# Patient Record
Sex: FEMALE | Race: WHITE | NOT HISPANIC OR LATINO | Employment: FULL TIME | URBAN - METROPOLITAN AREA
[De-identification: names, ages, dates, MRNs, and addresses within clinical notes are randomized per-mention and may not be internally consistent; named-entity substitution may affect disease eponyms.]

---

## 2018-04-24 ENCOUNTER — APPOINTMENT (OUTPATIENT)
Dept: GENERAL RADIOLOGY | Facility: HOSPITAL | Age: 38
End: 2018-04-24

## 2018-04-24 ENCOUNTER — HOSPITAL ENCOUNTER (EMERGENCY)
Facility: HOSPITAL | Age: 38
Discharge: HOME | End: 2018-04-24
Attending: EMERGENCY MEDICINE | Admitting: EMERGENCY MEDICINE

## 2018-04-24 ENCOUNTER — APPOINTMENT (OUTPATIENT)
Dept: CT IMAGING | Facility: HOSPITAL | Age: 38
End: 2018-04-24

## 2018-04-24 VITALS
HEIGHT: 63 IN | WEIGHT: 185 LBS | HEART RATE: 77 BPM | SYSTOLIC BLOOD PRESSURE: 129 MMHG | DIASTOLIC BLOOD PRESSURE: 73 MMHG | BODY MASS INDEX: 32.78 KG/M2 | OXYGEN SATURATION: 99 % | RESPIRATION RATE: 18 BRPM | TEMPERATURE: 97.9 F

## 2018-04-24 DIAGNOSIS — M25.551 RIGHT HIP PAIN: ICD-10-CM
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
## 2018-04-24 DIAGNOSIS — S36.60XA RECTUM INJURY, INITIAL ENCOUNTER: ICD-10-CM
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
## 2018-04-24 LAB
ALBUMIN SERPL-MCNC: 4.4 G/DL (ref 3.2–4.8)
ALBUMIN/GLOB SERPL: 1.4 G/DL (ref 1.5–2.5)
ALP SERPL-CCNC: 95 U/L (ref 25–100)
ALT SERPL W P-5'-P-CCNC: 39 U/L (ref 7–40)
AMORPH URATE CRY URNS QL MICRO: ABNORMAL /HPF
ANION GAP SERPL CALCULATED.3IONS-SCNC: 8 MMOL/L (ref 3–11)
AST SERPL-CCNC: 41 U/L (ref 0–33)
B-HCG UR QL: NEGATIVE
BACTERIA UR QL AUTO: ABNORMAL /HPF
BASOPHILS # BLD AUTO: 0.01 10*3/MM3 (ref 0–0.2)
BASOPHILS NFR BLD AUTO: 0.1 % (ref 0–1)
BILIRUB SERPL-MCNC: 0.5 MG/DL (ref 0.3–1.2)
BILIRUB UR QL STRIP: NEGATIVE
BUN BLD-MCNC: 9 MG/DL (ref 9–23)
BUN/CREAT SERPL: 11.3 (ref 7–25)
CALCIUM SPEC-SCNC: 9.4 MG/DL (ref 8.7–10.4)
CHLORIDE SERPL-SCNC: 104 MMOL/L (ref 99–109)
CLARITY UR: ABNORMAL
CO2 SERPL-SCNC: 26 MMOL/L (ref 20–31)
COLOR UR: ABNORMAL
CREAT BLD-MCNC: 0.8 MG/DL (ref 0.6–1.3)
DEPRECATED RDW RBC AUTO: 42.5 FL (ref 37–54)
EOSINOPHIL # BLD AUTO: 0.06 10*3/MM3 (ref 0–0.3)
EOSINOPHIL NFR BLD AUTO: 0.7 % (ref 0–3)
ERYTHROCYTE [DISTWIDTH] IN BLOOD BY AUTOMATED COUNT: 12.4 % (ref 11.3–14.5)
ETHANOL BLD-MCNC: 37 MG/DL (ref 0–10)
GFR SERPL CREATININE-BSD FRML MDRD: 81 ML/MIN/1.73
GLOBULIN UR ELPH-MCNC: 3.1 GM/DL
GLUCOSE BLD-MCNC: 96 MG/DL (ref 70–100)
GLUCOSE UR STRIP-MCNC: NEGATIVE MG/DL
HCT VFR BLD AUTO: 38.1 % (ref 34.5–44)
HGB BLD-MCNC: 13.1 G/DL (ref 11.5–15.5)
HGB UR QL STRIP.AUTO: ABNORMAL
HOLD SPECIMEN: NORMAL
HOLD SPECIMEN: NORMAL
HYALINE CASTS UR QL AUTO: ABNORMAL /LPF
IMM GRANULOCYTES # BLD: 0.02 10*3/MM3 (ref 0–0.03)
IMM GRANULOCYTES NFR BLD: 0.2 % (ref 0–0.6)
INTERNAL NEGATIVE CONTROL: NEGATIVE
INTERNAL POSITIVE CONTROL: POSITIVE
KETONES UR QL STRIP: ABNORMAL
LEUKOCYTE ESTERASE UR QL STRIP.AUTO: ABNORMAL
LYMPHOCYTES # BLD AUTO: 1.8 10*3/MM3 (ref 0.6–4.8)
LYMPHOCYTES NFR BLD AUTO: 22.3 % (ref 24–44)
Lab: NORMAL
MCH RBC QN AUTO: 31.9 PG (ref 27–31)
MCHC RBC AUTO-ENTMCNC: 34.4 G/DL (ref 32–36)
MCV RBC AUTO: 92.7 FL (ref 80–99)
MONOCYTES # BLD AUTO: 0.5 10*3/MM3 (ref 0–1)
MONOCYTES NFR BLD AUTO: 6.2 % (ref 0–12)
NEUTROPHILS # BLD AUTO: 5.71 10*3/MM3 (ref 1.5–8.3)
NEUTROPHILS NFR BLD AUTO: 70.7 % (ref 41–71)
NITRITE UR QL STRIP: NEGATIVE
OVAL FAT BODIES #/AREA URNS AUTO: ABNORMAL /HPF
PH UR STRIP.AUTO: <=5 [PH] (ref 5–8)
PLATELET # BLD AUTO: 278 10*3/MM3 (ref 150–450)
PMV BLD AUTO: 9.8 FL (ref 6–12)
POTASSIUM BLD-SCNC: 3.8 MMOL/L (ref 3.5–5.5)
PROT SERPL-MCNC: 7.5 G/DL (ref 5.7–8.2)
PROT UR QL STRIP: ABNORMAL
RBC # BLD AUTO: 4.11 10*6/MM3 (ref 3.89–5.14)
RBC # UR: ABNORMAL /HPF
REF LAB TEST METHOD: ABNORMAL
SODIUM BLD-SCNC: 138 MMOL/L (ref 132–146)
SP GR UR STRIP: 1.03 (ref 1–1.03)
SQUAMOUS #/AREA URNS HPF: ABNORMAL /HPF
UROBILINOGEN UR QL STRIP: ABNORMAL
WBC NRBC COR # BLD: 8.08 10*3/MM3 (ref 3.5–10.8)
WBC UR QL AUTO: ABNORMAL /HPF
WHOLE BLOOD HOLD SPECIMEN: NORMAL
WHOLE BLOOD HOLD SPECIMEN: NORMAL

## 2018-04-24 PROCEDURE — 80307 DRUG TEST PRSMV CHEM ANLYZR: CPT | Performed by: PHYSICIAN ASSISTANT

## 2018-04-24 PROCEDURE — 87086 URINE CULTURE/COLONY COUNT: CPT | Performed by: PHYSICIAN ASSISTANT

## 2018-04-24 PROCEDURE — 85025 COMPLETE CBC W/AUTO DIFF WBC: CPT | Performed by: PHYSICIAN ASSISTANT

## 2018-04-24 PROCEDURE — 72192 CT PELVIS W/O DYE: CPT

## 2018-04-24 PROCEDURE — 99284 EMERGENCY DEPT VISIT MOD MDM: CPT

## 2018-04-24 PROCEDURE — 81001 URINALYSIS AUTO W/SCOPE: CPT | Performed by: PHYSICIAN ASSISTANT

## 2018-04-24 PROCEDURE — 73502 X-RAY EXAM HIP UNI 2-3 VIEWS: CPT

## 2018-04-24 PROCEDURE — 80053 COMPREHEN METABOLIC PANEL: CPT | Performed by: PHYSICIAN ASSISTANT

## 2018-04-24 PROCEDURE — 70450 CT HEAD/BRAIN W/O DYE: CPT

## 2018-04-24 RX ORDER — HYDROCODONE BITARTRATE AND ACETAMINOPHEN 5; 325 MG/1; MG/1
1 TABLET ORAL ONCE
Status: COMPLETED | OUTPATIENT
Start: 2018-04-24 | End: 2018-04-24

## 2018-04-24 RX ORDER — IBUPROFEN 800 MG/1
800 TABLET ORAL EVERY 8 HOURS PRN
Qty: 30 TABLET | Refills: 0 | Status: SHIPPED | OUTPATIENT
Start: 2018-04-24 | End: 2018-08-21

## 2018-04-24 RX ADMIN — HYDROCODONE BITARTRATE AND ACETAMINOPHEN 1 TABLET: 5; 325 TABLET ORAL at 13:54
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
## 2018-04-24 NOTE — DISCHARGE INSTRUCTIONS
Follow up with one of the physician centers below to setup primary care.    [facility], [facility], [phone], [address], [address], [place], [address]    [facility], [phone], [address], [address]    [facility], [phone], [address] [place], [address];     [facility], [phone], [address], [address]        Follow up with one of the [facility] physician groups below to setup primary care. If you have trouble following up, please call Ktaie Huerta, our transitional care nurse, at [phone].    (Dr. Brooks, Dr. Casas, Dr. Rhoades, and Dr. Bear.)  [facility], Primary Care, [phone], [address], [place] [address]    [facility], Primary Care, [phone], [address], [address] [place], [address] [facility], Primary Care, [phone], [address], [address] [place], [address] [facility], Primary Care, [phone], [address], [address] [place], [address]     [place] [facility], Primary Care, [phone], [address], [address] [place], [address]    [place] [facility], Primary Care, [phone], [address]    [place], [address] [facility], Primary Care, [phone], [address], [address] [place], [address] [facility], Primary Care, [phone], [address], [address] [place], [address] [facility]) [facility], Primary Care, [phone], [address], [address] [place], [address] [facility]  [facility], Primary Care, [phone], [address], [address] [place], [address] [place] [address]     ([facility]) [facility], Primary Care, [phone], [address], [place], [address] [place] [address], Primary Care, [phone], [address], [address] [place] [address]

## 2018-04-26 LAB — BACTERIA SPEC AEROBE CULT: NORMAL

## 2018-08-21 ENCOUNTER — HOSPITAL ENCOUNTER (EMERGENCY)
Facility: HOSPITAL | Age: 38
Discharge: HOME | End: 2018-08-21
Attending: EMERGENCY MEDICINE | Admitting: EMERGENCY MEDICINE

## 2018-08-21 VITALS
SYSTOLIC BLOOD PRESSURE: 129 MMHG | HEIGHT: 63 IN | BODY MASS INDEX: 31.54 KG/M2 | RESPIRATION RATE: 16 BRPM | WEIGHT: 178 LBS | HEART RATE: 60 BPM | OXYGEN SATURATION: 100 % | DIASTOLIC BLOOD PRESSURE: 57 MMHG | TEMPERATURE: 98 F

## 2018-08-21 DIAGNOSIS — Z33.1 IUP (INTRAUTERINE PREGNANCY), INCIDENTAL: ICD-10-CM

## 2018-08-21 DIAGNOSIS — A59.01 TRICHOMONAS VAGINITIS: ICD-10-CM

## 2018-08-21 DIAGNOSIS — F11.93 OPIATE WITHDRAWAL (HCC): Primary | ICD-10-CM

## 2018-08-21 LAB
ANION GAP SERPL CALCULATED.3IONS-SCNC: 7 MMOL/L (ref 3–11)
BACTERIA UR QL AUTO: ABNORMAL /HPF
BASOPHILS # BLD AUTO: 0.01 10*3/MM3 (ref 0–0.2)
BASOPHILS NFR BLD AUTO: 0.2 % (ref 0–1)
BILIRUB UR QL STRIP: NEGATIVE
BUN BLD-MCNC: 7 MG/DL (ref 9–23)
BUN/CREAT SERPL: 12.5 (ref 7–25)
CALCIUM SPEC-SCNC: 9.2 MG/DL (ref 8.7–10.4)
CHLORIDE SERPL-SCNC: 107 MMOL/L (ref 99–109)
CLARITY UR: CLEAR
CLUE CELLS SPEC QL WET PREP: ABNORMAL
CO2 SERPL-SCNC: 23 MMOL/L (ref 20–31)
COLOR UR: YELLOW
CREAT BLD-MCNC: 0.56 MG/DL (ref 0.6–1.3)
DEPRECATED RDW RBC AUTO: 43.6 FL (ref 37–54)
EOSINOPHIL # BLD AUTO: 0.05 10*3/MM3 (ref 0–0.3)
EOSINOPHIL NFR BLD AUTO: 0.9 % (ref 0–3)
ERYTHROCYTE [DISTWIDTH] IN BLOOD BY AUTOMATED COUNT: 12.5 % (ref 11.3–14.5)
GFR SERPL CREATININE-BSD FRML MDRD: 122 ML/MIN/1.73
GLUCOSE BLD-MCNC: 94 MG/DL (ref 70–100)
GLUCOSE UR STRIP-MCNC: NEGATIVE MG/DL
HCT VFR BLD AUTO: 38.5 % (ref 34.5–44)
HGB BLD-MCNC: 13.3 G/DL (ref 11.5–15.5)
HGB UR QL STRIP.AUTO: NEGATIVE
HOLD SPECIMEN: NORMAL
HOLD SPECIMEN: NORMAL
HYALINE CASTS UR QL AUTO: ABNORMAL /LPF
HYDATID CYST SPEC WET PREP: ABNORMAL
IMM GRANULOCYTES # BLD: 0.02 10*3/MM3 (ref 0–0.03)
IMM GRANULOCYTES NFR BLD: 0.4 % (ref 0–0.6)
KETONES UR QL STRIP: ABNORMAL
KOH PREP NAIL: NORMAL
LEUKOCYTE ESTERASE UR QL STRIP.AUTO: ABNORMAL
LYMPHOCYTES # BLD AUTO: 1.46 10*3/MM3 (ref 0.6–4.8)
LYMPHOCYTES NFR BLD AUTO: 27.5 % (ref 24–44)
MCH RBC QN AUTO: 32.8 PG (ref 27–31)
MCHC RBC AUTO-ENTMCNC: 34.5 G/DL (ref 32–36)
MCV RBC AUTO: 95.1 FL (ref 80–99)
MONOCYTES # BLD AUTO: 0.31 10*3/MM3 (ref 0–1)
MONOCYTES NFR BLD AUTO: 5.8 % (ref 0–12)
NEUTROPHILS # BLD AUTO: 3.47 10*3/MM3 (ref 1.5–8.3)
NEUTROPHILS NFR BLD AUTO: 65.6 % (ref 41–71)
NITRITE UR QL STRIP: NEGATIVE
PH UR STRIP.AUTO: 5.5 [PH] (ref 5–8)
PLATELET # BLD AUTO: 201 10*3/MM3 (ref 150–450)
PMV BLD AUTO: 10.5 FL (ref 6–12)
POTASSIUM BLD-SCNC: 3.7 MMOL/L (ref 3.5–5.5)
PROT UR QL STRIP: NEGATIVE
RBC # BLD AUTO: 4.05 10*6/MM3 (ref 3.89–5.14)
RBC # UR: ABNORMAL /HPF
REF LAB TEST METHOD: ABNORMAL
SODIUM BLD-SCNC: 137 MMOL/L (ref 132–146)
SP GR UR STRIP: 1.02 (ref 1–1.03)
SQUAMOUS #/AREA URNS HPF: ABNORMAL /HPF
T VAGINALIS SPEC QL WET PREP: ABNORMAL
UROBILINOGEN UR QL STRIP: ABNORMAL
WBC NRBC COR # BLD: 5.3 10*3/MM3 (ref 3.5–10.8)
WBC SPEC QL WET PREP: ABNORMAL
WBC UR QL AUTO: ABNORMAL /HPF
WHOLE BLOOD HOLD SPECIMEN: NORMAL
WHOLE BLOOD HOLD SPECIMEN: NORMAL
YEAST GENITAL QL WET PREP: ABNORMAL

## 2018-08-21 PROCEDURE — 87220 TISSUE EXAM FOR FUNGI: CPT | Performed by: EMERGENCY MEDICINE

## 2018-08-21 PROCEDURE — 87086 URINE CULTURE/COLONY COUNT: CPT | Performed by: EMERGENCY MEDICINE

## 2018-08-21 PROCEDURE — 87210 SMEAR WET MOUNT SALINE/INK: CPT | Performed by: EMERGENCY MEDICINE

## 2018-08-21 PROCEDURE — 25010000002 ONDANSETRON PER 1 MG: Performed by: EMERGENCY MEDICINE

## 2018-08-21 PROCEDURE — 85025 COMPLETE CBC W/AUTO DIFF WBC: CPT | Performed by: EMERGENCY MEDICINE

## 2018-08-21 PROCEDURE — 96374 THER/PROPH/DIAG INJ IV PUSH: CPT

## 2018-08-21 PROCEDURE — 80048 BASIC METABOLIC PNL TOTAL CA: CPT | Performed by: EMERGENCY MEDICINE

## 2018-08-21 PROCEDURE — 87591 N.GONORRHOEAE DNA AMP PROB: CPT | Performed by: EMERGENCY MEDICINE

## 2018-08-21 PROCEDURE — 99284 EMERGENCY DEPT VISIT MOD MDM: CPT

## 2018-08-21 PROCEDURE — 87491 CHLMYD TRACH DNA AMP PROBE: CPT | Performed by: EMERGENCY MEDICINE

## 2018-08-21 PROCEDURE — 81001 URINALYSIS AUTO W/SCOPE: CPT | Performed by: EMERGENCY MEDICINE

## 2018-08-21 RX ORDER — METRONIDAZOLE 500 MG/1
2000 TABLET ORAL ONCE
Status: COMPLETED | OUTPATIENT
Start: 2018-08-21 | End: 2018-08-21

## 2018-08-21 RX ORDER — ONDANSETRON HYDROCHLORIDE 8 MG/1
8 TABLET, FILM COATED ORAL EVERY 8 HOURS PRN
Qty: 10 TABLET | Refills: 0 | Status: SHIPPED | OUTPATIENT
Start: 2018-08-21 | End: 2019-03-14 | Stop reason: HOSPADM

## 2018-08-21 RX ORDER — NICOTINE 21 MG/24HR
1 PATCH, TRANSDERMAL 24 HOURS TRANSDERMAL
Status: DISCONTINUED | OUTPATIENT
Start: 2018-08-21 | End: 2018-08-21 | Stop reason: HOSPADM

## 2018-08-21 RX ORDER — NICOTINE 21 MG/24HR
1 PATCH, TRANSDERMAL 24 HOURS TRANSDERMAL EVERY 24 HOURS
Qty: 7 PATCH | Refills: 0 | Status: SHIPPED | OUTPATIENT
Start: 2018-08-21 | End: 2018-09-17 | Stop reason: SINTOL

## 2018-08-21 RX ORDER — SODIUM CHLORIDE 0.9 % (FLUSH) 0.9 %
10 SYRINGE (ML) INJECTION AS NEEDED
Status: DISCONTINUED | OUTPATIENT
Start: 2018-08-21 | End: 2018-08-21 | Stop reason: HOSPADM

## 2018-08-21 RX ORDER — ONDANSETRON 2 MG/ML
4 INJECTION INTRAMUSCULAR; INTRAVENOUS ONCE
Status: COMPLETED | OUTPATIENT
Start: 2018-08-21 | End: 2018-08-21

## 2018-08-21 RX ADMIN — ONDANSETRON 4 MG: 2 INJECTION INTRAMUSCULAR; INTRAVENOUS at 11:53

## 2018-08-21 RX ADMIN — SODIUM CHLORIDE 500 ML: 9 INJECTION, SOLUTION INTRAVENOUS at 11:52

## 2018-08-21 RX ADMIN — METRONIDAZOLE 2000 MG: 500 TABLET ORAL at 12:37

## 2018-08-21 NOTE — ED PROVIDER NOTES
Subjective   Frieda Flores is a 38 y.o.female who presents to the ED with c/o abdominal pain with onset 4 days ago. She is currently at the [facility] for the past two days for narcotic detox. She has not used an opiates in 4 days and is starting to experience withdrawal symptoms. She took a positive home pregnancy test 3 weeks ago because her boyfriend told her she was pregnant. She is currently 10 weeks gestation that was confirmed by an US at [facility] and has an appointment with Dr. King, OB-GYN in one week. She is experiencing pelvic pain that was present before going into to opiate withdrawal. She has lower abdominal cramping along with a white creamy vaginal discharge and foul smelling odor but denies dysuria or vaginal bleeding. She c/o constant nausea and recurrent vomiting for 2 days. She has been unable to keep anything down PO and feels dehydrated. She has intermittent alternating fevers and chills. She experiences dizziness and light-headedness upon standing and reports a sensitivity to light and sound. She has a h/o IV drug use and gonorrhea.         History provided by:  Patient  Abdominal Pain   Pain location:  Suprapubic  Pain quality: cramping    Pain radiation: pelvis.  Pain severity:  Moderate  Onset quality:  Gradual  Duration:  4 days  Timing:  Constant  Progression:  Worsening  Chronicity:  New  Context: medication withdrawal    Context comment:  Pregnancy  Relieved by:  Nothing  Worsened by:  Nothing  Ineffective treatments:  None tried  Associated symptoms: chills, fever, nausea, vaginal discharge and vomiting    Associated symptoms: no dysuria and no vaginal bleeding    Risk factors: pregnancy        Review of Systems   Constitutional: Positive for appetite change, chills and fever.   Eyes: Positive for photophobia.   Gastrointestinal: Positive for abdominal pain, nausea and vomiting.   Genitourinary: Positive for pelvic pain and vaginal discharge. Negative for dysuria and  vaginal bleeding.   Neurological: Positive for dizziness and light-headedness.   All other systems reviewed and are negative.      Past Medical History:   Diagnosis Date   • Anxiety    • Hepatitis B    • Hepatitis C    • PTSD (post-traumatic stress disorder)        No Known Allergies    History reviewed. No pertinent surgical history.    History reviewed. No pertinent family history.    Social History     Social History   • Marital status:      Social History Main Topics   • Smoking status: Current Every Day Smoker     Packs/day: 1.50     Types: Cigarettes   • Smokeless tobacco: Never Used   • Alcohol use Yes      Comment: prior to four days ago patient states she was drinking 1/5 of vodka a day.   • Drug use: Yes     Types: IV      Comment: patient uses heroin, and crack cocaine   • Sexual activity: Yes     Other Topics Concern   • Not on file         Objective   Physical Exam   Constitutional: She is oriented to person, place, and time. She appears well-developed and well-nourished. No distress.   HENT:   Head: Normocephalic and atraumatic.   Right Ear: External ear normal.   Left Ear: External ear normal.   Nose: Nose normal.   Eyes: Conjunctivae are normal. No scleral icterus.   Neck: Normal range of motion. Neck supple. No thyromegaly present.   Cardiovascular: Normal rate, regular rhythm and normal heart sounds.    No murmur heard.  Pulmonary/Chest: Effort normal and breath sounds normal. No respiratory distress. She has no wheezes. She has no rales.   Abdominal: Soft. Bowel sounds are normal. She exhibits no distension. There is tenderness. There is no guarding.   Lower abdominal and suprapubic tenderness.    Genitourinary:   Genitourinary Comments: White creamy discharge.   Negative CMT.  Uterus approximately 10 weeks in size and non tender.  Right adnexa mildly tenderness.   Musculoskeletal: Normal range of motion. She exhibits no edema or tenderness.   No pretibial edema.    Lymphadenopathy:     She  has no cervical adenopathy.   Neurological: She is alert and oriented to person, place, and time.   Skin: Skin is warm and dry. She is not diaphoretic.   Vitiligo present.   Psychiatric: She has a normal mood and affect. Her behavior is normal.   Nursing note and vitals reviewed.      Procedures         ED Course      Recent Results (from the past 24 hour(s))   Light Blue Top    Collection Time: 08/21/18 11:00 AM   Result Value Ref Range    Extra Tube hold for add-on    Green Top (Gel)    Collection Time: 08/21/18 11:00 AM   Result Value Ref Range    Extra Tube Hold for add-ons.    Lavender Top    Collection Time: 08/21/18 11:00 AM   Result Value Ref Range    Extra Tube hold for add-on    Gold Top - SST    Collection Time: 08/21/18 11:00 AM   Result Value Ref Range    Extra Tube Hold for add-ons.    Basic Metabolic Panel    Collection Time: 08/21/18 11:00 AM   Result Value Ref Range    Glucose 94 70 - 100 mg/dL    BUN 7 (L) 9 - 23 mg/dL    Creatinine 0.56 (L) 0.60 - 1.30 mg/dL    Sodium 137 132 - 146 mmol/L    Potassium 3.7 3.5 - 5.5 mmol/L    Chloride 107 99 - 109 mmol/L    CO2 23.0 20.0 - 31.0 mmol/L    Calcium 9.2 8.7 - 10.4 mg/dL    eGFR Non African Amer 122 >60 mL/min/1.73    BUN/Creatinine Ratio 12.5 7.0 - 25.0    Anion Gap 7.0 3.0 - 11.0 mmol/L   CBC Auto Differential    Collection Time: 08/21/18 11:00 AM   Result Value Ref Range    WBC 5.30 3.50 - 10.80 10*3/mm3    RBC 4.05 3.89 - 5.14 10*6/mm3    Hemoglobin 13.3 11.5 - 15.5 g/dL    Hematocrit 38.5 34.5 - 44.0 %    MCV 95.1 80.0 - 99.0 fL    MCH 32.8 (H) 27.0 - 31.0 pg    MCHC 34.5 32.0 - 36.0 g/dL    RDW 12.5 11.3 - 14.5 %    RDW-SD 43.6 37.0 - 54.0 fl    MPV 10.5 6.0 - 12.0 fL    Platelets 201 150 - 450 10*3/mm3    Neutrophil % 65.6 41.0 - 71.0 %    Lymphocyte % 27.5 24.0 - 44.0 %    Monocyte % 5.8 0.0 - 12.0 %    Eosinophil % 0.9 0.0 - 3.0 %    Basophil % 0.2 0.0 - 1.0 %    Immature Grans % 0.4 0.0 - 0.6 %    Neutrophils, Absolute 3.47 1.50 - 8.30  10*3/mm3    Lymphocytes, Absolute 1.46 0.60 - 4.80 10*3/mm3    Monocytes, Absolute 0.31 0.00 - 1.00 10*3/mm3    Eosinophils, Absolute 0.05 0.00 - 0.30 10*3/mm3    Basophils, Absolute 0.01 0.00 - 0.20 10*3/mm3    Immature Grans, Absolute 0.02 0.00 - 0.03 10*3/mm3   Urinalysis With Culture If Indicated - Urine, Catheter    Collection Time: 08/21/18 11:26 AM   Result Value Ref Range    Color, UA Yellow Yellow, Straw    Appearance, UA Clear Clear    pH, UA 5.5 5.0 - 8.0    Specific Gravity, UA 1.016 1.001 - 1.030    Glucose, UA Negative Negative    Ketones, UA Trace (A) Negative    Bilirubin, UA Negative Negative    Blood, UA Negative Negative    Protein, UA Negative Negative    Leuk Esterase, UA Small (1+) (A) Negative    Nitrite, UA Negative Negative    Urobilinogen, UA 1.0 E.U./dL 0.2 - 1.0 E.U./dL   Urinalysis, Microscopic Only - Urine, Clean Catch    Collection Time: 08/21/18 11:26 AM   Result Value Ref Range    RBC, UA None Seen None Seen, 0-2 /HPF    WBC, UA 0-2 None Seen, 0-2 /HPF    Bacteria, UA None Seen None Seen, Trace /HPF    Squamous Epithelial Cells, UA 3-6 (A) None Seen, 0-2 /HPF    Hyaline Casts, UA 0-6 0 - 6 /LPF    Methodology Manual Light Microscopy    YOUNG Prep - Swab, Vagina    Collection Time: 08/21/18 11:27 AM   Result Value Ref Range    KOH Prep No yeast or hyphal elements seen No yeast or hyphal elements seen   Wet Prep, Genital - Swab, Vagina    Collection Time: 08/21/18 11:27 AM   Result Value Ref Range    YEAST No yeast seen No yeast seen    HYPHAL ELEMENTS No Hyphal elements seen No Hyphal elements seen    WBC'S 3+ WBC's seen (A) No WBC's seen    Clue Cells, Wet Prep 1+ Clue cells seen (A) No Clue cells seen    Trichomonas, Wet Prep 3+ Trichomonas seen (A) No Trichomonas seen     Note: In addition to lab results from this visit, the labs listed above may include labs taken at another facility or during a different encounter within the last 24 hours. Please correlate lab times with ED  "admission and discharge times for further clarification of the services performed during this visit.    No orders to display     Vitals:    08/21/18 1043   BP: 129/57   BP Location: Left arm   Patient Position: Sitting   Pulse: 60   Resp: 16   Temp: 98 °F (36.7 °C)   TempSrc: Oral   SpO2: 100%   Weight: 80.7 kg (178 lb)   Height: 160 cm (63\")     Medications   sodium chloride 0.9 % bolus 500 mL (0 mL Intravenous Stopped 8/21/18 1233)   ondansetron (ZOFRAN) injection 4 mg (4 mg Intravenous Given 8/21/18 1153)   metroNIDAZOLE (FLAGYL) tablet 2,000 mg (2,000 mg Oral Given 8/21/18 1237)     ECG/EMG Results (last 24 hours)     ** No results found for the last 24 hours. **                          [facility]    Final diagnoses:   Trichomonas vaginitis   Opiate withdrawal (CMS/[facility])   IUP (intrauterine pregnancy), incidental       Documentation assistance provided by alberto Newman.  Information recorded by the scribe was done at my direction and has been verified and validated by me.     Martin Newman  08/21/18 4093       Vikash Wallace MD  08/21/18 9529    "

## 2018-08-21 NOTE — DISCHARGE INSTRUCTIONS
Continue to stay off recreational drugs.    Notify sexual partner to be treated for trichomonas.     Follow up with Dr. Kendall King as scheduled.     Return if worse.

## 2018-08-23 LAB
BACTERIA SPEC AEROBE CULT: NORMAL
C TRACH RRNA SPEC DONR QL NAA+PROBE: NEGATIVE
N GONORRHOEA DNA SPEC QL NAA+PROBE: NEGATIVE

## 2018-08-28 ENCOUNTER — LAB (OUTPATIENT)
Dept: LAB | Facility: HOSPITAL | Age: 38
End: 2018-08-28

## 2018-08-28 ENCOUNTER — RESULTS ENCOUNTER (OUTPATIENT)
Dept: OBSTETRICS AND GYNECOLOGY | Facility: CLINIC | Age: 38
End: 2018-08-28

## 2018-08-28 ENCOUNTER — INITIAL PRENATAL (OUTPATIENT)
Dept: OBSTETRICS AND GYNECOLOGY | Facility: CLINIC | Age: 38
End: 2018-08-28

## 2018-08-28 VITALS — WEIGHT: 180 LBS | BODY MASS INDEX: 31.89 KG/M2 | DIASTOLIC BLOOD PRESSURE: 76 MMHG | SYSTOLIC BLOOD PRESSURE: 120 MMHG

## 2018-08-28 DIAGNOSIS — Z34.81 PRENATAL CARE, SUBSEQUENT PREGNANCY, FIRST TRIMESTER: ICD-10-CM

## 2018-08-28 DIAGNOSIS — F39 MOOD DISORDER (HCC): ICD-10-CM

## 2018-08-28 DIAGNOSIS — F11.21 OPIOID DEPENDENCE IN REMISSION (HCC): ICD-10-CM

## 2018-08-28 DIAGNOSIS — Z34.81 PRENATAL CARE, SUBSEQUENT PREGNANCY, FIRST TRIMESTER: Primary | ICD-10-CM

## 2018-08-28 DIAGNOSIS — Z72.0 TOBACCO ABUSE: ICD-10-CM

## 2018-08-28 DIAGNOSIS — O09.529 ANTEPARTUM MULTIGRAVIDA OF ADVANCED MATERNAL AGE: ICD-10-CM

## 2018-08-28 DIAGNOSIS — F10.21 ALCOHOLISM IN REMISSION (HCC): ICD-10-CM

## 2018-08-28 DIAGNOSIS — Z36.89 ENCOUNTER TO ESTABLISH GESTATIONAL AGE USING ULTRASOUND: Primary | ICD-10-CM

## 2018-08-28 PROBLEM — R79.89 ABNORMAL LIVER FUNCTION TESTS: Status: ACTIVE | Noted: 2018-08-28

## 2018-08-28 LAB
ABO GROUP BLD: NORMAL
ALBUMIN SERPL-MCNC: 4.27 G/DL (ref 3.2–4.8)
ALBUMIN/GLOB SERPL: 1.6 G/DL (ref 1.5–2.5)
ALP SERPL-CCNC: 70 U/L (ref 25–100)
ALT SERPL W P-5'-P-CCNC: 268 U/L (ref 7–40)
ANION GAP SERPL CALCULATED.3IONS-SCNC: 9 MMOL/L (ref 3–11)
AST SERPL-CCNC: 98 U/L (ref 0–33)
BASOPHILS # BLD AUTO: 0.02 10*3/MM3 (ref 0–0.2)
BASOPHILS NFR BLD AUTO: 0.3 % (ref 0–1)
BILIRUB SERPL-MCNC: 0.3 MG/DL (ref 0.3–1.2)
BILIRUB UR QL STRIP: NEGATIVE
BLD GP AB SCN SERPL QL: NEGATIVE
BUN BLD-MCNC: 8 MG/DL (ref 9–23)
BUN/CREAT SERPL: 14.8 (ref 7–25)
CALCIUM SPEC-SCNC: 9.3 MG/DL (ref 8.7–10.4)
CHLORIDE SERPL-SCNC: 105 MMOL/L (ref 99–109)
CLARITY UR: CLEAR
CO2 SERPL-SCNC: 22 MMOL/L (ref 20–31)
COLOR UR: YELLOW
CREAT BLD-MCNC: 0.54 MG/DL (ref 0.6–1.3)
DEPRECATED RDW RBC AUTO: 43.2 FL (ref 37–54)
EOSINOPHIL # BLD AUTO: 0.09 10*3/MM3 (ref 0–0.3)
EOSINOPHIL NFR BLD AUTO: 1.2 % (ref 0–3)
ERYTHROCYTE [DISTWIDTH] IN BLOOD BY AUTOMATED COUNT: 12.4 % (ref 11.3–14.5)
GFR SERPL CREATININE-BSD FRML MDRD: 127 ML/MIN/1.73
GLOBULIN UR ELPH-MCNC: 2.6 GM/DL
GLUCOSE BLD-MCNC: 101 MG/DL (ref 70–100)
GLUCOSE UR STRIP-MCNC: NEGATIVE MG/DL
HBA1C MFR BLD: 5.1 % (ref 4.8–5.6)
HBV SURFACE AG SERPL QL IA: NORMAL
HCT VFR BLD AUTO: 36.2 % (ref 34.5–44)
HCV AB SER DONR QL: REACTIVE
HGB BLD-MCNC: 12.3 G/DL (ref 11.5–15.5)
HGB UR QL STRIP.AUTO: NEGATIVE
HIV1+2 AB SER QL: NORMAL
IMM GRANULOCYTES # BLD: 0.06 10*3/MM3 (ref 0–0.03)
IMM GRANULOCYTES NFR BLD: 0.8 % (ref 0–0.6)
KETONES UR QL STRIP: NEGATIVE
LEUKOCYTE ESTERASE UR QL STRIP.AUTO: NEGATIVE
LYMPHOCYTES # BLD AUTO: 2.12 10*3/MM3 (ref 0.6–4.8)
LYMPHOCYTES NFR BLD AUTO: 28 % (ref 24–44)
MCH RBC QN AUTO: 32.6 PG (ref 27–31)
MCHC RBC AUTO-ENTMCNC: 34 G/DL (ref 32–36)
MCV RBC AUTO: 96 FL (ref 80–99)
MONOCYTES # BLD AUTO: 0.46 10*3/MM3 (ref 0–1)
MONOCYTES NFR BLD AUTO: 6.1 % (ref 0–12)
NEUTROPHILS # BLD AUTO: 4.87 10*3/MM3 (ref 1.5–8.3)
NEUTROPHILS NFR BLD AUTO: 64.4 % (ref 41–71)
NITRITE UR QL STRIP: NEGATIVE
PH UR STRIP.AUTO: 7.5 [PH] (ref 5–8)
PLATELET # BLD AUTO: 244 10*3/MM3 (ref 150–450)
PMV BLD AUTO: 10.2 FL (ref 6–12)
POTASSIUM BLD-SCNC: 4.2 MMOL/L (ref 3.5–5.5)
PROT SERPL-MCNC: 6.9 G/DL (ref 5.7–8.2)
PROT UR QL STRIP: NEGATIVE
RBC # BLD AUTO: 3.77 10*6/MM3 (ref 3.89–5.14)
REF LAB TEST METHOD: NORMAL
RH BLD: POSITIVE
RUBV IGG SER QL: NORMAL
RUBV IGG SER-ACNC: 224.1 IU/ML
SODIUM BLD-SCNC: 136 MMOL/L (ref 132–146)
SP GR UR STRIP: 1.01 (ref 1–1.03)
TSH SERPL DL<=0.05 MIU/L-ACNC: 1.42 MIU/ML (ref 0.35–5.35)
UROBILINOGEN UR QL STRIP: NORMAL
WBC NRBC COR # BLD: 7.56 10*3/MM3 (ref 3.5–10.8)

## 2018-08-28 PROCEDURE — 81003 URINALYSIS AUTO W/O SCOPE: CPT | Performed by: OBSTETRICS & GYNECOLOGY

## 2018-08-28 PROCEDURE — 80081 OBSTETRIC PANEL INC HIV TSTG: CPT | Performed by: OBSTETRICS & GYNECOLOGY

## 2018-08-28 PROCEDURE — 86803 HEPATITIS C AB TEST: CPT | Performed by: OBSTETRICS & GYNECOLOGY

## 2018-08-28 PROCEDURE — 99204 OFFICE O/P NEW MOD 45 MIN: CPT | Performed by: OBSTETRICS & GYNECOLOGY

## 2018-08-28 PROCEDURE — 83036 HEMOGLOBIN GLYCOSYLATED A1C: CPT | Performed by: OBSTETRICS & GYNECOLOGY

## 2018-08-28 PROCEDURE — 80053 COMPREHEN METABOLIC PANEL: CPT | Performed by: OBSTETRICS & GYNECOLOGY

## 2018-08-28 PROCEDURE — 87522 HEPATITIS C REVRS TRNSCRPJ: CPT | Performed by: OBSTETRICS & GYNECOLOGY

## 2018-08-28 PROCEDURE — 36415 COLL VENOUS BLD VENIPUNCTURE: CPT | Performed by: OBSTETRICS & GYNECOLOGY

## 2018-08-28 PROCEDURE — 84443 ASSAY THYROID STIM HORMONE: CPT | Performed by: OBSTETRICS & GYNECOLOGY

## 2018-08-28 PROCEDURE — 86480 TB TEST CELL IMMUN MEASURE: CPT | Performed by: OBSTETRICS & GYNECOLOGY

## 2018-08-28 RX ORDER — PRENATAL VIT/IRON FUM/FOLIC AC 27MG-0.8MG
1 TABLET ORAL DAILY
Qty: 30 TABLET | Refills: 5 | Status: SHIPPED | OUTPATIENT
Start: 2018-08-28 | End: 2021-02-02

## 2018-08-28 NOTE — PROGRESS NOTES
Subjective     Chief Complaint   Patient presents with   • Initial Prenatal Visit     NOB appt recovering from substance and alcohol.  Patient last drank 10 days and used heroin a couple of weeks ago       Frieda Flores is a 38 y.o. year old .  No LMP recorded (lmp unknown). Patient is pregnant..  She presents to be seen to initiate prenatal care with our practice.    She is currently having problems with mood  As it relates to the pregnancy, she has concerns regarding management of mood, incapacitating anxiety and irritability in the context of long-standing alcoholism in recent remission and long-standing polysubstance abuse, primarily opioids in remission. Recent detox at [facility] followed by near immediate relapse while at [facility]. Presented herself to [facility] and detoxed there again alone. Also concerned about AMA status. We discussed these risks and diagnostic and screening tools. She is a smoker and trying to cut down.    The following portions of the patient's history were reviewed and updated as appropriate:vital signs, allergies, current medications, past medical history, past social history, past surgical history and problem list.    A 14 point review of systems was negative except for: HPI    Objective     Physical Exam    General:  distracted  axious   Constitutional:  alert   Skin:  No suspicious lesions seen   Thyroid: normal to inspection and palpation   Lungs:  breathing is unlabored  clear to auscultation bilaterally   Heart:  regular rate and rhythm, S1, S2 normal, no murmur, click, rub or gallop   Breasts:  Not performed.   Abdomen: soft, non-tender; no masses  no umbilical or inginual hernias are present  no hepato-splenomegaly   Pelvis: Clinical staff was present for exam  External genitalia:  normal appearance of the external genitalia including Bartholin's and La Vernia's glands.  :  urethral meatus normal; urethral hypermobility is absent.  Vaginal:  normal pink mucosa without  prolapse or lesions.  Cervix:  normal appearance  Uterus:  normal size, shape and consistency symmetrically enlarged, consisent with 12 weeks size;  Adnexa:  normal bimanual exam of the adnexa.   Musculoskeletal: negative   Neuro: normal without focal findings, mental status, speech normal, alert and oriented x3 and JOHANNE   Psych: oriented to time, place and person, mood and affect are within normal limits, pt is a good historian; no memory problems were noted       Lab Review   No data reviewed    Imaging   No data reviewed    Assessment/Plan     ASSESSMENT  1. IUP at 11w1d  Problem List Items Addressed This Visit        Other    Alcoholism in remission (CMS/HCC)    Relevant Orders    TSH    Hemoglobin A1c    Opioid dependence in remission (CMS/HCC)    Antepartum multigravida of advanced maternal age    Relevant Orders    TSH    Hemoglobin A1c    Mood disorder (CMS/HCC)    Tobacco abuse      Other Visit Diagnoses     Prenatal care, subsequent pregnancy, first trimester    -  Primary    Relevant Orders    Gardnerella vaginalis, Trichomonas vaginalis, Candida albicans, PCR - Swab, Vagina    Liquid-based Pap Smear, Screening    HIV-1 / O / 2 Ag / Antibody 4th Generation    Obstetric Panel    Non-invasive Prenatal Testing    Pain Management Profile (13 Drugs) Urine - Urine, Clean Catch    Urinalysis With Culture If Indicated - Urine, Clean Catch (Completed)    Comprehensive Metabolic Panel    QuantiFERON TB Gold    RPR    CBC Auto Differential    Hepatitis B Surface Antigen    Rubella Antibody, IgG    OB Panel Type & Screen    Hepatitis C Antibody      2.     PLAN  1. Tests ordered today:  Orders Placed This Encounter   Procedures   • Gardnerella vaginalis, Trichomonas vaginalis, Candida albicans, PCR - Swab, Vagina   • HIV-1 / O / 2 Ag / Antibody 4th Generation   • Obstetric Panel   • Non-invasive Prenatal Testing   • Pain Management Profile (13 Drugs) Urine - Urine, Clean Catch     Standing Status:   Future      Standing Expiration Date:   8/28/2019   • Urinalysis With Culture If Indicated - Urine, Clean Catch   • Comprehensive Metabolic Panel   • QuantiFERON TB Gold     Standing Status:   Future     Number of Occurrences:   1     Standing Expiration Date:   8/28/2019   • TSH   • Hemoglobin A1c   • RPR   • CBC Auto Differential   • Hepatitis B Surface Antigen   • Rubella Antibody, IgG   • Hepatitis C Antibody   • OB Panel Type & Screen     2. Medications prescribed today:  No orders of the defined types were placed in this encounter.    3. Information reviewed: smoking in pregnancy, exercise in pregnancy, nutrition in pregnancy, weight gain in pregnancy, work and travel restrictions during pregnancy, list of OTC medications acceptable in pregnancy and call coverage groups  4. Genetic testing reviewed: NIPT ([facility])  5. The problem list for pregnancy was initiated today  6. AMA  7. Alcoholism in remission  8. Opioid dependence in remission  9. Mood disorder, needs evaluation and treatment      Follow up: 1 week(s)         This note was electronically signed.    Dani King MD  August 28, 2018

## 2018-08-28 NOTE — PROGRESS NOTES
Patient is new from [facility]. Per ARIADNE Amaya patient was positive for substances, but patient refuses that she has used any substances only alcohol. Patient is supposed to be a new subutex start.

## 2018-08-29 LAB — RPR SER QL: NORMAL

## 2018-08-30 LAB
HCV RNA SERPL NAA+PROBE-ACNC: NORMAL IU/ML
HCV RNA SERPL NAA+PROBE-LOG IU: 4.77 LOG10 IU/ML
TEST INFORMATION: NORMAL

## 2018-09-04 ENCOUNTER — LAB REQUISITION (OUTPATIENT)
Dept: LAB | Facility: HOSPITAL | Age: 38
End: 2018-09-04

## 2018-09-04 ENCOUNTER — ROUTINE PRENATAL (OUTPATIENT)
Dept: OBSTETRICS AND GYNECOLOGY | Facility: CLINIC | Age: 38
End: 2018-09-04

## 2018-09-04 VITALS — SYSTOLIC BLOOD PRESSURE: 98 MMHG | BODY MASS INDEX: 33.16 KG/M2 | DIASTOLIC BLOOD PRESSURE: 60 MMHG | WEIGHT: 187.2 LBS

## 2018-09-04 DIAGNOSIS — Z72.0 TOBACCO ABUSE: ICD-10-CM

## 2018-09-04 DIAGNOSIS — B18.2 HEPATITIS C CARRIER (HCC): ICD-10-CM

## 2018-09-04 DIAGNOSIS — Z00.00 ROUTINE GENERAL MEDICAL EXAMINATION AT A HEALTH CARE FACILITY: ICD-10-CM

## 2018-09-04 DIAGNOSIS — F10.21 ALCOHOLISM IN REMISSION (HCC): ICD-10-CM

## 2018-09-04 DIAGNOSIS — F11.21 OPIOID DEPENDENCE IN REMISSION (HCC): Primary | ICD-10-CM

## 2018-09-04 DIAGNOSIS — O09.529 ANTEPARTUM MULTIGRAVIDA OF ADVANCED MATERNAL AGE: ICD-10-CM

## 2018-09-04 PROCEDURE — 99213 OFFICE O/P EST LOW 20 MIN: CPT | Performed by: OBSTETRICS & GYNECOLOGY

## 2018-09-04 PROCEDURE — 36415 COLL VENOUS BLD VENIPUNCTURE: CPT

## 2018-09-04 RX ORDER — NITROFURANTOIN 25; 75 MG/1; MG/1
100 CAPSULE ORAL 2 TIMES DAILY
Refills: 0 | COMMUNITY
Start: 2018-08-20 | End: 2018-10-01

## 2018-09-04 RX ORDER — QUETIAPINE FUMARATE 200 MG/1
TABLET, FILM COATED ORAL
Refills: 0 | COMMUNITY
Start: 2018-08-29 | End: 2020-03-29

## 2018-09-04 NOTE — PROGRESS NOTES
Chief Complaint   Patient presents with   • Routine Prenatal Visit     pt harmony test came back stating that she needed to be redrawn. pt states that a psychiatrist put her on seroquel but she said that the bottle said use during third trimester.       HPI: Frieda is a  currently at 12w1d who today reports the following:Headache - No ; Visual change - No ; Swelling in legs - No ; Nausea - No ; Constipation - No; Diarrhea - No ; Contractions - No ; Leaking fluid - No ; Vaginal bleeding -  No.      ROS: Pertinent items are noted in HPI.  Vitals: See prenatal flowsheet     EXAM:  Abdomen: See prenatal flowsheet   Urine glucose/protein: See prenatal flowsheet   Pelvic: See prenatal flowsheet     Prenatal Labs  Lab Results   Component Value Date    HGB 12.3 2018    HEPBSAG Non-Reactive 2018    ABO A 2018    RH Positive 2018    ABSCRN Negative 2018    [id number] Non-Reactive 2018    HEPCVIRUSABY Reactive (C) 2018    URINECX No growth at 2 days 2018       MDM:  Impression:AMA, cfDNA pending. Alcoholism opioid dependence in remisssion. Mood disorder, imporved on medication. Hep C Smoker  Tests done today: none and UDS  Specific topics discussed at today's visit: tobacco use  AMA results pending or repeat  Next visit:none and UDS    I spent 15   mins out of 25 mins face to face time counselling the patient regarding   the effects of opioid addiction in pregnancy as well as the management of pregnancy with MAT of opioid addiction. We further discussed the risk and benefits of MAT the need for close monitoring and supervision of the use of Subutex in pregnancy. We discussed the risk for abuse and diversion. The effects of this medication on the  were discussed in detail including FREIDA.       Follow up: 2 week(s)         This note was electronically signed.    Dani King MD  2018

## 2018-09-05 ENCOUNTER — RESULTS ENCOUNTER (OUTPATIENT)
Dept: OBSTETRICS AND GYNECOLOGY | Facility: CLINIC | Age: 38
End: 2018-09-05

## 2018-09-05 DIAGNOSIS — F11.21 OPIOID DEPENDENCE IN REMISSION (HCC): ICD-10-CM

## 2018-09-14 ENCOUNTER — TELEPHONE (OUTPATIENT)
Dept: OBSTETRICS AND GYNECOLOGY | Facility: CLINIC | Age: 38
End: 2018-09-14

## 2018-09-14 NOTE — TELEPHONE ENCOUNTER
Patient called and wants to know [place] results. I tried to call stuart, no answer no machine.

## 2018-09-17 ENCOUNTER — ROUTINE PRENATAL (OUTPATIENT)
Dept: OBSTETRICS AND GYNECOLOGY | Facility: CLINIC | Age: 38
End: 2018-09-17

## 2018-09-17 ENCOUNTER — RESULTS ENCOUNTER (OUTPATIENT)
Dept: OBSTETRICS AND GYNECOLOGY | Facility: CLINIC | Age: 38
End: 2018-09-17

## 2018-09-17 VITALS — BODY MASS INDEX: 33.2 KG/M2 | WEIGHT: 187.4 LBS | SYSTOLIC BLOOD PRESSURE: 100 MMHG | DIASTOLIC BLOOD PRESSURE: 60 MMHG

## 2018-09-17 DIAGNOSIS — F11.21 OPIOID DEPENDENCE IN REMISSION (HCC): ICD-10-CM

## 2018-09-17 DIAGNOSIS — Z72.0 TOBACCO ABUSE: Primary | ICD-10-CM

## 2018-09-17 DIAGNOSIS — Z3A.14 14 WEEKS GESTATION OF PREGNANCY: ICD-10-CM

## 2018-09-17 DIAGNOSIS — O09.529 ANTEPARTUM MULTIGRAVIDA OF ADVANCED MATERNAL AGE: ICD-10-CM

## 2018-09-17 DIAGNOSIS — F10.21 ALCOHOLISM IN REMISSION (HCC): ICD-10-CM

## 2018-09-17 DIAGNOSIS — B18.2 HEPATITIS C CARRIER (HCC): ICD-10-CM

## 2018-09-17 PROCEDURE — 99213 OFFICE O/P EST LOW 20 MIN: CPT | Performed by: OBSTETRICS & GYNECOLOGY

## 2018-09-17 RX ORDER — POLYETHYLENE GLYCOL 3350 17 G
4 POWDER IN PACKET (EA) ORAL AS NEEDED
Qty: 24 EACH | Refills: 5 | Status: SHIPPED | OUTPATIENT
Start: 2018-09-17 | End: 2018-10-01 | Stop reason: SDDI

## 2018-09-17 NOTE — PROGRESS NOTES
Chief Complaint   Patient presents with   • Routine Prenatal Visit     pt states that she is unable to use the nicotine patches as they break her out and sting/ burn. pt wants to see if she can get the gum or lozenge.        HPI: Frieda is a  currently at 14w0d who today reports the following:Headache - No ; Visual change - No ; Swelling in legs - No ; Nausea - No ; Constipation - No; Diarrhea - No ; Contractions - No ; Leaking fluid - No ; Vaginal bleeding -  No.      ROS: Pertinent items are noted in HPI.  Vitals: See prenatal flowsheet     EXAM:  Abdomen: See prenatal flowsheet   Urine glucose/protein: See prenatal flowsheet   Pelvic: See prenatal flowsheet     Prenatal Labs  Lab Results   Component Value Date    HGB 12.3 2018    RUBELLAABIGG 224.1 2018    RUBELLAABIGG Immune 2018    HEPBSAG Non-Reactive 2018    ABO A 2018    RH Positive 2018    ABSCRN Negative 2018    [id number] Non-Reactive 2018    HEPCVIRUSABY Reactive (C) 2018    URINECX No growth at 2 days 2018       MDM:High Risk Pregnancy  Impression:Advnaced maternal age, normal screen. opioid and alcoholism in remission. Tobacco abuse will try lozenges. Needs wrist brace for carpa;l tunnel syndrome like symptoms  Tests done today: tobacco and carpal tunnel syndrome harmony results  Specific topics discussed at today's visit: none - she had no major complaints,questions or concerns  Next visit:none

## 2018-09-26 ENCOUNTER — TELEPHONE (OUTPATIENT)
Dept: OBSTETRICS AND GYNECOLOGY | Facility: CLINIC | Age: 38
End: 2018-09-26

## 2018-09-26 NOTE — TELEPHONE ENCOUNTER
DIARRHEA ALL MORNING NAUSEATED NO FEVER 15 WK 4 DAYS [address] IN CLASS UNTIL 10:30 AM PLEASE CALL AFTER

## 2018-09-30 ENCOUNTER — HOSPITAL ENCOUNTER (EMERGENCY)
Facility: HOSPITAL | Age: 38
Discharge: HOME | End: 2018-10-01
Attending: EMERGENCY MEDICINE | Admitting: EMERGENCY MEDICINE

## 2018-09-30 VITALS
RESPIRATION RATE: 18 BRPM | HEIGHT: 63 IN | BODY MASS INDEX: 33.84 KG/M2 | OXYGEN SATURATION: 100 % | DIASTOLIC BLOOD PRESSURE: 48 MMHG | WEIGHT: 191 LBS | TEMPERATURE: 98.1 F | HEART RATE: 84 BPM | SYSTOLIC BLOOD PRESSURE: 106 MMHG

## 2018-09-30 DIAGNOSIS — T78.40XA ALLERGIC REACTION TO DRUG, INITIAL ENCOUNTER: Primary | ICD-10-CM

## 2018-09-30 PROCEDURE — 96374 THER/PROPH/DIAG INJ IV PUSH: CPT

## 2018-09-30 PROCEDURE — 99283 EMERGENCY DEPT VISIT LOW MDM: CPT

## 2018-09-30 PROCEDURE — 96375 TX/PRO/DX INJ NEW DRUG ADDON: CPT

## 2018-09-30 PROCEDURE — 25010000002 DIPHENHYDRAMINE PER 50 MG

## 2018-09-30 PROCEDURE — 25010000002 LEVOFLOXACIN PER 250 MG

## 2018-09-30 RX ORDER — GUANFACINE 2 MG/1
TABLET, EXTENDED RELEASE ORAL
COMMUNITY
End: 2018-10-29 | Stop reason: DRUGHIGH

## 2018-09-30 RX ORDER — DIPHENHYDRAMINE HYDROCHLORIDE 50 MG/ML
25 INJECTION INTRAMUSCULAR; INTRAVENOUS ONCE
Status: COMPLETED | OUTPATIENT
Start: 2018-09-30 | End: 2018-09-30

## 2018-09-30 RX ORDER — SODIUM CHLORIDE 0.9 % (FLUSH) 0.9 %
10 SYRINGE (ML) INJECTION AS NEEDED
Status: DISCONTINUED | OUTPATIENT
Start: 2018-09-30 | End: 2018-10-01 | Stop reason: HOSPADM

## 2018-09-30 RX ORDER — FAMOTIDINE 10 MG/ML
20 INJECTION, SOLUTION INTRAVENOUS ONCE
Status: COMPLETED | OUTPATIENT
Start: 2018-09-30 | End: 2018-09-30

## 2018-09-30 RX ADMIN — FAMOTIDINE 20 MG: 10 INJECTION, SOLUTION INTRAVENOUS at 16:59

## 2018-09-30 RX ADMIN — DIPHENHYDRAMINE HYDROCHLORIDE 25 MG: 50 INJECTION INTRAMUSCULAR; INTRAVENOUS at 16:59

## 2018-10-01 ENCOUNTER — ROUTINE PRENATAL (OUTPATIENT)
Dept: OBSTETRICS AND GYNECOLOGY | Facility: CLINIC | Age: 38
End: 2018-10-01

## 2018-10-01 ENCOUNTER — RESULTS ENCOUNTER (OUTPATIENT)
Dept: OBSTETRICS AND GYNECOLOGY | Facility: CLINIC | Age: 38
End: 2018-10-01

## 2018-10-01 VITALS — BODY MASS INDEX: 34.44 KG/M2 | SYSTOLIC BLOOD PRESSURE: 108 MMHG | DIASTOLIC BLOOD PRESSURE: 64 MMHG | WEIGHT: 194.4 LBS

## 2018-10-01 DIAGNOSIS — B18.2 HEPATITIS C CARRIER (HCC): ICD-10-CM

## 2018-10-01 DIAGNOSIS — O09.529 ANTEPARTUM MULTIGRAVIDA OF ADVANCED MATERNAL AGE: ICD-10-CM

## 2018-10-01 DIAGNOSIS — F11.21 OPIOID DEPENDENCE IN REMISSION (HCC): ICD-10-CM

## 2018-10-01 DIAGNOSIS — Z72.0 TOBACCO ABUSE: Primary | ICD-10-CM

## 2018-10-01 DIAGNOSIS — F10.21 ALCOHOLISM IN REMISSION (HCC): ICD-10-CM

## 2018-10-01 PROBLEM — Z3A.16 16 WEEKS GESTATION OF PREGNANCY: Status: ACTIVE | Noted: 2018-09-17

## 2018-10-01 PROCEDURE — 99213 OFFICE O/P EST LOW 20 MIN: CPT | Performed by: OBSTETRICS & GYNECOLOGY

## 2018-10-01 RX ORDER — HYDROXYZINE PAMOATE 25 MG/1
CAPSULE ORAL
Refills: 0 | COMMUNITY
Start: 2018-09-18 | End: 2020-03-29

## 2018-10-01 NOTE — PROGRESS NOTES
Chief Complaint   Patient presents with   • Routine Prenatal Visit     pt had an allergic reaction to aleve. States face and neck swelled up and she states that she fell as she was running down the driveway when she was trying to get her boyfriends attention as she had no voice. pt would like to try the nicotine gum instead of the lozenge       HPI: Frieda is a  currently at 16w0d who today reports the following:Headache - No ; Visual change - No ; Swelling in legs - No ; Nausea - No ; Constipation - No; Diarrhea - No ; Contractions - No ; Leaking fluid - No ; Vaginal bleeding -  No.      ROS: Pertinent items are noted in HPI.  Vitals: See prenatal flowsheet     EXAM:  Abdomen: See prenatal flowsheet   Urine glucose/protein: See prenatal flowsheet   Pelvic: See prenatal flowsheet     Prenatal Labs  Lab Results   Component Value Date    HGB 12.3 2018    RUBELLAABIGG 224.1 2018    RUBELLAABIGG Immune 2018    HEPBSAG Non-Reactive 2018    ABO A 2018    RH Positive 2018    ABSCRN Negative 2018    [id number] Non-Reactive 2018    HEPCVIRUSABY Reactive (C) 2018    URINECX No growth at 2 days 2018       MDM: High Risk Pregnancy  Impression:AMA, Neg Lyons Male. Substance and alocohoism in remission. Smoker. Tobacco abuse  Tests done today: none  Specific topics discussed at today's visit: substance abuse. Tobacco abuse  Next visit:U/S for anatomic screening

## 2018-10-29 ENCOUNTER — HOSPITAL ENCOUNTER (OUTPATIENT)
Dept: WOMENS IMAGING | Facility: HOSPITAL | Age: 38
Discharge: HOME | End: 2018-10-29
Attending: OBSTETRICS & GYNECOLOGY | Admitting: OBSTETRICS & GYNECOLOGY

## 2018-10-29 ENCOUNTER — ROUTINE PRENATAL (OUTPATIENT)
Dept: OBSTETRICS AND GYNECOLOGY | Facility: CLINIC | Age: 38
End: 2018-10-29

## 2018-10-29 ENCOUNTER — RESULTS ENCOUNTER (OUTPATIENT)
Dept: OBSTETRICS AND GYNECOLOGY | Facility: CLINIC | Age: 38
End: 2018-10-29

## 2018-10-29 ENCOUNTER — OFFICE VISIT (OUTPATIENT)
Dept: OBSTETRICS AND GYNECOLOGY | Facility: HOSPITAL | Age: 38
End: 2018-10-29

## 2018-10-29 VITALS — DIASTOLIC BLOOD PRESSURE: 70 MMHG | BODY MASS INDEX: 36.31 KG/M2 | WEIGHT: 205 LBS | SYSTOLIC BLOOD PRESSURE: 120 MMHG

## 2018-10-29 VITALS — SYSTOLIC BLOOD PRESSURE: 106 MMHG | BODY MASS INDEX: 36.21 KG/M2 | DIASTOLIC BLOOD PRESSURE: 59 MMHG | WEIGHT: 204.4 LBS

## 2018-10-29 DIAGNOSIS — O09.529 ANTEPARTUM MULTIGRAVIDA OF ADVANCED MATERNAL AGE: ICD-10-CM

## 2018-10-29 DIAGNOSIS — F10.21 ALCOHOLISM IN REMISSION (HCC): Primary | ICD-10-CM

## 2018-10-29 DIAGNOSIS — B18.2 HEPATITIS C CARRIER (HCC): ICD-10-CM

## 2018-10-29 DIAGNOSIS — F11.21 OPIOID DEPENDENCE IN REMISSION (HCC): ICD-10-CM

## 2018-10-29 DIAGNOSIS — Z82.79 FAMILY HISTORY OF CONGENITAL HEART DEFECT: ICD-10-CM

## 2018-10-29 DIAGNOSIS — Z72.0 TOBACCO ABUSE: ICD-10-CM

## 2018-10-29 DIAGNOSIS — Z3A.20 20 WEEKS GESTATION OF PREGNANCY: ICD-10-CM

## 2018-10-29 PROCEDURE — 76811 OB US DETAILED SNGL FETUS: CPT | Performed by: OBSTETRICS & GYNECOLOGY

## 2018-10-29 PROCEDURE — 76811 OB US DETAILED SNGL FETUS: CPT

## 2018-10-29 PROCEDURE — 99213 OFFICE O/P EST LOW 20 MIN: CPT | Performed by: OBSTETRICS & GYNECOLOGY

## 2018-10-29 RX ORDER — BUPROPION HYDROCHLORIDE 150 MG/1
150 TABLET, EXTENDED RELEASE ORAL EVERY MORNING
Refills: 0 | COMMUNITY
Start: 2018-10-16 | End: 2020-03-29

## 2018-10-29 RX ORDER — GUANFACINE 3 MG/1
1 TABLET, EXTENDED RELEASE ORAL DAILY
COMMUNITY
End: 2021-01-12

## 2018-10-29 RX ORDER — RANITIDINE 150 MG/1
150 TABLET ORAL NIGHTLY
Qty: 30 TABLET | Refills: 3 | Status: SHIPPED | OUTPATIENT
Start: 2018-10-29 | End: 2019-10-29

## 2018-10-29 NOTE — PROGRESS NOTES
Documentation of the ultasound findings, images, and interpretations with be available in the patient's Viewpoint report located in the Chart Review Imaging tab in [email or web address].

## 2018-10-29 NOTE — PROGRESS NOTES
Denies problems. Admits to ETOH abuse early in pregnancy but denies any substance abuse except marijuana  since 2012 and denies any hx of IV drug use. Plans to discuss discrepancy in her chart with Dr. King today.  Has been at [facility] for rehab since late July.

## 2018-10-30 ENCOUNTER — TELEPHONE (OUTPATIENT)
Dept: OBSTETRICS AND GYNECOLOGY | Facility: CLINIC | Age: 38
End: 2018-10-30

## 2018-10-30 RX ORDER — DOCUSATE SODIUM 100 MG/1
100 CAPSULE, LIQUID FILLED ORAL 2 TIMES DAILY
Qty: 60 CAPSULE | Refills: 1 | Status: SHIPPED | OUTPATIENT
Start: 2018-10-30 | End: 2018-10-30 | Stop reason: SDUPTHER

## 2018-10-30 RX ORDER — DOCUSATE SODIUM 100 MG/1
100 CAPSULE, LIQUID FILLED ORAL 2 TIMES DAILY
Qty: 60 CAPSULE | Refills: 1 | Status: SHIPPED | OUTPATIENT
Start: 2018-10-30 | End: 2021-01-12

## 2018-11-26 ENCOUNTER — ROUTINE PRENATAL (OUTPATIENT)
Dept: OBSTETRICS AND GYNECOLOGY | Facility: CLINIC | Age: 38
End: 2018-11-26

## 2018-11-26 ENCOUNTER — HOSPITAL ENCOUNTER (OUTPATIENT)
Dept: WOMENS IMAGING | Facility: HOSPITAL | Age: 38
Discharge: HOME | End: 2018-11-26
Admitting: OBSTETRICS & GYNECOLOGY

## 2018-11-26 ENCOUNTER — OFFICE VISIT (OUTPATIENT)
Dept: OBSTETRICS AND GYNECOLOGY | Facility: HOSPITAL | Age: 38
End: 2018-11-26

## 2018-11-26 VITALS — BODY MASS INDEX: 37.87 KG/M2 | DIASTOLIC BLOOD PRESSURE: 71 MMHG | SYSTOLIC BLOOD PRESSURE: 117 MMHG | WEIGHT: 213.8 LBS

## 2018-11-26 VITALS — WEIGHT: 213 LBS | SYSTOLIC BLOOD PRESSURE: 118 MMHG | DIASTOLIC BLOOD PRESSURE: 68 MMHG | BODY MASS INDEX: 37.73 KG/M2

## 2018-11-26 DIAGNOSIS — F10.21 ALCOHOLISM IN REMISSION (HCC): Primary | ICD-10-CM

## 2018-11-26 DIAGNOSIS — F11.21 OPIOID DEPENDENCE IN REMISSION (HCC): ICD-10-CM

## 2018-11-26 DIAGNOSIS — O09.529 ANTEPARTUM MULTIGRAVIDA OF ADVANCED MATERNAL AGE: ICD-10-CM

## 2018-11-26 DIAGNOSIS — Z3A.24 24 WEEKS GESTATION OF PREGNANCY: ICD-10-CM

## 2018-11-26 DIAGNOSIS — Z72.0 TOBACCO ABUSE: ICD-10-CM

## 2018-11-26 DIAGNOSIS — Z82.79 FAMILY HISTORY OF CONGENITAL HEART DEFECT: ICD-10-CM

## 2018-11-26 DIAGNOSIS — B18.2 HEPATITIS C CARRIER (HCC): ICD-10-CM

## 2018-11-26 DIAGNOSIS — F10.21 ALCOHOLISM IN REMISSION (HCC): ICD-10-CM

## 2018-11-26 PROCEDURE — 93325 DOPPLER ECHO COLOR FLOW MAPG: CPT

## 2018-11-26 PROCEDURE — 76825 ECHO EXAM OF FETAL HEART: CPT | Performed by: OBSTETRICS & GYNECOLOGY

## 2018-11-26 PROCEDURE — 99213 OFFICE O/P EST LOW 20 MIN: CPT | Performed by: OBSTETRICS & GYNECOLOGY

## 2018-11-26 PROCEDURE — 76816 OB US FOLLOW-UP PER FETUS: CPT

## 2018-11-26 PROCEDURE — 76816 OB US FOLLOW-UP PER FETUS: CPT | Performed by: OBSTETRICS & GYNECOLOGY

## 2018-11-26 PROCEDURE — 93325 DOPPLER ECHO COLOR FLOW MAPG: CPT | Performed by: OBSTETRICS & GYNECOLOGY

## 2018-11-26 PROCEDURE — 76825 ECHO EXAM OF FETAL HEART: CPT

## 2018-11-26 NOTE — PROGRESS NOTES
Documentation of the ultasound findings, images, and interpretations with be available in the patient's Viewpoint report located in the Chart Review Imaging tab in [employer].

## 2018-11-26 NOTE — PROGRESS NOTES
Chief Complaint   Patient presents with   • Routine Prenatal Visit     ob visit with PDC today needs glucola       HPI: Frieda is a  currently at 24w0d who today reports the following:Headache - No ; Visual change - No ; Swelling in legs - No ; Nausea - No ; Constipation - No; Diarrhea - No ; Contractions - No ; Leaking fluid - No ; Vaginal bleeding -  No.      ROS: Pertinent items are noted in HPI.  Vitals: See prenatal flowsheet     EXAM:  Abdomen: See prenatal flowsheet   Urine glucose/protein: See prenatal flowsheet   Pelvic: See prenatal flowsheet     Prenatal Labs  Lab Results   Component Value Date    HGB 12.3 2018    RUBELLAABIGG 224.1 2018    RUBELLAABIGG Immune 2018    HEPBSAG Non-Reactive 2018    ABO A 2018    RH Positive 2018    ABSCRN Negative 2018    [id number] Non-Reactive 2018    HEPCVIRUSABY Reactive (C) 2018    URINECX No growth at 2 days 2018       MDM:High Risk Pregnancy  Impression:Multiparous patient with medical complications, AMA and Alcohol and asubstance abuse in remission. Tobacco abuse Hep C antibody +. Abnormal LFT  Tests done today: U/S for anatomic screening   Specific topics discussed at today's visit: none - she had no major complaints,questions or concerns  Next visit:GCT

## 2018-12-10 ENCOUNTER — RESULTS ENCOUNTER (OUTPATIENT)
Dept: OBSTETRICS AND GYNECOLOGY | Facility: CLINIC | Age: 38
End: 2018-12-10

## 2018-12-10 DIAGNOSIS — F10.21 ALCOHOLISM IN REMISSION (HCC): ICD-10-CM

## 2018-12-27 ENCOUNTER — LAB (OUTPATIENT)
Dept: LAB | Facility: HOSPITAL | Age: 38
End: 2018-12-27

## 2018-12-27 ENCOUNTER — ROUTINE PRENATAL (OUTPATIENT)
Dept: OBSTETRICS AND GYNECOLOGY | Facility: CLINIC | Age: 38
End: 2018-12-27

## 2018-12-27 VITALS — WEIGHT: 223 LBS | DIASTOLIC BLOOD PRESSURE: 70 MMHG | BODY MASS INDEX: 39.5 KG/M2 | SYSTOLIC BLOOD PRESSURE: 120 MMHG

## 2018-12-27 DIAGNOSIS — F39 MOOD DISORDER (HCC): ICD-10-CM

## 2018-12-27 DIAGNOSIS — Z72.0 TOBACCO ABUSE: ICD-10-CM

## 2018-12-27 DIAGNOSIS — F11.21 OPIOID DEPENDENCE IN REMISSION (HCC): ICD-10-CM

## 2018-12-27 DIAGNOSIS — O09.529 ANTEPARTUM MULTIGRAVIDA OF ADVANCED MATERNAL AGE: ICD-10-CM

## 2018-12-27 DIAGNOSIS — F10.21 ALCOHOLISM IN REMISSION (HCC): Primary | ICD-10-CM

## 2018-12-27 DIAGNOSIS — B18.2 HEPATITIS C CARRIER (HCC): ICD-10-CM

## 2018-12-27 PROBLEM — Z3A.28 28 WEEKS GESTATION OF PREGNANCY: Status: ACTIVE | Noted: 2018-09-17

## 2018-12-27 LAB
ALBUMIN SERPL-MCNC: 3.69 G/DL (ref 3.2–4.8)
ALBUMIN/GLOB SERPL: 1.7 G/DL (ref 1.5–2.5)
ALP SERPL-CCNC: 68 U/L (ref 25–100)
ALT SERPL W P-5'-P-CCNC: 125 U/L (ref 7–40)
ANION GAP SERPL CALCULATED.3IONS-SCNC: 7 MMOL/L (ref 3–11)
AST SERPL-CCNC: 64 U/L (ref 0–33)
BILIRUB SERPL-MCNC: 0.2 MG/DL (ref 0.3–1.2)
BUN BLD-MCNC: 10 MG/DL (ref 9–23)
BUN/CREAT SERPL: 17.9 (ref 7–25)
CALCIUM SPEC-SCNC: 8.3 MG/DL (ref 8.7–10.4)
CHLORIDE SERPL-SCNC: 105 MMOL/L (ref 99–109)
CO2 SERPL-SCNC: 23 MMOL/L (ref 20–31)
CREAT BLD-MCNC: 0.56 MG/DL (ref 0.6–1.3)
GFR SERPL CREATININE-BSD FRML MDRD: 122 ML/MIN/1.73
GLOBULIN UR ELPH-MCNC: 2.1 GM/DL
GLUCOSE 1H P 100 G GLC PO SERPL-MCNC: 109 MG/DL (ref 65–140)
GLUCOSE BLD-MCNC: 109 MG/DL (ref 70–100)
POTASSIUM BLD-SCNC: 3.9 MMOL/L (ref 3.5–5.5)
PROT SERPL-MCNC: 5.8 G/DL (ref 5.7–8.2)
SODIUM BLD-SCNC: 135 MMOL/L (ref 132–146)

## 2018-12-27 PROCEDURE — 82950 GLUCOSE TEST: CPT

## 2018-12-27 PROCEDURE — 36415 COLL VENOUS BLD VENIPUNCTURE: CPT

## 2018-12-27 PROCEDURE — 99213 OFFICE O/P EST LOW 20 MIN: CPT | Performed by: OBSTETRICS & GYNECOLOGY

## 2018-12-27 PROCEDURE — 80053 COMPREHEN METABOLIC PANEL: CPT

## 2018-12-27 RX ORDER — PANTOPRAZOLE SODIUM 20 MG/1
20 TABLET, DELAYED RELEASE ORAL DAILY
Qty: 30 TABLET | Refills: 1 | Status: SHIPPED | OUTPATIENT
Start: 2018-12-27 | End: 2019-12-27

## 2018-12-27 RX ORDER — PSEUDOEPHEDRINE HCL 30 MG
60 TABLET ORAL EVERY 6 HOURS
Qty: 60 TABLET | Refills: 1 | Status: SHIPPED | OUTPATIENT
Start: 2018-12-27 | End: 2020-03-29

## 2018-12-27 NOTE — PROGRESS NOTES
Chief Complaint   Patient presents with   • Routine Prenatal Visit     ob visit needs glucola and having hip pain feels like she can't walk.  Vaginal odor smells salty.  Bad Heartburn meds not working        HPI: Frieda is a  currently at 28w3d who today reports the following:Headache - YES ; Visual change - No ; Swelling in legs - No ; Nausea - No ; Constipation - No; Diarrhea - No ; Contractions - No ; Leaking fluid - No ; Vaginal bleeding -  No.      ROS: Ears, nose, mouth, throat, and face: positive for nasal congestion. GERD symptoms  Vitals: See prenatal flowsheet     EXAM:  Abdomen: See prenatal flowsheet   Urine glucose/protein: See prenatal flowsheet   Pelvic: See prenatal flowsheet     Prenatal Labs  Lab Results   Component Value Date    HGB 12.3 2018    RUBELLAABIGG 224.1 2018    RUBELLAABIGG Immune 2018    HEPBSAG Non-Reactive 2018    ABO A 2018    RH Positive 2018    ABSCRN Negative 2018    [id number] Non-Reactive 2018    HEPCVIRUSABY Reactive (C) 2018    URINECX No growth at 2 days 2018       MDM:High Risk Pregnancy  Impression:Multiparous patient with medical complications, AMA and alcoholism in remission. opioid dependence in remission. URI. Hip pain  Tests done today: GCT  Specific topics discussed at today's visit: URI management  Next visit:none

## 2019-01-14 ENCOUNTER — ROUTINE PRENATAL (OUTPATIENT)
Dept: OBSTETRICS AND GYNECOLOGY | Facility: CLINIC | Age: 39
End: 2019-01-14

## 2019-01-14 VITALS — DIASTOLIC BLOOD PRESSURE: 70 MMHG | BODY MASS INDEX: 40.74 KG/M2 | SYSTOLIC BLOOD PRESSURE: 120 MMHG | WEIGHT: 230 LBS

## 2019-01-14 DIAGNOSIS — G44.89 OTHER HEADACHE SYNDROME: ICD-10-CM

## 2019-01-14 DIAGNOSIS — B18.2 HEPATITIS C CARRIER (HCC): ICD-10-CM

## 2019-01-14 DIAGNOSIS — O09.529 ANTEPARTUM MULTIGRAVIDA OF ADVANCED MATERNAL AGE: ICD-10-CM

## 2019-01-14 DIAGNOSIS — F11.21 OPIOID DEPENDENCE IN REMISSION (HCC): Primary | ICD-10-CM

## 2019-01-14 DIAGNOSIS — Z72.0 TOBACCO ABUSE: ICD-10-CM

## 2019-01-14 DIAGNOSIS — F10.21 ALCOHOLISM IN REMISSION (HCC): ICD-10-CM

## 2019-01-14 PROBLEM — Z3A.31 31 WEEKS GESTATION OF PREGNANCY: Status: ACTIVE | Noted: 2018-09-17

## 2019-01-14 PROCEDURE — 99213 OFFICE O/P EST LOW 20 MIN: CPT | Performed by: OBSTETRICS & GYNECOLOGY

## 2019-01-14 NOTE — PROGRESS NOTES
Chief Complaint   Patient presents with   • Routine Prenatal Visit     ob visit, c/o pelvic pressure   • Insomnia   • Headache       HPI: Frieda is a  currently at 31w0d who today reports the following:Headache - chronic unimproved ; Visual change - No ; Swelling in legs - No ; Nausea - No ; Constipation - No; Diarrhea - No ; Contractions - No ; Leaking fluid - No ; Vaginal bleeding -  No.      ROS: Pertinent items are noted in HPI.  Vitals: See prenatal flowsheet     EXAM:  Abdomen: See prenatal flowsheet   Urine glucose/protein: See prenatal flowsheet   Pelvic: See prenatal flowsheet     Prenatal Labs  Lab Results   Component Value Date    HGB 12.3 2018    RUBELLAABIGG 224.1 2018    RUBELLAABIGG Immune 2018    HEPBSAG Non-Reactive 2018    ABO A 2018    RH Positive 2018    ABSCRN Negative 2018    [id number] Non-Reactive 2018    HEPCVIRUSABY Reactive (C) 2018    URINECX No growth at 2 days 2018       MDM:High Risk Pregnancy  Impression:Multiparous patient with medical complications, Obesity, AMA and alcoholism. opioid ependence in remission. tobacco abuse. Hep C, Chroninc headaceh  Tests done today: none  Specific topics discussed at today's visit:  labor signs and symptoms  Next visit:U/S to complete the anatomic screening

## 2019-01-17 DIAGNOSIS — F11.21 OPIOID DEPENDENCE IN REMISSION (HCC): ICD-10-CM

## 2019-01-21 ENCOUNTER — APPOINTMENT (OUTPATIENT)
Dept: WOMENS IMAGING | Facility: HOSPITAL | Age: 39
End: 2019-01-21
Attending: OBSTETRICS & GYNECOLOGY

## 2019-01-22 ENCOUNTER — OFFICE VISIT (OUTPATIENT)
Dept: OBSTETRICS AND GYNECOLOGY | Facility: HOSPITAL | Age: 39
End: 2019-01-22

## 2019-01-22 ENCOUNTER — HOSPITAL ENCOUNTER (OUTPATIENT)
Dept: WOMENS IMAGING | Facility: HOSPITAL | Age: 39
Discharge: HOME | End: 2019-01-22
Attending: OBSTETRICS & GYNECOLOGY | Admitting: OBSTETRICS & GYNECOLOGY

## 2019-01-22 VITALS — SYSTOLIC BLOOD PRESSURE: 128 MMHG | WEIGHT: 229.6 LBS | DIASTOLIC BLOOD PRESSURE: 75 MMHG | BODY MASS INDEX: 40.67 KG/M2

## 2019-01-22 DIAGNOSIS — B18.2 HEPATITIS C CARRIER (HCC): ICD-10-CM

## 2019-01-22 DIAGNOSIS — Z34.90 PREGNANCY, UNSPECIFIED GESTATIONAL AGE: ICD-10-CM

## 2019-01-22 DIAGNOSIS — F10.21 ALCOHOLISM IN REMISSION (HCC): ICD-10-CM

## 2019-01-22 DIAGNOSIS — O09.529 ANTEPARTUM MULTIGRAVIDA OF ADVANCED MATERNAL AGE: Primary | ICD-10-CM

## 2019-01-22 DIAGNOSIS — Z72.0 TOBACCO ABUSE: ICD-10-CM

## 2019-01-22 DIAGNOSIS — Z82.79 FAMILY HISTORY OF CONGENITAL HEART DEFECT: ICD-10-CM

## 2019-01-22 PROCEDURE — 76816 OB US FOLLOW-UP PER FETUS: CPT

## 2019-01-22 PROCEDURE — 76816 OB US FOLLOW-UP PER FETUS: CPT | Performed by: OBSTETRICS & GYNECOLOGY

## 2019-02-05 ENCOUNTER — ROUTINE PRENATAL (OUTPATIENT)
Dept: OBSTETRICS AND GYNECOLOGY | Facility: CLINIC | Age: 39
End: 2019-02-05

## 2019-02-05 VITALS — BODY MASS INDEX: 40.74 KG/M2 | DIASTOLIC BLOOD PRESSURE: 68 MMHG | WEIGHT: 230 LBS | SYSTOLIC BLOOD PRESSURE: 120 MMHG

## 2019-02-05 DIAGNOSIS — F10.21 ALCOHOLISM IN REMISSION (HCC): Primary | ICD-10-CM

## 2019-02-05 DIAGNOSIS — Z72.0 TOBACCO ABUSE: ICD-10-CM

## 2019-02-05 DIAGNOSIS — Z3A.34 34 WEEKS GESTATION OF PREGNANCY: ICD-10-CM

## 2019-02-05 DIAGNOSIS — F11.21 OPIOID DEPENDENCE IN REMISSION (HCC): ICD-10-CM

## 2019-02-05 DIAGNOSIS — O09.529 ANTEPARTUM MULTIGRAVIDA OF ADVANCED MATERNAL AGE: ICD-10-CM

## 2019-02-05 DIAGNOSIS — B18.2 HEPATITIS C CARRIER (HCC): ICD-10-CM

## 2019-02-05 PROCEDURE — 99213 OFFICE O/P EST LOW 20 MIN: CPT | Performed by: OBSTETRICS & GYNECOLOGY

## 2019-02-05 RX ORDER — LORATADINE 10 MG/1
10 TABLET ORAL DAILY
Qty: 30 TABLET | Refills: 2 | Status: SHIPPED | OUTPATIENT
Start: 2019-02-05 | End: 2020-02-05

## 2019-02-05 NOTE — PROGRESS NOTES
Chief Complaint   Patient presents with   • Routine Prenatal Visit     ob visit head congestion request Claritin.  Patient states cramping and severe back pain.  Hurts to stand and sit       HPI: Frieda is a  currently at 34w1d who today reports the following:Headache - No ; Visual change - No ; Swelling in legs - No ; Nausea - No ; Constipation - No; Diarrhea - No ; Contractions - No ; Leaking fluid - No ; Vaginal bleeding -  No.      ROS: Ears, nose, mouth, throat, and face: positive for nasal congestion  Back pain  Vitals: See prenatal flowsheet     EXAM:  Abdomen: See prenatal flowsheet   Urine glucose/protein: See prenatal flowsheet   Pelvic: See prenatal flowsheet     Prenatal Labs  Lab Results   Component Value Date    HGB 12.3 2018    RUBELLAABIGG 224.1 2018    RUBELLAABIGG Immune 2018    HEPBSAG Non-Reactive 2018    ABO A 2018    RH Positive 2018    ABSCRN Negative 2018    [id number] Non-Reactive 2018    HEPCVIRUSABY Reactive (C) 2018    URINECX No growth at 2 days 2018       MDM:High Risk Pregnancy  Impression:Multiparous patient with medical complications, Obesity, AMA and alcoholism and opioid dependence in remision. Hep C+  Tests done today: none  Specific topics discussed at today's visit:  labor signs and symptoms  Next visit:GBS testing and call in Claritin today

## 2019-02-06 ENCOUNTER — TELEPHONE (OUTPATIENT)
Dept: OBSTETRICS AND GYNECOLOGY | Facility: CLINIC | Age: 39
End: 2019-02-06

## 2019-02-06 NOTE — TELEPHONE ENCOUNTER
Pt called in stating that she spoke to [place] and Dr King yesterday at her appt about some pain that she was having and stated that Dr King thought that it was sciatica. Pt is 34w2d pregnant and is at the [facility] [place] and states that her room is on the second floor and she is having trouble going up and down the stairs. Pt states that a 1st floor room has come available but those are on medical request only. Pt is asking if she can get a note stating that due to her issues with sciatica pain that she needs to be in a 1st floor room.     Please advise

## 2019-02-06 NOTE — TELEPHONE ENCOUNTER
"I dont care where her room is. If she truly believes that changing her room will make a significnat improvement to her health, Please srite a note sating that \" I am in support\" of her moving her room\" to limit amount of stair climbing  "

## 2019-02-18 ENCOUNTER — ROUTINE PRENATAL (OUTPATIENT)
Dept: OBSTETRICS AND GYNECOLOGY | Facility: CLINIC | Age: 39
End: 2019-02-18

## 2019-02-18 VITALS — DIASTOLIC BLOOD PRESSURE: 68 MMHG | BODY MASS INDEX: 41.1 KG/M2 | WEIGHT: 232 LBS | SYSTOLIC BLOOD PRESSURE: 130 MMHG

## 2019-02-18 DIAGNOSIS — R79.89 ABNORMAL LIVER FUNCTION TESTS: ICD-10-CM

## 2019-02-18 DIAGNOSIS — Z3A.36 36 WEEKS GESTATION OF PREGNANCY: ICD-10-CM

## 2019-02-18 DIAGNOSIS — B18.2 HEPATITIS C CARRIER (HCC): ICD-10-CM

## 2019-02-18 DIAGNOSIS — F11.21 OPIOID DEPENDENCE IN REMISSION (HCC): Primary | ICD-10-CM

## 2019-02-18 DIAGNOSIS — F39 MOOD DISORDER (HCC): ICD-10-CM

## 2019-02-18 DIAGNOSIS — Z34.83 PRENATAL CARE, SUBSEQUENT PREGNANCY, THIRD TRIMESTER: ICD-10-CM

## 2019-02-18 DIAGNOSIS — Z72.0 TOBACCO ABUSE: ICD-10-CM

## 2019-02-18 LAB — EXTERNAL GROUP B STREP ANTIGEN: POSITIVE

## 2019-02-18 PROCEDURE — 99213 OFFICE O/P EST LOW 20 MIN: CPT | Performed by: OBSTETRICS & GYNECOLOGY

## 2019-02-18 NOTE — PROGRESS NOTES
Chief Complaint   Patient presents with   • Routine Prenatal Visit     ob visit leg cramps back pain insomnia swollen legs and feet        HPI: Frieda is a  currently at 36w0d who today reports the following:Headache - No ; Visual change - No ; Swelling in legs - No ; Nausea - No ; Constipation - No; Diarrhea - No ; Contractions - No ; Leaking fluid - No ; Vaginal bleeding -  No.      ROS: back pain and boil on thigh  Vitals: See prenatal flowsheet     EXAM:  Abdomen: See prenatal flowsheet   Urine glucose/protein: See prenatal flowsheet   Pelvic: See prenatal flowsheet  Small pimple on upper thigh of left leg     Prenatal Labs  Lab Results   Component Value Date    HGB 12.3 2018    RUBELLAABIGG 224.1 2018    RUBELLAABIGG Immune 2018    HEPBSAG Non-Reactive 2018    ABO A 2018    RH Positive 2018    ABSCRN Negative 2018    [id number] Non-Reactive 2018    HEPCVIRUSABY Reactive (C) 2018    URINECX No growth at 2 days 2018       MDM:High Risk Pregnancy  Impression:Multiparous patient with medical complications, Obesity, AMA and alcoholism and opioid dependence in remission. Hep C+ Tobacco abuse continues. Mood disorfer on high risk medication. Back pain  Tests done today: GBS testing  Specific topics discussed at today's visit: labor signs and symptoms  tobacco use  aches and pains of pregnancy  Next visit:none

## 2019-02-26 ENCOUNTER — ROUTINE PRENATAL (OUTPATIENT)
Dept: OBSTETRICS AND GYNECOLOGY | Facility: CLINIC | Age: 39
End: 2019-02-26

## 2019-02-26 VITALS — BODY MASS INDEX: 41.27 KG/M2 | DIASTOLIC BLOOD PRESSURE: 76 MMHG | WEIGHT: 233 LBS | SYSTOLIC BLOOD PRESSURE: 120 MMHG

## 2019-02-26 DIAGNOSIS — F10.21 ALCOHOLISM IN REMISSION (HCC): Primary | ICD-10-CM

## 2019-02-26 DIAGNOSIS — Z3A.37 37 WEEKS GESTATION OF PREGNANCY: ICD-10-CM

## 2019-02-26 DIAGNOSIS — R79.89 ABNORMAL LIVER FUNCTION TESTS: ICD-10-CM

## 2019-02-26 DIAGNOSIS — F11.21 OPIOID DEPENDENCE IN REMISSION (HCC): ICD-10-CM

## 2019-02-26 DIAGNOSIS — Z72.0 TOBACCO ABUSE: ICD-10-CM

## 2019-02-26 DIAGNOSIS — B18.2 HEPATITIS C CARRIER (HCC): ICD-10-CM

## 2019-02-26 DIAGNOSIS — O09.529 ANTEPARTUM MULTIGRAVIDA OF ADVANCED MATERNAL AGE: ICD-10-CM

## 2019-02-26 PROBLEM — Z3A.36 36 WEEKS GESTATION OF PREGNANCY: Status: RESOLVED | Noted: 2019-01-22 | Resolved: 2019-02-26

## 2019-02-26 PROCEDURE — 99213 OFFICE O/P EST LOW 20 MIN: CPT | Performed by: OBSTETRICS & GYNECOLOGY

## 2019-02-26 NOTE — PROGRESS NOTES
Chief Complaint   Patient presents with   • Routine Prenatal Visit     ob visit leg cramps and back pain       HPI: Frieda is a  currently at 37w1d who today reports the following:Headache - No ; Visual change - No ; Swelling in legs - No ; Nausea - No ; Constipation - No; Diarrhea - No ; Contractions - No ; Leaking fluid - No ; Vaginal bleeding -  No.      ROS: Pertinent items are noted in HPI.  Vitals: See prenatal flowsheet     EXAM:  Abdomen: See prenatal flowsheet   Urine glucose/protein: See prenatal flowsheet   Pelvic: See prenatal flowsheet     Prenatal Labs  Lab Results   Component Value Date    HGB 12.3 2018    RUBELLAABIGG 224.1 2018    RUBELLAABIGG Immune 2018    HEPBSAG Non-Reactive 2018    ABO A 2018    RH Positive 2018    ABSCRN Negative 2018    [id number] Non-Reactive 2018    HEPCVIRUSABY Reactive (C) 2018    URINECX No growth at 2 days 2018       MDM:High Risk Pregnancy    Impression:Multiparous patient with medical complications, AMA and opioid dependence and alcoholism in remission. Tobacco absue continues. Hep C antibody +. Elevated LFT's  Tests done today: none  Specific topics discussed at today's visit: labor signs and symptoms  Next visit:none

## 2019-03-04 ENCOUNTER — ROUTINE PRENATAL (OUTPATIENT)
Dept: OBSTETRICS AND GYNECOLOGY | Facility: CLINIC | Age: 39
End: 2019-03-04

## 2019-03-04 VITALS — DIASTOLIC BLOOD PRESSURE: 82 MMHG | SYSTOLIC BLOOD PRESSURE: 120 MMHG | BODY MASS INDEX: 41.98 KG/M2 | WEIGHT: 237 LBS

## 2019-03-04 DIAGNOSIS — E66.01 MORBIDLY OBESE (HCC): ICD-10-CM

## 2019-03-04 DIAGNOSIS — F11.21 OPIOID DEPENDENCE IN REMISSION (HCC): ICD-10-CM

## 2019-03-04 DIAGNOSIS — Z72.0 TOBACCO ABUSE: ICD-10-CM

## 2019-03-04 DIAGNOSIS — F39 MOOD DISORDER (HCC): ICD-10-CM

## 2019-03-04 DIAGNOSIS — B18.2 HEPATITIS C CARRIER (HCC): ICD-10-CM

## 2019-03-04 DIAGNOSIS — Z3A.38 38 WEEKS GESTATION OF PREGNANCY: ICD-10-CM

## 2019-03-04 DIAGNOSIS — O09.529 ANTEPARTUM MULTIGRAVIDA OF ADVANCED MATERNAL AGE: ICD-10-CM

## 2019-03-04 DIAGNOSIS — F10.21 ALCOHOLISM IN REMISSION (HCC): Primary | ICD-10-CM

## 2019-03-04 PROCEDURE — 99213 OFFICE O/P EST LOW 20 MIN: CPT | Performed by: OBSTETRICS & GYNECOLOGY

## 2019-03-04 NOTE — PROGRESS NOTES
Chief Complaint   Patient presents with   • Routine Prenatal Visit     ob visit with diarrhea, insomnia and shortness of breath irregular contractions.  Patient missed work requesting a note       HPI: Frieda is a  currently at 38w0d who today reports the following:Headache - No ; Visual change - No ; Swelling in legs - No ; Nausea - No ; Constipation - No; Diarrhea - No ; Contractions - No ; Leaking fluid - No ; Vaginal bleeding -  No.      ROS: Pertinent items are noted in HPI.  Vitals: See prenatal flowsheet     EXAM:  Abdomen: See prenatal flowsheet   Urine glucose/protein: See prenatal flowsheet   Pelvic: See prenatal flowsheet     Prenatal Labs  Lab Results   Component Value Date    HGB 12.3 2018    RUBELLAABIGG 224.1 2018    RUBELLAABIGG Immune 2018    HEPBSAG Non-Reactive 2018    ABO A 2018    RH Positive 2018    ABSCRN Negative 2018    [id number] Non-Reactive 2018    HEPCVIRUSABY Reactive (C) 2018    URINECX No growth at 2 days 2018       MDM:High Risk Pregnancy    Impression:Multiparous patient with medical complications, Obesity, AMA and Substance abuse in remission. Tobacco abuse. Hep C+  Tests done today: none  Specific topics discussed at today's visit: birth plan  labor signs and symptoms  Next visit:none

## 2019-03-08 ENCOUNTER — HOSPITAL ENCOUNTER (INPATIENT)
Facility: HOSPITAL | Age: 39
LOS: 6 days | Discharge: HOME | End: 2019-03-14
Attending: OBSTETRICS & GYNECOLOGY | Admitting: OBSTETRICS & GYNECOLOGY

## 2019-03-08 PROBLEM — Z37.9 NORMAL LABOR: Status: ACTIVE | Noted: 2019-03-08

## 2019-03-08 LAB
ABO GROUP BLD: NORMAL
AMPHET+METHAMPHET UR QL: NEGATIVE
AMPHETAMINES UR QL: NEGATIVE
BARBITURATES UR QL SCN: NEGATIVE
BENZODIAZ UR QL SCN: NEGATIVE
BLD GP AB SCN SERPL QL: NEGATIVE
BUPRENORPHINE SERPL-MCNC: NEGATIVE NG/ML
CANNABINOIDS SERPL QL: NEGATIVE
COCAINE UR QL: NEGATIVE
DEPRECATED RDW RBC AUTO: 43.3 FL (ref 37–54)
ERYTHROCYTE [DISTWIDTH] IN BLOOD BY AUTOMATED COUNT: 13 % (ref 11.3–14.5)
HCT VFR BLD AUTO: 35.8 % (ref 34.5–44)
HGB BLD-MCNC: 12.1 G/DL (ref 11.5–15.5)
MCH RBC QN AUTO: 31.3 PG (ref 27–31)
MCHC RBC AUTO-ENTMCNC: 33.8 G/DL (ref 32–36)
MCV RBC AUTO: 92.7 FL (ref 80–99)
METHADONE UR QL SCN: NEGATIVE
OPIATES UR QL: NEGATIVE
OXYCODONE UR QL SCN: NEGATIVE
PCP UR QL SCN: NEGATIVE
PLATELET # BLD AUTO: 237 10*3/MM3 (ref 150–450)
PMV BLD AUTO: 10.4 FL (ref 6–12)
PROPOXYPH UR QL: NEGATIVE
RBC # BLD AUTO: 3.86 10*6/MM3 (ref 3.89–5.14)
RH BLD: POSITIVE
T&S EXPIRATION DATE: NORMAL
TRICYCLICS UR QL SCN: NEGATIVE
WBC NRBC COR # BLD: 9.82 10*3/MM3 (ref 3.5–10.8)

## 2019-03-08 PROCEDURE — 86900 BLOOD TYPING SEROLOGIC ABO: CPT | Performed by: OBSTETRICS & GYNECOLOGY

## 2019-03-08 PROCEDURE — 25010000002 PENICILLIN G POTASSIUM PER 600000 UNITS: Performed by: OBSTETRICS & GYNECOLOGY

## 2019-03-08 PROCEDURE — 85027 COMPLETE CBC AUTOMATED: CPT | Performed by: OBSTETRICS & GYNECOLOGY

## 2019-03-08 PROCEDURE — 86901 BLOOD TYPING SEROLOGIC RH(D): CPT | Performed by: OBSTETRICS & GYNECOLOGY

## 2019-03-08 PROCEDURE — 80306 DRUG TEST PRSMV INSTRMNT: CPT | Performed by: OBSTETRICS & GYNECOLOGY

## 2019-03-08 PROCEDURE — 59025 FETAL NON-STRESS TEST: CPT

## 2019-03-08 PROCEDURE — 86850 RBC ANTIBODY SCREEN: CPT | Performed by: OBSTETRICS & GYNECOLOGY

## 2019-03-08 RX ORDER — ONDANSETRON 4 MG/1
4 TABLET, FILM COATED ORAL EVERY 6 HOURS PRN
Status: DISCONTINUED | OUTPATIENT
Start: 2019-03-08 | End: 2019-03-09 | Stop reason: HOSPADM

## 2019-03-08 RX ORDER — TRISODIUM CITRATE DIHYDRATE AND CITRIC ACID MONOHYDRATE 500; 334 MG/5ML; MG/5ML
30 SOLUTION ORAL ONCE
Status: COMPLETED | OUTPATIENT
Start: 2019-03-08 | End: 2019-03-08

## 2019-03-08 RX ORDER — PENICILLIN G 3000000 [IU]/50ML
3 INJECTION, SOLUTION INTRAVENOUS EVERY 4 HOURS
Status: DISCONTINUED | OUTPATIENT
Start: 2019-03-09 | End: 2019-03-09 | Stop reason: HOSPADM

## 2019-03-08 RX ORDER — PROMETHAZINE HYDROCHLORIDE 12.5 MG/1
12.5 SUPPOSITORY RECTAL EVERY 6 HOURS PRN
Status: DISCONTINUED | OUTPATIENT
Start: 2019-03-08 | End: 2019-03-09 | Stop reason: HOSPADM

## 2019-03-08 RX ORDER — BUTORPHANOL TARTRATE 1 MG/ML
1 INJECTION, SOLUTION INTRAMUSCULAR; INTRAVENOUS
Status: DISCONTINUED | OUTPATIENT
Start: 2019-03-08 | End: 2019-03-09 | Stop reason: HOSPADM

## 2019-03-08 RX ORDER — PROMETHAZINE HYDROCHLORIDE 12.5 MG/1
12.5 TABLET ORAL EVERY 6 HOURS PRN
Status: DISCONTINUED | OUTPATIENT
Start: 2019-03-08 | End: 2019-03-09 | Stop reason: HOSPADM

## 2019-03-08 RX ORDER — FAMOTIDINE 10 MG/ML
20 INJECTION, SOLUTION INTRAVENOUS 2 TIMES DAILY
Status: DISCONTINUED | OUTPATIENT
Start: 2019-03-08 | End: 2019-03-10

## 2019-03-08 RX ORDER — SODIUM CHLORIDE, SODIUM LACTATE, POTASSIUM CHLORIDE, CALCIUM CHLORIDE 600; 310; 30; 20 MG/100ML; MG/100ML; MG/100ML; MG/100ML
125 INJECTION, SOLUTION INTRAVENOUS CONTINUOUS
Status: DISCONTINUED | OUTPATIENT
Start: 2019-03-08 | End: 2019-03-11

## 2019-03-08 RX ORDER — MAGNESIUM CARB/ALUMINUM HYDROX 105-160MG
30 TABLET,CHEWABLE ORAL ONCE
Status: DISCONTINUED | OUTPATIENT
Start: 2019-03-08 | End: 2019-03-09 | Stop reason: HOSPADM

## 2019-03-08 RX ORDER — SODIUM CHLORIDE 0.9 % (FLUSH) 0.9 %
1-10 SYRINGE (ML) INJECTION AS NEEDED
Status: DISCONTINUED | OUTPATIENT
Start: 2019-03-08 | End: 2019-03-09 | Stop reason: HOSPADM

## 2019-03-08 RX ORDER — LIDOCAINE HYDROCHLORIDE 10 MG/ML
5 INJECTION, SOLUTION EPIDURAL; INFILTRATION; INTRACAUDAL; PERINEURAL AS NEEDED
Status: DISCONTINUED | OUTPATIENT
Start: 2019-03-08 | End: 2019-03-09 | Stop reason: HOSPADM

## 2019-03-08 RX ORDER — OXYTOCIN-SODIUM CHLORIDE 0.9% IV SOLN 30 UNIT/500ML 30-0.9/5 UT/ML-%
2-24 SOLUTION INTRAVENOUS
Status: DISCONTINUED | OUTPATIENT
Start: 2019-03-08 | End: 2019-03-09 | Stop reason: HOSPADM

## 2019-03-08 RX ORDER — ONDANSETRON 2 MG/ML
4 INJECTION INTRAMUSCULAR; INTRAVENOUS EVERY 6 HOURS PRN
Status: DISCONTINUED | OUTPATIENT
Start: 2019-03-08 | End: 2019-03-09 | Stop reason: HOSPADM

## 2019-03-08 RX ORDER — ACETAMINOPHEN 500 MG
500 TABLET ORAL EVERY 6 HOURS PRN
COMMUNITY
End: 2019-03-14 | Stop reason: HOSPADM

## 2019-03-08 RX ORDER — PROMETHAZINE HYDROCHLORIDE 25 MG/ML
12.5 INJECTION, SOLUTION INTRAMUSCULAR; INTRAVENOUS EVERY 6 HOURS PRN
Status: DISCONTINUED | OUTPATIENT
Start: 2019-03-08 | End: 2019-03-09 | Stop reason: HOSPADM

## 2019-03-08 RX ORDER — SODIUM CHLORIDE 0.9 % (FLUSH) 0.9 %
3 SYRINGE (ML) INJECTION EVERY 12 HOURS SCHEDULED
Status: DISCONTINUED | OUTPATIENT
Start: 2019-03-08 | End: 2019-03-09 | Stop reason: HOSPADM

## 2019-03-08 RX ADMIN — SODIUM CHLORIDE, POTASSIUM CHLORIDE, SODIUM LACTATE AND CALCIUM CHLORIDE 125 ML/HR: 600; 310; 30; 20 INJECTION, SOLUTION INTRAVENOUS at 19:44

## 2019-03-08 RX ADMIN — SODIUM CITRATE AND CITRIC ACID MONOHYDRATE 30 ML: 500; 334 SOLUTION ORAL at 21:12

## 2019-03-08 RX ADMIN — PENICILLIN G POTASSIUM 5 MILLION UNITS: 5000000 INJECTION, POWDER, FOR SOLUTION INTRAMUSCULAR; INTRAVENOUS at 20:19

## 2019-03-08 RX ADMIN — FAMOTIDINE 20 MG: 10 INJECTION, SOLUTION INTRAVENOUS at 20:20

## 2019-03-09 ENCOUNTER — ANESTHESIA (OUTPATIENT)
Dept: LABOR AND DELIVERY | Facility: HOSPITAL | Age: 39
End: 2019-03-09

## 2019-03-09 ENCOUNTER — ANESTHESIA EVENT (OUTPATIENT)
Dept: LABOR AND DELIVERY | Facility: HOSPITAL | Age: 39
End: 2019-03-09

## 2019-03-09 LAB
ATMOSPHERIC PRESS: ABNORMAL MMHG
ATMOSPHERIC PRESS: ABNORMAL MMHG
BASE EXCESS BLDCOA CALC-SCNC: -5.3 MMOL/L (ref 0–2)
BASE EXCESS BLDCOV CALC-SCNC: -2.2 MMOL/L (ref 0–2)
BDY SITE: ABNORMAL
BDY SITE: ABNORMAL
BODY TEMPERATURE: 37 C
BODY TEMPERATURE: 37 C
CO2 BLDA-SCNC: 24.5 MMOL/L (ref 23–27)
CO2 BLDA-SCNC: 25.8 MMOL/L (ref 23–27)
HCO3 BLDCOA-SCNC: 23.9 MMOL/L (ref 16.9–20.5)
HCO3 BLDCOV-SCNC: 23.3 MMOL/L (ref 18.6–21.4)
HGB BLDA-MCNC: 13.8 G/DL (ref 14–18)
HGB BLDA-MCNC: 14.2 G/DL (ref 14–18)
HOROWITZ INDEX BLD+IHG-RTO: 21 %
HOROWITZ INDEX BLD+IHG-RTO: 21 %
MODALITY: ABNORMAL
MODALITY: ABNORMAL
NOTE: ABNORMAL
NOTE: ABNORMAL
PCO2 BLDCOA: 61.7 MMHG (ref 43.3–54.9)
PCO2 BLDCOV: 41.7 MM HG
PH BLDCOA: 7.2 PH UNITS (ref 7.22–7.3)
PH BLDCOV: 7.36 PH UNITS
PO2 BLDCOA: 11.2 MMHG (ref 11.5–43.3)
PO2 BLDCOV: 22.6 MM HG
SAO2 % BLDCOA: 10.8 %
SAO2 % BLDCOA: ABNORMAL % (ref 92–98)
SAO2 % BLDCOV: 48.6 %

## 2019-03-09 PROCEDURE — C1755 CATHETER, INTRASPINAL: HCPCS

## 2019-03-09 PROCEDURE — 25010000003 CEFAZOLIN IN DEXTROSE 2-4 GM/100ML-% SOLUTION: Performed by: OBSTETRICS & GYNECOLOGY

## 2019-03-09 PROCEDURE — 25010000002 FENTANYL CITRATE (PF) 100 MCG/2ML SOLUTION: Performed by: ANESTHESIOLOGY

## 2019-03-09 PROCEDURE — 59515 CESAREAN DELIVERY: CPT | Performed by: OBSTETRICS & GYNECOLOGY

## 2019-03-09 PROCEDURE — 25010000002 ROPIVACAINE PER 1 MG: Performed by: ANESTHESIOLOGY

## 2019-03-09 PROCEDURE — 25010000002 MIDAZOLAM PER 1 MG: Performed by: ANESTHESIOLOGY

## 2019-03-09 PROCEDURE — 25010000002 BUTORPHANOL PER 1 MG: Performed by: OBSTETRICS & GYNECOLOGY

## 2019-03-09 PROCEDURE — 59025 FETAL NON-STRESS TEST: CPT

## 2019-03-09 PROCEDURE — 99024 POSTOP FOLLOW-UP VISIT: CPT | Performed by: OBSTETRICS & GYNECOLOGY

## 2019-03-09 PROCEDURE — 82805 BLOOD GASES W/O2 SATURATION: CPT

## 2019-03-09 PROCEDURE — 25010000002 PENICILLIN G POTASSIUM PER 600000 UNITS: Performed by: OBSTETRICS & GYNECOLOGY

## 2019-03-09 PROCEDURE — C1755 CATHETER, INTRASPINAL: HCPCS | Performed by: ANESTHESIOLOGY

## 2019-03-09 PROCEDURE — 25010000003 MORPHINE PER 10 MG: Performed by: ANESTHESIOLOGY

## 2019-03-09 RX ORDER — ACETAMINOPHEN 325 MG/1
650 TABLET ORAL ONCE AS NEEDED
Status: DISCONTINUED | OUTPATIENT
Start: 2019-03-09 | End: 2019-03-09 | Stop reason: HOSPADM

## 2019-03-09 RX ORDER — CARBOPROST TROMETHAMINE 250 UG/ML
250 INJECTION, SOLUTION INTRAMUSCULAR AS NEEDED
Status: DISCONTINUED | OUTPATIENT
Start: 2019-03-09 | End: 2019-03-09 | Stop reason: HOSPADM

## 2019-03-09 RX ORDER — TRISODIUM CITRATE DIHYDRATE AND CITRIC ACID MONOHYDRATE 500; 334 MG/5ML; MG/5ML
30 SOLUTION ORAL ONCE
Status: COMPLETED | OUTPATIENT
Start: 2019-03-09 | End: 2019-03-09

## 2019-03-09 RX ORDER — ONDANSETRON 4 MG/1
4 TABLET, FILM COATED ORAL EVERY 8 HOURS PRN
Status: DISCONTINUED | OUTPATIENT
Start: 2019-03-09 | End: 2019-03-14 | Stop reason: HOSPADM

## 2019-03-09 RX ORDER — OXYCODONE HYDROCHLORIDE AND ACETAMINOPHEN 5; 325 MG/1; MG/1
1 TABLET ORAL EVERY 4 HOURS PRN
Status: DISCONTINUED | OUTPATIENT
Start: 2019-03-09 | End: 2019-03-11

## 2019-03-09 RX ORDER — QUETIAPINE FUMARATE 100 MG/1
400 TABLET, FILM COATED ORAL NIGHTLY
Status: DISCONTINUED | OUTPATIENT
Start: 2019-03-09 | End: 2019-03-14 | Stop reason: HOSPADM

## 2019-03-09 RX ORDER — MISOPROSTOL 200 UG/1
800 TABLET ORAL AS NEEDED
Status: DISCONTINUED | OUTPATIENT
Start: 2019-03-09 | End: 2019-03-09 | Stop reason: HOSPADM

## 2019-03-09 RX ORDER — CEFAZOLIN SODIUM 2 G/100ML
2 INJECTION, SOLUTION INTRAVENOUS ONCE
Status: COMPLETED | OUTPATIENT
Start: 2019-03-09 | End: 2019-03-09

## 2019-03-09 RX ORDER — NALOXONE HCL 0.4 MG/ML
0.4 VIAL (ML) INJECTION
Status: ACTIVE | OUTPATIENT
Start: 2019-03-09 | End: 2019-03-10

## 2019-03-09 RX ORDER — OXYTOCIN-SODIUM CHLORIDE 0.9% IV SOLN 30 UNIT/500ML 30-0.9/5 UT/ML-%
85 SOLUTION INTRAVENOUS ONCE
Status: DISCONTINUED | OUTPATIENT
Start: 2019-03-09 | End: 2019-03-09 | Stop reason: HOSPADM

## 2019-03-09 RX ORDER — SODIUM CHLORIDE, SODIUM LACTATE, POTASSIUM CHLORIDE, CALCIUM CHLORIDE 600; 310; 30; 20 MG/100ML; MG/100ML; MG/100ML; MG/100ML
125 INJECTION, SOLUTION INTRAVENOUS CONTINUOUS
Status: DISCONTINUED | OUTPATIENT
Start: 2019-03-09 | End: 2019-03-12 | Stop reason: SDUPTHER

## 2019-03-09 RX ORDER — SODIUM CHLORIDE, SODIUM LACTATE, POTASSIUM CHLORIDE, CALCIUM CHLORIDE 600; 310; 30; 20 MG/100ML; MG/100ML; MG/100ML; MG/100ML
100 INJECTION, SOLUTION INTRAVENOUS CONTINUOUS
Status: DISCONTINUED | OUTPATIENT
Start: 2019-03-09 | End: 2019-03-11

## 2019-03-09 RX ORDER — DIPHENHYDRAMINE HYDROCHLORIDE 50 MG/ML
12.5 INJECTION INTRAMUSCULAR; INTRAVENOUS EVERY 8 HOURS PRN
Status: DISCONTINUED | OUTPATIENT
Start: 2019-03-09 | End: 2019-03-09 | Stop reason: HOSPADM

## 2019-03-09 RX ORDER — HYDROXYZINE HYDROCHLORIDE 25 MG/1
25 TABLET, FILM COATED ORAL 2 TIMES DAILY PRN
Status: DISCONTINUED | OUTPATIENT
Start: 2019-03-09 | End: 2019-03-14 | Stop reason: HOSPADM

## 2019-03-09 RX ORDER — SODIUM CHLORIDE, SODIUM LACTATE, POTASSIUM CHLORIDE, CALCIUM CHLORIDE 600; 310; 30; 20 MG/100ML; MG/100ML; MG/100ML; MG/100ML
INJECTION, SOLUTION INTRAVENOUS CONTINUOUS PRN
Status: DISCONTINUED | OUTPATIENT
Start: 2019-03-09 | End: 2019-03-09 | Stop reason: SURG

## 2019-03-09 RX ORDER — METHYLERGONOVINE MALEATE 0.2 MG/ML
200 INJECTION INTRAVENOUS ONCE AS NEEDED
Status: DISCONTINUED | OUTPATIENT
Start: 2019-03-09 | End: 2019-03-09 | Stop reason: HOSPADM

## 2019-03-09 RX ORDER — OXYTOCIN-SODIUM CHLORIDE 0.9% IV SOLN 30 UNIT/500ML 30-0.9/5 UT/ML-%
650 SOLUTION INTRAVENOUS ONCE
Status: DISCONTINUED | OUTPATIENT
Start: 2019-03-09 | End: 2019-03-09 | Stop reason: HOSPADM

## 2019-03-09 RX ORDER — METOCLOPRAMIDE HYDROCHLORIDE 5 MG/ML
10 INJECTION INTRAMUSCULAR; INTRAVENOUS ONCE AS NEEDED
Status: DISCONTINUED | OUTPATIENT
Start: 2019-03-09 | End: 2019-03-09 | Stop reason: HOSPADM

## 2019-03-09 RX ORDER — ONDANSETRON 2 MG/ML
4 INJECTION INTRAMUSCULAR; INTRAVENOUS ONCE
Status: DISCONTINUED | OUTPATIENT
Start: 2019-03-09 | End: 2019-03-09 | Stop reason: HOSPADM

## 2019-03-09 RX ORDER — OXYTOCIN 10 [USP'U]/ML
INJECTION, SOLUTION INTRAMUSCULAR; INTRAVENOUS AS NEEDED
Status: DISCONTINUED | OUTPATIENT
Start: 2019-03-09 | End: 2019-03-09 | Stop reason: SURG

## 2019-03-09 RX ORDER — LIDOCAINE HYDROCHLORIDE AND EPINEPHRINE 20; 5 MG/ML; UG/ML
INJECTION, SOLUTION EPIDURAL; INFILTRATION; INTRACAUDAL; PERINEURAL AS NEEDED
Status: DISCONTINUED | OUTPATIENT
Start: 2019-03-09 | End: 2019-03-09 | Stop reason: SURG

## 2019-03-09 RX ORDER — MIDAZOLAM HYDROCHLORIDE 1 MG/ML
INJECTION INTRAMUSCULAR; INTRAVENOUS AS NEEDED
Status: DISCONTINUED | OUTPATIENT
Start: 2019-03-09 | End: 2019-03-09 | Stop reason: SURG

## 2019-03-09 RX ORDER — DOCUSATE SODIUM 100 MG/1
100 CAPSULE, LIQUID FILLED ORAL 2 TIMES DAILY PRN
Status: DISCONTINUED | OUTPATIENT
Start: 2019-03-09 | End: 2019-03-14 | Stop reason: HOSPADM

## 2019-03-09 RX ORDER — IBUPROFEN 600 MG/1
600 TABLET ORAL ONCE AS NEEDED
Status: DISCONTINUED | OUTPATIENT
Start: 2019-03-09 | End: 2019-03-09

## 2019-03-09 RX ORDER — ROPIVACAINE HYDROCHLORIDE 2 MG/ML
15 INJECTION, SOLUTION EPIDURAL; INFILTRATION; PERINEURAL CONTINUOUS
Status: DISCONTINUED | OUTPATIENT
Start: 2019-03-09 | End: 2019-03-11

## 2019-03-09 RX ORDER — ONDANSETRON 2 MG/ML
4 INJECTION INTRAMUSCULAR; INTRAVENOUS ONCE AS NEEDED
Status: DISCONTINUED | OUTPATIENT
Start: 2019-03-09 | End: 2019-03-09 | Stop reason: HOSPADM

## 2019-03-09 RX ORDER — BUPROPION HYDROCHLORIDE 150 MG/1
150 TABLET, EXTENDED RELEASE ORAL DAILY
Status: DISCONTINUED | OUTPATIENT
Start: 2019-03-10 | End: 2019-03-14 | Stop reason: HOSPADM

## 2019-03-09 RX ORDER — FENTANYL CITRATE 50 UG/ML
INJECTION, SOLUTION INTRAMUSCULAR; INTRAVENOUS AS NEEDED
Status: DISCONTINUED | OUTPATIENT
Start: 2019-03-09 | End: 2019-03-09 | Stop reason: SURG

## 2019-03-09 RX ORDER — EPHEDRINE SULFATE/0.9% NACL/PF 25 MG/5 ML
10 SYRINGE (ML) INTRAVENOUS
Status: DISCONTINUED | OUTPATIENT
Start: 2019-03-09 | End: 2019-03-09 | Stop reason: HOSPADM

## 2019-03-09 RX ORDER — OXYCODONE AND ACETAMINOPHEN 7.5; 325 MG/1; MG/1
1 TABLET ORAL EVERY 4 HOURS PRN
Status: DISCONTINUED | OUTPATIENT
Start: 2019-03-09 | End: 2019-03-14 | Stop reason: HOSPADM

## 2019-03-09 RX ORDER — ACETAMINOPHEN 325 MG/1
650 TABLET ORAL EVERY 6 HOURS PRN
Status: DISCONTINUED | OUTPATIENT
Start: 2019-03-09 | End: 2019-03-11

## 2019-03-09 RX ORDER — LANOLIN 100 %
OINTMENT (GRAM) TOPICAL
Status: DISCONTINUED | OUTPATIENT
Start: 2019-03-09 | End: 2019-03-14 | Stop reason: HOSPADM

## 2019-03-09 RX ORDER — NALBUPHINE HYDROCHLORIDE 20 MG/ML
2.5 INJECTION, SOLUTION INTRAMUSCULAR; INTRAVENOUS; SUBCUTANEOUS EVERY 4 HOURS PRN
Status: ACTIVE | OUTPATIENT
Start: 2019-03-09 | End: 2019-03-10

## 2019-03-09 RX ORDER — LIDOCAINE HYDROCHLORIDE AND EPINEPHRINE 15; 5 MG/ML; UG/ML
INJECTION, SOLUTION EPIDURAL AS NEEDED
Status: DISCONTINUED | OUTPATIENT
Start: 2019-03-09 | End: 2019-03-09 | Stop reason: SURG

## 2019-03-09 RX ORDER — MORPHINE SULFATE 0.5 MG/ML
INJECTION, SOLUTION EPIDURAL; INTRATHECAL; INTRAVENOUS AS NEEDED
Status: DISCONTINUED | OUTPATIENT
Start: 2019-03-09 | End: 2019-03-09 | Stop reason: SURG

## 2019-03-09 RX ORDER — MAGNESIUM CARB/ALUMINUM HYDROX 105-160MG
30 TABLET,CHEWABLE ORAL ONCE
Status: DISCONTINUED | OUTPATIENT
Start: 2019-03-09 | End: 2019-03-09 | Stop reason: HOSPADM

## 2019-03-09 RX ORDER — HYDROMORPHONE HYDROCHLORIDE 1 MG/ML
0.5 INJECTION, SOLUTION INTRAMUSCULAR; INTRAVENOUS; SUBCUTANEOUS
Status: DISCONTINUED | OUTPATIENT
Start: 2019-03-09 | End: 2019-03-09 | Stop reason: HOSPADM

## 2019-03-09 RX ADMIN — SODIUM CHLORIDE, POTASSIUM CHLORIDE, SODIUM LACTATE AND CALCIUM CHLORIDE: 600; 310; 30; 20 INJECTION, SOLUTION INTRAVENOUS at 15:47

## 2019-03-09 RX ADMIN — BUTORPHANOL TARTRATE 2 MG: 2 INJECTION, SOLUTION INTRAMUSCULAR; INTRAVENOUS at 00:57

## 2019-03-09 RX ADMIN — PENICILLIN G 3 MILLION UNITS: 3000000 INJECTION, SOLUTION INTRAVENOUS at 12:16

## 2019-03-09 RX ADMIN — ROPIVACAINE HYDROCHLORIDE 12 ML: 5 INJECTION, SOLUTION EPIDURAL; INFILTRATION; PERINEURAL at 05:32

## 2019-03-09 RX ADMIN — FAMOTIDINE 20 MG: 10 INJECTION, SOLUTION INTRAVENOUS at 21:43

## 2019-03-09 RX ADMIN — SODIUM CHLORIDE, POTASSIUM CHLORIDE, SODIUM LACTATE AND CALCIUM CHLORIDE 100 ML/HR: 600; 310; 30; 20 INJECTION, SOLUTION INTRAVENOUS at 11:12

## 2019-03-09 RX ADMIN — SODIUM CHLORIDE, POTASSIUM CHLORIDE, SODIUM LACTATE AND CALCIUM CHLORIDE 300 ML: 600; 310; 30; 20 INJECTION, SOLUTION INTRAVENOUS at 10:52

## 2019-03-09 RX ADMIN — LIDOCAINE HYDROCHLORIDE AND EPINEPHRINE 2 ML: 15; 5 INJECTION, SOLUTION EPIDURAL at 05:30

## 2019-03-09 RX ADMIN — OXYCODONE HYDROCHLORIDE AND ACETAMINOPHEN 1 TABLET: 7.5; 325 TABLET ORAL at 17:49

## 2019-03-09 RX ADMIN — LIDOCAINE HYDROCHLORIDE,EPINEPHRINE BITARTRATE 5 ML: 20; .005 INJECTION, SOLUTION EPIDURAL; INFILTRATION; INTRACAUDAL; PERINEURAL at 16:19

## 2019-03-09 RX ADMIN — PENICILLIN G 3 MILLION UNITS: 3000000 INJECTION, SOLUTION INTRAVENOUS at 00:06

## 2019-03-09 RX ADMIN — OXYTOCIN 3 UNITS: 10 INJECTION, SOLUTION INTRAMUSCULAR; INTRAVENOUS at 16:14

## 2019-03-09 RX ADMIN — OXYTOCIN 3 UNITS: 10 INJECTION, SOLUTION INTRAMUSCULAR; INTRAVENOUS at 16:10

## 2019-03-09 RX ADMIN — ACETAMINOPHEN 650 MG: 325 TABLET ORAL at 14:07

## 2019-03-09 RX ADMIN — CEFAZOLIN SODIUM 2 G: 2 INJECTION, SOLUTION INTRAVENOUS at 15:33

## 2019-03-09 RX ADMIN — PENICILLIN G 3 MILLION UNITS: 3000000 INJECTION, SOLUTION INTRAVENOUS at 08:00

## 2019-03-09 RX ADMIN — SODIUM CHLORIDE, POTASSIUM CHLORIDE, SODIUM LACTATE AND CALCIUM CHLORIDE 125 ML/HR: 600; 310; 30; 20 INJECTION, SOLUTION INTRAVENOUS at 13:33

## 2019-03-09 RX ADMIN — PENICILLIN G 3 MILLION UNITS: 3000000 INJECTION, SOLUTION INTRAVENOUS at 04:14

## 2019-03-09 RX ADMIN — OXYTOCIN 2 MILLI-UNITS/MIN: 10 INJECTION, SOLUTION INTRAMUSCULAR; INTRAVENOUS at 00:09

## 2019-03-09 RX ADMIN — SODIUM CHLORIDE, POTASSIUM CHLORIDE, SODIUM LACTATE AND CALCIUM CHLORIDE 1000 ML: 600; 310; 30; 20 INJECTION, SOLUTION INTRAVENOUS at 05:37

## 2019-03-09 RX ADMIN — SODIUM CITRATE AND CITRIC ACID MONOHYDRATE 30 ML: 500; 334 SOLUTION ORAL at 15:33

## 2019-03-09 RX ADMIN — ROPIVACAINE HYDROCHLORIDE 15 ML/HR: 2 INJECTION, SOLUTION EPIDURAL; INFILTRATION at 05:38

## 2019-03-09 RX ADMIN — MORPHINE SULFATE 4 MG: 0.5 INJECTION, SOLUTION EPIDURAL; INTRATHECAL; INTRAVENOUS at 16:16

## 2019-03-09 RX ADMIN — Medication 10 MG: at 07:52

## 2019-03-09 RX ADMIN — SODIUM CHLORIDE, POTASSIUM CHLORIDE, SODIUM LACTATE AND CALCIUM CHLORIDE 125 ML/HR: 600; 310; 30; 20 INJECTION, SOLUTION INTRAVENOUS at 14:37

## 2019-03-09 RX ADMIN — OXYTOCIN 10 UNITS: 10 INJECTION, SOLUTION INTRAMUSCULAR; INTRAVENOUS at 16:18

## 2019-03-09 RX ADMIN — BUTORPHANOL TARTRATE 2 MG: 2 INJECTION, SOLUTION INTRAMUSCULAR; INTRAVENOUS at 03:49

## 2019-03-09 RX ADMIN — SODIUM CHLORIDE, POTASSIUM CHLORIDE, SODIUM LACTATE AND CALCIUM CHLORIDE 125 ML/HR: 600; 310; 30; 20 INJECTION, SOLUTION INTRAVENOUS at 02:51

## 2019-03-09 RX ADMIN — OXYTOCIN 10 UNITS: 10 INJECTION, SOLUTION INTRAMUSCULAR; INTRAVENOUS at 16:29

## 2019-03-09 RX ADMIN — LIDOCAINE HYDROCHLORIDE,EPINEPHRINE BITARTRATE 10 ML: 20; .005 INJECTION, SOLUTION EPIDURAL; INFILTRATION; INTRACAUDAL; PERINEURAL at 15:39

## 2019-03-09 RX ADMIN — LIDOCAINE HYDROCHLORIDE AND EPINEPHRINE 3 ML: 15; 5 INJECTION, SOLUTION EPIDURAL at 05:28

## 2019-03-09 RX ADMIN — FENTANYL CITRATE 100 MCG: 50 INJECTION, SOLUTION INTRAMUSCULAR; INTRAVENOUS at 05:35

## 2019-03-09 RX ADMIN — MIDAZOLAM HYDROCHLORIDE 2.5 MG: 1 INJECTION, SOLUTION INTRAMUSCULAR; INTRAVENOUS at 16:27

## 2019-03-09 NOTE — H&P
Jane Lew  Frieda Flores  : 1980  MRN: [id number]  CSN: [id number]    History and Physical    Subjective   Frieda Flores is a 38 y.o. year old  with an Estimated Date of Delivery: 3/18/19 currently at 38w5d presenting yesterday evening with complaints of rupture of membranes.     Prenatal care has been with Dr. King.  It has been complicated by history of alcoholism and opioid dependence currently on MAT    Obstetric History       T3      L3     SAB0   TAB0   Ectopic0   Molar0   Multiple0   Live Births3       # Outcome Date GA Lbr [place]/2nd Weight Sex Delivery Anes PTL Lv   4 Current            3 Term 2007 40w0d  2948 g (6 lb 8 oz) M Vag-Spont EPI N CANDY   2 Term 2002 40w0d  2778 g (6 lb 2 oz) F Vag-Spont  N CANDY   1 Term 2000 40w0d  2835 g (6 lb 4 oz) F Vag-Spont EPI N CANDY      Obstetric Comments   Pregnancy #4 - FOB #3     Past Medical History:   Diagnosis Date   • Abnormal Pap smear of cervix    • ADHD    • Alcoholism (CMS/HCC)     recovering 10 days ago last drink   • Anxiety    • Arthritis    • Bipolar disorder (CMS/HCC)    • Depression    • Hepatitis C    • Migraine    • PTSD (post-traumatic stress disorder)    • Substance abuse (CMS/HCC)     last used weeks ago during the beginning of pregnancy   • Urinary tract infection    • Urogenital trichomoniasis      Past Surgical History:   Procedure Laterality Date   • BREAST LUMPECTOMY Right        Allergies   Allergen Reactions   • Aleve [Naproxen Sodium] Swelling     Pt had swelling in her face and in her neck. Pt also states that she lost her voice.      Social History    Tobacco Use      Smoking status: Current Every Day Smoker        Packs/day: 0.50        Types: Cigarettes      Smokeless tobacco: Never Used    Review of Systems   Constitutional: Negative for chills and fever.   HENT: Negative for congestion and sore throat.    Respiratory: Negative for shortness of breath and wheezing.    Cardiovascular: Negative for chest  "pain and palpitations.   Gastrointestinal: Negative for abdominal pain, nausea and vomiting.   Genitourinary: Negative for frequency, vaginal bleeding and vaginal pain.   Musculoskeletal: Negative for back pain and myalgias.   Neurological: Negative for dizziness, light-headedness and headaches.   Psychiatric/Behavioral: Negative for confusion. The patient is not nervous/anxious.          Objective   /57   Pulse 81   Temp 97.3 °F (36.3 °C) (Oral)   Resp 16   Ht 160 cm (63\")   Wt 108 kg (237 lb)   LMP  (LMP Unknown)   BMI 41.98 kg/m²   General: well developed; well nourished  no acute distress   Heart: Not performed.   Lungs: breathing is unlabored   Abdomen: soft, non-tender; no masses  no umbilical or inguinal hernias are present  no hepato-splenomegaly   FHT's: reassuring and category 1   Cervix: was checked (by MD Quinn): 4 cm / 70 % / -2   Presentation: cephalic   Contractions: irregular     Prenatal Labs  Lab Results   Component Value Date    HGB 12.1 03/08/2019    RUBELLAABIGG 224.1 08/28/2018    RUBELLAABIGG Immune 08/28/2018    HEPBSAG Non-Reactive 08/28/2018    ABSCRN Negative 03/08/2019    QRN9QES9 Non-Reactive 08/28/2018    HEPCVIRUSABY Reactive (C) 08/28/2018     12/27/2018    STREPGPB Positive 02/18/2019    URINECX No growth at 2 days 08/21/2018    CHLAMNAA Negative 08/21/2018    NGONORRHON Negative 08/21/2018       Current Labs Reviewed   No data reviewed       Assessment   1. IUP at 38w5d  2. Group B strep status: positive  3. Premature rupture of membranes  4. Fetal status reassuring  5. History of alcoholism - sober since 8/2018  6. History of opioid dependence on MAT     Plan   1. Continue with augmentation. S/p intra cervical oquendo after no change with pitocin overnight.   2. Continue PCN for GBS prophylaxis     Shelley Hughes MD  3/9/2019  9:24 AM     "

## 2019-03-09 NOTE — PLAN OF CARE
Problem: Patient Care Overview  Goal: Individualization and Mutuality   03/09/19 0205   Individualization   Patient Specific Goals (Include Timeframe) healthy mom, healthy baby     Goal: Discharge Needs Assessment   03/09/19 0205   Discharge Needs Assessment   Concerns to be Addressed care coordination/care conferences;discharge planning;substance/tobacco abuse/use   Patient/Family Anticipates Transition to ([facility])   Patient/Family Anticipated Services at Transition    Equipment Needed After Discharge none       Problem: Labor (Cervical Ripen, Induct, Augment) (Adult,Obstetrics,Pediatric)  Goal: Signs and Symptoms of Listed Potential Problems Will be Absent, Minimized or Managed (Labor)  Outcome: Ongoing (interventions implemented as appropriate)   03/09/19 0205   Goal/Outcome Evaluation   Problems Assessed (Labor) all   Problems Present (Labor) none

## 2019-03-09 NOTE — PLAN OF CARE
Problem: Patient Care Overview  Goal: Plan of Care Review  Outcome: Ongoing (interventions implemented as appropriate)   19 1633   Coping/Psychosocial   Plan of Care Reviewed With patient   Plan of Care Review   Progress improving   OTHER   Outcome Summary succesfule  delivery of viable infant     Goal: Individualization and Mutuality  Outcome: Ongoing (interventions implemented as appropriate)   19 1633   Individualization   Patient Specific Goals (Include Timeframe) healthy baby and healthy mommy     Goal: Discharge Needs Assessment  Outcome: Ongoing (interventions implemented as appropriate)   19 1633   Discharge Needs Assessment   Concerns to be Addressed no discharge needs identified     Goal: Interprofessional Rounds/Family Conf  Outcome: Ongoing (interventions implemented as appropriate)   19 1633   Interdisciplinary Rounds/Family Conf   Participants nursing       Problem:  Delivery (Adult,Obstetrics,Pediatric)  Goal: Signs and Symptoms of Listed Potential Problems Will be Absent, Minimized or Managed ( Delivery)  Outcome: Outcome(s) achieved Date Met: 19 1633   Goal/Outcome Evaluation   Problems Assessed ( Delivery) all   Problems Present ( Delivery) none

## 2019-03-09 NOTE — PROGRESS NOTES
In to see patient for late decelerations again. Pitocin now at 16. Cervix with slight change to 6/80/-1 but cervix swollen. Discussed with patient either turning pitocin off to give baby a break and attempting to restart versus proceeding with primary  section for NRFWB. Decision made to proceed with primary  section for NRFWB. I discussed with Frieda the risks of her upcoming surgical procedure. Risks including intraoperative bleeding, infection at the site of surgery, damage to the adjacent surrounding organs, catheter induced urinary tract infections and the small risk for deep vein thrombosis were all explained. Additionally, the small risk for reoperation in the event of unanticipated bleeding or surgical injury was discussed.  All of her questions were answered fully.      Shelley Hughes MD

## 2019-03-09 NOTE — PROGRESS NOTES
Gary Flores  : 1980  MRN: [id number]  CSN: [id number]    Labor progress note    Subjective   She is comfortable with her epidural      Objective   Min/max vitals past 24 hours:  Temp  Min: 97.3 °F (36.3 °C)  Max: 98.5 °F (36.9 °C)   BP  Min: 79/52  Max: 135/88   Pulse  Min: 67  Max: 171   Resp  Min: 12  Max: 18        FHT's: reassuring, late decelerations are present, variable decelerations are present and category 2   Cervix: was checked (by me): 5 cm / 80 % / -2   Contractions: regular every 3-4 minutes         Assessment   1. IUP at 38w5d  2. Fetal status reassuring  3. Contractions inadequate     Plan   1. Start amnioinfusion given decelerations. Pitocin recently halved to 8. Increase back up as able. Good variability noted in between contractions.     Shelley Hughes MD  3/9/2019  10:47 AM

## 2019-03-09 NOTE — OP NOTE
" Gary Flores  : 1980  MRN: [id number]  CSN: [id number]     Section Operative Note    Pre-Operative Dx:   1. Intrauterine pregnancy at 38w5d weeks   2. Premature rupture of membranes  3. Hepatitis C  4. History of alcoholism - sober since 2018  5. History of opioid dependence on MAT  6. Fetal intolerance of labor       Postoperative dx:    1. Intrauterine pregnancy at 38w5d weeks 7.   Premature rupture of membranes  8. Hepatitis C  9. History of alcoholism - sober since 2018  10. History of opioid dependence on MAT  11. Fetal intolerance of labor         Procedure: Primary T uterine incision  section (will need repeat  sectio in the future by 37 weeks)       Surgeon: Shelley Hughes MD   Assistant: Adelaida Jiménez       Anesthesia: Epidural        EBL: 900 mls.   IV Fluids: 1200 mls.   UOP: 200 mls.     Antibiotics: cefazolin 2 gms     Infant:      Name:  Lane      Gender: male  infant    Weight: 3224 g (7 lb 1.7 oz)     Apgars:    @ 1 minute /    @ 5 minutes     Procedure Details:   After the patient was adequately anesthetized, she was sterilely prepped and draped in the dorsal supine left lateral tilt position. A Pfannenstiel incision was created sharply with the knife. It was carried down to the fascia with the Bovie.  The fascia was cut transversely with the knife and extended in a curvilinear fashion with scissors. The fascia was freed from its midline insertion superiorly and inferiorly. Rectus muscles were  in the midline. The peritoneum was sharply entered and a bladder flap was not sharply created. The lower uterine segment was scored transversely with the knife. Clear amniotic fluid was seen. The infant's was in vertex presentation.  The head was difficult to deliver so a modified maylard was performed using the bovi to take down some of the rectus to make more room. In addition the incision was extended vertically in the middle in a \"T\" fashion. " Forcep was attempted to be applied but was second blade would not articulate. Thus a vacuum was used. There were two pop offs and on second application infant head delivered OA followed by remainder of infant body. The mouth and nose were bulb suctioned.  The cord was clamped and the infant was handed to the delivery team which was in attendance.The placenta was spontaneously extracted. The uterus was exteriorized and wiped free of debris and clot. The vertical aspect of the uterine incision was closed with #1 chromic in a continuous running locking fashion. A second layer on the vertical aspect was closed using a 1-0 monocryl suture. The transverse aspect of the incision was closed with #1 chromic in a running locking fashion. An additional figure of eight x2 was used to achieve hemostasis and surgicel snow was applied.     The uterus was returned to the abdomen. The paracolic gutters were cleared of debris and clot. The fascia was closed with 0 looped PDS in a running locked fashion. The subcutaneous tissue was copiously irrigated. Eric's fascia was closed with 3-0 Vicryl and the skin was closed with 4-0 Vicryl subcuticularly. Steri strips applied.  All counts were correct.         Complications:   None      Disposition:   Mother to Mother Baby/Postpartum  in stable condition currently.   Baby to transition in the NICU  in stable condition currently.       Shelley Hughes MD  3/9/2019  4:58 PM

## 2019-03-09 NOTE — ANESTHESIA PREPROCEDURE EVALUATION
Anesthesia Evaluation     Patient summary reviewed and Nursing notes reviewed   NPO Solid Status: > 8 hours  NPO Liquid Status: > 8 hours           Airway   Mallampati: II  TM distance: <3 FB  Neck ROM: full  No difficulty expected  Dental      Pulmonary - negative pulmonary ROS   Cardiovascular - negative cardio ROS        Neuro/Psych  (+) headaches, psychiatric history Anxiety, Depression, Bipolar and ADHD,     GI/Hepatic/Renal/Endo    (+) obesity, morbid obesity,  hepatitis C, liver disease history of elevated LFT,     Musculoskeletal     Abdominal    Substance History - negative use     OB/GYN    (+) Pregnant,         Other   (+) arthritis                   Anesthesia Plan    ASA 3     epidural     Anesthetic plan, all risks, benefits, and alternatives have been provided, discussed and informed consent has been obtained with: patient.

## 2019-03-09 NOTE — PLAN OF CARE
Problem: Labor (Cervical Ripen, Induct, Augment) (Adult,Obstetrics,Pediatric)  Goal: Signs and Symptoms of Listed Potential Problems Will be Absent, Minimized or Managed (Labor)  Outcome: Unable to achieve outcome(s) by discharge Date Met: 03/09/19 03/09/19 1431   Goal/Outcome Evaluation   Problems Assessed (Labor) all   Problems Present (Labor) change in fetal wellbeing

## 2019-03-09 NOTE — ANESTHESIA POSTPROCEDURE EVALUATION
Patient: Frieda Flores    Procedure Summary     Date:  19 Room / Location:  [facility] LABOR DELIVERY   [facility]OR DELIVERY    Anesthesia Start:  519 Anesthesia Stop:      Procedure:   SECTION PRIMARY (Bilateral Abdomen) Diagnosis:      Surgeon:  Shelley Hughes MD Provider:  Corey Mccall DO    Anesthesia Type:  epidural ASA Status:  3          Anesthesia Type: epidural  Last vitals  BP   104/53   Temp   97.7 °F (36.5 °C) (19)   Pulse   97 (19)   Resp   14 (19)     SpO2   97 % (19)     Post Anesthesia Care and Evaluation    Patient location during evaluation: bedside  Patient participation: complete - patient participated  Level of consciousness: awake and sleepy but conscious  Pain score: 0  Pain management: satisfactory to patient  Airway patency: patent  Anesthetic complications: No anesthetic complications  PONV Status: none  Cardiovascular status: acceptable and hemodynamically stable  Respiratory status: acceptable  Hydration status: acceptable

## 2019-03-10 LAB
BASOPHILS # BLD AUTO: 0 10*3/MM3 (ref 0–0.2)
BASOPHILS NFR BLD AUTO: 0 % (ref 0–1)
DEPRECATED RDW RBC AUTO: 45.7 FL (ref 37–54)
EOSINOPHIL # BLD AUTO: 0.08 10*3/MM3 (ref 0–0.3)
EOSINOPHIL NFR BLD AUTO: 0.7 % (ref 0–3)
ERYTHROCYTE [DISTWIDTH] IN BLOOD BY AUTOMATED COUNT: 13.1 % (ref 11.3–14.5)
HCT VFR BLD AUTO: 28.8 % (ref 34.5–44)
HGB BLD-MCNC: 9.5 G/DL (ref 11.5–15.5)
IMM GRANULOCYTES # BLD AUTO: 0.03 10*3/MM3 (ref 0–0.05)
IMM GRANULOCYTES NFR BLD AUTO: 0.3 % (ref 0–0.6)
LYMPHOCYTES # BLD AUTO: 1.3 10*3/MM3 (ref 0.6–4.8)
LYMPHOCYTES NFR BLD AUTO: 11.9 % (ref 24–44)
MCH RBC QN AUTO: 31.3 PG (ref 27–31)
MCHC RBC AUTO-ENTMCNC: 33 G/DL (ref 32–36)
MCV RBC AUTO: 94.7 FL (ref 80–99)
MONOCYTES # BLD AUTO: 0.54 10*3/MM3 (ref 0–1)
MONOCYTES NFR BLD AUTO: 4.9 % (ref 0–12)
NEUTROPHILS # BLD AUTO: 8.99 10*3/MM3 (ref 1.5–8.3)
NEUTROPHILS NFR BLD AUTO: 82.5 % (ref 41–71)
PLATELET # BLD AUTO: 230 10*3/MM3 (ref 150–450)
PMV BLD AUTO: 10.6 FL (ref 6–12)
RBC # BLD AUTO: 3.04 10*6/MM3 (ref 3.89–5.14)
WBC NRBC COR # BLD: 10.91 10*3/MM3 (ref 3.5–10.8)

## 2019-03-10 PROCEDURE — 99024 POSTOP FOLLOW-UP VISIT: CPT | Performed by: OBSTETRICS & GYNECOLOGY

## 2019-03-10 PROCEDURE — 63710000001 DIPHENHYDRAMINE PER 50 MG: Performed by: OBSTETRICS & GYNECOLOGY

## 2019-03-10 PROCEDURE — 85025 COMPLETE CBC W/AUTO DIFF WBC: CPT | Performed by: OBSTETRICS & GYNECOLOGY

## 2019-03-10 RX ORDER — FAMOTIDINE 20 MG/1
20 TABLET, FILM COATED ORAL 2 TIMES DAILY
Status: DISCONTINUED | OUTPATIENT
Start: 2019-03-10 | End: 2019-03-14 | Stop reason: HOSPADM

## 2019-03-10 RX ORDER — DIPHENHYDRAMINE HCL 25 MG
25 CAPSULE ORAL EVERY 6 HOURS PRN
Status: DISCONTINUED | OUTPATIENT
Start: 2019-03-10 | End: 2019-03-14 | Stop reason: HOSPADM

## 2019-03-10 RX ADMIN — FAMOTIDINE 20 MG: 20 TABLET, FILM COATED ORAL at 13:28

## 2019-03-10 RX ADMIN — OXYCODONE HYDROCHLORIDE AND ACETAMINOPHEN 1 TABLET: 7.5; 325 TABLET ORAL at 06:47

## 2019-03-10 RX ADMIN — DOCUSATE SODIUM 100 MG: 100 CAPSULE, LIQUID FILLED ORAL at 13:28

## 2019-03-10 RX ADMIN — BUPROPION HYDROCHLORIDE 150 MG: 150 TABLET, EXTENDED RELEASE ORAL at 07:24

## 2019-03-10 RX ADMIN — OXYCODONE HYDROCHLORIDE AND ACETAMINOPHEN 1 TABLET: 7.5; 325 TABLET ORAL at 22:20

## 2019-03-10 RX ADMIN — OXYCODONE HYDROCHLORIDE AND ACETAMINOPHEN 1 TABLET: 7.5; 325 TABLET ORAL at 18:05

## 2019-03-10 RX ADMIN — DOCUSATE SODIUM 100 MG: 100 CAPSULE, LIQUID FILLED ORAL at 22:20

## 2019-03-10 RX ADMIN — OXYCODONE HYDROCHLORIDE AND ACETAMINOPHEN 1 TABLET: 7.5; 325 TABLET ORAL at 13:28

## 2019-03-10 RX ADMIN — DIPHENHYDRAMINE HYDROCHLORIDE 25 MG: 25 CAPSULE ORAL at 16:24

## 2019-03-10 NOTE — PROGRESS NOTES
Gary Flores  : 1980  MRN: [id number]  CSN: [id number]    Post-operative Day #1  Subjective   Her pain is well controlled. Vaginal bleeding is normal in amount. She is ambulating without difficulty. She is passing gas. Her baby is doing well. Savage just out this morning and has not yet voided.      Objective     Min/max vitals past 24 hours:   Temp  Min: 97.5 °F (36.4 °C)  Max: 98.7 °F (37.1 °C)  BP  Min: 97/67  Max: 129/79  Pulse  Min: 73  Max: 99  Resp  Min: 14  Max: 18        General: well developed; well nourished  no acute distress   Abdomen: soft, non-tender; no masses  no umbilical or inguinal hernias are present  no hepato-splenomegaly   Incision: covered with bandage    Pelvic: Not performed   Ext: Calves NT     Last 3 values   Results from last 7 days   Lab Units 03/10/19  0656 19  1955   WBC 10*3/mm3 10.91* 9.82   HEMOGLOBIN g/dL 9.5* 12.1   HEMATOCRIT % 28.8* 35.8   PLATELETS 10*3/mm3 230 237     Lab Results   Component Value Date    RH Positive 2019    HEPBSAG Non-Reactive 2018          Assessment   1. POD #1 S/P Primary low transverse  section with T incision for fetal intolerance of labor   2. Doing well     Plan   1. Continue routine post-operative care  2. Ambulate  3. Advance diet    Shelley Hughes MD  3/10/2019  11:29 AM

## 2019-03-11 PROCEDURE — 99232 SBSQ HOSP IP/OBS MODERATE 35: CPT | Performed by: OBSTETRICS & GYNECOLOGY

## 2019-03-11 RX ORDER — SIMETHICONE 80 MG
80 TABLET,CHEWABLE ORAL 4 TIMES DAILY PRN
Status: DISCONTINUED | OUTPATIENT
Start: 2019-03-11 | End: 2019-03-14 | Stop reason: HOSPADM

## 2019-03-11 RX ORDER — OXYCODONE HYDROCHLORIDE AND ACETAMINOPHEN 5; 325 MG/1; MG/1
2 TABLET ORAL EVERY 4 HOURS PRN
Status: DISCONTINUED | OUTPATIENT
Start: 2019-03-11 | End: 2019-03-13

## 2019-03-11 RX ORDER — CEPHALEXIN 250 MG/1
500 CAPSULE ORAL EVERY 6 HOURS SCHEDULED
Status: DISCONTINUED | OUTPATIENT
Start: 2019-03-11 | End: 2019-03-12

## 2019-03-11 RX ORDER — BISACODYL 10 MG
10 SUPPOSITORY, RECTAL RECTAL DAILY PRN
Status: DISCONTINUED | OUTPATIENT
Start: 2019-03-11 | End: 2019-03-14 | Stop reason: HOSPADM

## 2019-03-11 RX ORDER — GUAIFENESIN/DEXTROMETHORPHAN 100-10MG/5
5 SYRUP ORAL EVERY 6 HOURS PRN
Status: DISCONTINUED | OUTPATIENT
Start: 2019-03-11 | End: 2019-03-14 | Stop reason: HOSPADM

## 2019-03-11 RX ADMIN — SIMETHICONE CHEW TAB 80 MG 80 MG: 80 TABLET ORAL at 16:28

## 2019-03-11 RX ADMIN — OXYCODONE HYDROCHLORIDE AND ACETAMINOPHEN 1 TABLET: 7.5; 325 TABLET ORAL at 15:21

## 2019-03-11 RX ADMIN — SIMETHICONE CHEW TAB 80 MG 80 MG: 80 TABLET ORAL at 10:55

## 2019-03-11 RX ADMIN — OXYCODONE AND ACETAMINOPHEN 2 TABLET: 5; 325 TABLET ORAL at 23:47

## 2019-03-11 RX ADMIN — BUPROPION HYDROCHLORIDE 150 MG: 150 TABLET, EXTENDED RELEASE ORAL at 07:29

## 2019-03-11 RX ADMIN — OXYCODONE HYDROCHLORIDE AND ACETAMINOPHEN 1 TABLET: 7.5; 325 TABLET ORAL at 06:46

## 2019-03-11 RX ADMIN — ONDANSETRON HYDROCHLORIDE 4 MG: 4 TABLET, FILM COATED ORAL at 16:34

## 2019-03-11 RX ADMIN — BISACODYL 10 MG: 10 SUPPOSITORY RECTAL at 05:15

## 2019-03-11 RX ADMIN — OXYCODONE AND ACETAMINOPHEN 2 TABLET: 5; 325 TABLET ORAL at 19:46

## 2019-03-11 RX ADMIN — OXYCODONE HYDROCHLORIDE AND ACETAMINOPHEN 1 TABLET: 7.5; 325 TABLET ORAL at 02:25

## 2019-03-11 RX ADMIN — OXYCODONE HYDROCHLORIDE AND ACETAMINOPHEN 1 TABLET: 7.5; 325 TABLET ORAL at 10:55

## 2019-03-11 RX ADMIN — DOCUSATE SODIUM 100 MG: 100 CAPSULE, LIQUID FILLED ORAL at 07:29

## 2019-03-11 RX ADMIN — SIMETHICONE CHEW TAB 80 MG 80 MG: 80 TABLET ORAL at 05:15

## 2019-03-11 RX ADMIN — DOCUSATE SODIUM 100 MG: 100 CAPSULE, LIQUID FILLED ORAL at 19:46

## 2019-03-11 RX ADMIN — FAMOTIDINE 20 MG: 20 TABLET, FILM COATED ORAL at 07:29

## 2019-03-11 RX ADMIN — SIMETHICONE CHEW TAB 80 MG 80 MG: 80 TABLET ORAL at 21:49

## 2019-03-11 RX ADMIN — CEPHALEXIN 500 MG: 250 CAPSULE ORAL at 19:47

## 2019-03-11 NOTE — CONSULTS
Continued Stay Note  [facility]     Patient Name: Frieda Flores  MRN: [id number]  Today's Date: 3/11/2019    Admit Date: 3/8/2019    Discharge Plan     Row Name 03/11/19 0757       Plan    Plan  MSW available     Plan Comments  Pt has h/o substance abuse. SHe ahd + UDS on 8/28/2018 for THC. She ahd - UDS on 9/4/2018, 9/17/2018, 10/1/2018, 10/29/2018, 1/17/2019 and 3/8/2019.     Final Discharge Disposition Code  01 - home or self-care        Discharge Codes    No documentation.             Ambika Martínez, MSW

## 2019-03-11 NOTE — LACTATION NOTE
Mom reports baby is latching and nursing well.  Mom is having pain control problems currently and has given baby a bottle but has pumped when the bottle was given.  Encouraged mom to put baby back to breast when she felt well enough and to pump whenever supplementing.

## 2019-03-11 NOTE — LACTATION NOTE
03/10/19 1400   Maternal Information   Date of Referral 03/10/19   Person Making Referral patient;nurse   Maternal Assessment   Breast Size Issue none   Breast Shape Bilateral:;round   Breast Density Bilateral:;soft   Nipples Bilateral:;flat   Maternal Infant Feeding   Maternal Emotional State assist needed;anxious;tense   Infant Positioning clutch/football   Signs of Milk Transfer infant jaw motion present   Pain with Feeding no   Comfort Measures Before/During Feeding infant position adjusted;latch adjusted;maternal position adjusted   Latch Assistance yes   Reproductive Interventions   Breastfeeding Assistance support offered;assisted with positioning;feeding on demand promoted;feeding cue recognition promoted;feeding session observed;infant latch-on verified   Breastfeeding Support diary/feeding log utilized;encouragement provided;lactation counseling provided   Helped mom with latch and position using nipple shield. Baby latched well. Did not breastfeed her other children. Teaching done, as documented under Education. To call for lactation services, if there are questions or concerns.

## 2019-03-11 NOTE — LACTATION NOTE
03/10/19 2010   Maternal Information   Date of Referral 03/10/19   Person Making Referral patient   Maternal Reason for Referral (wants to pump)   Equipment Type   Breast Pump Type manual  (doesn't have breast pump from insurance)   Breast Pump Flange Type hard   Breast Pump Flange Size 24 mm   Reproductive Interventions   Breastfeeding Assistance support offered   Breastfeeding Support encouragement provided;lactation counseling provided   Breast Pumping   Breast Pumping Interventions (can use manual breast pump every 3 hours)   Mom requesting to use breast pump. Gave hand pump and instructed hand pump--mom return demonstrated hand pump use. Mom got several mL expressed breast milk and will give to baby with bottle. Gave breast pump rx and instructed on how to get breast pump through insurance tomorrow. To call lactation services, if she has questions or concerns.

## 2019-03-11 NOTE — PROGRESS NOTES
Gary Flores  : 1980  MRN: [id number]  CSN: [id number]    Post-operative Day #2  Subjective   Her pain is well controlled. Vaginal bleeding is normal in amount. She is ambulating without difficulty. She is passing gas. Her baby is doing well. Savage just out this morning and has not yet voided.      Objective     Min/max vitals past 24 hours:   Temp  Min: 97.7 °F (36.5 °C)  Max: 98.7 °F (37.1 °C)  BP  Min: 108/59  Max: 139/83  Pulse  Min: 81  Max: 98  Resp  Min: 14  Max: 20        General: well developed; well nourished  no acute distress   Abdomen: soft, non-tender; no masses  no umbilical or inguinal hernias are present  no hepato-splenomegaly   Incision: covered with bandage    Pelvic: Not performed   Ext: Calves NT     Last 3 values   Results from last 7 days   Lab Units 03/10/19  0656 19  1955   WBC 10*3/mm3 10.91* 9.82   HEMOGLOBIN g/dL 9.5* 12.1   HEMATOCRIT % 28.8* 35.8   PLATELETS 10*3/mm3 230 237     Lab Results   Component Value Date    RH Positive 2019    HEPBSAG Non-Reactive 2018          Assessment   1. POD #1 S/P Primary low transverse  section with T incision for fetal intolerance of labor   2. Doing well     Plan   1. Hopefully home in AM    Dani King MD  3/11/2019  3:53 PM       Addendum:  Asked to reassess abdomen.  Remains afebrile.  Significant area of cellulitis of panus above incision with mild induration  And not fluctuance.  Will begin oral Cephalosporin for cellulitis and increase oral pain medication.

## 2019-03-11 NOTE — PLAN OF CARE
Problem: Patient Care Overview  Goal: Plan of Care Review  Outcome: Ongoing (interventions implemented as appropriate)   19   Plan of Care Review   Progress improving   OTHER   Outcome Summary VSS, lochia lt. amt. encouraged ambulation to help pass flatus.     Goal: Individualization and Mutuality  Outcome: Ongoing (interventions implemented as appropriate)    Goal: Discharge Needs Assessment  Outcome: Ongoing (interventions implemented as appropriate)   19   Discharge Needs Assessment   Patient/Family Anticipates Transition to home with family     Goal: Interprofessional Rounds/Family Conf  Outcome: Ongoing (interventions implemented as appropriate)   19   Interdisciplinary Rounds/Family Conf   Participants family       Problem: Postpartum ( Delivery) (Adult,Obstetrics,Pediatric)  Goal: Signs and Symptoms of Listed Potential Problems Will be Absent, Minimized or Managed (Postpartum)  Outcome: Ongoing (interventions implemented as appropriate)   19   Goal/Outcome Evaluation   Problems Present (Postpartum ) pain     Goal: Anesthesia/Sedation Recovery  Outcome: Ongoing (interventions implemented as appropriate)   19   Goal/Outcome Evaluation   Anesthesia/Sedation Recovery criteria met for discharge       Problem: Breastfeeding (Adult,Obstetrics,Pediatric)  Goal: Signs and Symptoms of Listed Potential Problems Will be Absent, Minimized or Managed (Breastfeeding)  Outcome: Ongoing (interventions implemented as appropriate)   19   Goal/Outcome Evaluation   Problems Present (Breastfeeding) none

## 2019-03-12 LAB
BASOPHILS # BLD AUTO: 0.01 10*3/MM3 (ref 0–0.2)
BASOPHILS NFR BLD AUTO: 0.1 % (ref 0–1)
DEPRECATED RDW RBC AUTO: 43.1 FL (ref 37–54)
EOSINOPHIL # BLD AUTO: 0.13 10*3/MM3 (ref 0–0.3)
EOSINOPHIL NFR BLD AUTO: 1.1 % (ref 0–3)
ERYTHROCYTE [DISTWIDTH] IN BLOOD BY AUTOMATED COUNT: 12.7 % (ref 11.3–14.5)
HCT VFR BLD AUTO: 28.5 % (ref 34.5–44)
HGB BLD-MCNC: 9.7 G/DL (ref 11.5–15.5)
IMM GRANULOCYTES # BLD AUTO: 0.05 10*3/MM3 (ref 0–0.05)
IMM GRANULOCYTES NFR BLD AUTO: 0.4 % (ref 0–0.6)
LYMPHOCYTES # BLD AUTO: 1.4 10*3/MM3 (ref 0.6–4.8)
LYMPHOCYTES NFR BLD AUTO: 11.9 % (ref 24–44)
MCH RBC QN AUTO: 31.5 PG (ref 27–31)
MCHC RBC AUTO-ENTMCNC: 34 G/DL (ref 32–36)
MCV RBC AUTO: 92.5 FL (ref 80–99)
MONOCYTES # BLD AUTO: 0.47 10*3/MM3 (ref 0–1)
MONOCYTES NFR BLD AUTO: 4 % (ref 0–12)
NEUTROPHILS # BLD AUTO: 9.69 10*3/MM3 (ref 1.5–8.3)
NEUTROPHILS NFR BLD AUTO: 82.5 % (ref 41–71)
PLATELET # BLD AUTO: 263 10*3/MM3 (ref 150–450)
PMV BLD AUTO: 9.7 FL (ref 6–12)
RBC # BLD AUTO: 3.08 10*6/MM3 (ref 3.89–5.14)
WBC NRBC COR # BLD: 11.75 10*3/MM3 (ref 3.5–10.8)

## 2019-03-12 PROCEDURE — 85025 COMPLETE CBC W/AUTO DIFF WBC: CPT | Performed by: OBSTETRICS & GYNECOLOGY

## 2019-03-12 PROCEDURE — 99231 SBSQ HOSP IP/OBS SF/LOW 25: CPT | Performed by: OBSTETRICS & GYNECOLOGY

## 2019-03-12 PROCEDURE — 25010000003 CEFAZOLIN IN DEXTROSE 2-4 GM/100ML-% SOLUTION: Performed by: OBSTETRICS & GYNECOLOGY

## 2019-03-12 RX ORDER — CEFAZOLIN SODIUM 2 G/100ML
2 INJECTION, SOLUTION INTRAVENOUS EVERY 8 HOURS
Status: DISCONTINUED | OUTPATIENT
Start: 2019-03-12 | End: 2019-03-13

## 2019-03-12 RX ORDER — SODIUM CHLORIDE, SODIUM LACTATE, POTASSIUM CHLORIDE, CALCIUM CHLORIDE 600; 310; 30; 20 MG/100ML; MG/100ML; MG/100ML; MG/100ML
100 INJECTION, SOLUTION INTRAVENOUS CONTINUOUS
Status: DISCONTINUED | OUTPATIENT
Start: 2019-03-12 | End: 2019-03-13

## 2019-03-12 RX ADMIN — BUPROPION HYDROCHLORIDE 150 MG: 150 TABLET, EXTENDED RELEASE ORAL at 08:36

## 2019-03-12 RX ADMIN — CEPHALEXIN 500 MG: 250 CAPSULE ORAL at 12:23

## 2019-03-12 RX ADMIN — SIMETHICONE CHEW TAB 80 MG 80 MG: 80 TABLET ORAL at 08:36

## 2019-03-12 RX ADMIN — DOCUSATE SODIUM 100 MG: 100 CAPSULE, LIQUID FILLED ORAL at 08:36

## 2019-03-12 RX ADMIN — OXYCODONE AND ACETAMINOPHEN 2 TABLET: 5; 325 TABLET ORAL at 17:44

## 2019-03-12 RX ADMIN — OXYCODONE AND ACETAMINOPHEN 2 TABLET: 5; 325 TABLET ORAL at 13:47

## 2019-03-12 RX ADMIN — CEPHALEXIN 500 MG: 250 CAPSULE ORAL at 01:21

## 2019-03-12 RX ADMIN — ONDANSETRON HYDROCHLORIDE 4 MG: 4 TABLET, FILM COATED ORAL at 02:29

## 2019-03-12 RX ADMIN — FAMOTIDINE 20 MG: 20 TABLET, FILM COATED ORAL at 21:28

## 2019-03-12 RX ADMIN — OXYCODONE AND ACETAMINOPHEN 2 TABLET: 5; 325 TABLET ORAL at 21:27

## 2019-03-12 RX ADMIN — CEPHALEXIN 500 MG: 250 CAPSULE ORAL at 06:54

## 2019-03-12 RX ADMIN — OXYCODONE AND ACETAMINOPHEN 2 TABLET: 5; 325 TABLET ORAL at 05:14

## 2019-03-12 RX ADMIN — SIMETHICONE CHEW TAB 80 MG 80 MG: 80 TABLET ORAL at 21:27

## 2019-03-12 RX ADMIN — OXYCODONE AND ACETAMINOPHEN 2 TABLET: 5; 325 TABLET ORAL at 09:28

## 2019-03-12 RX ADMIN — CEFAZOLIN SODIUM 2 G: 2 INJECTION, SOLUTION INTRAVENOUS at 17:44

## 2019-03-12 NOTE — PLAN OF CARE
Problem: Patient Care Overview  Goal: Plan of Care Review  Outcome: Ongoing (interventions implemented as appropriate)   19 0785   Coping/Psychosocial   Plan of Care Reviewed With patient   Plan of Care Review   Progress no change   OTHER   Outcome Summary VSS - pt afebrile. Taking 2 Percocet for pain Q4-5 hours. Pt states that pain has improved wirh passing of gas, having a bowel movement, and taking antibiotics. Area of redness on abdomen marked at beginning of shift. No increase in reddened area until last check this morning. Pt taking PO Keflex Q 6 hours. Breast and bottlefeeding baby.     Goal: Individualization and Mutuality  Outcome: Ongoing (interventions implemented as appropriate)    Goal: Discharge Needs Assessment  Outcome: Ongoing (interventions implemented as appropriate)      Problem: Postpartum ( Delivery) (Adult,Obstetrics,Pediatric)  Goal: Signs and Symptoms of Listed Potential Problems Will be Absent, Minimized or Managed (Postpartum)  Outcome: Ongoing (interventions implemented as appropriate)      Problem: Breastfeeding (Adult,Obstetrics,Pediatric)  Goal: Signs and Symptoms of Listed Potential Problems Will be Absent, Minimized or Managed (Breastfeeding)  Outcome: Ongoing (interventions implemented as appropriate)

## 2019-03-12 NOTE — PROGRESS NOTES
[facility] [place]    3/12/2019    Name:Frieda Flores    MR#:[id number]     PROGRESS NOTE:  Post-Op Day 3 S/P    HD:4    Subjective   38 y.o. yo Female  s/p CS at 38w5d doing well. Pain well controlled. Tolerating regular diet and having flatus. Lochia normal.     Patient Active Problem List   Diagnosis   • Alcoholism in remission (CMS/HCC)   • Opioid dependence in remission (CMS/HCC)   • Antepartum multigravida of advanced maternal age   • Mood disorder (CMS/HCC)   • Tobacco abuse   • Abnormal liver function tests   • Hepatitis C carrier (CMS/HCC)   • 38 weeks gestation of pregnancy   • Family history of congenital heart defect   • Other headache syndrome   • Morbidly obese (CMS/HCC)   • Full-term premature rupture of membranes with onset of labor within 24 hours of rupture   • Postpartum care following pLTCS 3/9/19 (boy)        Objective    Vitals  Temp:  Temp:  [97.6 °F (36.4 °C)-98.6 °F (37 °C)] 98 °F (36.7 °C)  Temp src: Oral  BP:  BP: (113-141)/(69-84) 127/72  Pulse:  Heart Rate:  [67-94] 87  RR:   Resp:  [14-16] 16    General Awake, alert, no distress  Abdomen Soft, non-distended, fundus firm, is below umbilicus,less tender than yesterday.   Incision  Intact, no erythema or exudate. Erythema above the inicision is minimally worse than yesterday, however less indurated.  Extremities Calves NT bilaterally     I/O last 3 completed shifts:  In: -   Out: 1150 [Urine:1150]    LABS:   Lab Results   Component Value Date    WBC 11.75 (H) 2019    HGB 9.7 (L) 2019    HCT 28.5 (L) 2019    MCV 92.5 2019     2019       Infant: male       Assessment   1.  POD 3 Primary  section done in active labor   2.  Opioid and alcohol dependence in remission   3.  Hep C carrier   4.  AMA    5.  Obesity   6.  Wound cellulitis. She has remained afebrile during the post-operative. She has a leukocytosis which is  essentially unchanged over 48 hours having been started on oral Keflex less ta 24 hours ago. She feels subjectively significantly improved. There has been a 2 cm increase in the area of erythema since initiating antibiotics. I do not think this is clinically significant at this time. However, should the area of cellulitis continue to expand rather than improve, I will stringer to an Iv antibiotic, consider imaging the area and consultation regarding further diagnostic or therapeutic intervstion.      Plan: Doing well.  Routine postoperative care      Active Problems:   None      Dani King MD  3/12/2019 9:48 AM     Patient re-examined and there appears to be no improvement after 24 hours of oral antibiotic. Although she does not appear ill and is afebrile, I will begin IV antibiotic and consult infectious disease re: antibiotic choice, duration, and any further diagnostic testing.  Dani King MD  3/12/2019 5:11 PM

## 2019-03-13 ENCOUNTER — APPOINTMENT (OUTPATIENT)
Dept: CT IMAGING | Facility: HOSPITAL | Age: 39
End: 2019-03-13

## 2019-03-13 LAB
ALBUMIN SERPL-MCNC: 3.29 G/DL (ref 3.2–4.8)
ALBUMIN/GLOB SERPL: 1.6 G/DL (ref 1.5–2.5)
ALP SERPL-CCNC: 91 U/L (ref 25–100)
ALT SERPL W P-5'-P-CCNC: 20 U/L (ref 7–40)
ANION GAP SERPL CALCULATED.3IONS-SCNC: 8 MMOL/L (ref 3–11)
AST SERPL-CCNC: 20 U/L (ref 0–33)
BACTERIA UR QL AUTO: NORMAL /HPF
BASOPHILS # BLD AUTO: 0.01 10*3/MM3 (ref 0–0.2)
BASOPHILS NFR BLD AUTO: 0.1 % (ref 0–1)
BILIRUB SERPL-MCNC: 0.2 MG/DL (ref 0.3–1.2)
BILIRUB UR QL STRIP: NEGATIVE
BUN BLD-MCNC: 6 MG/DL (ref 9–23)
BUN/CREAT SERPL: 11.8 (ref 7–25)
CALCIUM SPEC-SCNC: 8.3 MG/DL (ref 8.7–10.4)
CHLORIDE SERPL-SCNC: 108 MMOL/L (ref 99–109)
CK SERPL-CCNC: 74 U/L (ref 26–174)
CLARITY UR: CLEAR
CO2 SERPL-SCNC: 24 MMOL/L (ref 20–31)
COLOR UR: YELLOW
CREAT BLD-MCNC: 0.51 MG/DL (ref 0.6–1.3)
DEPRECATED RDW RBC AUTO: 43.7 FL (ref 37–54)
EOSINOPHIL # BLD AUTO: 0.21 10*3/MM3 (ref 0–0.3)
EOSINOPHIL NFR BLD AUTO: 1.8 % (ref 0–3)
ERYTHROCYTE [DISTWIDTH] IN BLOOD BY AUTOMATED COUNT: 12.8 % (ref 11.3–14.5)
GFR SERPL CREATININE-BSD FRML MDRD: 136 ML/MIN/1.73
GLOBULIN UR ELPH-MCNC: 2.1 GM/DL
GLUCOSE BLD-MCNC: 89 MG/DL (ref 70–100)
GLUCOSE UR STRIP-MCNC: NEGATIVE MG/DL
HCT VFR BLD AUTO: 26.7 % (ref 34.5–44)
HGB BLD-MCNC: 9 G/DL (ref 11.5–15.5)
HGB UR QL STRIP.AUTO: ABNORMAL
HYALINE CASTS UR QL AUTO: NORMAL /LPF
IMM GRANULOCYTES # BLD AUTO: 0.05 10*3/MM3 (ref 0–0.05)
IMM GRANULOCYTES NFR BLD AUTO: 0.4 % (ref 0–0.6)
KETONES UR QL STRIP: NEGATIVE
LEUKOCYTE ESTERASE UR QL STRIP.AUTO: NEGATIVE
LYMPHOCYTES # BLD AUTO: 1.48 10*3/MM3 (ref 0.6–4.8)
LYMPHOCYTES NFR BLD AUTO: 12.5 % (ref 24–44)
MCH RBC QN AUTO: 31.4 PG (ref 27–31)
MCHC RBC AUTO-ENTMCNC: 33.7 G/DL (ref 32–36)
MCV RBC AUTO: 93 FL (ref 80–99)
MONOCYTES # BLD AUTO: 0.52 10*3/MM3 (ref 0–1)
MONOCYTES NFR BLD AUTO: 4.4 % (ref 0–12)
NEUTROPHILS # BLD AUTO: 9.57 10*3/MM3 (ref 1.5–8.3)
NEUTROPHILS NFR BLD AUTO: 80.8 % (ref 41–71)
NITRITE UR QL STRIP: NEGATIVE
PH UR STRIP.AUTO: 7.5 [PH] (ref 5–8)
PLATELET # BLD AUTO: 269 10*3/MM3 (ref 150–450)
PMV BLD AUTO: 9.5 FL (ref 6–12)
POTASSIUM BLD-SCNC: 3.6 MMOL/L (ref 3.5–5.5)
PROT SERPL-MCNC: 5.4 G/DL (ref 5.7–8.2)
PROT UR QL STRIP: NEGATIVE
RBC # BLD AUTO: 2.87 10*6/MM3 (ref 3.89–5.14)
RBC # UR: NORMAL /HPF
REF LAB TEST METHOD: NORMAL
SODIUM BLD-SCNC: 140 MMOL/L (ref 132–146)
SP GR UR STRIP: 1.01 (ref 1–1.03)
SQUAMOUS #/AREA URNS HPF: NORMAL /HPF
UROBILINOGEN UR QL STRIP: ABNORMAL
WBC NRBC COR # BLD: 11.84 10*3/MM3 (ref 3.5–10.8)
WBC UR QL AUTO: NORMAL /HPF

## 2019-03-13 PROCEDURE — 99024 POSTOP FOLLOW-UP VISIT: CPT | Performed by: OBSTETRICS & GYNECOLOGY

## 2019-03-13 PROCEDURE — 80053 COMPREHEN METABOLIC PANEL: CPT | Performed by: INTERNAL MEDICINE

## 2019-03-13 PROCEDURE — 25010000003 CEFTRIAXONE PER 250 MG: Performed by: INTERNAL MEDICINE

## 2019-03-13 PROCEDURE — 25010000002 DAPTOMYCIN PER 1 MG: Performed by: INTERNAL MEDICINE

## 2019-03-13 PROCEDURE — 82550 ASSAY OF CK (CPK): CPT | Performed by: INTERNAL MEDICINE

## 2019-03-13 PROCEDURE — 81001 URINALYSIS AUTO W/SCOPE: CPT | Performed by: INTERNAL MEDICINE

## 2019-03-13 PROCEDURE — 25010000002 IOPAMIDOL 61 % SOLUTION: Performed by: OBSTETRICS & GYNECOLOGY

## 2019-03-13 PROCEDURE — 74177 CT ABD & PELVIS W/CONTRAST: CPT

## 2019-03-13 PROCEDURE — 25010000003 CEFAZOLIN IN DEXTROSE 2-4 GM/100ML-% SOLUTION: Performed by: OBSTETRICS & GYNECOLOGY

## 2019-03-13 PROCEDURE — 85025 COMPLETE CBC W/AUTO DIFF WBC: CPT | Performed by: OBSTETRICS & GYNECOLOGY

## 2019-03-13 RX ORDER — SODIUM CHLORIDE 0.9 % (FLUSH) 0.9 %
10 SYRINGE (ML) INJECTION EVERY 8 HOURS
Status: DISCONTINUED | OUTPATIENT
Start: 2019-03-13 | End: 2019-03-14 | Stop reason: HOSPADM

## 2019-03-13 RX ORDER — CEFTRIAXONE SODIUM 2 G/50ML
2 INJECTION, SOLUTION INTRAVENOUS EVERY 24 HOURS
Status: DISCONTINUED | OUTPATIENT
Start: 2019-03-13 | End: 2019-03-14 | Stop reason: HOSPADM

## 2019-03-13 RX ORDER — OXYCODONE AND ACETAMINOPHEN 10; 325 MG/1; MG/1
1 TABLET ORAL EVERY 4 HOURS PRN
Status: DISCONTINUED | OUTPATIENT
Start: 2019-03-13 | End: 2019-03-14 | Stop reason: HOSPADM

## 2019-03-13 RX ADMIN — IOPAMIDOL 100 ML: 612 INJECTION, SOLUTION INTRAVENOUS at 16:00

## 2019-03-13 RX ADMIN — DOCUSATE SODIUM 100 MG: 100 CAPSULE, LIQUID FILLED ORAL at 09:12

## 2019-03-13 RX ADMIN — OXYCODONE HYDROCHLORIDE AND ACETAMINOPHEN 1 TABLET: 10; 325 TABLET ORAL at 10:16

## 2019-03-13 RX ADMIN — CEFAZOLIN SODIUM 2 G: 2 INJECTION, SOLUTION INTRAVENOUS at 01:47

## 2019-03-13 RX ADMIN — DAPTOMYCIN 300 MG: 500 INJECTION, POWDER, LYOPHILIZED, FOR SOLUTION INTRAVENOUS at 09:12

## 2019-03-13 RX ADMIN — CEFTRIAXONE SODIUM 2 G: 2 INJECTION, SOLUTION INTRAVENOUS at 08:37

## 2019-03-13 RX ADMIN — OXYCODONE HYDROCHLORIDE AND ACETAMINOPHEN 1 TABLET: 10; 325 TABLET ORAL at 14:20

## 2019-03-13 RX ADMIN — OXYCODONE HYDROCHLORIDE AND ACETAMINOPHEN 1 TABLET: 10; 325 TABLET ORAL at 18:17

## 2019-03-13 RX ADMIN — OXYCODONE HYDROCHLORIDE AND ACETAMINOPHEN 1 TABLET: 10; 325 TABLET ORAL at 22:14

## 2019-03-13 RX ADMIN — OXYCODONE AND ACETAMINOPHEN 2 TABLET: 5; 325 TABLET ORAL at 05:32

## 2019-03-13 RX ADMIN — BUPROPION HYDROCHLORIDE 150 MG: 150 TABLET, EXTENDED RELEASE ORAL at 09:12

## 2019-03-13 RX ADMIN — OXYCODONE AND ACETAMINOPHEN 2 TABLET: 5; 325 TABLET ORAL at 01:47

## 2019-03-13 NOTE — PROGRESS NOTES
[facility] [place]    3/13/2019    Name:Frieda Flores    MR#:[id number]     PROGRESS NOTE:  Post-Op 4 S/P    HD:5    Subjective   38 y.o. yo Female  s/p CS at 38w5d with postoperative course complicated by abdominal cellulitis. This condition failed to respond to oral antibiotic and 1st generation cephalosporin. Although not toxic in appearance, this failure to respond to simple antibiotics prompted ID consult which was performed today.     Patient Active Problem List   Diagnosis   • Alcoholism in remission (CMS/HCC)   • Opioid dependence in remission (CMS/HCC)   • Antepartum multigravida of advanced maternal age   • Mood disorder (CMS/HCC)   • Tobacco abuse   • Abnormal liver function tests   • Hepatitis C carrier (CMS/HCC)   • 38 weeks gestation of pregnancy   • Family history of congenital heart defect   • Other headache syndrome   • Morbidly obese (CMS/HCC)   • Full-term premature rupture of membranes with onset of labor within 24 hours of rupture   • Postpartum care following pLTCS 3/9/19 (boy)        Objective    Vitals  Temp:  Temp:  [98.3 °F (36.8 °C)-98.7 °F (37.1 °C)] 98.5 °F (36.9 °C)     BP:  BP: (127-133)/(70-84) 133/84  Pulse:  Heart Rate:  [90-91] 90  RR:   Resp:  [16-18] 18    General Awake, alert, no distress  Abdomen Soft, non-distended, fundus firm, is below umbilicus, appropriately tender  Incision  The area of erythema seems unchanged from yesterday afternoon although somewhat less prominent. There remains no wound drainage mass effect or fluctuance to suggest abscess or necrosis  Extremities Calves NT bilaterally     No intake/output data recorded.    LABS:   Lab Results   Component Value Date    WBC 11.84 (H) 2019    HGB 9.0 (L) 2019    HCT 26.7 (L) 2019    MCV 93.0 2019     2019   Leukocytosis is essentially unchanged today    Infant: male       Assessment   1.  POD 4 Primary c  section in labor   2.  Abdominal Cellulitis, ID consult much appreciated   3.  Hx of opioid abuse   4.  Obesity       Plan:Will follow ID recommendations and as discussed with obtain abdominal CT to exclude abscess or evidence of necrosis both of which seem unlikely but would require surgical intervention.  Hopefully we will continue to see arrest of this condition and be able to be discharged tomorrow on once daily IV antibiotics for a treatment duration recommended by ID and without the necessity of a PICC    Active Problems:   Cellulitis      Dani King MD  3/13/2019 9:29 AM

## 2019-03-13 NOTE — CONSULTS
Frieda Flores  1980  [id number]    Date of Consult: 3/13/2019    Admit Date:  3/8/2019      Requesting Provider: Dani King MD  Evaluating Physician: Broderick Wilhelm MD    Chief Complaint: cellulitis    Reason for Consultation: cellulitis    History of present illness:    Patient is a 38 y.o.  Yr old female with history of MRSA per patient associated with infection  per her report.  She underwent  associated with premature rupture of membranes, surgery 2019.  Postoperatively she has redness/pain at her anterior abdominal wall above incision and dysuria associated with leukocytosis, empiric cefazolin started by OB team    Patient reports redness at her anterior abdominal wall, across her pannus with vague soreness which is constant, sharp at times, nonradiating, worse with palpation, generally better with pain meds.  She reports this has not receded significantly.  No dehiscence or drainage at her surgical site.  No other skin rash or diffuse erythema.  She has dysuria but no hematuria or pyuria.  No new hesitancy urgency or flank pain.    No headache photophobia or neck stiffness.  No nausea vomiting or diarrhea.  No other skin rash.  Currently no fevers chills or sweats.    Past Medical History:   Diagnosis Date   • Abnormal Pap smear of cervix    • ADHD    • Alcoholism (CMS/HCC)     recovering 10 days ago last drink   • Anxiety    • Arthritis    • Bipolar disorder (CMS/HCC)    • Depression    • Hepatitis C    • Migraine    • PTSD (post-traumatic stress disorder)    • Substance abuse (CMS/HCC)     last used weeks ago during the beginning of pregnancy   • Urinary tract infection    • Urogenital trichomoniasis        Past Surgical History:   Procedure Laterality Date   • BREAST LUMPECTOMY Right    •  SECTION Bilateral 3/9/2019    Procedure:  SECTION PRIMARY;  Surgeon: Shelley Hughes MD;  Location: [facility] LABOR DELIVERY;  Service: Obstetrics/Gynecology        Pediatric History   Patient Guardian Status   • Not on file     Other Topics Concern   • Not on file   Social History Narrative   • Not on file   Prior alcoholism, history of past opioid dependence.    family history includes Diabetes in her mother; Heart disease in her father and mother; Hypertension in her mother.    Allergies   Allergen Reactions   • Aleve [Naproxen Sodium] Swelling     Pt had swelling in her face and in her neck. Pt also states that she lost her voice.        Medication:  Current Facility-Administered Medications   Medication Dose Route Frequency Provider Last Rate Last Dose   • bisacodyl (DULCOLAX) suppository 10 mg  10 mg Rectal Daily PRN Barry Centeno MD   10 mg at 03/11/19 0515   • buPROPion SR (WELLBUTRIN SR) 12 hr tablet 150 mg  150 mg Oral Daily Shelley Hughes MD   150 mg at 03/12/19 0836   • cefTRIAXone (ROCEPHIN) IVPB 2 g  2 g Intravenous Q24H Broderick Wilhelm MD       • DAPTOmycin (CUBICIN) 300 mg in sodium chloride 0.9 % 50 mL IVPB  4 mg/kg (Adjusted) Intravenous Q24H Broderick Wilhelm MD       • diphenhydrAMINE (BENADRYL) capsule 25 mg  25 mg Oral Q6H PRN Shelley Hughes MD   25 mg at 03/10/19 1624   • docusate sodium (COLACE) capsule 100 mg  100 mg Oral BID PRN Shelley Hughes MD   100 mg at 03/12/19 0836   • famotidine (PEPCID) tablet 20 mg  20 mg Oral BID Shelley Hughes MD   20 mg at 03/12/19 2128   • guaifenesin-dextromethorphan (ROBITUSSIN DM) 100-10 MG/5ML syrup 5 mL  5 mL Oral Q6H PRN Barry Centeno MD       • hydrOXYzine (ATARAX) tablet 25 mg  25 mg Oral BID PRN Shelley Hughes MD       • lanolin ointment   Topical Q1H PRN Shelley Hughes MD       • nicotine polacrilex (NICORETTE) gum 4 mg  4 mg Mouth/Throat PRN Shelley Hughes MD       • ondansetron (ZOFRAN) tablet 4 mg  4 mg Oral Q8H PRN Shelley Hughes MD   4 mg at 03/12/19 0229   • oxyCODONE-acetaminophen (PERCOCET) 5-325 MG per tablet 2 tablet  2 tablet Oral Q4H PRN Fernando,  Dani CRAMER MD   2 tablet at 03/13/19 0532   • oxyCODONE-acetaminophen (PERCOCET) 7.5-325 MG per tablet 1 tablet  1 tablet Oral Q4H PRN Shelley Hughes MD   1 tablet at 03/11/19 1521   • QUEtiapine (SEROquel) tablet 400 mg  400 mg Oral Nightly Shelley Hughes MD       • simethicone (MYLICON) chewable tablet 80 mg  80 mg Oral 4x Daily PRN Barry Centeno MD   80 mg at 03/12/19 2127       Antibiotics:  Anti-Infectives (From admission, onward)    Ordered     Dose/Rate Route Frequency Start Stop    03/13/19 0805  DAPTOmycin (CUBICIN) 300 mg in sodium chloride 0.9 % 50 mL IVPB     Ordering Provider:  Broderick Wilhelm MD    4 mg/kg × 74.6 kg (Adjusted)  100 mL/hr over 30 Minutes Intravenous Every 24 Hours 03/13/19 0900 03/20/19 0859    03/13/19 0805  cefTRIAXone (ROCEPHIN) IVPB 2 g     Ordering Provider:  Broderick Wilhelm MD    2 g  100 mL/hr over 30 Minutes Intravenous Every 24 Hours 03/13/19 0900 03/20/19 0859    03/09/19 1411  ceFAZolin in dextrose (ANCEF) IVPB solution 2 g     Ordering Provider:  Shelley Hughes MD    2 g  over 30 Minutes Intravenous Once 03/09/19 1500 03/09/19 1603    03/08/19 1931  penicillin g 5 mu/100 mL 0.9% NS IVPB (mbp)     Ordering Provider:  Derian Quinn III, MD    5 Million Units  over 30 Minutes Intravenous Once 03/08/19 2015 03/08/19 2049            Review of Systems    Constitutional-- No Fever, chills or sweats.  Appetite good, and no malaise. No fatigue.  Heent-- No new vision, hearing or throat complaints.  No epistaxis or oral sores.  Denies odynophagia or dysphagia.  No flashers, floaters or eye pain. No odynophagia or dysphagia. No headache, photophobia or neck stiffness.  CV-- No chest pain, palpitation or syncope  Resp-- No SOB/cough/Hemoptysis  GI- No nausea, vomiting, or diarrhea.  No hematochezia, melena, or hematemesis. Denies jaundice or chronic liver disease.  -- No hematuria, or flank pain.  Denies hesitancy, urgency or flank pain.  Lymph- no swollen  "lymph nodes in neck/axilla or groin.   Heme- No active bruising or bleeding; no Hx of DVT or PE.  MS-- no swelling or pain in the bones or joints of arms/legs.  No new back pain.  Neuro-- No acute focal weakness or numbness in the arms or legs.  No seizures.    Full 12 point review of systems reviewed and negative otherwise for acute complaints, except for above    Physical Exam:   Vital Signs   /84   Pulse 90   Temp 98.5 °F (36.9 °C)   Resp 18   Ht 160 cm (63\")   Wt 108 kg (237 lb)   LMP  (LMP Unknown)   SpO2 100%   Breastfeeding? Yes   BMI 41.98 kg/m²     GENERAL: Awake and alert, in no acute distress.   HEENT: Normocephalic, atraumatic.  PERRL. EOMI. No conjunctival injection. No icterus. Oropharynx clear without evidence of thrush or exudate. No evidence of peridontal disease.    NECK: Supple without nuchal rigidity. No mass.  LYMPH: No cervical, axillary or inguinal lymphadenopathy.  HEART: RRR; No murmur, rubs, gallops.   LUNGS: Clear to auscultation bilaterally without wheezing, rales, rhonchi. Normal respiratory effort. Nonlabored. No dullness.  ABDOMEN: Soft, mildly tender, nondistended. Positive bowel sounds. No rebound or guarding. NO mass or HSM.  See below  EXT:  No cyanosis, clubbing or edema. No cord.  : Genitalia generally unremarkable.  Without Savage catheter.  MSK: FROM without joint effusions noted arms/legs.    SKIN: Warm and dry without cutaneous eruptions on Inspection/palpation.    NEURO: Oriented to PPT. No focal deficits on motor/sensory exam at arms/legs.  PSYCHIATRIC: Normal insight and judgement. Cooperative with PE    Anterior abdominal wall across her pannus with vague erythema slight induration towards surgical site but no discrete mass bulge or fluctuance.  No crepitus or bulla.  Surgical site with intact Steri-Strips, no dehiscence or active drainage.  No obvious purulence or skin necrosis.    No peripheral stigmata/phenomena of endocarditis    Laboratory " Data    Results from last 7 days   Lab Units 19  0655 03/10/19  0656 19  1955   WBC 10*3/mm3 11.75* 10.91* 9.82   HEMOGLOBIN g/dL 9.7* 9.5* 12.1   HEMATOCRIT % 28.5* 28.8* 35.8   PLATELETS 10*3/mm3 263 230 237                       CrCl cannot be calculated (Patient's most recent lab result is older than the maximum 30 days allowed.).      Microbiology:      Radiology:  Imaging Results (last 72 hours)     ** No results found for the last 72 hours. **            Impression:     --Acute abdominal wall cellulitis.  Postop from  and prior history of MRSA per patient.  Empiric daptomycin/Rocephin.  Primary concern would be gram-positive organisms such as staph/strep.  If not improving despite this approach, you should give consideration to further imaging or surgical site debridement.  Currently no evidence for abscess or necrotizing soft tissue infection by exam. No toxic shock, etc..     --Acute dysuria.  Check UA with culture.    --Postpartum with      --History hep C per records.  I do not treat viral hepatitis as a part of my practice.  If you desire further input regarding hepatitis C during her hospitalization you should give consideration to gastroenterology consultation.  Otherwise you should refer to [facility] for ongoing treatment/evaluation regarding hepatitis C.    --History opioid and alcohol dependence in the past.    PLAN: Thank you for asking us to see Frieda Flores, I recommend the following:  --IV daptomycin/Rocephin    --I discussed potential risks and benefits of the prescribed antibiotics that include, but are not limited to, solid organ toxicity, neuro/muscles/pulmonary toxicity, renal toxicity, CDiff, cytopenias, hypersensitivity,  etc.. Patient/Family voice understanding and agree to proceed.    --Check/review labs cultures and scans    --Discussed with nursing, history per them as well    --Lactated ringer stopped.  If you need ongoing fluid  resuscitation, you should give consideration to normal saline products while on ceftriaxone    --Lactation on hold.  Clearance regarding lactation will be left to obstetrics/pediatrics while patient on daptomycin/ceftriaxone.  I discussed potential risks as it relates to lactation with patient for these agents.  She voices understanding.    --Discussed with microbiology    --Highly complex set of issues with high risk for further serious morbidity and other serious sequela       Broderick Wilhelm MD  3/13/2019

## 2019-03-13 NOTE — PLAN OF CARE
Problem: Patient Care Overview  Goal: Plan of Care Review  Outcome: Ongoing (interventions implemented as appropriate)   19 0553   Coping/Psychosocial   Plan of Care Reviewed With patient   Plan of Care Review   Progress no change   OTHER   Outcome Summary Pt taking IV keflex for cellulitis on abdomen, pt states she does not see any improvement     Goal: Individualization and Mutuality  Outcome: Ongoing (interventions implemented as appropriate)   19 0747   Individualization   Patient Specific Preferences bottle and breastfeed baby   Patient Specific Goals (Include Timeframe) adequate pain control       Problem: Postpartum ( Delivery) (Adult,Obstetrics,Pediatric)  Goal: Signs and Symptoms of Listed Potential Problems Will be Absent, Minimized or Managed (Postpartum)   19 0553   Goal/Outcome Evaluation   Problems Assessed (Postpartum ) all   Problems Present (Postpartum ) infection

## 2019-03-14 VITALS
HEIGHT: 63 IN | BODY MASS INDEX: 41.99 KG/M2 | RESPIRATION RATE: 16 BRPM | WEIGHT: 237 LBS | DIASTOLIC BLOOD PRESSURE: 81 MMHG | HEART RATE: 87 BPM | SYSTOLIC BLOOD PRESSURE: 139 MMHG | OXYGEN SATURATION: 100 % | TEMPERATURE: 98.2 F

## 2019-03-14 PROBLEM — L03.311 ABDOMINAL WALL CELLULITIS: Status: ACTIVE | Noted: 2019-03-14

## 2019-03-14 LAB
ALBUMIN SERPL-MCNC: 3.24 G/DL (ref 3.2–4.8)
ALBUMIN/GLOB SERPL: 1.5 G/DL (ref 1.5–2.5)
ALP SERPL-CCNC: 90 U/L (ref 25–100)
ALT SERPL W P-5'-P-CCNC: 20 U/L (ref 7–40)
ANION GAP SERPL CALCULATED.3IONS-SCNC: 7 MMOL/L (ref 3–11)
AST SERPL-CCNC: 18 U/L (ref 0–33)
BASOPHILS # BLD AUTO: 0.02 10*3/MM3 (ref 0–0.2)
BASOPHILS NFR BLD AUTO: 0.2 % (ref 0–1)
BILIRUB SERPL-MCNC: 0.2 MG/DL (ref 0.3–1.2)
BUN BLD-MCNC: 6 MG/DL (ref 9–23)
BUN/CREAT SERPL: 12.8 (ref 7–25)
CALCIUM SPEC-SCNC: 8.2 MG/DL (ref 8.7–10.4)
CHLORIDE SERPL-SCNC: 110 MMOL/L (ref 99–109)
CO2 SERPL-SCNC: 23 MMOL/L (ref 20–31)
CREAT BLD-MCNC: 0.47 MG/DL (ref 0.6–1.3)
DEPRECATED RDW RBC AUTO: 44.3 FL (ref 37–54)
EOSINOPHIL # BLD AUTO: 0.25 10*3/MM3 (ref 0–0.3)
EOSINOPHIL NFR BLD AUTO: 2.7 % (ref 0–3)
ERYTHROCYTE [DISTWIDTH] IN BLOOD BY AUTOMATED COUNT: 12.9 % (ref 11.3–14.5)
GFR SERPL CREATININE-BSD FRML MDRD: 149 ML/MIN/1.73
GLOBULIN UR ELPH-MCNC: 2.2 GM/DL
GLUCOSE BLD-MCNC: 91 MG/DL (ref 70–100)
HCT VFR BLD AUTO: 27.2 % (ref 34.5–44)
HGB BLD-MCNC: 9 G/DL (ref 11.5–15.5)
IMM GRANULOCYTES # BLD AUTO: 0.08 10*3/MM3 (ref 0–0.05)
IMM GRANULOCYTES NFR BLD AUTO: 0.9 % (ref 0–0.6)
LYMPHOCYTES # BLD AUTO: 1.42 10*3/MM3 (ref 0.6–4.8)
LYMPHOCYTES NFR BLD AUTO: 15.4 % (ref 24–44)
MCH RBC QN AUTO: 31 PG (ref 27–31)
MCHC RBC AUTO-ENTMCNC: 33.1 G/DL (ref 32–36)
MCV RBC AUTO: 93.8 FL (ref 80–99)
MONOCYTES # BLD AUTO: 0.36 10*3/MM3 (ref 0–1)
MONOCYTES NFR BLD AUTO: 3.9 % (ref 0–12)
NEUTROPHILS # BLD AUTO: 7.15 10*3/MM3 (ref 1.5–8.3)
NEUTROPHILS NFR BLD AUTO: 77.8 % (ref 41–71)
PLATELET # BLD AUTO: 302 10*3/MM3 (ref 150–450)
PMV BLD AUTO: 10 FL (ref 6–12)
POTASSIUM BLD-SCNC: 3.6 MMOL/L (ref 3.5–5.5)
PROT SERPL-MCNC: 5.4 G/DL (ref 5.7–8.2)
RBC # BLD AUTO: 2.9 10*6/MM3 (ref 3.89–5.14)
SODIUM BLD-SCNC: 140 MMOL/L (ref 132–146)
WBC NRBC COR # BLD: 9.2 10*3/MM3 (ref 3.5–10.8)

## 2019-03-14 PROCEDURE — 25010000003 CEFTRIAXONE PER 250 MG: Performed by: INTERNAL MEDICINE

## 2019-03-14 PROCEDURE — 99024 POSTOP FOLLOW-UP VISIT: CPT | Performed by: OBSTETRICS & GYNECOLOGY

## 2019-03-14 PROCEDURE — 85025 COMPLETE CBC W/AUTO DIFF WBC: CPT | Performed by: OBSTETRICS & GYNECOLOGY

## 2019-03-14 PROCEDURE — 80053 COMPREHEN METABOLIC PANEL: CPT | Performed by: INTERNAL MEDICINE

## 2019-03-14 PROCEDURE — 25010000002 DAPTOMYCIN PER 1 MG: Performed by: INTERNAL MEDICINE

## 2019-03-14 RX ORDER — OXYCODONE HYDROCHLORIDE AND ACETAMINOPHEN 5; 325 MG/1; MG/1
1-2 TABLET ORAL EVERY 6 HOURS PRN
Qty: 30 TABLET | Refills: 0 | Status: SHIPPED | OUTPATIENT
Start: 2019-03-14 | End: 2020-03-29

## 2019-03-14 RX ORDER — CEFTRIAXONE SODIUM 2 G/50ML
2 INJECTION, SOLUTION INTRAVENOUS EVERY 24 HOURS
Qty: 250 ML | Refills: 0 | Status: SHIPPED | OUTPATIENT
Start: 2019-03-15 | End: 2019-03-20

## 2019-03-14 RX ADMIN — OXYCODONE HYDROCHLORIDE AND ACETAMINOPHEN 1 TABLET: 10; 325 TABLET ORAL at 06:27

## 2019-03-14 RX ADMIN — OXYCODONE HYDROCHLORIDE AND ACETAMINOPHEN 1 TABLET: 10; 325 TABLET ORAL at 02:27

## 2019-03-14 RX ADMIN — DOCUSATE SODIUM 100 MG: 100 CAPSULE, LIQUID FILLED ORAL at 09:17

## 2019-03-14 RX ADMIN — OXYCODONE HYDROCHLORIDE AND ACETAMINOPHEN 1 TABLET: 10; 325 TABLET ORAL at 10:45

## 2019-03-14 RX ADMIN — BUPROPION HYDROCHLORIDE 150 MG: 150 TABLET, EXTENDED RELEASE ORAL at 09:17

## 2019-03-14 RX ADMIN — Medication 10 ML: at 02:10

## 2019-03-14 RX ADMIN — FAMOTIDINE 20 MG: 20 TABLET, FILM COATED ORAL at 09:17

## 2019-03-14 RX ADMIN — SIMETHICONE CHEW TAB 80 MG 80 MG: 80 TABLET ORAL at 09:17

## 2019-03-14 RX ADMIN — FAMOTIDINE 20 MG: 20 TABLET, FILM COATED ORAL at 02:27

## 2019-03-14 RX ADMIN — DAPTOMYCIN 300 MG: 500 INJECTION, POWDER, LYOPHILIZED, FOR SOLUTION INTRAVENOUS at 09:17

## 2019-03-14 RX ADMIN — CEFTRIAXONE SODIUM 2 G: 2 INJECTION, SOLUTION INTRAVENOUS at 10:46

## 2019-03-14 NOTE — PROGRESS NOTES
[facility] Progress Note        Antibiotics:  Anti-Infectives (From admission, onward)    Ordered     Dose/Rate Route Frequency Start Stop    19 0805  DAPTOmycin (CUBICIN) 300 mg in sodium chloride 0.9 % 50 mL IVPB     Ordering Provider:  Broderick Wilhelm MD    4 mg/kg × 74.6 kg (Adjusted)  100 mL/hr over 30 Minutes Intravenous Every 24 Hours 19 0915 19 0914    19 0805  cefTRIAXone (ROCEPHIN) IVPB 2 g     Ordering Provider:  Broderick Wilhelm MD    2 g  100 mL/hr over 30 Minutes Intravenous Every 24 Hours 19 0915 19 0914    19 1411  ceFAZolin in dextrose (ANCEF) IVPB solution 2 g     Ordering Provider:  Shelley Hughes MD    2 g  over 30 Minutes Intravenous Once 19 1500 19 1603    19 1931  penicillin g 5 mu/100 mL 0.9% NS IVPB (mbp)     Ordering Provider:  Derian Quinn III, MD    5 Million Units  over 30 Minutes Intravenous Once 19          CC: abd wall cellulitis    HPI:    Patient is a 38 y.o.  Yr old female with history of MRSA per patient associated with infection  per her report.  She underwent  associated with premature rupture of membranes, surgery 2019.  Postoperatively she has redness/pain at her anterior abdominal wall above incision and dysuria associated with leukocytosis, empiric cefazolin started by OB team     3/14/19 Patient reports less intense redness at her anterior abdominal wall, across her pannus with vague soreness which is constant, sharp at times, nonradiating, worse with palpation, generally better with pain meds and better on abx. No dehiscence or drainage at her surgical site.  No other skin rash or diffuse erythema.  She has less dysuria and no hematuria or pyuria.  No new hesitancy urgency or flank pain.     No headache photophobia or neck stiffness.  No nausea vomiting or diarrhea.  No other skin rash.  Currently no fevers chills or sweats.      ROS:      3/14/19 No f/c/s.  "No n/v/d. No rash. No new ADR to Abx.     PE:   /81   Pulse 87   Temp 98.2 °F (36.8 °C) (Oral)   Resp 16   Ht 160 cm (63\")   Wt 108 kg (237 lb)   LMP  (LMP Unknown)   SpO2 100%   Breastfeeding? Yes   BMI 41.98 kg/m²       GENERAL: Awake and alert, in no acute distress.   HEENT: Normocephalic, atraumatic.  PERRL. EOMI. No conjunctival injection. No icterus. Oropharynx clear without evidence of thrush or exudate. No evidence of peridontal disease.    NECK: Supple without nuchal rigidity. No mass.  LYMPH: No cervical, axillary or inguinal lymphadenopathy.  HEART: RRR; No murmur, rubs, gallops.   LUNGS: Clear to auscultation bilaterally without wheezing, rales, rhonchi. Normal respiratory effort. Nonlabored. No dullness.  ABDOMEN: Soft, mildly tender, nondistended. Positive bowel sounds. No rebound or guarding. NO mass or HSM.  See below  EXT:  No cyanosis, clubbing or edema. No cord.  MSK: FROM without joint effusions noted arms/legs.    SKIN: Warm and dry without cutaneous eruptions on Inspection/palpation.    NEURO: Oriented to PPT. No focal deficits on motor/sensory exam at arms/legs.  PSYCHIATRIC: Normal insight and judgement. Cooperative with PE     Anterior abdominal wall across her pannus with vague erythema slight induration towards surgical site but no discrete mass bulge or fluctuance AND REDNESS LESS INTENSE.  No crepitus or bulla.  Surgical site with intact Steri-Strips, no dehiscence or active drainage.  No obvious purulence or skin necrosis.     No peripheral stigmata/phenomena of endocarditis        Laboratory Data    Results from last 7 days   Lab Units 03/14/19  0745 03/13/19  0757 03/12/19  0655   WBC 10*3/mm3 9.20 11.84* 11.75*   HEMOGLOBIN g/dL 9.0* 9.0* 9.7*   HEMATOCRIT % 27.2* 26.7* 28.5*   PLATELETS 10*3/mm3 302 269 263     Results from last 7 days   Lab Units 03/14/19  0745   SODIUM mmol/L 140   POTASSIUM mmol/L 3.6   CHLORIDE mmol/L 110*   CO2 mmol/L 23.0   BUN mg/dL 6* "   CREATININE mg/dL 0.47*   GLUCOSE mg/dL 91   CALCIUM mg/dL 8.2*     Results from last 7 days   Lab Units 03/14/19  0745   ALK PHOS U/L 90   BILIRUBIN mg/dL 0.2*   ALT (SGPT) U/L 20   AST (SGOT) U/L 18               Estimated Creatinine Clearance: 193 mL/min (A) (by C-G formula based on SCr of 0.47 mg/dL (L)).      Microbiology:      Radiology:  Imaging Results (last 72 hours)     Procedure Component Value Units Date/Time    CT Abdomen Pelvis With Contrast [[id number]] Collected:  03/14/19 0825     Updated:  03/14/19 0922    Narrative:       EXAMINATION: CT ABDOMEN AND PELVIS W CONTRAST-      INDICATION: Cellulitis of pannus postoperatively.      TECHNIQUE: CT abdomen and pelvis with intravenous contrast  administration.     The radiation dose reduction device was turned on for each scan per the  ALARA (As Low as Reasonably Achievable) protocol.     COMPARISON: None.     FINDINGS: Lung bases are grossly clear apart from moderate atelectasis.  Liver without focal lesion. Gallbladder unremarkable.  No biliary  dilatation. Pancreas and spleen are grossly unremarkable. Splenule at  the splenic hilum. Adrenals without distinct nodule. Kidneys demonstrate  mild prominence of right renal collecting system likely persistent  and/or resolving hydronephrosis of pregnancy. Grossly symmetric  nephrogram and subsequent excretory phase imaging without filling defect  to suggest obstructive uropathy or urolithiasis. Layering contrast  within the urinary bladder grossly unremarkable. No bulky  retroperitoneal adenopathy. Patent nonaneurysmal abdominal aorta. Portal  veins and IVC are patent. GI tract evaluation demonstrates mucosal  thickening distal esophagus along with small hiatal hernia. No  disproportionate dilatation of bowel to suggest mechanical obstructive  process, however, questionable mural thickening of the transverse colon  may represent components of colitis or inflammation without focal fluid  collection to suggest  abscess or perforation demonstrating postsurgical  changes from recent  section of an enlarged postpartum uterus  within normal limits from a single locule of gas within the endometrial  canal and throughout the vaginal vault.     Postsurgical changes of the ventral abdominal wall including  inflammation of pannus consistent of cellulitis with scattered gas  locules within normal limits for early postoperative changes status post   section. No loculated fluid collection within the pelvis. No  aggressive osseous or soft tissue body wall lesions.       Impression:       1. Postsurgical changes suprapubic soft tissues and pannus consistent of  cellulitis or panniculitis scattered gas locules within normal limits  for early postoperative  section.  2. No mechanical obstructive process of the GI tract, however,  questionable thickening of the transverse colon may represent components  of colitis without abscess or perforation identified.  3. Thickening of the distal esophagus may represent esophagitis with  small hiatal hernia present.     D:  2019  E:  2019     This report was finalized on 3/14/2019 9:19 AM by Dr. Alexandre Wei.               Impression:     --Acute abdominal wall cellulitis. IMPROVING;  Postop from  and prior history of MRSA per patient.  Empiric daptomycin/Rocephin.  Primary concern would be gram-positive organisms such as staph/strep.  CT scan no focal abscess. If not improving despite this approach, you should give consideration to repeat imaging or surgical site debridement.  Currently exam is not consistent with for abscess or necrotizing soft tissue infection. No toxic shock, etc..      --Acute dysuria.  Check UA with culture.     --Postpartum with       --History hep C per records.  I do not treat viral hepatitis as a part of my practice.  If you desire further input regarding hepatitis C during her hospitalization you should give  consideration to gastroenterology consultation.  Otherwise you should refer to [facility] for ongoing treatment/evaluation regarding hepatitis C.     --History opioid and alcohol dependence in the past.        PLAN:     --IV daptomycin/Rocephin     --I discussed potential risks and benefits of the prescribed antibiotics that include, but are not limited to, solid organ toxicity, neuro/muscles/pulmonary toxicity, renal toxicity, CDiff, cytopenias, hypersensitivity,  etc.. Patient/Family voice understanding and agree to proceed.     --Check/review labs cultures and scans     --Discussed with nursing, history per them as well     --Lactation per lactation consultant     --Discussed with microbiology     --Highly complex set of issues with high risk for further serious morbidity and other serious sequela    --Home okay with me with outpatient daptomycin/Rocephin to be arranged by  at [facility] outpatient unit by butterfly infusion.  Follow-up with me in office, Friday,  March 15, 2019.        Broderick Wilhelm MD  3/14/2019

## 2019-03-14 NOTE — LACTATION NOTE
03/13/19 1220   Maternal Information   Date of Referral 03/13/19   Person Making Referral nurse   Maternal Reason for Referral (questions about medications)   Equipment Type   Breast Pump Type double electric, hospital grade   Breast Pump Flange Type hard   Breast Pump Flange Size 24 mm   Reproductive Interventions   Breastfeeding Assistance support offered;electric breast pump used   Breastfeeding Support encouragement provided;lactation counseling provided   Breast Pumping   Breast Pumping Interventions frequent pumping encouraged   Mom has an infection and is being treated with rocephin L1 and cubicin L1. These are safe to breastfeed with. She will be having a CT with contrast this afternoon. Recommend breastfeeding/pumping right before she goes for CT and contrast is not safe for baby. Will need to pump and dump every 3 hours for the next 24 hours and then can resume breastfeeding or pumping. Initiated pumping. Mom return demonstrated breast pump use.  To call for lactation services, if she has questions or concerns.

## 2019-03-14 NOTE — DISCHARGE SUMMARY
Discharge Summary    Date of Admission: 3/8/2019  Date of Discharge:  3/14/2019      Patient: Frieda Flores      MR#:[id number]    Primary Surgeon/OB: Shelley Hughes MD    Discharge Surgeon/OB:Dani King MD    Presenting Problem/History of Present Illness  Normal labor [O80, Z37.9]     Patient Active Problem List    Diagnosis   • *Postpartum care following pLTCS 3/9/19 (boy) [Z39.2]   • Full-term premature rupture of membranes with onset of labor within 24 hours of rupture [O42.02]   • Morbidly obese (CMS/HCC) [E66.01]   • Other headache syndrome [G44.89]   • Family history of congenital heart defect [Z82.79]   • 38 weeks gestation of pregnancy [Z3A.38]   • Hepatitis C carrier (CMS/HCC) [B18.2]   • Alcoholism in remission (CMS/HCC) [F10.21]   • Opioid dependence in remission (CMS/HCC) [F11.21]   • Antepartum multigravida of advanced maternal age [O09.529]   • Mood disorder (CMS/HCC) [F39]   • Tobacco abuse [Z72.0]   • Abnormal liver function tests [R94.5]         Discharge Diagnosis:  section at 38w5d  Abdominal Cellulitis  AMA  Abnormal Fetal Heart Rate Pattern  Opioid Dependence/ Alcoholism in remission  Tobacco Abuse  Hep C Carrier  Obesity    Procedures:  , Low Transverse     3/9/2019    4:09 PM        Rh Immune globulin given: not applicable    Rubella vaccine given: not applicable    Discharge Date: 3/14/2019; Discharge Time: 1:47 PM    Hospital Course  Patient is a 38 y.o. female  at 38w5d status post  section with postoperative abdominal wall cellulitis. This did not respond to oral antibiotics. ID was consulted and has coordinated antibiotic management. She is significantly improved today, with negative CT Scan for abscess or fascial necrosis.   Patient was advanced to regular diet on postoperative day#1.  On discharge, ambulating, tolerating a regular diet without any difficulties and her incision is dry, clean and intact. She will require 5 more daily doses of IV  antibiotic vis butterfly IV infusion.    Infant:   male  fetus 3224 g (7 lb 1.7 oz)  with Apgar scores of 5  , 6   at five minutes.    Condition on Discharge:  Stable    Vital Signs  Temp:  [97.1 °F (36.2 °C)-98.5 °F (36.9 °C)] 98.2 °F (36.8 °C)  Heart Rate:  [79-89] 87  Resp:  [14-18] 16  BP: (121-139)/(58-86) 139/81    Lab Results   Component Value Date    WBC 9.20 2019    HGB 9.0 (L) 2019    HCT 27.2 (L) 2019    MCV 93.8 2019     2019       Discharge Disposition  Home or Self Care    Discharge Medications     Discharge Medications      New Medications      Instructions Start Date   cefTRIAXone 40 MG/ML IVPB  Commonly known as:  ROCEPHIN   2 g, Intravenous, Every 24 Hours      DAPTOmycin 300 mg in sodium chloride 0.9 % 50 mL   4 mg/kg, Intravenous, Every 24 Hours      oxyCODONE-acetaminophen 5-325 MG per tablet  Commonly known as:  PERCOCET   1-2 tablets, Oral, Every 6 Hours PRN         Changes to Medications      Instructions Start Date   nicotine polacrilex 4 MG gum  Commonly known as:  NICORETTE  What changed:  reasons to take this   4 mg, Mouth/Throat, As Needed         Continue These Medications      Instructions Start Date   buPROPion  MG 12 hr tablet  Commonly known as:  WELLBUTRIN SR   150 mg, Oral, Every Morning      docusate sodium 100 MG capsule  Commonly known as:  COLACE   100 mg, Oral, 2 Times Daily      guanFACINE HCl ER 3 MG tablet sustained-release 24 hour   1 tablet, Oral, Daily      hydrOXYzine pamoate 25 MG capsule  Commonly known as:  VISTARIL   take 1 capsule by mouth twice a day for anxiety      loratadine 10 MG tablet  Commonly known as:  CLARITIN   10 mg, Oral, Daily      pantoprazole 20 MG EC tablet  Commonly known as:  PROTONIX   20 mg, Oral, Daily      prenatal vitamin 27-0.8 27-0.8 MG tablet tablet   1 tablet, Oral, Daily      QUEtiapine 200 MG tablet  Commonly known as:  SEROquel   2 tablets once daily      raNITIdine 150 MG tablet  Commonly  known as:  ZANTAC   150 mg, Oral, Nightly      SUDOGEST 30 MG tablet  Generic drug:  pseudoephedrine   60 mg, Oral, Every 6 Hours      Wrist Brace misc   1 each, Does not apply, Daily         Stop These Medications    acetaminophen 500 MG tablet  Commonly known as:  TYLENOL     ondansetron 8 MG tablet  Commonly known as:  ZOFRAN            Discharge Diet: Regular    Activity at Discharge:     Follow-up Appointments  Future Appointments   Date Time Provider Department Center   3/15/2019  2:30 PM CHAIR 9  [facility]   3/16/2019  9:00 AM CHAIR 2  [facility]   3/17/2019  9:00 AM CHAIR 2  [facility]   3/18/2019  2:00 PM CHAIR 4  [facility]   3/19/2019  2:00 PM CHAIR 1  [facility]   3/20/2019  2:00 PM CHAIR 4  [facility]   3/21/2019  2:30 PM CHAIR 12  [facility]         Dani King MD  03/14/19  1:45 PM  EDT

## 2019-03-14 NOTE — PLAN OF CARE
Problem: Patient Care Overview  Goal: Plan of Care Review  Outcome: Ongoing (interventions implemented as appropriate)  Pt is keeping pain under control w/ meds.  Incision CDI, light lochia, and VSS.  Abdomen is unchanged.     19 0321   Coping/Psychosocial   Plan of Care Reviewed With patient   Plan of Care Review   Progress improving     Goal: Individualization and Mutuality  Outcome: Ongoing (interventions implemented as appropriate)    Goal: Discharge Needs Assessment  Outcome: Ongoing (interventions implemented as appropriate)    Goal: Interprofessional Rounds/Family Conf  Outcome: Ongoing (interventions implemented as appropriate)      Problem: Postpartum ( Delivery) (Adult,Obstetrics,Pediatric)  Goal: Signs and Symptoms of Listed Potential Problems Will be Absent, Minimized or Managed (Postpartum)  Outcome: Ongoing (interventions implemented as appropriate)

## 2019-03-14 NOTE — DISCHARGE INSTR - APPOINTMENTS
Follow up with Dr. Wilhelm on Friday March 15th, 2019 at 09:15 am    Follow up with Dr. King on Thursday, March 28th, 2019 at 2:50 pm

## 2019-03-14 NOTE — PROGRESS NOTES
CT Scan personally reviewed and reviewed with reading radiologist  Normal postoperative findings save cellulitis  No abscess or fascial necrosis.  BBB

## 2019-03-14 NOTE — CONSULTS
Continued Stay Note  [facility]     Patient Name: Frieda Flores  MRN: [id number]  Today's Date: 3/14/2019    Admit Date: 3/8/2019    Discharge Plan     Row Name 03/14/19 1408       Plan    Plan Comments  Received consult for outpt infusion. RN has scheduled this         Discharge Codes    No documentation.       Expected Discharge Date and Time     Expected Discharge Date Expected Discharge Time    Mar 14, 2019             ABDOUL Farley

## 2019-03-16 ENCOUNTER — HOSPITAL ENCOUNTER (OUTPATIENT)
Dept: ONCOLOGY | Facility: HOSPITAL | Age: 39
Setting detail: INFUSION SERIES
Discharge: HOME | End: 2019-03-16

## 2019-03-16 VITALS
WEIGHT: 230 LBS | BODY MASS INDEX: 40.75 KG/M2 | HEIGHT: 63 IN | HEART RATE: 85 BPM | RESPIRATION RATE: 16 BRPM | TEMPERATURE: 97.6 F | DIASTOLIC BLOOD PRESSURE: 69 MMHG | SYSTOLIC BLOOD PRESSURE: 138 MMHG

## 2019-03-16 DIAGNOSIS — L03.311 ABDOMINAL WALL CELLULITIS: Primary | ICD-10-CM

## 2019-03-16 PROCEDURE — 25010000002 DAPTOMYCIN PER 1 MG: Performed by: INTERNAL MEDICINE

## 2019-03-16 PROCEDURE — 96365 THER/PROPH/DIAG IV INF INIT: CPT

## 2019-03-16 PROCEDURE — 25010000003 CEFTRIAXONE PER 250 MG: Performed by: INTERNAL MEDICINE

## 2019-03-16 PROCEDURE — 96368 THER/DIAG CONCURRENT INF: CPT

## 2019-03-16 RX ORDER — CEFTRIAXONE SODIUM 2 G/50ML
2 INJECTION, SOLUTION INTRAVENOUS ONCE
Status: COMPLETED | OUTPATIENT
Start: 2019-03-17 | End: 2019-03-16

## 2019-03-16 RX ADMIN — CEFTRIAXONE SODIUM 2 G: 2 INJECTION, SOLUTION INTRAVENOUS at 09:46

## 2019-03-16 RX ADMIN — DAPTOMYCIN 400 MG: 500 INJECTION, POWDER, LYOPHILIZED, FOR SOLUTION INTRAVENOUS at 09:46

## 2019-03-17 ENCOUNTER — HOSPITAL ENCOUNTER (OUTPATIENT)
Dept: ONCOLOGY | Facility: HOSPITAL | Age: 39
Setting detail: INFUSION SERIES
Discharge: HOME | End: 2019-03-17

## 2019-03-17 VITALS
HEIGHT: 63 IN | WEIGHT: 230 LBS | RESPIRATION RATE: 18 BRPM | HEART RATE: 80 BPM | SYSTOLIC BLOOD PRESSURE: 127 MMHG | BODY MASS INDEX: 40.75 KG/M2 | TEMPERATURE: 98.2 F | DIASTOLIC BLOOD PRESSURE: 74 MMHG

## 2019-03-17 DIAGNOSIS — L03.311 ABDOMINAL WALL CELLULITIS: Primary | ICD-10-CM

## 2019-03-17 PROCEDURE — 96365 THER/PROPH/DIAG IV INF INIT: CPT

## 2019-03-17 PROCEDURE — 25010000002 DAPTOMYCIN PER 1 MG: Performed by: INTERNAL MEDICINE

## 2019-03-17 PROCEDURE — 96368 THER/DIAG CONCURRENT INF: CPT

## 2019-03-17 PROCEDURE — 25010000003 CEFTRIAXONE PER 250 MG: Performed by: INTERNAL MEDICINE

## 2019-03-17 RX ORDER — CEFTRIAXONE SODIUM 2 G/50ML
2 INJECTION, SOLUTION INTRAVENOUS ONCE
Status: COMPLETED | OUTPATIENT
Start: 2019-03-18 | End: 2019-03-17

## 2019-03-17 RX ADMIN — CEFTRIAXONE SODIUM 2 G: 2 INJECTION, SOLUTION INTRAVENOUS at 09:40

## 2019-03-17 RX ADMIN — DAPTOMYCIN 400 MG: 500 INJECTION, POWDER, LYOPHILIZED, FOR SOLUTION INTRAVENOUS at 09:39

## 2019-03-18 ENCOUNTER — LAB (OUTPATIENT)
Dept: LAB | Facility: HOSPITAL | Age: 39
End: 2019-03-18

## 2019-03-18 ENCOUNTER — TRANSCRIBE ORDERS (OUTPATIENT)
Dept: LAB | Facility: HOSPITAL | Age: 39
End: 2019-03-18

## 2019-03-18 DIAGNOSIS — F11.21 NARCOTIC DEPENDENCE, IN REMISSION (HCC): ICD-10-CM

## 2019-03-18 DIAGNOSIS — Z86.14 PERSONAL HISTORY OF METHICILLIN RESISTANT STAPHYLOCOCCUS AUREUS: ICD-10-CM

## 2019-03-18 DIAGNOSIS — R30.0 DYSURIA: ICD-10-CM

## 2019-03-18 DIAGNOSIS — F10.21 ACUTE ALCOHOLIC INTOXICATION IN ALCOHOLISM, IN REMISSION (HCC): ICD-10-CM

## 2019-03-18 DIAGNOSIS — L03.311 CELLULITIS OF ABDOMINAL WALL: ICD-10-CM

## 2019-03-18 DIAGNOSIS — B18.2 CHRONIC HEPATITIS C WITH HEPATIC COMA (HCC): ICD-10-CM

## 2019-03-18 LAB
ALBUMIN SERPL-MCNC: 4.08 G/DL (ref 3.2–4.8)
ALBUMIN/GLOB SERPL: 1.8 G/DL (ref 1.5–2.5)
ALP SERPL-CCNC: 114 U/L (ref 25–100)
ALT SERPL W P-5'-P-CCNC: 29 U/L (ref 7–40)
ANION GAP SERPL CALCULATED.3IONS-SCNC: 13 MMOL/L (ref 3–11)
AST SERPL-CCNC: 24 U/L (ref 0–33)
BASOPHILS # BLD AUTO: 0.02 10*3/MM3 (ref 0–0.2)
BASOPHILS NFR BLD AUTO: 0.2 % (ref 0–1)
BILIRUB SERPL-MCNC: 0.2 MG/DL (ref 0.3–1.2)
BUN BLD-MCNC: 9 MG/DL (ref 9–23)
BUN/CREAT SERPL: 14.5 (ref 7–25)
CALCIUM SPEC-SCNC: 9.5 MG/DL (ref 8.7–10.4)
CHLORIDE SERPL-SCNC: 107 MMOL/L (ref 99–109)
CK SERPL-CCNC: 160 U/L (ref 26–174)
CO2 SERPL-SCNC: 22 MMOL/L (ref 20–31)
CREAT BLD-MCNC: 0.62 MG/DL (ref 0.6–1.3)
CRP SERPL-MCNC: 2.25 MG/DL (ref 0–1)
DEPRECATED RDW RBC AUTO: 42.8 FL (ref 37–54)
EOSINOPHIL # BLD AUTO: 0.13 10*3/MM3 (ref 0–0.3)
EOSINOPHIL NFR BLD AUTO: 1.5 % (ref 0–3)
ERYTHROCYTE [DISTWIDTH] IN BLOOD BY AUTOMATED COUNT: 12.5 % (ref 11.3–14.5)
ERYTHROCYTE [SEDIMENTATION RATE] IN BLOOD: 34 MM/HR (ref 0–20)
GFR SERPL CREATININE-BSD FRML MDRD: 108 ML/MIN/1.73
GLOBULIN UR ELPH-MCNC: 2.3 GM/DL
GLUCOSE BLD-MCNC: 112 MG/DL (ref 70–100)
HCT VFR BLD AUTO: 29.5 % (ref 34.5–44)
HGB BLD-MCNC: 9.8 G/DL (ref 11.5–15.5)
IMM GRANULOCYTES # BLD AUTO: 0.23 10*3/MM3 (ref 0–0.05)
IMM GRANULOCYTES NFR BLD AUTO: 2.7 % (ref 0–0.6)
LYMPHOCYTES # BLD AUTO: 1.92 10*3/MM3 (ref 0.6–4.8)
LYMPHOCYTES NFR BLD AUTO: 22.6 % (ref 24–44)
MCH RBC QN AUTO: 30.8 PG (ref 27–31)
MCHC RBC AUTO-ENTMCNC: 33.2 G/DL (ref 32–36)
MCV RBC AUTO: 92.8 FL (ref 80–99)
MONOCYTES # BLD AUTO: 0.32 10*3/MM3 (ref 0–1)
MONOCYTES NFR BLD AUTO: 3.8 % (ref 0–12)
NEUTROPHILS # BLD AUTO: 6.1 10*3/MM3 (ref 1.5–8.3)
NEUTROPHILS NFR BLD AUTO: 71.9 % (ref 41–71)
PLATELET # BLD AUTO: 471 10*3/MM3 (ref 150–450)
PMV BLD AUTO: 9 FL (ref 6–12)
POTASSIUM BLD-SCNC: 4.1 MMOL/L (ref 3.5–5.5)
PROT SERPL-MCNC: 6.4 G/DL (ref 5.7–8.2)
RBC # BLD AUTO: 3.18 10*6/MM3 (ref 3.89–5.14)
SODIUM BLD-SCNC: 142 MMOL/L (ref 132–146)
WBC NRBC COR # BLD: 8.49 10*3/MM3 (ref 3.5–10.8)

## 2019-03-18 PROCEDURE — 25010000003 CEFTRIAXONE PER 250 MG: Performed by: INTERNAL MEDICINE

## 2019-03-18 PROCEDURE — 36415 COLL VENOUS BLD VENIPUNCTURE: CPT

## 2019-03-18 PROCEDURE — 86140 C-REACTIVE PROTEIN: CPT

## 2019-03-18 PROCEDURE — 80053 COMPREHEN METABOLIC PANEL: CPT

## 2019-03-18 PROCEDURE — 82550 ASSAY OF CK (CPK): CPT | Performed by: INTERNAL MEDICINE

## 2019-03-18 PROCEDURE — 85025 COMPLETE CBC W/AUTO DIFF WBC: CPT

## 2019-03-20 ENCOUNTER — LAB (OUTPATIENT)
Dept: LAB | Facility: HOSPITAL | Age: 39
End: 2019-03-20

## 2019-03-20 ENCOUNTER — TRANSCRIBE ORDERS (OUTPATIENT)
Dept: LAB | Facility: HOSPITAL | Age: 39
End: 2019-03-20

## 2019-03-20 ENCOUNTER — APPOINTMENT (OUTPATIENT)
Dept: ONCOLOGY | Facility: HOSPITAL | Age: 39
End: 2019-03-20

## 2019-03-20 DIAGNOSIS — Z86.14 PERSONAL HISTORY OF METHICILLIN RESISTANT STAPHYLOCOCCUS AUREUS: ICD-10-CM

## 2019-03-20 DIAGNOSIS — R19.7 DIARRHEA OF PRESUMED INFECTIOUS ORIGIN: ICD-10-CM

## 2019-03-20 DIAGNOSIS — R19.7 DIARRHEA OF PRESUMED INFECTIOUS ORIGIN: Primary | ICD-10-CM

## 2019-03-20 DIAGNOSIS — L03.311 CELLULITIS OF ABDOMINAL WALL: ICD-10-CM

## 2019-03-20 LAB
ALBUMIN SERPL-MCNC: 4.21 G/DL (ref 3.2–4.8)
ALBUMIN/GLOB SERPL: 1.8 G/DL (ref 1.5–2.5)
ALP SERPL-CCNC: 124 U/L (ref 25–100)
ALT SERPL W P-5'-P-CCNC: 30 U/L (ref 7–40)
ANION GAP SERPL CALCULATED.3IONS-SCNC: 11 MMOL/L (ref 3–11)
AST SERPL-CCNC: 30 U/L (ref 0–33)
BASOPHILS # BLD MANUAL: 0 10*3/MM3 (ref 0–0.2)
BASOPHILS NFR BLD AUTO: 0 % (ref 0–1)
BILIRUB SERPL-MCNC: 0.4 MG/DL (ref 0.3–1.2)
BUN BLD-MCNC: 10 MG/DL (ref 9–23)
BUN/CREAT SERPL: 13.9 (ref 7–25)
CALCIUM SPEC-SCNC: 8.9 MG/DL (ref 8.7–10.4)
CHLORIDE SERPL-SCNC: 109 MMOL/L (ref 99–109)
CO2 SERPL-SCNC: 24 MMOL/L (ref 20–31)
CREAT BLD-MCNC: 0.72 MG/DL (ref 0.6–1.3)
DEPRECATED RDW RBC AUTO: 42.7 FL (ref 37–54)
EOSINOPHIL # BLD MANUAL: 0.09 10*3/MM3 (ref 0.1–0.3)
EOSINOPHIL NFR BLD MANUAL: 1 % (ref 0–3)
ERYTHROCYTE [DISTWIDTH] IN BLOOD BY AUTOMATED COUNT: 12.7 % (ref 11.3–14.5)
GFR SERPL CREATININE-BSD FRML MDRD: 91 ML/MIN/1.73
GLOBULIN UR ELPH-MCNC: 2.4 GM/DL
GLUCOSE BLD-MCNC: 108 MG/DL (ref 70–100)
HCT VFR BLD AUTO: 33.5 % (ref 34.5–44)
HGB BLD-MCNC: 10.8 G/DL (ref 11.5–15.5)
LYMPHOCYTES # BLD MANUAL: 2.44 10*3/MM3 (ref 0.6–4.8)
LYMPHOCYTES NFR BLD MANUAL: 26 % (ref 24–44)
LYMPHOCYTES NFR BLD MANUAL: 5 % (ref 0–12)
MCH RBC QN AUTO: 30.3 PG (ref 27–31)
MCHC RBC AUTO-ENTMCNC: 32.2 G/DL (ref 32–36)
MCV RBC AUTO: 93.8 FL (ref 80–99)
MONOCYTES # BLD AUTO: 0.47 10*3/MM3 (ref 0–1)
NEUTROPHILS # BLD AUTO: 6.39 10*3/MM3 (ref 1.5–8.3)
NEUTROPHILS NFR BLD MANUAL: 67 % (ref 41–71)
NEUTS BAND NFR BLD MANUAL: 1 % (ref 0–5)
PLAT MORPH BLD: NORMAL
PLATELET # BLD AUTO: 519 10*3/MM3 (ref 150–450)
PMV BLD AUTO: 8.9 FL (ref 6–12)
POTASSIUM BLD-SCNC: 3.7 MMOL/L (ref 3.5–5.5)
PROT SERPL-MCNC: 6.6 G/DL (ref 5.7–8.2)
RBC # BLD AUTO: 3.57 10*6/MM3 (ref 3.89–5.14)
RBC MORPH BLD: NORMAL
SODIUM BLD-SCNC: 144 MMOL/L (ref 132–146)
WBC MORPH BLD: NORMAL
WBC NRBC COR # BLD: 9.39 10*3/MM3 (ref 3.5–10.8)

## 2019-03-20 PROCEDURE — 85025 COMPLETE CBC W/AUTO DIFF WBC: CPT

## 2019-03-20 PROCEDURE — 85007 BL SMEAR W/DIFF WBC COUNT: CPT

## 2019-03-20 PROCEDURE — 36415 COLL VENOUS BLD VENIPUNCTURE: CPT | Performed by: INTERNAL MEDICINE

## 2019-03-20 PROCEDURE — 80053 COMPREHEN METABOLIC PANEL: CPT | Performed by: INTERNAL MEDICINE

## 2019-03-21 ENCOUNTER — APPOINTMENT (OUTPATIENT)
Dept: ONCOLOGY | Facility: HOSPITAL | Age: 39
End: 2019-03-21

## 2019-03-23 ENCOUNTER — TRANSCRIBE ORDERS (OUTPATIENT)
Dept: LAB | Facility: HOSPITAL | Age: 39
End: 2019-03-23

## 2019-03-23 ENCOUNTER — APPOINTMENT (OUTPATIENT)
Dept: LAB | Facility: HOSPITAL | Age: 39
End: 2019-03-23

## 2019-03-23 DIAGNOSIS — R19.7 DIARRHEA, UNSPECIFIED TYPE: Primary | ICD-10-CM

## 2019-03-23 LAB — C DIFF TOX GENS STL QL NAA+PROBE: NOT DETECTED

## 2019-03-23 PROCEDURE — 87493 C DIFF AMPLIFIED PROBE: CPT | Performed by: INTERNAL MEDICINE

## 2019-03-28 ENCOUNTER — TELEPHONE (OUTPATIENT)
Dept: OBSTETRICS AND GYNECOLOGY | Facility: CLINIC | Age: 39
End: 2019-03-28

## 2020-03-29 ENCOUNTER — HOSPITAL ENCOUNTER (EMERGENCY)
Facility: HOSPITAL | Age: 40
Discharge: HOME | End: 2020-03-29
Attending: EMERGENCY MEDICINE | Admitting: EMERGENCY MEDICINE

## 2020-03-29 VITALS
WEIGHT: 213 LBS | TEMPERATURE: 98.6 F | OXYGEN SATURATION: 99 % | HEART RATE: 84 BPM | BODY MASS INDEX: 37.74 KG/M2 | SYSTOLIC BLOOD PRESSURE: 174 MMHG | HEIGHT: 63 IN | RESPIRATION RATE: 20 BRPM | DIASTOLIC BLOOD PRESSURE: 108 MMHG

## 2020-03-29 DIAGNOSIS — Z86.19 HISTORY OF HEPATITIS C: ICD-10-CM

## 2020-03-29 DIAGNOSIS — Z86.59 HISTORY OF POSTTRAUMATIC STRESS DISORDER (PTSD): ICD-10-CM

## 2020-03-29 DIAGNOSIS — R03.0 ELEVATED BLOOD PRESSURE READING WITHOUT DIAGNOSIS OF HYPERTENSION: ICD-10-CM

## 2020-03-29 DIAGNOSIS — R10.2 SUPRAPUBIC CRAMPING: ICD-10-CM

## 2020-03-29 DIAGNOSIS — Z32.02 NEGATIVE PREGNANCY TEST: ICD-10-CM

## 2020-03-29 DIAGNOSIS — N93.9 VAGINAL BLEEDING: Primary | ICD-10-CM

## 2020-03-29 DIAGNOSIS — Z72.0 TOBACCO ABUSE: ICD-10-CM

## 2020-03-29 LAB
ALBUMIN SERPL-MCNC: 4.5 G/DL (ref 3.5–5.2)
ALBUMIN/GLOB SERPL: 1.4 G/DL
ALP SERPL-CCNC: 74 U/L (ref 39–117)
ALT SERPL W P-5'-P-CCNC: 111 U/L (ref 1–33)
ANION GAP SERPL CALCULATED.3IONS-SCNC: 13 MMOL/L (ref 5–15)
AST SERPL-CCNC: 61 U/L (ref 1–32)
B-HCG UR QL: NEGATIVE
BACTERIA UR QL AUTO: ABNORMAL /HPF
BASOPHILS # BLD AUTO: 0.02 10*3/MM3 (ref 0–0.2)
BASOPHILS NFR BLD AUTO: 0.3 % (ref 0–1.5)
BILIRUB SERPL-MCNC: 0.2 MG/DL (ref 0.2–1.2)
BILIRUB UR QL STRIP: NEGATIVE
BUN BLD-MCNC: 12 MG/DL (ref 6–20)
BUN/CREAT SERPL: 17.4 (ref 7–25)
CALCIUM SPEC-SCNC: 9.8 MG/DL (ref 8.6–10.5)
CHLORIDE SERPL-SCNC: 102 MMOL/L (ref 98–107)
CLARITY UR: CLEAR
CO2 SERPL-SCNC: 25 MMOL/L (ref 22–29)
COLOR UR: YELLOW
CREAT BLD-MCNC: 0.69 MG/DL (ref 0.57–1)
DEPRECATED RDW RBC AUTO: 41.8 FL (ref 37–54)
EOSINOPHIL # BLD AUTO: 0.19 10*3/MM3 (ref 0–0.4)
EOSINOPHIL NFR BLD AUTO: 2.6 % (ref 0.3–6.2)
ERYTHROCYTE [DISTWIDTH] IN BLOOD BY AUTOMATED COUNT: 12.8 % (ref 12.3–15.4)
GFR SERPL CREATININE-BSD FRML MDRD: 95 ML/MIN/1.73
GLOBULIN UR ELPH-MCNC: 3.2 GM/DL
GLUCOSE BLD-MCNC: 88 MG/DL (ref 65–99)
GLUCOSE UR STRIP-MCNC: NEGATIVE MG/DL
HCG INTACT+B SERPL-ACNC: 0.89 MIU/ML
HCT VFR BLD AUTO: 38.1 % (ref 34–46.6)
HGB BLD-MCNC: 12.5 G/DL (ref 12–15.9)
HGB UR QL STRIP.AUTO: ABNORMAL
HYALINE CASTS UR QL AUTO: ABNORMAL /LPF
IMM GRANULOCYTES # BLD AUTO: 0.03 10*3/MM3 (ref 0–0.05)
IMM GRANULOCYTES NFR BLD AUTO: 0.4 % (ref 0–0.5)
INTERNAL NEGATIVE CONTROL: NEGATIVE
INTERNAL POSITIVE CONTROL: POSITIVE
KETONES UR QL STRIP: NEGATIVE
LEUKOCYTE ESTERASE UR QL STRIP.AUTO: ABNORMAL
LYMPHOCYTES # BLD AUTO: 1.78 10*3/MM3 (ref 0.7–3.1)
LYMPHOCYTES NFR BLD AUTO: 24.6 % (ref 19.6–45.3)
Lab: NORMAL
MCH RBC QN AUTO: 29.6 PG (ref 26.6–33)
MCHC RBC AUTO-ENTMCNC: 32.8 G/DL (ref 31.5–35.7)
MCV RBC AUTO: 90.1 FL (ref 79–97)
MONOCYTES # BLD AUTO: 0.51 10*3/MM3 (ref 0.1–0.9)
MONOCYTES NFR BLD AUTO: 7 % (ref 5–12)
NEUTROPHILS # BLD AUTO: 4.71 10*3/MM3 (ref 1.7–7)
NEUTROPHILS NFR BLD AUTO: 65.1 % (ref 42.7–76)
NITRITE UR QL STRIP: NEGATIVE
NRBC BLD AUTO-RTO: 0 /100 WBC (ref 0–0.2)
PH UR STRIP.AUTO: 6.5 [PH] (ref 5–8)
PLATELET # BLD AUTO: 260 10*3/MM3 (ref 140–450)
PMV BLD AUTO: 9.8 FL (ref 6–12)
POTASSIUM BLD-SCNC: 4.5 MMOL/L (ref 3.5–5.2)
PROT SERPL-MCNC: 7.7 G/DL (ref 6–8.5)
PROT UR QL STRIP: NEGATIVE
RBC # BLD AUTO: 4.23 10*6/MM3 (ref 3.77–5.28)
RBC # UR: ABNORMAL /HPF
REF LAB TEST METHOD: ABNORMAL
SODIUM BLD-SCNC: 140 MMOL/L (ref 136–145)
SP GR UR STRIP: 1.02 (ref 1–1.03)
SQUAMOUS #/AREA URNS HPF: ABNORMAL /HPF
UROBILINOGEN UR QL STRIP: ABNORMAL
WBC NRBC COR # BLD: 7.24 10*3/MM3 (ref 3.4–10.8)
WBC UR QL AUTO: ABNORMAL /HPF

## 2020-03-29 PROCEDURE — 99283 EMERGENCY DEPT VISIT LOW MDM: CPT

## 2020-03-29 PROCEDURE — 81001 URINALYSIS AUTO W/SCOPE: CPT | Performed by: PHYSICIAN ASSISTANT

## 2020-03-29 PROCEDURE — 80053 COMPREHEN METABOLIC PANEL: CPT | Performed by: PHYSICIAN ASSISTANT

## 2020-03-29 PROCEDURE — 81025 URINE PREGNANCY TEST: CPT | Performed by: PHYSICIAN ASSISTANT

## 2020-03-29 PROCEDURE — 84702 CHORIONIC GONADOTROPIN TEST: CPT | Performed by: PHYSICIAN ASSISTANT

## 2020-03-29 PROCEDURE — 85025 COMPLETE CBC W/AUTO DIFF WBC: CPT | Performed by: PHYSICIAN ASSISTANT

## 2020-04-02 DIAGNOSIS — Z36.89 ENCOUNTER TO ESTABLISH GESTATIONAL AGE USING ULTRASOUND: Primary | ICD-10-CM

## 2020-12-30 DIAGNOSIS — Z36.89 ENCOUNTER TO ESTABLISH GESTATIONAL AGE USING ULTRASOUND: Primary | ICD-10-CM

## 2020-12-31 ENCOUNTER — TELEPHONE (OUTPATIENT)
Dept: OBSTETRICS AND GYNECOLOGY | Facility: CLINIC | Age: 40
End: 2020-12-31

## 2020-12-31 RX ORDER — ONDANSETRON 4 MG/1
4 TABLET, FILM COATED ORAL DAILY PRN
Qty: 30 TABLET | Refills: 1 | Status: ON HOLD | OUTPATIENT
Start: 2020-12-31 | End: 2021-07-31

## 2021-01-01 ENCOUNTER — HOSPITAL ENCOUNTER (EMERGENCY)
Facility: HOSPITAL | Age: 41
Discharge: LEFT BEFORE DISCHARGE | End: 2021-01-01

## 2021-01-01 VITALS
WEIGHT: 169.75 LBS | TEMPERATURE: 97.3 F | RESPIRATION RATE: 20 BRPM | HEART RATE: 80 BPM | BODY MASS INDEX: 30.08 KG/M2 | OXYGEN SATURATION: 97 % | HEIGHT: 63 IN | SYSTOLIC BLOOD PRESSURE: 116 MMHG | DIASTOLIC BLOOD PRESSURE: 79 MMHG

## 2021-01-01 PROCEDURE — 99211 OFF/OP EST MAY X REQ PHY/QHP: CPT

## 2021-01-06 ENCOUNTER — TELEPHONE (OUTPATIENT)
Dept: OBSTETRICS AND GYNECOLOGY | Facility: CLINIC | Age: 41
End: 2021-01-06

## 2021-01-06 NOTE — TELEPHONE ENCOUNTER
DR BARAJAS PATIENT CALLED TO ASK FOR SOME MIROLAX TO BE CALLED IN. SHE SAID SHE HAS NOT HAD A BOWEL MOVEMENT IN AWHILE. SHE SAID SHE IS NOW USING THE [facility] ON [address].

## 2021-01-07 RX ORDER — POLYETHYLENE GLYCOL 3350 17 G/17G
17 POWDER, FOR SOLUTION ORAL DAILY
Qty: 255 G | Refills: 1 | Status: ON HOLD | OUTPATIENT
Start: 2021-01-07 | End: 2021-07-31

## 2021-01-12 ENCOUNTER — INITIAL PRENATAL (OUTPATIENT)
Dept: OBSTETRICS AND GYNECOLOGY | Facility: CLINIC | Age: 41
End: 2021-01-12

## 2021-01-12 ENCOUNTER — LAB (OUTPATIENT)
Dept: LAB | Facility: HOSPITAL | Age: 41
End: 2021-01-12

## 2021-01-12 VITALS — SYSTOLIC BLOOD PRESSURE: 118 MMHG | DIASTOLIC BLOOD PRESSURE: 76 MMHG | BODY MASS INDEX: 29.94 KG/M2 | WEIGHT: 169 LBS

## 2021-01-12 DIAGNOSIS — O09.529 ANTEPARTUM MULTIGRAVIDA OF ADVANCED MATERNAL AGE: ICD-10-CM

## 2021-01-12 DIAGNOSIS — F10.21 ALCOHOLISM IN REMISSION (HCC): ICD-10-CM

## 2021-01-12 DIAGNOSIS — F39 MOOD DISORDER (HCC): ICD-10-CM

## 2021-01-12 DIAGNOSIS — Z72.0 TOBACCO ABUSE: ICD-10-CM

## 2021-01-12 DIAGNOSIS — O09.90 SUPERVISION OF HIGH RISK PREGNANCY, ANTEPARTUM: ICD-10-CM

## 2021-01-12 DIAGNOSIS — F11.21 OPIOID DEPENDENCE IN REMISSION (HCC): ICD-10-CM

## 2021-01-12 DIAGNOSIS — Z82.79 FAMILY HISTORY OF CONGENITAL HEART DEFECT: ICD-10-CM

## 2021-01-12 DIAGNOSIS — B18.2 HEPATITIS C CARRIER (HCC): ICD-10-CM

## 2021-01-12 DIAGNOSIS — O09.90 SUPERVISION OF HIGH RISK PREGNANCY, ANTEPARTUM: Primary | ICD-10-CM

## 2021-01-12 PROBLEM — Z3A.38 38 WEEKS GESTATION OF PREGNANCY: Status: RESOLVED | Noted: 2018-09-17 | Resolved: 2021-01-12

## 2021-01-12 PROBLEM — Z3A.09 9 WEEKS GESTATION OF PREGNANCY: Status: ACTIVE | Noted: 2021-01-12

## 2021-01-12 LAB
ABO GROUP BLD: NORMAL
AMPHET+METHAMPHET UR QL: NEGATIVE
AMPHETAMINES UR QL: NEGATIVE
BACTERIA UR QL AUTO: ABNORMAL /HPF
BARBITURATES UR QL SCN: NEGATIVE
BASOPHILS # BLD AUTO: 0.02 10*3/MM3 (ref 0–0.2)
BASOPHILS NFR BLD AUTO: 0.4 % (ref 0–1.5)
BENZODIAZ UR QL SCN: NEGATIVE
BILIRUB UR QL STRIP: NEGATIVE
BLD GP AB SCN SERPL QL: NEGATIVE
BUPRENORPHINE SERPL-MCNC: NEGATIVE NG/ML
CANNABINOIDS SERPL QL: POSITIVE
CLARITY UR: ABNORMAL
COCAINE UR QL: NEGATIVE
COLOR UR: YELLOW
DEPRECATED RDW RBC AUTO: 40.3 FL (ref 37–54)
EOSINOPHIL # BLD AUTO: 0.07 10*3/MM3 (ref 0–0.4)
EOSINOPHIL NFR BLD AUTO: 1.3 % (ref 0.3–6.2)
ERYTHROCYTE [DISTWIDTH] IN BLOOD BY AUTOMATED COUNT: 12.5 % (ref 12.3–15.4)
GLUCOSE UR STRIP-MCNC: NEGATIVE MG/DL
GLUCOSE UR STRIP-MCNC: NEGATIVE MG/DL
HBA1C MFR BLD: 5.35 % (ref 4.8–5.6)
HBV SURFACE AG SERPL QL IA: NORMAL
HCT VFR BLD AUTO: 37.2 % (ref 34–46.6)
HCV AB SER DONR QL: REACTIVE
HGB BLD-MCNC: 12.8 G/DL (ref 12–15.9)
HGB UR QL STRIP.AUTO: NEGATIVE
HIV1+2 AB SER QL: NORMAL
HYALINE CASTS UR QL AUTO: ABNORMAL /LPF
IMM GRANULOCYTES # BLD AUTO: 0.02 10*3/MM3 (ref 0–0.05)
IMM GRANULOCYTES NFR BLD AUTO: 0.4 % (ref 0–0.5)
KETONES UR QL STRIP: NEGATIVE
LEUKOCYTE ESTERASE UR QL STRIP.AUTO: ABNORMAL
LYMPHOCYTES # BLD AUTO: 1.53 10*3/MM3 (ref 0.7–3.1)
LYMPHOCYTES NFR BLD AUTO: 28.5 % (ref 19.6–45.3)
MCH RBC QN AUTO: 31 PG (ref 26.6–33)
MCHC RBC AUTO-ENTMCNC: 34.4 G/DL (ref 31.5–35.7)
MCV RBC AUTO: 90.1 FL (ref 79–97)
METHADONE UR QL SCN: NEGATIVE
MONOCYTES # BLD AUTO: 0.24 10*3/MM3 (ref 0.1–0.9)
MONOCYTES NFR BLD AUTO: 4.5 % (ref 5–12)
NEUTROPHILS NFR BLD AUTO: 3.48 10*3/MM3 (ref 1.7–7)
NEUTROPHILS NFR BLD AUTO: 64.9 % (ref 42.7–76)
NITRITE UR QL STRIP: POSITIVE
NRBC BLD AUTO-RTO: 0.2 /100 WBC (ref 0–0.2)
OPIATES UR QL: NEGATIVE
OXYCODONE UR QL SCN: NEGATIVE
PCP UR QL SCN: NEGATIVE
PH UR STRIP.AUTO: 6 [PH] (ref 5–8)
PLATELET # BLD AUTO: 206 10*3/MM3 (ref 140–450)
PMV BLD AUTO: 10.9 FL (ref 6–12)
PROPOXYPH UR QL: NEGATIVE
PROT UR QL STRIP: ABNORMAL
PROT UR STRIP-MCNC: NEGATIVE MG/DL
RBC # BLD AUTO: 4.13 10*6/MM3 (ref 3.77–5.28)
RBC # UR: ABNORMAL /HPF
REF LAB TEST METHOD: ABNORMAL
RH BLD: POSITIVE
RPR SER QL: NORMAL
RUBV IGG SERPL IA-ACNC: POSITIVE
SP GR UR STRIP: 1.02 (ref 1–1.03)
SQUAMOUS #/AREA URNS HPF: ABNORMAL /HPF
TRICYCLICS UR QL SCN: NEGATIVE
TSH SERPL DL<=0.05 MIU/L-ACNC: 1.16 UIU/ML (ref 0.27–4.2)
UROBILINOGEN UR QL STRIP: ABNORMAL
WBC # BLD AUTO: 5.36 10*3/MM3 (ref 3.4–10.8)
WBC CLUMPS # UR AUTO: ABNORMAL /HPF
WBC UR QL AUTO: ABNORMAL /HPF

## 2021-01-12 PROCEDURE — 81001 URINALYSIS AUTO W/SCOPE: CPT

## 2021-01-12 PROCEDURE — 36415 COLL VENOUS BLD VENIPUNCTURE: CPT

## 2021-01-12 PROCEDURE — 87086 URINE CULTURE/COLONY COUNT: CPT

## 2021-01-12 PROCEDURE — 87186 SC STD MICRODIL/AGAR DIL: CPT

## 2021-01-12 PROCEDURE — 86803 HEPATITIS C AB TEST: CPT

## 2021-01-12 PROCEDURE — G0480 DRUG TEST DEF 1-7 CLASSES: HCPCS | Performed by: OBSTETRICS & GYNECOLOGY

## 2021-01-12 PROCEDURE — 80306 DRUG TEST PRSMV INSTRMNT: CPT | Performed by: OBSTETRICS & GYNECOLOGY

## 2021-01-12 PROCEDURE — 84443 ASSAY THYROID STIM HORMONE: CPT | Performed by: OBSTETRICS & GYNECOLOGY

## 2021-01-12 PROCEDURE — 99214 OFFICE O/P EST MOD 30 MIN: CPT | Performed by: OBSTETRICS & GYNECOLOGY

## 2021-01-12 PROCEDURE — 80081 OBSTETRIC PANEL INC HIV TSTG: CPT

## 2021-01-12 PROCEDURE — 83036 HEMOGLOBIN GLYCOSYLATED A1C: CPT

## 2021-01-12 PROCEDURE — 87522 HEPATITIS C REVRS TRNSCRPJ: CPT | Performed by: OBSTETRICS & GYNECOLOGY

## 2021-01-12 PROCEDURE — 87088 URINE BACTERIA CULTURE: CPT

## 2021-01-12 RX ORDER — BUPROPION HYDROCHLORIDE 300 MG/1
300 TABLET ORAL DAILY
Status: ON HOLD | COMMUNITY
Start: 2020-12-22 | End: 2021-07-31

## 2021-01-12 NOTE — PROGRESS NOTES
Subjective     Chief Complaint   Patient presents with   • Initial Prenatal Visit     NOB return  c/o  hip pain, nausea vomiting, gas  constipation fatique insomnia  she states her muscles in her arms and legs jerk    • Heartburn       Frieda Flores is a 40 y.o. year old .  Patient's last menstrual period was 2020..  She presents to be seen to initiate prenatal care with our practice.    She is currently having problems with nausea, improving on medication.  As it relates to the pregnancy, she has concerns regarding management of High Risk Pregnancy  Previous Child with CHD  Alcoholism in remission  Opioid Dependence in remission  One brief relapse after oral surgery  Hepatitis C Antibody positive, needs titer  Mood disorder, stable on High Risk medication  Tobacco use  Now vaping and trying to wean completely  History of  Section  Vaginal birth () was discussed today with Frieda .    Success for  is dependant upon the reason for the first .  If the first  was done for a non-repeating cause (breech, fetal intolerance to labor, etc) the success rate is ~ 80%. If the first  was done for a repeating cause (failure to progress in labor), the risk of success is much lower (~ 50%).     carries risks that cannot be eliminated.  The greatest risk is for uterine rupture.  With a prior low low transverse uterine incision, this likelihood of rupture is ~ 1/100.  Should a rupture occur, risk to both mother and fetus exist.  The greatest risks are those of the fetus.  Should rupture occur with fetal expulsion outside of the uterus, unless delivery can be accomplished in < 10 minutes, the risk of fetal death and anoxic brain injury occur.  This can result in long term  injury including cerebral palsy.  Maternal risk include uterine rupture with possible need for hysterectomy, and extension of the rupture into the bladder or ureter.  Additionally, the risk of infection  and need for blood transfusion may be increased with a failed .  The risk of rupture may be higher with factors such as short interval between deliveries, prior one layer closure and induced labor.    As compared to repeat , the benefits of a successful  include lower rates of infection, less blood loss and quicker return to function.  Additionally, with successful , often the  transitions more quickly to extra-uterine life.    I explained to Frieda that  is elective choice.  Should she choose , she can change her mind and opt for a repeat  at any point.  Should she choose an elective repeat , it can be scheduled no sooner than 39 weeks gestation. At this time she opts for TOLAC  Advanced Maternal Age  Discussed screening and diagnostic tools  Risk for aneuploidy about 1/50  At this time, she op[ts for cell free DNA screening only      The following portions of the patient's history were reviewed and updated as appropriate:vital signs, allergies, current medications, past medical history, past social history, past surgical history and problem list.    A 14 point review of systems was negative except for: nausea    Objective     Physical Exam    General:  well developed; well nourished  no acute distress   Constitutional: healthy   Skin:  No suspicious lesions seen   Thyroid: normal to inspection and palpation   Lungs:  breathing is unlabored  clear to auscultation bilaterally   Heart:  regular rate and rhythm, S1, S2 normal, no murmur, click, rub or gallop   Breasts:  Not performed.   Abdomen: soft, non-tender; no masses  no umbilical or inginual hernias are present  no hepato-splenomegaly   Pelvis: Clinical staff was present for exam  External genitalia:  normal appearance of the external genitalia including Bartholin's and Cunard's glands.  :  urethral meatus normal; urethral hypermobility is absent.  Vaginal:  normal pink mucosa without prolapse or  lesions.  Cervix:  normal appearance pap and cultues done  Uterus:  symmetrically enlarged, consisent with 8-10 weeks size;  Adnexa:  normal bimanual exam of the adnexa.   Musculoskeletal: positive findings: decreased ROM  bilateral hips   Neuro: normal without focal findings, mental status, speech normal, alert and oriented x3 and JOHANNE   Psych: oriented to time, place and person, mood and affect are within normal limits, pt is a good historian; no memory problems were noted       Lab Review   Op Note C Section    Imaging   Pelvic ultrasound report      Assessment/Plan  High Risk Pregnancy      1. Information reviewed: smoking in pregnancy, exercise in pregnancy, nutrition in pregnancy, weight gain in pregnancy, work and travel restrictions during pregnancy, list of OTC medications acceptable in pregnancy and call coverage groups   2. Genetic testing reviewed: NIPT (Panorama) and Amniocentesis   3. The problem list for pregnancy was initiated today   4. Plans TOLAC     Follow up: 3 week(s)     this was a 60 minute encounter of which 45 minutes was face to face re: initial evaluation and mangagement of complex pregnancy    This note was electronically signed.    Dani King MD  January 12, 2021

## 2021-01-13 RX ORDER — CEPHALEXIN 500 MG/1
500 CAPSULE ORAL 4 TIMES DAILY
Qty: 40 CAPSULE | Refills: 0 | Status: SHIPPED | OUTPATIENT
Start: 2021-01-13 | End: 2021-01-23

## 2021-01-14 LAB
BACTERIA SPEC AEROBE CULT: ABNORMAL
BACTERIA SPEC AEROBE CULT: ABNORMAL
HCV RNA SERPL NAA+PROBE-ACNC: NORMAL IU/ML
TEST INFORMATION: NORMAL

## 2021-01-15 DIAGNOSIS — O09.90 SUPERVISION OF HIGH RISK PREGNANCY, ANTEPARTUM: ICD-10-CM

## 2021-01-19 ENCOUNTER — TELEPHONE (OUTPATIENT)
Dept: OBSTETRICS AND GYNECOLOGY | Facility: CLINIC | Age: 41
End: 2021-01-19

## 2021-01-19 NOTE — TELEPHONE ENCOUNTER
JENN    Patient called and stated she received a text message from [employer] telling her she needs a code to get her test results.  Patient stated she never received a code.  Please call and advise.  Thank you.

## 2021-01-20 DIAGNOSIS — O09.90 SUPERVISION OF HIGH RISK PREGNANCY, ANTEPARTUM: ICD-10-CM

## 2021-01-20 LAB — CANNABINOIDS UR QL CFM: POSITIVE

## 2021-01-20 RX ORDER — METRONIDAZOLE 500 MG/1
500 TABLET ORAL 2 TIMES DAILY
Qty: 10 TABLET | Refills: 0 | Status: SHIPPED | OUTPATIENT
Start: 2021-01-20 | End: 2021-01-25

## 2021-01-21 ENCOUNTER — TELEPHONE (OUTPATIENT)
Dept: OBSTETRICS AND GYNECOLOGY | Facility: CLINIC | Age: 41
End: 2021-01-21

## 2021-01-21 NOTE — TELEPHONE ENCOUNTER
----- Message from Dani King MD sent at 1/20/2021 11:37 AM EST -----      ----- Message -----  From: Rika Chapman RegSched Rep  Sent: 1/18/2021   8:30 AM EST  To: Dani King MD

## 2021-02-02 ENCOUNTER — ROUTINE PRENATAL (OUTPATIENT)
Dept: OBSTETRICS AND GYNECOLOGY | Facility: CLINIC | Age: 41
End: 2021-02-02

## 2021-02-02 VITALS — SYSTOLIC BLOOD PRESSURE: 102 MMHG | BODY MASS INDEX: 31.18 KG/M2 | DIASTOLIC BLOOD PRESSURE: 64 MMHG | WEIGHT: 176 LBS

## 2021-02-02 DIAGNOSIS — F11.21 OPIOID DEPENDENCE IN REMISSION (HCC): ICD-10-CM

## 2021-02-02 DIAGNOSIS — Z72.0 TOBACCO ABUSE: ICD-10-CM

## 2021-02-02 DIAGNOSIS — O09.529 ANTEPARTUM MULTIGRAVIDA OF ADVANCED MATERNAL AGE: ICD-10-CM

## 2021-02-02 DIAGNOSIS — F10.21 ALCOHOLISM IN REMISSION (HCC): Primary | ICD-10-CM

## 2021-02-02 DIAGNOSIS — F39 MOOD DISORDER (HCC): ICD-10-CM

## 2021-02-02 PROBLEM — Z3A.12 12 WEEKS GESTATION OF PREGNANCY: Status: ACTIVE | Noted: 2021-01-12

## 2021-02-02 LAB
GLUCOSE UR STRIP-MCNC: NEGATIVE MG/DL
PROT UR STRIP-MCNC: NEGATIVE MG/DL

## 2021-02-02 PROCEDURE — 99213 OFFICE O/P EST LOW 20 MIN: CPT | Performed by: OBSTETRICS & GYNECOLOGY

## 2021-02-02 RX ORDER — PRENATAL VIT/IRON FUM/FOLIC AC 27MG-0.8MG
1 TABLET ORAL DAILY
Qty: 30 TABLET | Refills: 5 | Status: SHIPPED | OUTPATIENT
Start: 2021-02-02

## 2021-02-02 RX ORDER — OMEPRAZOLE 20 MG/1
20 CAPSULE, DELAYED RELEASE ORAL DAILY
Qty: 30 CAPSULE | Refills: 1 | Status: SHIPPED | OUTPATIENT
Start: 2021-02-02 | End: 2021-04-19

## 2021-02-02 NOTE — PROGRESS NOTES
Chief Complaint   Patient presents with   • Routine Prenatal Visit     ob visit, c/o's N/V/D constantly. Requesting nausea and heart burn meds.        HPI: Frieda is a  currently at 12w3d who today reports the following:Headache - No ; Visual change - No ; Swelling in legs - No ; Nausea - YES ; Constipation - No; Diarrhea - No ; Contractions - No ; Leaking fluid - No ; Vaginal bleeding -  No.      ROS: Constitutional: negative for fever, chills, activity change, appetite change, fatigue and unexpected weight change.  Respiratory: negative for cough and dyspnea on exertion  Gastrointestinal: positive for reflux symptoms  Vitals: See prenatal flowsheet     EXAM:  Abdomen: See physician component of prenatal flowsheet   Urine glucose/protein: See physician component of prenatal flowsheet   Pelvic: See physician component of prenatal flowsheet     Prenatal Labs  Lab Results   Component Value Date    HGB 12.8 2021    RUBELLAABIGG Positive 2021    HEPBSAG Non-Reactive 2021    ABO A 2021    RH Positive 2021    ABSCRN Negative 2021    VWX8SHQ1 Non-Reactive 2021    HEPCVIRUSABY Reactive (A) 2021    STREPGPB Positive 2019    URINECX >100,000 CFU/mL Escherichia coli (A) 2021    URINECX >100,000 CFU/mL Escherichia coli (A) 2021       MDM: High Risk Pregnancy  Impression:Multiparous patient with medical complications, Previous C/S with anticipated , Obesity, AMA, Substance  Abuse in remission or managed elsewhere and hep c antibody positive, neg viral load  Tests done today: none  Specific topics discussed at today's visit: Questions answered regarding COVID-19 and revision of office schedule of visits due to pandemic  OTC medical options for her GI symptoms  Next visit:none   Consider MSAFP

## 2021-04-19 RX ORDER — OMEPRAZOLE 20 MG/1
20 CAPSULE, DELAYED RELEASE ORAL DAILY
Qty: 30 CAPSULE | Refills: 1 | Status: ON HOLD | OUTPATIENT
Start: 2021-04-19 | End: 2021-07-31

## 2021-07-31 ENCOUNTER — HOSPITAL ENCOUNTER (OUTPATIENT)
Facility: HOSPITAL | Age: 41
End: 2021-07-31
Attending: OBSTETRICS & GYNECOLOGY | Admitting: OBSTETRICS & GYNECOLOGY

## 2021-07-31 ENCOUNTER — HOSPITAL ENCOUNTER (INPATIENT)
Facility: HOSPITAL | Age: 41
LOS: 1 days | Discharge: HOME | End: 2021-07-31
Attending: OBSTETRICS & GYNECOLOGY | Admitting: OBSTETRICS & GYNECOLOGY

## 2021-07-31 ENCOUNTER — APPOINTMENT (OUTPATIENT)
Dept: ULTRASOUND IMAGING | Facility: HOSPITAL | Age: 41
End: 2021-07-31

## 2021-07-31 ENCOUNTER — ANESTHESIA (OUTPATIENT)
Dept: PERIOP | Facility: HOSPITAL | Age: 41
End: 2021-07-31

## 2021-07-31 ENCOUNTER — APPOINTMENT (OUTPATIENT)
Dept: GENERAL RADIOLOGY | Facility: HOSPITAL | Age: 41
End: 2021-07-31

## 2021-07-31 ENCOUNTER — ANESTHESIA EVENT (OUTPATIENT)
Dept: PERIOP | Facility: HOSPITAL | Age: 41
End: 2021-07-31

## 2021-07-31 ENCOUNTER — HOSPITAL ENCOUNTER (INPATIENT)
Facility: HOSPITAL | Age: 41
LOS: 20 days | Discharge: NURSING, REHAB OR LONG-TERM CARE | End: 2021-08-20
Attending: EMERGENCY MEDICINE | Admitting: OBSTETRICS & GYNECOLOGY

## 2021-07-31 ENCOUNTER — ANESTHESIA EVENT (OUTPATIENT)
Dept: TELEMETRY | Facility: HOSPITAL | Age: 41
End: 2021-07-31

## 2021-07-31 ENCOUNTER — APPOINTMENT (OUTPATIENT)
Dept: CARDIOLOGY | Facility: HOSPITAL | Age: 41
End: 2021-07-31

## 2021-07-31 ENCOUNTER — ANESTHESIA (OUTPATIENT)
Dept: TELEMETRY | Facility: HOSPITAL | Age: 41
End: 2021-07-31

## 2021-07-31 VITALS
SYSTOLIC BLOOD PRESSURE: 152 MMHG | RESPIRATION RATE: 22 BRPM | OXYGEN SATURATION: 100 % | WEIGHT: 159 LBS | HEART RATE: 67 BPM | HEIGHT: 63 IN | DIASTOLIC BLOOD PRESSURE: 99 MMHG | TEMPERATURE: 97.6 F | BODY MASS INDEX: 28.17 KG/M2

## 2021-07-31 DIAGNOSIS — O34.219 PREVIOUS CESAREAN DELIVERY AFFECTING PREGNANCY: Primary | ICD-10-CM

## 2021-07-31 DIAGNOSIS — M00.9 PYOGENIC ARTHRITIS OF RIGHT HIP, DUE TO UNSPECIFIED ORGANISM (HCC): ICD-10-CM

## 2021-07-31 DIAGNOSIS — F19.90 IVDU (INTRAVENOUS DRUG USER): ICD-10-CM

## 2021-07-31 DIAGNOSIS — O47.9 UTERINE CONTRACTIONS DURING PREGNANCY: ICD-10-CM

## 2021-07-31 DIAGNOSIS — O34.219 PREVIOUS CESAREAN DELIVERY AFFECTING PREGNANCY: ICD-10-CM

## 2021-07-31 DIAGNOSIS — Z34.93 THIRD TRIMESTER PREGNANCY: ICD-10-CM

## 2021-07-31 DIAGNOSIS — S79.919A HIP INJURY, INITIAL ENCOUNTER: ICD-10-CM

## 2021-07-31 DIAGNOSIS — M25.551 RIGHT HIP PAIN: ICD-10-CM

## 2021-07-31 PROBLEM — O09.93 SUPERVISION OF HIGH-RISK PREGNANCY, THIRD TRIMESTER: Status: RESOLVED | Noted: 2021-07-31 | Resolved: 2021-07-31

## 2021-07-31 PROBLEM — F19.10 POLYSUBSTANCE ABUSE (HCC): Status: ACTIVE | Noted: 2018-08-28

## 2021-07-31 PROBLEM — Z3A.38 38 WEEKS GESTATION OF PREGNANCY: Status: RESOLVED | Noted: 2021-01-12 | Resolved: 2021-07-31

## 2021-07-31 PROBLEM — B18.2 HEPATITIS C CARRIER: Status: RESOLVED | Noted: 2018-09-04 | Resolved: 2021-07-31

## 2021-07-31 PROBLEM — O99.333 TOBACCO USE COMPLICATING PREGNANCY, THIRD TRIMESTER: Status: ACTIVE | Noted: 2018-08-28

## 2021-07-31 PROBLEM — O99.333 TOBACCO USE COMPLICATING PREGNANCY, THIRD TRIMESTER: Status: RESOLVED | Noted: 2018-08-28 | Resolved: 2021-07-31

## 2021-07-31 PROBLEM — F17.210 CIGARETTE SMOKER: Status: ACTIVE | Noted: 2021-07-31

## 2021-07-31 PROBLEM — G44.89 OTHER HEADACHE SYNDROME: Status: RESOLVED | Noted: 2019-01-14 | Resolved: 2021-07-31

## 2021-07-31 PROBLEM — O09.523 MULTIGRAVIDA OF ADVANCED MATERNAL AGE IN THIRD TRIMESTER: Status: ACTIVE | Noted: 2018-08-28

## 2021-07-31 PROBLEM — O09.93 SUPERVISION OF HIGH-RISK PREGNANCY, THIRD TRIMESTER: Status: ACTIVE | Noted: 2021-07-31

## 2021-07-31 PROBLEM — O09.33 INSUFFICIENT PRENATAL CARE IN THIRD TRIMESTER: Status: RESOLVED | Noted: 2021-07-31 | Resolved: 2021-07-31

## 2021-07-31 PROBLEM — K59.03 THERAPEUTIC OPIOID-INDUCED CONSTIPATION (OIC): Status: ACTIVE | Noted: 2018-01-01

## 2021-07-31 PROBLEM — R79.82 CRP ELEVATED: Status: ACTIVE | Noted: 2021-07-31

## 2021-07-31 PROBLEM — F39 MOOD DISORDER (HCC): Status: RESOLVED | Noted: 2018-08-28 | Resolved: 2021-07-31

## 2021-07-31 PROBLEM — O09.523 MULTIGRAVIDA OF ADVANCED MATERNAL AGE IN THIRD TRIMESTER: Status: RESOLVED | Noted: 2018-08-28 | Resolved: 2021-07-31

## 2021-07-31 PROBLEM — T40.2X5A THERAPEUTIC OPIOID-INDUCED CONSTIPATION (OIC): Status: ACTIVE | Noted: 2018-01-01

## 2021-07-31 PROBLEM — Z3A.38 38 WEEKS GESTATION OF PREGNANCY: Status: ACTIVE | Noted: 2021-01-12

## 2021-07-31 PROBLEM — F10.10 ALCOHOL ABUSE: Status: ACTIVE | Noted: 2018-08-28

## 2021-07-31 PROBLEM — R70.0 ELEVATED SED RATE: Status: ACTIVE | Noted: 2021-07-31

## 2021-07-31 PROBLEM — L03.311 ABDOMINAL WALL CELLULITIS: Status: RESOLVED | Noted: 2019-03-14 | Resolved: 2021-07-31

## 2021-07-31 PROBLEM — O09.33 INSUFFICIENT PRENATAL CARE IN THIRD TRIMESTER: Status: ACTIVE | Noted: 2021-07-31

## 2021-07-31 PROBLEM — E66.01 MORBIDLY OBESE (HCC): Status: RESOLVED | Noted: 2019-03-04 | Resolved: 2021-07-31

## 2021-07-31 LAB
ABO GROUP BLD: NORMAL
ALBUMIN SERPL-MCNC: 3 G/DL (ref 3.5–5.2)
ALBUMIN/GLOB SERPL: 0.8 G/DL
ALP SERPL-CCNC: 165 U/L (ref 39–117)
ALT SERPL W P-5'-P-CCNC: 11 U/L (ref 1–33)
AMPHET+METHAMPHET UR QL: POSITIVE
AMPHETAMINES UR QL: POSITIVE
AMYLASE SERPL-CCNC: 7 U/L (ref 28–100)
ANION GAP SERPL CALCULATED.3IONS-SCNC: 13 MMOL/L (ref 5–15)
APPEARANCE FLD: ABNORMAL
APTT PPP: 33.2 SECONDS (ref 22–39)
AST SERPL-CCNC: 17 U/L (ref 1–32)
BARBITURATES UR QL SCN: NEGATIVE
BASOPHILS # BLD AUTO: 0.01 10*3/MM3 (ref 0–0.2)
BASOPHILS NFR BLD AUTO: 0.1 % (ref 0–1.5)
BENZODIAZ UR QL SCN: NEGATIVE
BILIRUB SERPL-MCNC: 0.7 MG/DL (ref 0–1.2)
BLD GP AB SCN SERPL QL: NEGATIVE
BUN SERPL-MCNC: 10 MG/DL (ref 6–20)
BUN/CREAT SERPL: 16.7 (ref 7–25)
BUPRENORPHINE SERPL-MCNC: NEGATIVE NG/ML
CALCIUM SPEC-SCNC: 9.6 MG/DL (ref 8.6–10.5)
CANNABINOIDS SERPL QL: POSITIVE
CHLORIDE SERPL-SCNC: 99 MMOL/L (ref 98–107)
CO2 SERPL-SCNC: 23 MMOL/L (ref 22–29)
COCAINE UR QL: POSITIVE
COLOR FLD: YELLOW
CREAT SERPL-MCNC: 0.6 MG/DL (ref 0.57–1)
CRP SERPL-MCNC: 13.79 MG/DL (ref 0–0.5)
CRYSTALS FLD MICRO: NORMAL
DEPRECATED RDW RBC AUTO: 39.7 FL (ref 37–54)
DEPRECATED RDW RBC AUTO: 43.1 FL (ref 37–54)
EOSINOPHIL # BLD AUTO: 0 10*3/MM3 (ref 0–0.4)
EOSINOPHIL NFR BLD AUTO: 0 % (ref 0.3–6.2)
ERYTHROCYTE [DISTWIDTH] IN BLOOD BY AUTOMATED COUNT: 12.2 % (ref 12.3–15.4)
ERYTHROCYTE [DISTWIDTH] IN BLOOD BY AUTOMATED COUNT: 12.5 % (ref 12.3–15.4)
ERYTHROCYTE [SEDIMENTATION RATE] IN BLOOD: 68 MM/HR (ref 0–20)
FIBRINOGEN PPP-MCNC: 812 MG/DL (ref 220–470)
GFR SERPL CREATININE-BSD FRML MDRD: 111 ML/MIN/1.73
GLOBULIN UR ELPH-MCNC: 3.7 GM/DL
GLUCOSE SERPL-MCNC: 92 MG/DL (ref 65–99)
HCT VFR BLD AUTO: 30.1 % (ref 34–46.6)
HCT VFR BLD AUTO: 33.6 % (ref 34–46.6)
HGB BLD-MCNC: 10.7 G/DL (ref 12–15.9)
HGB BLD-MCNC: 11.5 G/DL (ref 12–15.9)
IMM GRANULOCYTES # BLD AUTO: 0.06 10*3/MM3 (ref 0–0.05)
IMM GRANULOCYTES NFR BLD AUTO: 0.7 % (ref 0–0.5)
INR PPP: 0.92 (ref 0.85–1.16)
LIPASE SERPL-CCNC: 8 U/L (ref 13–60)
LYMPHOCYTES # BLD AUTO: 1 10*3/MM3 (ref 0.7–3.1)
LYMPHOCYTES NFR BLD AUTO: 12 % (ref 19.6–45.3)
LYMPHOCYTES NFR FLD MANUAL: 7 %
MACROPHAGE FLUID: 2 %
MAXIMAL PREDICTED HEART RATE: 181 BPM
MCH RBC QN AUTO: 31.8 PG (ref 26.6–33)
MCH RBC QN AUTO: 31.9 PG (ref 26.6–33)
MCHC RBC AUTO-ENTMCNC: 34.2 G/DL (ref 31.5–35.7)
MCHC RBC AUTO-ENTMCNC: 35.5 G/DL (ref 31.5–35.7)
MCV RBC AUTO: 89.6 FL (ref 79–97)
MCV RBC AUTO: 93.3 FL (ref 79–97)
METHADONE UR QL SCN: NEGATIVE
MONOCYTES # BLD AUTO: 0.42 10*3/MM3 (ref 0.1–0.9)
MONOCYTES NFR BLD AUTO: 5 % (ref 5–12)
MONOCYTES NFR FLD: 7 %
NEUTROPHILS NFR BLD AUTO: 6.85 10*3/MM3 (ref 1.7–7)
NEUTROPHILS NFR BLD AUTO: 82.2 % (ref 42.7–76)
NEUTROPHILS NFR FLD MANUAL: 84 %
NRBC BLD AUTO-RTO: 0 /100 WBC (ref 0–0.2)
OPIATES UR QL: NEGATIVE
OXYCODONE UR QL SCN: NEGATIVE
PCP UR QL SCN: NEGATIVE
PLATELET # BLD AUTO: 204 10*3/MM3 (ref 140–450)
PLATELET # BLD AUTO: 218 10*3/MM3 (ref 140–450)
PMV BLD AUTO: 10.4 FL (ref 6–12)
PMV BLD AUTO: 10.5 FL (ref 6–12)
POTASSIUM SERPL-SCNC: 3.3 MMOL/L (ref 3.5–5.2)
PROPOXYPH UR QL: NEGATIVE
PROT SERPL-MCNC: 6.7 G/DL (ref 6–8.5)
PROTHROMBIN TIME: 12.1 SECONDS (ref 11.4–14.4)
RBC # BLD AUTO: 3.36 10*6/MM3 (ref 3.77–5.28)
RBC # BLD AUTO: 3.6 10*6/MM3 (ref 3.77–5.28)
RBC # FLD AUTO: ABNORMAL /MM3
RH BLD: POSITIVE
SARS-COV-2 RDRP RESP QL NAA+PROBE: NORMAL
SODIUM SERPL-SCNC: 135 MMOL/L (ref 136–145)
STRESS TARGET HR: 154 BPM
T&S EXPIRATION DATE: NORMAL
TRICYCLICS UR QL SCN: NEGATIVE
WBC # BLD AUTO: 8.34 10*3/MM3 (ref 3.4–10.8)
WBC # BLD AUTO: 9.64 10*3/MM3 (ref 3.4–10.8)
WBC # FLD AUTO: ABNORMAL /MM3

## 2021-07-31 PROCEDURE — 76942 ECHO GUIDE FOR BIOPSY: CPT

## 2021-07-31 PROCEDURE — G0480 DRUG TEST DEF 1-7 CLASSES: HCPCS | Performed by: OBSTETRICS & GYNECOLOGY

## 2021-07-31 PROCEDURE — 25010000003 MORPHINE PER 10 MG: Performed by: ANESTHESIOLOGY

## 2021-07-31 PROCEDURE — 87116 MYCOBACTERIA CULTURE: CPT | Performed by: OBSTETRICS & GYNECOLOGY

## 2021-07-31 PROCEDURE — 85025 COMPLETE CBC W/AUTO DIFF WBC: CPT | Performed by: EMERGENCY MEDICINE

## 2021-07-31 PROCEDURE — 87070 CULTURE OTHR SPECIMN AEROBIC: CPT | Performed by: EMERGENCY MEDICINE

## 2021-07-31 PROCEDURE — 25010000002 CEFAZOLIN PER 500 MG: Performed by: OBSTETRICS & GYNECOLOGY

## 2021-07-31 PROCEDURE — 87591 N.GONORRHOEAE DNA AMP PROB: CPT | Performed by: OBSTETRICS & GYNECOLOGY

## 2021-07-31 PROCEDURE — 87077 CULTURE AEROBIC IDENTIFY: CPT | Performed by: EMERGENCY MEDICINE

## 2021-07-31 PROCEDURE — 27030 ARTHROTOMY HIP W/DRAINAGE: CPT | Performed by: PHYSICIAN ASSISTANT

## 2021-07-31 PROCEDURE — 87635 SARS-COV-2 COVID-19 AMP PRB: CPT | Performed by: OBSTETRICS & GYNECOLOGY

## 2021-07-31 PROCEDURE — 20611 DRAIN/INJ JOINT/BURSA W/US: CPT | Performed by: RADIOLOGY

## 2021-07-31 PROCEDURE — 86850 RBC ANTIBODY SCREEN: CPT | Performed by: OBSTETRICS & GYNECOLOGY

## 2021-07-31 PROCEDURE — 89051 BODY FLUID CELL COUNT: CPT | Performed by: EMERGENCY MEDICINE

## 2021-07-31 PROCEDURE — 87206 SMEAR FLUORESCENT/ACID STAI: CPT | Performed by: OBSTETRICS & GYNECOLOGY

## 2021-07-31 PROCEDURE — 87075 CULTR BACTERIA EXCEPT BLOOD: CPT | Performed by: OBSTETRICS & GYNECOLOGY

## 2021-07-31 PROCEDURE — 85027 COMPLETE CBC AUTOMATED: CPT | Performed by: OBSTETRICS & GYNECOLOGY

## 2021-07-31 PROCEDURE — 87147 CULTURE TYPE IMMUNOLOGIC: CPT | Performed by: OBSTETRICS & GYNECOLOGY

## 2021-07-31 PROCEDURE — 25010000002 TERBUTALINE PER 1 MG: Performed by: OBSTETRICS & GYNECOLOGY

## 2021-07-31 PROCEDURE — 0S993ZX DRAINAGE OF RIGHT HIP JOINT, PERCUTANEOUS APPROACH, DIAGNOSTIC: ICD-10-PCS | Performed by: RADIOLOGY

## 2021-07-31 PROCEDURE — 25010000002 MIDAZOLAM PER 1 MG: Performed by: ANESTHESIOLOGY

## 2021-07-31 PROCEDURE — 87070 CULTURE OTHR SPECIMN AEROBIC: CPT | Performed by: OBSTETRICS & GYNECOLOGY

## 2021-07-31 PROCEDURE — 25010000002 HYDROMORPHONE PER 4 MG: Performed by: ANESTHESIOLOGY

## 2021-07-31 PROCEDURE — 87205 SMEAR GRAM STAIN: CPT | Performed by: OBSTETRICS & GYNECOLOGY

## 2021-07-31 PROCEDURE — 86900 BLOOD TYPING SEROLOGIC ABO: CPT | Performed by: OBSTETRICS & GYNECOLOGY

## 2021-07-31 PROCEDURE — C9803 HOPD COVID-19 SPEC COLLECT: HCPCS | Performed by: OBSTETRICS & GYNECOLOGY

## 2021-07-31 PROCEDURE — 88304 TISSUE EXAM BY PATHOLOGIST: CPT | Performed by: ORTHOPAEDIC SURGERY

## 2021-07-31 PROCEDURE — 87205 SMEAR GRAM STAIN: CPT | Performed by: EMERGENCY MEDICINE

## 2021-07-31 PROCEDURE — 83690 ASSAY OF LIPASE: CPT | Performed by: OBSTETRICS & GYNECOLOGY

## 2021-07-31 PROCEDURE — 25010000002 ONDANSETRON PER 1 MG: Performed by: ANESTHESIOLOGY

## 2021-07-31 PROCEDURE — 80306 DRUG TEST PRSMV INSTRMNT: CPT | Performed by: OBSTETRICS & GYNECOLOGY

## 2021-07-31 PROCEDURE — 99284 EMERGENCY DEPT VISIT MOD MDM: CPT

## 2021-07-31 PROCEDURE — 87186 SC STD MICRODIL/AGAR DIL: CPT | Performed by: EMERGENCY MEDICINE

## 2021-07-31 PROCEDURE — 85610 PROTHROMBIN TIME: CPT | Performed by: OBSTETRICS & GYNECOLOGY

## 2021-07-31 PROCEDURE — 80053 COMPREHEN METABOLIC PANEL: CPT | Performed by: OBSTETRICS & GYNECOLOGY

## 2021-07-31 PROCEDURE — 59515 CESAREAN DELIVERY: CPT | Performed by: OBSTETRICS & GYNECOLOGY

## 2021-07-31 PROCEDURE — 88307 TISSUE EXAM BY PATHOLOGIST: CPT | Performed by: OBSTETRICS & GYNECOLOGY

## 2021-07-31 PROCEDURE — C1889 IMPLANT/INSERT DEVICE, NOC: HCPCS | Performed by: ORTHOPAEDIC SURGERY

## 2021-07-31 PROCEDURE — 0SB90ZZ EXCISION OF RIGHT HIP JOINT, OPEN APPROACH: ICD-10-PCS | Performed by: ORTHOPAEDIC SURGERY

## 2021-07-31 PROCEDURE — 82945 GLUCOSE OTHER FLUID: CPT | Performed by: ORTHOPAEDIC SURGERY

## 2021-07-31 PROCEDURE — 87491 CHLMYD TRACH DNA AMP PROBE: CPT | Performed by: OBSTETRICS & GYNECOLOGY

## 2021-07-31 PROCEDURE — 93970 EXTREMITY STUDY: CPT

## 2021-07-31 PROCEDURE — 59025 FETAL NON-STRESS TEST: CPT

## 2021-07-31 PROCEDURE — 85730 THROMBOPLASTIN TIME PARTIAL: CPT | Performed by: OBSTETRICS & GYNECOLOGY

## 2021-07-31 PROCEDURE — 75989 ABSCESS DRAINAGE UNDER X-RAY: CPT

## 2021-07-31 PROCEDURE — 27030 ARTHROTOMY HIP W/DRAINAGE: CPT | Performed by: ORTHOPAEDIC SURGERY

## 2021-07-31 PROCEDURE — 25010000002 PROPOFOL 10 MG/ML EMULSION: Performed by: ANESTHESIOLOGY

## 2021-07-31 PROCEDURE — 85384 FIBRINOGEN ACTIVITY: CPT | Performed by: OBSTETRICS & GYNECOLOGY

## 2021-07-31 PROCEDURE — 86901 BLOOD TYPING SEROLOGIC RH(D): CPT | Performed by: OBSTETRICS & GYNECOLOGY

## 2021-07-31 PROCEDURE — 86140 C-REACTIVE PROTEIN: CPT | Performed by: EMERGENCY MEDICINE

## 2021-07-31 PROCEDURE — 82150 ASSAY OF AMYLASE: CPT | Performed by: OBSTETRICS & GYNECOLOGY

## 2021-07-31 PROCEDURE — 99222 1ST HOSP IP/OBS MODERATE 55: CPT | Performed by: ORTHOPAEDIC SURGERY

## 2021-07-31 PROCEDURE — 93970 EXTREMITY STUDY: CPT | Performed by: INTERNAL MEDICINE

## 2021-07-31 PROCEDURE — 85652 RBC SED RATE AUTOMATED: CPT | Performed by: EMERGENCY MEDICINE

## 2021-07-31 PROCEDURE — 25010000002 FENTANYL CITRATE (PF) 50 MCG/ML SOLUTION: Performed by: ANESTHESIOLOGY

## 2021-07-31 PROCEDURE — 89060 EXAM SYNOVIAL FLUID CRYSTALS: CPT | Performed by: EMERGENCY MEDICINE

## 2021-07-31 PROCEDURE — 87102 FUNGUS ISOLATION CULTURE: CPT | Performed by: OBSTETRICS & GYNECOLOGY

## 2021-07-31 PROCEDURE — 25010000002 CEFAZOLIN PER 500 MG: Performed by: EMERGENCY MEDICINE

## 2021-07-31 PROCEDURE — S0260 H&P FOR SURGERY: HCPCS | Performed by: OBSTETRICS & GYNECOLOGY

## 2021-07-31 PROCEDURE — 73502 X-RAY EXAM HIP UNI 2-3 VIEWS: CPT

## 2021-07-31 DEVICE — IMPLANTABLE DEVICE: Type: IMPLANTABLE DEVICE | Site: HIP | Status: FUNCTIONAL

## 2021-07-31 DEVICE — DEV CONTRL TISS STRATAFIX SPIRAL PDO BIDIR 1 36X36CM: Type: IMPLANTABLE DEVICE | Site: HIP | Status: FUNCTIONAL

## 2021-07-31 RX ORDER — ACETAMINOPHEN 500 MG
1000 TABLET ORAL EVERY 8 HOURS
Status: DISCONTINUED | OUTPATIENT
Start: 2021-07-31 | End: 2021-07-31 | Stop reason: SDUPTHER

## 2021-07-31 RX ORDER — ONDANSETRON 4 MG/1
4 TABLET, FILM COATED ORAL EVERY 6 HOURS PRN
Status: DISCONTINUED | OUTPATIENT
Start: 2021-07-31 | End: 2021-08-20 | Stop reason: HOSPADM

## 2021-07-31 RX ORDER — CARBOPROST TROMETHAMINE 250 UG/ML
250 INJECTION, SOLUTION INTRAMUSCULAR AS NEEDED
Status: DISCONTINUED | OUTPATIENT
Start: 2021-07-31 | End: 2021-07-31 | Stop reason: HOSPADM

## 2021-07-31 RX ORDER — PROMETHAZINE HYDROCHLORIDE 25 MG/1
25 TABLET ORAL EVERY 6 HOURS PRN
Status: DISCONTINUED | OUTPATIENT
Start: 2021-07-31 | End: 2021-08-20 | Stop reason: HOSPADM

## 2021-07-31 RX ORDER — MISOPROSTOL 200 UG/1
600 TABLET ORAL ONCE AS NEEDED
Status: DISCONTINUED | OUTPATIENT
Start: 2021-07-31 | End: 2021-08-01

## 2021-07-31 RX ORDER — ALUMINA, MAGNESIA, AND SIMETHICONE 2400; 2400; 240 MG/30ML; MG/30ML; MG/30ML
15 SUSPENSION ORAL EVERY 4 HOURS PRN
Status: DISCONTINUED | OUTPATIENT
Start: 2021-07-31 | End: 2021-08-20 | Stop reason: HOSPADM

## 2021-07-31 RX ORDER — SODIUM CHLORIDE 0.9 % (FLUSH) 0.9 %
10 SYRINGE (ML) INJECTION AS NEEDED
Status: DISCONTINUED | OUTPATIENT
Start: 2021-07-31 | End: 2021-08-20 | Stop reason: HOSPADM

## 2021-07-31 RX ORDER — OXYCODONE HYDROCHLORIDE 5 MG/1
10 TABLET ORAL EVERY 4 HOURS PRN
Status: DISPENSED | OUTPATIENT
Start: 2021-07-31 | End: 2021-08-10

## 2021-07-31 RX ORDER — OXYTOCIN-SODIUM CHLORIDE 0.9% IV SOLN 30 UNIT/500ML 30-0.9/5 UT/ML-%
85 SOLUTION INTRAVENOUS ONCE
Status: CANCELLED | OUTPATIENT
Start: 2021-07-31 | End: 2021-07-31

## 2021-07-31 RX ORDER — KETOROLAC TROMETHAMINE 30 MG/ML
15 INJECTION, SOLUTION INTRAMUSCULAR; INTRAVENOUS EVERY 6 HOURS
Status: COMPLETED | OUTPATIENT
Start: 2021-08-01 | End: 2021-08-01

## 2021-07-31 RX ORDER — KETOROLAC TROMETHAMINE 30 MG/ML
30 INJECTION, SOLUTION INTRAMUSCULAR; INTRAVENOUS ONCE
Status: CANCELLED | OUTPATIENT
Start: 2021-07-31 | End: 2021-07-31

## 2021-07-31 RX ORDER — PROMETHAZINE HYDROCHLORIDE 12.5 MG/1
12.5 TABLET ORAL EVERY 6 HOURS PRN
Status: DISCONTINUED | OUTPATIENT
Start: 2021-07-31 | End: 2021-07-31 | Stop reason: HOSPADM

## 2021-07-31 RX ORDER — PROMETHAZINE HYDROCHLORIDE 12.5 MG/1
12.5 SUPPOSITORY RECTAL EVERY 6 HOURS PRN
Status: DISCONTINUED | OUTPATIENT
Start: 2021-07-31 | End: 2021-08-20 | Stop reason: HOSPADM

## 2021-07-31 RX ORDER — OXYTOCIN-SODIUM CHLORIDE 0.9% IV SOLN 30 UNIT/500ML 30-0.9/5 UT/ML-%
650 SOLUTION INTRAVENOUS ONCE
Status: CANCELLED | OUTPATIENT
Start: 2021-07-31 | End: 2021-07-31

## 2021-07-31 RX ORDER — HYDROMORPHONE HYDROCHLORIDE 1 MG/ML
0.5 INJECTION, SOLUTION INTRAMUSCULAR; INTRAVENOUS; SUBCUTANEOUS
Status: DISCONTINUED | OUTPATIENT
Start: 2021-07-31 | End: 2021-07-31 | Stop reason: HOSPADM

## 2021-07-31 RX ORDER — CEFAZOLIN SODIUM 2 G/100ML
2 INJECTION, SOLUTION INTRAVENOUS ONCE
Status: DISCONTINUED | OUTPATIENT
Start: 2021-07-31 | End: 2021-07-31 | Stop reason: HOSPADM

## 2021-07-31 RX ORDER — SODIUM CHLORIDE 0.9 % (FLUSH) 0.9 %
1-10 SYRINGE (ML) INJECTION AS NEEDED
Status: DISCONTINUED | OUTPATIENT
Start: 2021-07-31 | End: 2021-07-31 | Stop reason: HOSPADM

## 2021-07-31 RX ORDER — SODIUM CHLORIDE, SODIUM LACTATE, POTASSIUM CHLORIDE, CALCIUM CHLORIDE 600; 310; 30; 20 MG/100ML; MG/100ML; MG/100ML; MG/100ML
125 INJECTION, SOLUTION INTRAVENOUS CONTINUOUS
Status: DISCONTINUED | OUTPATIENT
Start: 2021-07-31 | End: 2021-08-01

## 2021-07-31 RX ORDER — ONDANSETRON 2 MG/ML
INJECTION INTRAMUSCULAR; INTRAVENOUS AS NEEDED
Status: DISCONTINUED | OUTPATIENT
Start: 2021-07-31 | End: 2021-07-31 | Stop reason: SURG

## 2021-07-31 RX ORDER — ONDANSETRON 2 MG/ML
4 INJECTION INTRAMUSCULAR; INTRAVENOUS EVERY 6 HOURS PRN
Status: DISCONTINUED | OUTPATIENT
Start: 2021-07-31 | End: 2021-08-20 | Stop reason: HOSPADM

## 2021-07-31 RX ORDER — METHYLERGONOVINE MALEATE 0.2 MG/ML
200 INJECTION INTRAVENOUS ONCE AS NEEDED
Status: DISCONTINUED | OUTPATIENT
Start: 2021-07-31 | End: 2021-08-01

## 2021-07-31 RX ORDER — KETOROLAC TROMETHAMINE 30 MG/ML
30 INJECTION, SOLUTION INTRAMUSCULAR; INTRAVENOUS ONCE
Status: DISCONTINUED | OUTPATIENT
Start: 2021-07-31 | End: 2021-07-31 | Stop reason: HOSPADM

## 2021-07-31 RX ORDER — LIDOCAINE HYDROCHLORIDE 10 MG/ML
5 INJECTION, SOLUTION EPIDURAL; INFILTRATION; INTRACAUDAL; PERINEURAL AS NEEDED
Status: DISCONTINUED | OUTPATIENT
Start: 2021-07-31 | End: 2021-07-31 | Stop reason: HOSPADM

## 2021-07-31 RX ORDER — OXYCODONE HYDROCHLORIDE 5 MG/1
10 TABLET ORAL EVERY 6 HOURS PRN
Status: DISCONTINUED | OUTPATIENT
Start: 2021-07-31 | End: 2021-07-31 | Stop reason: SDUPTHER

## 2021-07-31 RX ORDER — MAGNESIUM HYDROXIDE 1200 MG/15ML
LIQUID ORAL AS NEEDED
Status: DISCONTINUED | OUTPATIENT
Start: 2021-07-31 | End: 2021-07-31 | Stop reason: HOSPADM

## 2021-07-31 RX ORDER — SODIUM CHLORIDE 0.9 % (FLUSH) 0.9 %
10 SYRINGE (ML) INJECTION EVERY 12 HOURS SCHEDULED
Status: DISCONTINUED | OUTPATIENT
Start: 2021-07-31 | End: 2021-07-31 | Stop reason: HOSPADM

## 2021-07-31 RX ORDER — MISOPROSTOL 200 UG/1
800 TABLET ORAL AS NEEDED
Status: CANCELLED | OUTPATIENT
Start: 2021-07-31

## 2021-07-31 RX ORDER — ACETAMINOPHEN 500 MG
1000 TABLET ORAL EVERY 6 HOURS SCHEDULED
Status: COMPLETED | OUTPATIENT
Start: 2021-08-01 | End: 2021-08-01

## 2021-07-31 RX ORDER — CARBOPROST TROMETHAMINE 250 UG/ML
250 INJECTION, SOLUTION INTRAMUSCULAR ONCE AS NEEDED
Status: DISCONTINUED | OUTPATIENT
Start: 2021-07-31 | End: 2021-08-01

## 2021-07-31 RX ORDER — CEFAZOLIN SODIUM 2 G/100ML
INJECTION, SOLUTION INTRAVENOUS
Status: DISCONTINUED
Start: 2021-07-31 | End: 2021-08-16

## 2021-07-31 RX ORDER — OXYCODONE HYDROCHLORIDE 5 MG/1
5 TABLET ORAL EVERY 6 HOURS PRN
Status: DISCONTINUED | OUTPATIENT
Start: 2021-07-31 | End: 2021-07-31 | Stop reason: SDUPTHER

## 2021-07-31 RX ORDER — SODIUM CHLORIDE, SODIUM LACTATE, POTASSIUM CHLORIDE, CALCIUM CHLORIDE 600; 310; 30; 20 MG/100ML; MG/100ML; MG/100ML; MG/100ML
125 INJECTION, SOLUTION INTRAVENOUS CONTINUOUS
Status: DISCONTINUED | OUTPATIENT
Start: 2021-07-31 | End: 2021-07-31 | Stop reason: HOSPADM

## 2021-07-31 RX ORDER — ACETAMINOPHEN 325 MG/1
650 TABLET ORAL EVERY 6 HOURS
Status: DISCONTINUED | OUTPATIENT
Start: 2021-08-02 | End: 2021-08-20 | Stop reason: HOSPADM

## 2021-07-31 RX ORDER — SODIUM CHLORIDE 0.9 % (FLUSH) 0.9 %
3 SYRINGE (ML) INJECTION EVERY 12 HOURS SCHEDULED
Status: DISCONTINUED | OUTPATIENT
Start: 2021-07-31 | End: 2021-08-20 | Stop reason: HOSPADM

## 2021-07-31 RX ORDER — CEFAZOLIN SODIUM 2 G/100ML
2 INJECTION, SOLUTION INTRAVENOUS ONCE
Status: COMPLETED | OUTPATIENT
Start: 2021-07-31 | End: 2021-07-31

## 2021-07-31 RX ORDER — PROMETHAZINE HYDROCHLORIDE 12.5 MG/1
12.5 SUPPOSITORY RECTAL EVERY 6 HOURS PRN
Status: DISCONTINUED | OUTPATIENT
Start: 2021-07-31 | End: 2021-07-31 | Stop reason: HOSPADM

## 2021-07-31 RX ORDER — TRISODIUM CITRATE DIHYDRATE AND CITRIC ACID MONOHYDRATE 500; 334 MG/5ML; MG/5ML
30 SOLUTION ORAL ONCE
Status: DISCONTINUED | OUTPATIENT
Start: 2021-07-31 | End: 2021-07-31 | Stop reason: HOSPADM

## 2021-07-31 RX ORDER — IBUPROFEN 600 MG/1
600 TABLET ORAL EVERY 6 HOURS
Status: DISCONTINUED | OUTPATIENT
Start: 2021-08-02 | End: 2021-08-20 | Stop reason: HOSPADM

## 2021-07-31 RX ORDER — CARBOPROST TROMETHAMINE 250 UG/ML
250 INJECTION, SOLUTION INTRAMUSCULAR AS NEEDED
Status: CANCELLED | OUTPATIENT
Start: 2021-07-31

## 2021-07-31 RX ORDER — DOCUSATE SODIUM 100 MG/1
100 CAPSULE, LIQUID FILLED ORAL 2 TIMES DAILY PRN
Status: DISCONTINUED | OUTPATIENT
Start: 2021-07-31 | End: 2021-08-20 | Stop reason: HOSPADM

## 2021-07-31 RX ORDER — PROMETHAZINE HYDROCHLORIDE 12.5 MG/1
12.5 TABLET ORAL EVERY 6 HOURS PRN
Status: CANCELLED | OUTPATIENT
Start: 2021-07-31

## 2021-07-31 RX ORDER — METHYLERGONOVINE MALEATE 0.2 MG/ML
200 INJECTION INTRAVENOUS ONCE AS NEEDED
Status: CANCELLED | OUTPATIENT
Start: 2021-07-31

## 2021-07-31 RX ORDER — TERBUTALINE SULFATE 1 MG/ML
0.25 INJECTION, SOLUTION SUBCUTANEOUS ONCE
Status: COMPLETED | OUTPATIENT
Start: 2021-07-31 | End: 2021-07-31

## 2021-07-31 RX ORDER — FENTANYL CITRATE 50 UG/ML
50 INJECTION, SOLUTION INTRAMUSCULAR; INTRAVENOUS
Status: DISCONTINUED | OUTPATIENT
Start: 2021-07-31 | End: 2021-07-31 | Stop reason: HOSPADM

## 2021-07-31 RX ORDER — TRISODIUM CITRATE DIHYDRATE AND CITRIC ACID MONOHYDRATE 500; 334 MG/5ML; MG/5ML
30 SOLUTION ORAL ONCE
Status: COMPLETED | OUTPATIENT
Start: 2021-07-31 | End: 2021-07-31

## 2021-07-31 RX ORDER — OXYTOCIN-SODIUM CHLORIDE 0.9% IV SOLN 30 UNIT/500ML 30-0.9/5 UT/ML-%
85 SOLUTION INTRAVENOUS ONCE
Status: DISCONTINUED | OUTPATIENT
Start: 2021-07-31 | End: 2021-07-31 | Stop reason: HOSPADM

## 2021-07-31 RX ORDER — CALCIUM CARBONATE 200(500)MG
1 TABLET,CHEWABLE ORAL EVERY 4 HOURS PRN
Status: DISCONTINUED | OUTPATIENT
Start: 2021-07-31 | End: 2021-08-20 | Stop reason: HOSPADM

## 2021-07-31 RX ORDER — MISOPROSTOL 200 UG/1
800 TABLET ORAL AS NEEDED
Status: DISCONTINUED | OUTPATIENT
Start: 2021-07-31 | End: 2021-07-31 | Stop reason: HOSPADM

## 2021-07-31 RX ORDER — PROMETHAZINE HYDROCHLORIDE 12.5 MG/1
12.5 SUPPOSITORY RECTAL EVERY 6 HOURS PRN
Status: CANCELLED | OUTPATIENT
Start: 2021-07-31

## 2021-07-31 RX ORDER — OXYCODONE HYDROCHLORIDE 5 MG/1
5 TABLET ORAL EVERY 4 HOURS PRN
Status: DISCONTINUED | OUTPATIENT
Start: 2021-07-31 | End: 2021-08-11

## 2021-07-31 RX ORDER — METHYLERGONOVINE MALEATE 0.2 MG/ML
200 INJECTION INTRAVENOUS ONCE AS NEEDED
Status: DISCONTINUED | OUTPATIENT
Start: 2021-07-31 | End: 2021-07-31 | Stop reason: HOSPADM

## 2021-07-31 RX ORDER — OXYTOCIN-SODIUM CHLORIDE 0.9% IV SOLN 30 UNIT/500ML 30-0.9/5 UT/ML-%
650 SOLUTION INTRAVENOUS ONCE
Status: DISCONTINUED | OUTPATIENT
Start: 2021-07-31 | End: 2021-07-31 | Stop reason: HOSPADM

## 2021-07-31 RX ORDER — SODIUM HYPOCHLORITE 1.25 MG/ML
SOLUTION TOPICAL AS NEEDED
Status: DISCONTINUED | OUTPATIENT
Start: 2021-07-31 | End: 2021-07-31 | Stop reason: HOSPADM

## 2021-07-31 RX ORDER — LANOLIN
CREAM (ML) TOPICAL
Status: DISCONTINUED | OUTPATIENT
Start: 2021-07-31 | End: 2021-08-20 | Stop reason: HOSPADM

## 2021-07-31 RX ORDER — HYDROCORTISONE 25 MG/G
1 CREAM TOPICAL AS NEEDED
Status: DISCONTINUED | OUTPATIENT
Start: 2021-07-31 | End: 2021-08-20 | Stop reason: HOSPADM

## 2021-07-31 RX ORDER — SIMETHICONE 80 MG
80 TABLET,CHEWABLE ORAL 4 TIMES DAILY PRN
Status: DISCONTINUED | OUTPATIENT
Start: 2021-07-31 | End: 2021-08-20 | Stop reason: HOSPADM

## 2021-07-31 RX ADMIN — FENTANYL CITRATE 15 MCG: 50 INJECTION, SOLUTION INTRAMUSCULAR; INTRAVENOUS at 18:21

## 2021-07-31 RX ADMIN — SODIUM CHLORIDE, POTASSIUM CHLORIDE, SODIUM LACTATE AND CALCIUM CHLORIDE: 600; 310; 30; 20 INJECTION, SOLUTION INTRAVENOUS at 17:53

## 2021-07-31 RX ADMIN — TERBUTALINE SULFATE 0.25 MG: 1 INJECTION SUBCUTANEOUS at 10:50

## 2021-07-31 RX ADMIN — SODIUM CHLORIDE, POTASSIUM CHLORIDE, SODIUM LACTATE AND CALCIUM CHLORIDE 125 ML/HR: 600; 310; 30; 20 INJECTION, SOLUTION INTRAVENOUS at 17:50

## 2021-07-31 RX ADMIN — CEFAZOLIN 2 G: 10 INJECTION, POWDER, FOR SOLUTION INTRAVENOUS at 17:51

## 2021-07-31 RX ADMIN — HYDROMORPHONE HYDROCHLORIDE 0.5 MG: 1 INJECTION, SOLUTION INTRAMUSCULAR; INTRAVENOUS; SUBCUTANEOUS at 21:20

## 2021-07-31 RX ADMIN — MIDAZOLAM HYDROCHLORIDE 1 MG: 1 INJECTION, SOLUTION INTRAMUSCULAR; INTRAVENOUS at 18:49

## 2021-07-31 RX ADMIN — FENTANYL CITRATE 50 MCG: 0.05 INJECTION, SOLUTION INTRAMUSCULAR; INTRAVENOUS at 22:15

## 2021-07-31 RX ADMIN — HYDROMORPHONE HYDROCHLORIDE 0.5 MG: 1 INJECTION, SOLUTION INTRAMUSCULAR; INTRAVENOUS; SUBCUTANEOUS at 21:05

## 2021-07-31 RX ADMIN — MORPHINE SULFATE 0.15 MG: 0.5 INJECTION, SOLUTION EPIDURAL; INTRATHECAL; INTRAVENOUS at 18:21

## 2021-07-31 RX ADMIN — SODIUM CHLORIDE, POTASSIUM CHLORIDE, SODIUM LACTATE AND CALCIUM CHLORIDE 125 ML/HR: 600; 310; 30; 20 INJECTION, SOLUTION INTRAVENOUS at 16:45

## 2021-07-31 RX ADMIN — PROPOFOL 50 MCG/KG/MIN: 10 INJECTION, EMULSION INTRAVENOUS at 20:15

## 2021-07-31 RX ADMIN — ONDANSETRON 4 MG: 2 INJECTION INTRAMUSCULAR; INTRAVENOUS at 19:33

## 2021-07-31 RX ADMIN — BUPIVACAINE HYDROCHLORIDE IN DEXTROSE 1.6 ML: 7.5 INJECTION, SOLUTION SUBARACHNOID at 18:21

## 2021-07-31 RX ADMIN — MIDAZOLAM HYDROCHLORIDE 2 MG: 1 INJECTION, SOLUTION INTRAMUSCULAR; INTRAVENOUS at 18:25

## 2021-07-31 RX ADMIN — FENTANYL CITRATE 50 MCG: 0.05 INJECTION, SOLUTION INTRAMUSCULAR; INTRAVENOUS at 21:35

## 2021-07-31 RX ADMIN — SODIUM CHLORIDE, PRESERVATIVE FREE 3 ML: 5 INJECTION INTRAVENOUS at 23:35

## 2021-07-31 RX ADMIN — KETOROLAC TROMETHAMINE 15 MG: 30 INJECTION, SOLUTION INTRAMUSCULAR; INTRAVENOUS at 23:08

## 2021-07-31 RX ADMIN — CEFAZOLIN 2 G: 10 INJECTION, POWDER, FOR SOLUTION INTRAVENOUS at 15:43

## 2021-07-31 RX ADMIN — CEFEPIME HYDROCHLORIDE 2 G: 2 INJECTION, POWDER, FOR SOLUTION INTRAVENOUS at 23:35

## 2021-07-31 RX ADMIN — ACETAMINOPHEN 1000 MG: 500 TABLET, FILM COATED ORAL at 23:07

## 2021-07-31 RX ADMIN — SODIUM CHLORIDE, POTASSIUM CHLORIDE, SODIUM LACTATE AND CALCIUM CHLORIDE 125 ML/HR: 600; 310; 30; 20 INJECTION, SOLUTION INTRAVENOUS at 09:43

## 2021-07-31 RX ADMIN — SODIUM CHLORIDE, POTASSIUM CHLORIDE, SODIUM LACTATE AND CALCIUM CHLORIDE 1000 ML: 600; 310; 30; 20 INJECTION, SOLUTION INTRAVENOUS at 17:44

## 2021-07-31 RX ADMIN — SODIUM CHLORIDE, POTASSIUM CHLORIDE, SODIUM LACTATE AND CALCIUM CHLORIDE 125 ML/HR: 600; 310; 30; 20 INJECTION, SOLUTION INTRAVENOUS at 23:07

## 2021-07-31 RX ADMIN — SODIUM CITRATE AND CITRIC ACID MONOHYDRATE 30 ML: 500; 334 SOLUTION ORAL at 17:50

## 2021-07-31 NOTE — ANESTHESIA PREPROCEDURE EVALUATION
Anesthesia Evaluation     Patient summary reviewed and Nursing notes reviewed   no history of anesthetic complications:  NPO Solid Status: > 8 hours  NPO Liquid Status: > 8 hours           Airway   Mallampati: I  TM distance: >3 FB  Neck ROM: full  Dental    (+) poor dentition    Pulmonary - negative pulmonary ROS and normal exam   Cardiovascular - normal exam        Neuro/Psych  (+) headaches, psychiatric history,     GI/Hepatic/Renal/Endo      Musculoskeletal     Abdominal    Substance History   (+) alcohol use, drug use     OB/GYN    (+) Pregnant,         Other   arthritis,                      Anesthesia Plan    ASA 3 - emergent     spinal   (Spinal with sedation as needed.  Will convert to ett if necxessary .  I will assume anesthesia care upon completion of c section and before start of hip I and d)  intravenous induction     Anesthetic plan, all risks, benefits, and alternatives have been provided, discussed and informed consent has been obtained with: patient.    Plan discussed with CRNA.

## 2021-08-01 LAB
ALP SERPL-CCNC: 122 U/L (ref 39–117)
ALT SERPL W P-5'-P-CCNC: 7 U/L (ref 1–33)
ANION GAP SERPL CALCULATED.3IONS-SCNC: 8 MMOL/L (ref 5–15)
AST SERPL-CCNC: 18 U/L (ref 1–32)
BASOPHILS # BLD AUTO: 0.02 10*3/MM3 (ref 0–0.2)
BASOPHILS NFR BLD AUTO: 0.2 % (ref 0–1.5)
BH CV ECHO MEAS - BSA(HAYCOCK): 1.8 M^2
BH CV ECHO MEAS - BSA: 1.8 M^2
BH CV ECHO MEAS - BZI_BMI: 28.2 KILOGRAMS/M^2
BH CV ECHO MEAS - BZI_METRIC_HEIGHT: 160 CM
BH CV ECHO MEAS - BZI_METRIC_WEIGHT: 72.1 KG
BH CV LOWER VASCULAR LEFT COMMON FEMORAL AUGMENT: NORMAL
BH CV LOWER VASCULAR LEFT COMMON FEMORAL COMPRESS: NORMAL
BH CV LOWER VASCULAR LEFT COMMON FEMORAL PHASIC: NORMAL
BH CV LOWER VASCULAR LEFT COMMON FEMORAL SPONT: NORMAL
BH CV LOWER VASCULAR LEFT DISTAL FEMORAL AUGMENT: NORMAL
BH CV LOWER VASCULAR LEFT DISTAL FEMORAL COMPRESS: NORMAL
BH CV LOWER VASCULAR LEFT GASTRONEMIUS COMPRESS: NORMAL
BH CV LOWER VASCULAR LEFT GREATER SAPH AK COMPRESS: NORMAL
BH CV LOWER VASCULAR LEFT GREATER SAPH BK COMPRESS: NORMAL
BH CV LOWER VASCULAR LEFT LESSER SAPH COMPRESS: NORMAL
BH CV LOWER VASCULAR LEFT MID FEMORAL AUGMENT: NORMAL
BH CV LOWER VASCULAR LEFT MID FEMORAL COMPRESS: NORMAL
BH CV LOWER VASCULAR LEFT MID FEMORAL PHASIC: NORMAL
BH CV LOWER VASCULAR LEFT MID FEMORAL SPONT: NORMAL
BH CV LOWER VASCULAR LEFT PERONEAL AUGMENT: NORMAL
BH CV LOWER VASCULAR LEFT POPLITEAL AUGMENT: NORMAL
BH CV LOWER VASCULAR LEFT POPLITEAL COMPRESS: NORMAL
BH CV LOWER VASCULAR LEFT POPLITEAL PHASIC: NORMAL
BH CV LOWER VASCULAR LEFT POPLITEAL SPONT: NORMAL
BH CV LOWER VASCULAR LEFT POSTERIOR TIBIAL AUGMENT: NORMAL
BH CV LOWER VASCULAR LEFT POSTERIOR TIBIAL COMPRESS: NORMAL
BH CV LOWER VASCULAR LEFT PROFUNDA FEMORAL AUGMENT: NORMAL
BH CV LOWER VASCULAR LEFT PROFUNDA FEMORAL COMPRESS: NORMAL
BH CV LOWER VASCULAR LEFT PROXIMAL FEMORAL AUGMENT: NORMAL
BH CV LOWER VASCULAR LEFT PROXIMAL FEMORAL COMPRESS: NORMAL
BH CV LOWER VASCULAR LEFT SAPHENOFEMORAL JUNCTION AUGMENT: NORMAL
BH CV LOWER VASCULAR LEFT SAPHENOFEMORAL JUNCTION COMPRESS: NORMAL
BH CV LOWER VASCULAR RIGHT COMMON FEMORAL AUGMENT: NORMAL
BH CV LOWER VASCULAR RIGHT COMMON FEMORAL COMPRESS: NORMAL
BH CV LOWER VASCULAR RIGHT COMMON FEMORAL PHASIC: NORMAL
BH CV LOWER VASCULAR RIGHT COMMON FEMORAL SPONT: NORMAL
BH CV LOWER VASCULAR RIGHT DISTAL FEMORAL AUGMENT: NORMAL
BH CV LOWER VASCULAR RIGHT DISTAL FEMORAL COMPRESS: NORMAL
BH CV LOWER VASCULAR RIGHT GASTRONEMIUS COMPRESS: NORMAL
BH CV LOWER VASCULAR RIGHT GREATER SAPH AK COMPRESS: NORMAL
BH CV LOWER VASCULAR RIGHT GREATER SAPH BK COMPRESS: NORMAL
BH CV LOWER VASCULAR RIGHT LESSER SAPH COMPRESS: NORMAL
BH CV LOWER VASCULAR RIGHT MID FEMORAL AUGMENT: NORMAL
BH CV LOWER VASCULAR RIGHT MID FEMORAL COMPRESS: NORMAL
BH CV LOWER VASCULAR RIGHT MID FEMORAL PHASIC: NORMAL
BH CV LOWER VASCULAR RIGHT MID FEMORAL SPONT: NORMAL
BH CV LOWER VASCULAR RIGHT PERONEAL AUGMENT: NORMAL
BH CV LOWER VASCULAR RIGHT PERONEAL COMPRESS: NORMAL
BH CV LOWER VASCULAR RIGHT POPLITEAL AUGMENT: NORMAL
BH CV LOWER VASCULAR RIGHT POPLITEAL COMPRESS: NORMAL
BH CV LOWER VASCULAR RIGHT POPLITEAL PHASIC: NORMAL
BH CV LOWER VASCULAR RIGHT POPLITEAL SPONT: NORMAL
BH CV LOWER VASCULAR RIGHT POSTERIOR TIBIAL AUGMENT: NORMAL
BH CV LOWER VASCULAR RIGHT POSTERIOR TIBIAL COMPRESS: NORMAL
BH CV LOWER VASCULAR RIGHT PROFUNDA FEMORAL AUGMENT: NORMAL
BH CV LOWER VASCULAR RIGHT PROFUNDA FEMORAL COMPRESS: NORMAL
BH CV LOWER VASCULAR RIGHT PROXIMAL FEMORAL AUGMENT: NORMAL
BH CV LOWER VASCULAR RIGHT PROXIMAL FEMORAL COMPRESS: NORMAL
BH CV LOWER VASCULAR RIGHT SAPHENOFEMORAL JUNCTION AUGMENT: NORMAL
BH CV LOWER VASCULAR RIGHT SAPHENOFEMORAL JUNCTION COMPRESS: NORMAL
BILIRUB SERPL-MCNC: 0.4 MG/DL (ref 0–1.2)
BUN SERPL-MCNC: 7 MG/DL (ref 6–20)
BUN/CREAT SERPL: 14.3 (ref 7–25)
CALCIUM SPEC-SCNC: 8.1 MG/DL (ref 8.6–10.5)
CHLORIDE SERPL-SCNC: 106 MMOL/L (ref 98–107)
CO2 SERPL-SCNC: 21 MMOL/L (ref 22–29)
CREAT SERPL-MCNC: 0.49 MG/DL (ref 0.57–1)
CREAT SERPL-MCNC: 0.49 MG/DL (ref 0.57–1)
DEPRECATED RDW RBC AUTO: 40.7 FL (ref 37–54)
EOSINOPHIL # BLD AUTO: 0.01 10*3/MM3 (ref 0–0.4)
EOSINOPHIL NFR BLD AUTO: 0.1 % (ref 0.3–6.2)
ERYTHROCYTE [DISTWIDTH] IN BLOOD BY AUTOMATED COUNT: 12.4 % (ref 12.3–15.4)
GFR SERPL CREATININE-BSD FRML MDRD: 141 ML/MIN/1.73
GLUCOSE SERPL-MCNC: 77 MG/DL (ref 65–99)
HCT VFR BLD AUTO: 33.2 % (ref 34–46.6)
HGB BLD-MCNC: 11.6 G/DL (ref 12–15.9)
IMM GRANULOCYTES # BLD AUTO: 0.04 10*3/MM3 (ref 0–0.05)
IMM GRANULOCYTES NFR BLD AUTO: 0.5 % (ref 0–0.5)
LDH SERPL-CCNC: 190 U/L (ref 135–214)
LYMPHOCYTES # BLD AUTO: 1.23 10*3/MM3 (ref 0.7–3.1)
LYMPHOCYTES NFR BLD AUTO: 14.9 % (ref 19.6–45.3)
MCH RBC QN AUTO: 31.4 PG (ref 26.6–33)
MCHC RBC AUTO-ENTMCNC: 34.9 G/DL (ref 31.5–35.7)
MCV RBC AUTO: 89.7 FL (ref 79–97)
MONOCYTES # BLD AUTO: 0.43 10*3/MM3 (ref 0.1–0.9)
MONOCYTES NFR BLD AUTO: 5.2 % (ref 5–12)
NEUTROPHILS NFR BLD AUTO: 6.52 10*3/MM3 (ref 1.7–7)
NEUTROPHILS NFR BLD AUTO: 79.1 % (ref 42.7–76)
NRBC BLD AUTO-RTO: 0 /100 WBC (ref 0–0.2)
PLATELET # BLD AUTO: 192 10*3/MM3 (ref 140–450)
PMV BLD AUTO: 10.8 FL (ref 6–12)
POTASSIUM SERPL-SCNC: 3.2 MMOL/L (ref 3.5–5.2)
RBC # BLD AUTO: 3.7 10*6/MM3 (ref 3.77–5.28)
SODIUM SERPL-SCNC: 135 MMOL/L (ref 136–145)
URATE SERPL-MCNC: 4.6 MG/DL (ref 2.4–5.7)
WBC # BLD AUTO: 8.25 10*3/MM3 (ref 3.4–10.8)

## 2021-08-01 PROCEDURE — 84550 ASSAY OF BLOOD/URIC ACID: CPT | Performed by: OBSTETRICS & GYNECOLOGY

## 2021-08-01 PROCEDURE — 99024 POSTOP FOLLOW-UP VISIT: CPT | Performed by: ORTHOPAEDIC SURGERY

## 2021-08-01 PROCEDURE — 99232 SBSQ HOSP IP/OBS MODERATE 35: CPT | Performed by: OBSTETRICS & GYNECOLOGY

## 2021-08-01 PROCEDURE — 84075 ASSAY ALKALINE PHOSPHATASE: CPT | Performed by: OBSTETRICS & GYNECOLOGY

## 2021-08-01 PROCEDURE — 25010000002 VANCOMYCIN 10 G RECONSTITUTED SOLUTION

## 2021-08-01 PROCEDURE — 97162 PT EVAL MOD COMPLEX 30 MIN: CPT

## 2021-08-01 PROCEDURE — 84460 ALANINE AMINO (ALT) (SGPT): CPT | Performed by: OBSTETRICS & GYNECOLOGY

## 2021-08-01 PROCEDURE — 85025 COMPLETE CBC W/AUTO DIFF WBC: CPT | Performed by: OBSTETRICS & GYNECOLOGY

## 2021-08-01 PROCEDURE — 25010000002 CEFEPIME PER 500 MG: Performed by: ORTHOPAEDIC SURGERY

## 2021-08-01 PROCEDURE — 84450 TRANSFERASE (AST) (SGOT): CPT | Performed by: OBSTETRICS & GYNECOLOGY

## 2021-08-01 PROCEDURE — 97116 GAIT TRAINING THERAPY: CPT

## 2021-08-01 PROCEDURE — 82247 BILIRUBIN TOTAL: CPT | Performed by: OBSTETRICS & GYNECOLOGY

## 2021-08-01 PROCEDURE — 80048 BASIC METABOLIC PNL TOTAL CA: CPT | Performed by: ORTHOPAEDIC SURGERY

## 2021-08-01 PROCEDURE — 83615 LACTATE (LD) (LDH) ENZYME: CPT | Performed by: OBSTETRICS & GYNECOLOGY

## 2021-08-01 PROCEDURE — 82565 ASSAY OF CREATININE: CPT | Performed by: OBSTETRICS & GYNECOLOGY

## 2021-08-01 PROCEDURE — 25010000002 KETOROLAC TROMETHAMINE PER 15 MG: Performed by: OBSTETRICS & GYNECOLOGY

## 2021-08-01 RX ORDER — FAMOTIDINE 20 MG/1
20 TABLET, FILM COATED ORAL 2 TIMES DAILY
Status: DISCONTINUED | OUTPATIENT
Start: 2021-08-01 | End: 2021-08-20 | Stop reason: HOSPADM

## 2021-08-01 RX ORDER — PANTOPRAZOLE SODIUM 40 MG/1
40 TABLET, DELAYED RELEASE ORAL
Status: DISCONTINUED | OUTPATIENT
Start: 2021-08-02 | End: 2021-08-20 | Stop reason: HOSPADM

## 2021-08-01 RX ADMIN — KETOROLAC TROMETHAMINE 15 MG: 30 INJECTION, SOLUTION INTRAMUSCULAR; INTRAVENOUS at 09:09

## 2021-08-01 RX ADMIN — ACETAMINOPHEN 1000 MG: 500 TABLET, FILM COATED ORAL at 12:04

## 2021-08-01 RX ADMIN — FAMOTIDINE 20 MG: 20 TABLET ORAL at 12:05

## 2021-08-01 RX ADMIN — FAMOTIDINE 20 MG: 20 TABLET ORAL at 20:30

## 2021-08-01 RX ADMIN — CEFEPIME HYDROCHLORIDE 2 G: 2 INJECTION, POWDER, FOR SOLUTION INTRAVENOUS at 09:10

## 2021-08-01 RX ADMIN — OXYCODONE 10 MG: 5 TABLET ORAL at 16:55

## 2021-08-01 RX ADMIN — ANTACID TABLETS 1 TABLET: 500 TABLET, CHEWABLE ORAL at 09:10

## 2021-08-01 RX ADMIN — OXYCODONE 10 MG: 5 TABLET ORAL at 20:29

## 2021-08-01 RX ADMIN — VANCOMYCIN HYDROCHLORIDE 1500 MG: 100 INJECTION, POWDER, LYOPHILIZED, FOR SOLUTION INTRAVENOUS at 00:41

## 2021-08-01 RX ADMIN — CEFEPIME HYDROCHLORIDE 2 G: 2 INJECTION, POWDER, FOR SOLUTION INTRAVENOUS at 16:55

## 2021-08-01 RX ADMIN — OXYCODONE 10 MG: 5 TABLET ORAL at 05:22

## 2021-08-01 RX ADMIN — ACETAMINOPHEN 1000 MG: 500 TABLET, FILM COATED ORAL at 18:30

## 2021-08-01 RX ADMIN — OXYCODONE 10 MG: 5 TABLET ORAL at 12:04

## 2021-08-01 RX ADMIN — ACETAMINOPHEN 1000 MG: 500 TABLET, FILM COATED ORAL at 05:22

## 2021-08-01 RX ADMIN — KETOROLAC TROMETHAMINE 15 MG: 30 INJECTION, SOLUTION INTRAMUSCULAR; INTRAVENOUS at 03:10

## 2021-08-01 RX ADMIN — VANCOMYCIN HYDROCHLORIDE 1250 MG: 100 INJECTION, POWDER, LYOPHILIZED, FOR SOLUTION INTRAVENOUS at 12:05

## 2021-08-01 RX ADMIN — KETOROLAC TROMETHAMINE 15 MG: 30 INJECTION, SOLUTION INTRAMUSCULAR; INTRAVENOUS at 15:01

## 2021-08-01 RX ADMIN — SODIUM CHLORIDE, PRESERVATIVE FREE 3 ML: 5 INJECTION INTRAVENOUS at 09:09

## 2021-08-01 NOTE — ANESTHESIA POSTPROCEDURE EVALUATION
Patient: Frieda Flores    Procedure Summary     Date: 21 Room / [facility]:  [facility]  [facility]    Anesthesia Start:  Anesthesia Stop:     Procedure: Repeat LTCS - 1 layer closure (N/A Abdomen) Diagnosis:       Previous  delivery affecting pregnancy      (Previous  delivery affecting pregnancy [O34.219])    Surgeons: Fernando Pedersen MD Provider:     Anesthesia Type: Not recorded ASA Status: Not recorded          Anesthesia Type: No value filed.    Vitals  Vitals Value Taken Time   /97 21 0919   Temp 99 °F (37.2 °C) 21 0814   Pulse 76 21 1147   Resp 20 21 0814   SpO2 99 % 21 0959   Vitals shown include unvalidated device data.        Post Anesthesia Care and Evaluation    Patient location during evaluation: bedside  Patient participation: complete - patient cannot participate  Level of consciousness: responsive to physical stimuli  Pain score: 0  Pain management: satisfactory to patient  Airway patency: patent  Anesthetic complications: No anesthetic complications  PONV Status: none  Cardiovascular status: acceptable and hemodynamically stable  Respiratory status: acceptable  Hydration status: acceptable  Post Neuraxial Block status: Motor and sensory function returned to baseline and No signs or symptoms of PDPH  Comments: Patient was sedate and wasn't able to participate in evaluation.  Information obtained from nursing staff.

## 2021-08-02 ENCOUNTER — APPOINTMENT (OUTPATIENT)
Dept: CARDIOLOGY | Facility: HOSPITAL | Age: 41
End: 2021-08-02

## 2021-08-02 ENCOUNTER — TELEPHONE (OUTPATIENT)
Dept: PSYCHIATRY | Facility: CLINIC | Age: 41
End: 2021-08-02

## 2021-08-02 LAB
ANION GAP SERPL CALCULATED.3IONS-SCNC: 8 MMOL/L (ref 5–15)
ASCENDING AORTA: 2.7 CM
BACTERIA FLD CULT: ABNORMAL
BACTERIA SPEC AEROBE CULT: ABNORMAL
BH CV ECHO MEAS - AO MAX PG (FULL): 8.4 MMHG
BH CV ECHO MEAS - AO MAX PG: 16 MMHG
BH CV ECHO MEAS - AO MEAN PG (FULL): 4.3 MMHG
BH CV ECHO MEAS - AO MEAN PG: 8.9 MMHG
BH CV ECHO MEAS - AO ROOT AREA (BSA CORRECTED): 1.5
BH CV ECHO MEAS - AO ROOT AREA: 5.6 CM^2
BH CV ECHO MEAS - AO ROOT DIAM: 2.7 CM
BH CV ECHO MEAS - AO V2 MAX: 198.1 CM/SEC
BH CV ECHO MEAS - AO V2 MEAN: 142.3 CM/SEC
BH CV ECHO MEAS - AO V2 VTI: 38 CM
BH CV ECHO MEAS - ASC AORTA: 2.7 CM
BH CV ECHO MEAS - AVA(I,A): 2.3 CM^2
BH CV ECHO MEAS - AVA(I,D): 2.3 CM^2
BH CV ECHO MEAS - AVA(V,A): 2.1 CM^2
BH CV ECHO MEAS - AVA(V,D): 2.1 CM^2
BH CV ECHO MEAS - BSA(HAYCOCK): 1.8 M^2
BH CV ECHO MEAS - BSA: 1.8 M^2
BH CV ECHO MEAS - BZI_BMI: 28.2 KILOGRAMS/M^2
BH CV ECHO MEAS - BZI_METRIC_HEIGHT: 160 CM
BH CV ECHO MEAS - BZI_METRIC_WEIGHT: 72.1 KG
BH CV ECHO MEAS - EDV(CUBED): 91.8 ML
BH CV ECHO MEAS - EDV(MOD-SP2): 96.9 ML
BH CV ECHO MEAS - EDV(MOD-SP4): 108 ML
BH CV ECHO MEAS - EDV(TEICH): 93 ML
BH CV ECHO MEAS - EF(CUBED): 67.3 %
BH CV ECHO MEAS - EF(MOD-BP): 64.9 %
BH CV ECHO MEAS - EF(MOD-SP2): 67.8 %
BH CV ECHO MEAS - EF(MOD-SP4): 60.6 %
BH CV ECHO MEAS - EF(TEICH): 59 %
BH CV ECHO MEAS - ESV(CUBED): 30 ML
BH CV ECHO MEAS - ESV(MOD-SP2): 31.2 ML
BH CV ECHO MEAS - ESV(MOD-SP4): 42.5 ML
BH CV ECHO MEAS - ESV(TEICH): 38.1 ML
BH CV ECHO MEAS - FS: 31.1 %
BH CV ECHO MEAS - IVS/LVPW: 0.89
BH CV ECHO MEAS - IVSD: 0.88 CM
BH CV ECHO MEAS - LA DIMENSION: 4.3 CM
BH CV ECHO MEAS - LA/AO: 1.6
BH CV ECHO MEAS - LAD MAJOR: 5.4 CM
BH CV ECHO MEAS - LAT PEAK E' VEL: 15.6 CM/SEC
BH CV ECHO MEAS - LATERAL E/E' RATIO: 4.9
BH CV ECHO MEAS - LV DIASTOLIC VOL/BSA (35-75): 61.6 ML/M^2
BH CV ECHO MEAS - LV IVRT: 0.1 SEC
BH CV ECHO MEAS - LV MASS(C)D: 141.7 GRAMS
BH CV ECHO MEAS - LV MASS(C)DI: 80.8 GRAMS/M^2
BH CV ECHO MEAS - LV MAX PG: 7.6 MMHG
BH CV ECHO MEAS - LV MEAN PG: 4.6 MMHG
BH CV ECHO MEAS - LV SYSTOLIC VOL/BSA (12-30): 24.2 ML/M^2
BH CV ECHO MEAS - LV V1 MAX: 138 CM/SEC
BH CV ECHO MEAS - LV V1 MEAN: 102.7 CM/SEC
BH CV ECHO MEAS - LV V1 VTI: 28.5 CM
BH CV ECHO MEAS - LVIDD: 4.5 CM
BH CV ECHO MEAS - LVIDS: 3.1 CM
BH CV ECHO MEAS - LVLD AP2: 8.5 CM
BH CV ECHO MEAS - LVLD AP4: 8.7 CM
BH CV ECHO MEAS - LVLS AP2: 7.4 CM
BH CV ECHO MEAS - LVLS AP4: 7.3 CM
BH CV ECHO MEAS - LVOT AREA (M): 3.1 CM^2
BH CV ECHO MEAS - LVOT AREA: 3 CM^2
BH CV ECHO MEAS - LVOT DIAM: 2 CM
BH CV ECHO MEAS - LVPWD: 1 CM
BH CV ECHO MEAS - MED PEAK E' VEL: 7.4 CM/SEC
BH CV ECHO MEAS - MEDIAL E/E' RATIO: 10.4
BH CV ECHO MEAS - MV A MAX VEL: 97.5 CM/SEC
BH CV ECHO MEAS - MV DEC SLOPE: 379.4 CM/SEC^2
BH CV ECHO MEAS - MV DEC TIME: 0.29 SEC
BH CV ECHO MEAS - MV E MAX VEL: 76.6 CM/SEC
BH CV ECHO MEAS - MV E/A: 0.79
BH CV ECHO MEAS - MV P1/2T MAX VEL: 105.7 CM/SEC
BH CV ECHO MEAS - MV P1/2T: 81.6 MSEC
BH CV ECHO MEAS - MVA P1/2T LCG: 2.1 CM^2
BH CV ECHO MEAS - MVA(P1/2T): 2.7 CM^2
BH CV ECHO MEAS - PA ACC TIME: 0.16 SEC
BH CV ECHO MEAS - PA MAX PG: 11 MMHG
BH CV ECHO MEAS - PA PR(ACCEL): 6.1 MMHG
BH CV ECHO MEAS - PA V2 MAX: 165.9 CM/SEC
BH CV ECHO MEAS - RAP SYSTOLE: 3 MMHG
BH CV ECHO MEAS - RVSP: 33 MMHG
BH CV ECHO MEAS - SI(AO): 120.4 ML/M^2
BH CV ECHO MEAS - SI(CUBED): 35.3 ML/M^2
BH CV ECHO MEAS - SI(LVOT): 49.5 ML/M^2
BH CV ECHO MEAS - SI(MOD-SP2): 37.5 ML/M^2
BH CV ECHO MEAS - SI(MOD-SP4): 37.3 ML/M^2
BH CV ECHO MEAS - SI(TEICH): 31.3 ML/M^2
BH CV ECHO MEAS - SV(AO): 211.2 ML
BH CV ECHO MEAS - SV(CUBED): 61.8 ML
BH CV ECHO MEAS - SV(LVOT): 86.8 ML
BH CV ECHO MEAS - SV(MOD-SP2): 65.7 ML
BH CV ECHO MEAS - SV(MOD-SP4): 65.5 ML
BH CV ECHO MEAS - SV(TEICH): 54.9 ML
BH CV ECHO MEAS - TAPSE (>1.6): 2.9 CM
BH CV ECHO MEAS - TR MAX PG: 30 MMHG
BH CV ECHO MEAS - TR MAX VEL: 269 CM/SEC
BH CV ECHO MEASUREMENTS AVERAGE E/E' RATIO: 6.66
BH CV VAS BP LEFT ARM: NORMAL MMHG
BH CV XLRA - RV BASE: 3.8 CM
BH CV XLRA - RV LENGTH: 7 CM
BH CV XLRA - RV MID: 2.7 CM
BH CV XLRA - TDI S': 23.2 CM/SEC
BUN SERPL-MCNC: 7 MG/DL (ref 6–20)
BUN/CREAT SERPL: 13.5 (ref 7–25)
CALCIUM SPEC-SCNC: 8.5 MG/DL (ref 8.6–10.5)
CHLORIDE SERPL-SCNC: 106 MMOL/L (ref 98–107)
CO2 SERPL-SCNC: 24 MMOL/L (ref 22–29)
CREAT SERPL-MCNC: 0.52 MG/DL (ref 0.57–1)
GFR SERPL CREATININE-BSD FRML MDRD: 131 ML/MIN/1.73
GLUCOSE SERPL-MCNC: 109 MG/DL (ref 65–99)
GRAM STN SPEC: ABNORMAL
LEFT ATRIUM VOLUME INDEX: 30.7 ML/M^2
LEFT ATRIUM VOLUME: 53.8 ML
POTASSIUM SERPL-SCNC: 3.3 MMOL/L (ref 3.5–5.2)
SODIUM SERPL-SCNC: 138 MMOL/L (ref 136–145)

## 2021-08-02 PROCEDURE — 25010000002 LORAZEPAM PER 2 MG: Performed by: OBSTETRICS & GYNECOLOGY

## 2021-08-02 PROCEDURE — 25010000002 HEPARIN (PORCINE) PER 1000 UNITS: Performed by: OBSTETRICS & GYNECOLOGY

## 2021-08-02 PROCEDURE — 25010000002 THIAMINE PER 100 MG: Performed by: NURSE PRACTITIONER

## 2021-08-02 PROCEDURE — 99232 SBSQ HOSP IP/OBS MODERATE 35: CPT | Performed by: OBSTETRICS & GYNECOLOGY

## 2021-08-02 PROCEDURE — 93306 TTE W/DOPPLER COMPLETE: CPT | Performed by: INTERNAL MEDICINE

## 2021-08-02 PROCEDURE — 80048 BASIC METABOLIC PNL TOTAL CA: CPT | Performed by: OBSTETRICS & GYNECOLOGY

## 2021-08-02 PROCEDURE — 63710000001 PROMETHAZINE PER 25 MG: Performed by: OBSTETRICS & GYNECOLOGY

## 2021-08-02 PROCEDURE — 25010000002 VANCOMYCIN 10 G RECONSTITUTED SOLUTION

## 2021-08-02 PROCEDURE — 87040 BLOOD CULTURE FOR BACTERIA: CPT | Performed by: INTERNAL MEDICINE

## 2021-08-02 PROCEDURE — 99222 1ST HOSP IP/OBS MODERATE 55: CPT | Performed by: NURSE PRACTITIONER

## 2021-08-02 PROCEDURE — 25010000002 CEFEPIME PER 500 MG: Performed by: ORTHOPAEDIC SURGERY

## 2021-08-02 PROCEDURE — 63710000001 ONDANSETRON PER 8 MG: Performed by: OBSTETRICS & GYNECOLOGY

## 2021-08-02 PROCEDURE — 93306 TTE W/DOPPLER COMPLETE: CPT

## 2021-08-02 PROCEDURE — 99024 POSTOP FOLLOW-UP VISIT: CPT | Performed by: ORTHOPAEDIC SURGERY

## 2021-08-02 RX ORDER — LORAZEPAM 1 MG/1
2 TABLET ORAL
Status: DISCONTINUED | OUTPATIENT
Start: 2021-08-02 | End: 2021-08-05

## 2021-08-02 RX ORDER — HEPARIN SODIUM 5000 [USP'U]/ML
5000 INJECTION, SOLUTION INTRAVENOUS; SUBCUTANEOUS EVERY 12 HOURS SCHEDULED
Status: DISCONTINUED | OUTPATIENT
Start: 2021-08-02 | End: 2021-08-20 | Stop reason: HOSPADM

## 2021-08-02 RX ORDER — NICOTINE 21 MG/24HR
1 PATCH, TRANSDERMAL 24 HOURS TRANSDERMAL
Status: DISCONTINUED | OUTPATIENT
Start: 2021-08-02 | End: 2021-08-20 | Stop reason: HOSPADM

## 2021-08-02 RX ORDER — LORAZEPAM 1 MG/1
1 TABLET ORAL
Status: DISPENSED | OUTPATIENT
Start: 2021-08-02 | End: 2021-08-09

## 2021-08-02 RX ORDER — LORAZEPAM 2 MG/ML
1 INJECTION INTRAMUSCULAR EVERY 8 HOURS
Status: COMPLETED | OUTPATIENT
Start: 2021-08-03 | End: 2021-08-04

## 2021-08-02 RX ORDER — LORAZEPAM 2 MG/ML
1 INJECTION INTRAMUSCULAR EVERY 6 HOURS
Status: DISPENSED | OUTPATIENT
Start: 2021-08-02 | End: 2021-08-03

## 2021-08-02 RX ORDER — LORAZEPAM 2 MG/ML
1 INJECTION INTRAMUSCULAR
Status: DISPENSED | OUTPATIENT
Start: 2021-08-02 | End: 2021-08-09

## 2021-08-02 RX ORDER — LORAZEPAM 2 MG/ML
2 INJECTION INTRAMUSCULAR
Status: DISCONTINUED | OUTPATIENT
Start: 2021-08-02 | End: 2021-08-05

## 2021-08-02 RX ADMIN — CEFEPIME HYDROCHLORIDE 2 G: 2 INJECTION, POWDER, FOR SOLUTION INTRAVENOUS at 00:27

## 2021-08-02 RX ADMIN — HEPARIN SODIUM 5000 UNITS: 5000 INJECTION INTRAVENOUS; SUBCUTANEOUS at 09:51

## 2021-08-02 RX ADMIN — ONDANSETRON HYDROCHLORIDE 4 MG: 4 TABLET, FILM COATED ORAL at 00:26

## 2021-08-02 RX ADMIN — NAFCILLIN SODIUM 2 G: 2 INJECTION, POWDER, LYOPHILIZED, FOR SOLUTION INTRAMUSCULAR; INTRAVENOUS at 20:10

## 2021-08-02 RX ADMIN — VANCOMYCIN HYDROCHLORIDE 1250 MG: 100 INJECTION, POWDER, LYOPHILIZED, FOR SOLUTION INTRAVENOUS at 00:27

## 2021-08-02 RX ADMIN — OXYCODONE 10 MG: 5 TABLET ORAL at 00:26

## 2021-08-02 RX ADMIN — ACETAMINOPHEN 650 MG: 325 TABLET, FILM COATED ORAL at 12:07

## 2021-08-02 RX ADMIN — ACETAMINOPHEN 650 MG: 325 TABLET, FILM COATED ORAL at 04:51

## 2021-08-02 RX ADMIN — METHADONE HYDROCHLORIDE 15 MG: 10 TABLET ORAL at 16:28

## 2021-08-02 RX ADMIN — OXYCODONE 10 MG: 5 TABLET ORAL at 09:06

## 2021-08-02 RX ADMIN — THIAMINE HYDROCHLORIDE 100 MG: 100 INJECTION, SOLUTION INTRAMUSCULAR; INTRAVENOUS at 13:27

## 2021-08-02 RX ADMIN — ACETAMINOPHEN 650 MG: 325 TABLET, FILM COATED ORAL at 23:50

## 2021-08-02 RX ADMIN — Medication 1 PATCH: at 09:51

## 2021-08-02 RX ADMIN — PANTOPRAZOLE SODIUM 40 MG: 40 TABLET, DELAYED RELEASE ORAL at 04:51

## 2021-08-02 RX ADMIN — HEPARIN SODIUM 5000 UNITS: 5000 INJECTION INTRAVENOUS; SUBCUTANEOUS at 20:10

## 2021-08-02 RX ADMIN — LORAZEPAM 1 MG: 2 INJECTION INTRAMUSCULAR; INTRAVENOUS at 20:12

## 2021-08-02 RX ADMIN — SODIUM CHLORIDE, PRESERVATIVE FREE 3 ML: 5 INJECTION INTRAVENOUS at 09:52

## 2021-08-02 RX ADMIN — IBUPROFEN 600 MG: 600 TABLET, FILM COATED ORAL at 20:10

## 2021-08-02 RX ADMIN — OXYCODONE 10 MG: 5 TABLET ORAL at 13:23

## 2021-08-02 RX ADMIN — LORAZEPAM 1 MG: 2 INJECTION INTRAMUSCULAR; INTRAVENOUS at 09:51

## 2021-08-02 RX ADMIN — FAMOTIDINE 20 MG: 20 TABLET ORAL at 12:07

## 2021-08-02 RX ADMIN — OXYCODONE 10 MG: 5 TABLET ORAL at 23:50

## 2021-08-02 RX ADMIN — ALUMINUM HYDROXIDE, MAGNESIUM HYDROXIDE, AND DIMETHICONE 15 ML: 400; 400; 40 SUSPENSION ORAL at 20:20

## 2021-08-02 RX ADMIN — NAFCILLIN SODIUM 2 G: 2 INJECTION, POWDER, LYOPHILIZED, FOR SOLUTION INTRAMUSCULAR; INTRAVENOUS at 23:50

## 2021-08-02 RX ADMIN — ACETAMINOPHEN 650 MG: 325 TABLET, FILM COATED ORAL at 00:26

## 2021-08-02 RX ADMIN — ACETAMINOPHEN 650 MG: 325 TABLET, FILM COATED ORAL at 18:23

## 2021-08-02 RX ADMIN — CEFEPIME HYDROCHLORIDE 2 G: 2 INJECTION, POWDER, FOR SOLUTION INTRAVENOUS at 08:02

## 2021-08-02 RX ADMIN — IBUPROFEN 600 MG: 600 TABLET, FILM COATED ORAL at 16:19

## 2021-08-02 RX ADMIN — OXYCODONE 10 MG: 5 TABLET ORAL at 04:50

## 2021-08-02 RX ADMIN — IBUPROFEN 600 MG: 600 TABLET, FILM COATED ORAL at 09:51

## 2021-08-02 RX ADMIN — NAFCILLIN SODIUM 2 G: 2 INJECTION, POWDER, LYOPHILIZED, FOR SOLUTION INTRAMUSCULAR; INTRAVENOUS at 16:18

## 2021-08-02 RX ADMIN — NAFCILLIN SODIUM 2 G: 2 INJECTION, POWDER, LYOPHILIZED, FOR SOLUTION INTRAMUSCULAR; INTRAVENOUS at 11:46

## 2021-08-02 RX ADMIN — PROMETHAZINE HYDROCHLORIDE 25 MG: 25 TABLET ORAL at 00:26

## 2021-08-02 RX ADMIN — OXYCODONE 10 MG: 5 TABLET ORAL at 18:28

## 2021-08-02 RX ADMIN — LORAZEPAM 1 MG: 1 TABLET ORAL at 12:07

## 2021-08-02 RX ADMIN — FAMOTIDINE 20 MG: 20 TABLET ORAL at 20:10

## 2021-08-02 RX ADMIN — ALUMINUM HYDROXIDE, MAGNESIUM HYDROXIDE, AND DIMETHICONE 15 ML: 400; 400; 40 SUSPENSION ORAL at 04:06

## 2021-08-02 NOTE — TELEPHONE ENCOUNTER
Nurse called regarding patient consult. Nurse states that patient has just had hip surgery and C Section 2 days ago. Patient also has major addiction issues with drugs and alcohol. Patient is able to participate in consult and does not have a sitter at the moment. Nurse also states that patient is not suicidal.

## 2021-08-03 ENCOUNTER — ANESTHESIA EVENT (OUTPATIENT)
Dept: TELEMETRY | Facility: HOSPITAL | Age: 41
End: 2021-08-03

## 2021-08-03 ENCOUNTER — ANESTHESIA (OUTPATIENT)
Dept: TELEMETRY | Facility: HOSPITAL | Age: 41
End: 2021-08-03

## 2021-08-03 LAB
ANION GAP SERPL CALCULATED.3IONS-SCNC: 11 MMOL/L (ref 5–15)
BUN SERPL-MCNC: 8 MG/DL (ref 6–20)
BUN/CREAT SERPL: 15.4 (ref 7–25)
C TRACH RRNA SPEC QL NAA+PROBE: POSITIVE
CALCIUM SPEC-SCNC: 8.1 MG/DL (ref 8.6–10.5)
CHLORIDE SERPL-SCNC: 105 MMOL/L (ref 98–107)
CO2 SERPL-SCNC: 22 MMOL/L (ref 22–29)
CREAT SERPL-MCNC: 0.52 MG/DL (ref 0.57–1)
CYTO UR: NORMAL
CYTO UR: NORMAL
GFR SERPL CREATININE-BSD FRML MDRD: 131 ML/MIN/1.73
GLUCOSE FLD-MCNC: <2 MG/DL
GLUCOSE SERPL-MCNC: 69 MG/DL (ref 65–99)
LAB AP CASE REPORT: NORMAL
LAB AP CASE REPORT: NORMAL
LAB AP CLINICAL INFORMATION: NORMAL
LAB AP CLINICAL INFORMATION: NORMAL
N GONORRHOEA RRNA SPEC QL NAA+PROBE: NEGATIVE
PATH REPORT.FINAL DX SPEC: NORMAL
PATH REPORT.FINAL DX SPEC: NORMAL
PATH REPORT.GROSS SPEC: NORMAL
PATH REPORT.GROSS SPEC: NORMAL
POTASSIUM SERPL-SCNC: 3.8 MMOL/L (ref 3.5–5.2)
SODIUM SERPL-SCNC: 138 MMOL/L (ref 136–145)

## 2021-08-03 PROCEDURE — 99232 SBSQ HOSP IP/OBS MODERATE 35: CPT | Performed by: OBSTETRICS & GYNECOLOGY

## 2021-08-03 PROCEDURE — 25010000002 LORAZEPAM PER 2 MG: Performed by: OBSTETRICS & GYNECOLOGY

## 2021-08-03 PROCEDURE — 99024 POSTOP FOLLOW-UP VISIT: CPT | Performed by: ORTHOPAEDIC SURGERY

## 2021-08-03 PROCEDURE — 97530 THERAPEUTIC ACTIVITIES: CPT

## 2021-08-03 PROCEDURE — 25010000002 HEPARIN (PORCINE) PER 1000 UNITS: Performed by: OBSTETRICS & GYNECOLOGY

## 2021-08-03 PROCEDURE — 97165 OT EVAL LOW COMPLEX 30 MIN: CPT | Performed by: OCCUPATIONAL THERAPIST

## 2021-08-03 PROCEDURE — 99222 1ST HOSP IP/OBS MODERATE 55: CPT | Performed by: NURSE PRACTITIONER

## 2021-08-03 PROCEDURE — 25010000002 THIAMINE PER 100 MG: Performed by: NURSE PRACTITIONER

## 2021-08-03 PROCEDURE — 99232 SBSQ HOSP IP/OBS MODERATE 35: CPT | Performed by: INTERNAL MEDICINE

## 2021-08-03 PROCEDURE — 80048 BASIC METABOLIC PNL TOTAL CA: CPT | Performed by: INTERNAL MEDICINE

## 2021-08-03 PROCEDURE — 97116 GAIT TRAINING THERAPY: CPT

## 2021-08-03 PROCEDURE — 97535 SELF CARE MNGMENT TRAINING: CPT | Performed by: OCCUPATIONAL THERAPIST

## 2021-08-03 PROCEDURE — 97110 THERAPEUTIC EXERCISES: CPT

## 2021-08-03 RX ORDER — MIDAZOLAM HYDROCHLORIDE 1 MG/ML
INJECTION INTRAMUSCULAR; INTRAVENOUS AS NEEDED
Status: DISCONTINUED | OUTPATIENT
Start: 2021-07-31 | End: 2021-08-03 | Stop reason: SURG

## 2021-08-03 RX ORDER — BUPIVACAINE HYDROCHLORIDE 7.5 MG/ML
INJECTION, SOLUTION INTRASPINAL AS NEEDED
Status: DISCONTINUED | OUTPATIENT
Start: 2021-07-31 | End: 2021-08-03 | Stop reason: SURG

## 2021-08-03 RX ORDER — AZITHROMYCIN 250 MG/1
1000 TABLET, FILM COATED ORAL ONCE
Status: COMPLETED | OUTPATIENT
Start: 2021-08-03 | End: 2021-08-04

## 2021-08-03 RX ORDER — POTASSIUM CHLORIDE 750 MG/1
40 CAPSULE, EXTENDED RELEASE ORAL AS NEEDED
Status: DISCONTINUED | OUTPATIENT
Start: 2021-08-03 | End: 2021-08-20 | Stop reason: HOSPADM

## 2021-08-03 RX ORDER — SODIUM CHLORIDE, SODIUM LACTATE, POTASSIUM CHLORIDE, CALCIUM CHLORIDE 600; 310; 30; 20 MG/100ML; MG/100ML; MG/100ML; MG/100ML
INJECTION, SOLUTION INTRAVENOUS CONTINUOUS PRN
Status: DISCONTINUED | OUTPATIENT
Start: 2021-07-31 | End: 2021-08-03 | Stop reason: SURG

## 2021-08-03 RX ORDER — FENTANYL CITRATE 50 UG/ML
INJECTION, SOLUTION INTRAMUSCULAR; INTRAVENOUS AS NEEDED
Status: DISCONTINUED | OUTPATIENT
Start: 2021-07-31 | End: 2021-08-03 | Stop reason: SURG

## 2021-08-03 RX ORDER — MORPHINE SULFATE 0.5 MG/ML
INJECTION, SOLUTION EPIDURAL; INTRATHECAL; INTRAVENOUS AS NEEDED
Status: DISCONTINUED | OUTPATIENT
Start: 2021-07-31 | End: 2021-08-03 | Stop reason: SURG

## 2021-08-03 RX ORDER — POTASSIUM CHLORIDE 7.45 MG/ML
10 INJECTION INTRAVENOUS
Status: DISCONTINUED | OUTPATIENT
Start: 2021-08-03 | End: 2021-08-20 | Stop reason: HOSPADM

## 2021-08-03 RX ORDER — POTASSIUM CHLORIDE 1.5 G/1.77G
40 POWDER, FOR SOLUTION ORAL AS NEEDED
Status: DISCONTINUED | OUTPATIENT
Start: 2021-08-03 | End: 2021-08-20 | Stop reason: HOSPADM

## 2021-08-03 RX ADMIN — ACETAMINOPHEN 650 MG: 325 TABLET, FILM COATED ORAL at 16:26

## 2021-08-03 RX ADMIN — FAMOTIDINE 20 MG: 20 TABLET ORAL at 12:21

## 2021-08-03 RX ADMIN — IBUPROFEN 600 MG: 600 TABLET, FILM COATED ORAL at 08:24

## 2021-08-03 RX ADMIN — LORAZEPAM 1 MG: 2 INJECTION INTRAMUSCULAR; INTRAVENOUS at 18:21

## 2021-08-03 RX ADMIN — LORAZEPAM 1 MG: 2 INJECTION INTRAMUSCULAR; INTRAVENOUS at 10:36

## 2021-08-03 RX ADMIN — IBUPROFEN 600 MG: 600 TABLET, FILM COATED ORAL at 20:14

## 2021-08-03 RX ADMIN — ACETAMINOPHEN 650 MG: 325 TABLET, FILM COATED ORAL at 04:13

## 2021-08-03 RX ADMIN — NAFCILLIN SODIUM 2 G: 2 INJECTION, POWDER, LYOPHILIZED, FOR SOLUTION INTRAMUSCULAR; INTRAVENOUS at 16:06

## 2021-08-03 RX ADMIN — Medication 1 PATCH: at 08:25

## 2021-08-03 RX ADMIN — OXYCODONE 10 MG: 5 TABLET ORAL at 16:26

## 2021-08-03 RX ADMIN — OXYCODONE 10 MG: 5 TABLET ORAL at 20:16

## 2021-08-03 RX ADMIN — THIAMINE HYDROCHLORIDE 100 MG: 100 INJECTION, SOLUTION INTRAMUSCULAR; INTRAVENOUS at 09:49

## 2021-08-03 RX ADMIN — SODIUM CHLORIDE, PRESERVATIVE FREE 3 ML: 5 INJECTION INTRAVENOUS at 08:26

## 2021-08-03 RX ADMIN — NAFCILLIN SODIUM 2 G: 2 INJECTION, POWDER, LYOPHILIZED, FOR SOLUTION INTRAMUSCULAR; INTRAVENOUS at 08:25

## 2021-08-03 RX ADMIN — FAMOTIDINE 20 MG: 20 TABLET ORAL at 20:14

## 2021-08-03 RX ADMIN — OXYCODONE 10 MG: 5 TABLET ORAL at 04:13

## 2021-08-03 RX ADMIN — LORAZEPAM 1 MG: 2 INJECTION INTRAMUSCULAR; INTRAVENOUS at 04:13

## 2021-08-03 RX ADMIN — OXYCODONE 10 MG: 5 TABLET ORAL at 12:23

## 2021-08-03 RX ADMIN — NAFCILLIN SODIUM 2 G: 2 INJECTION, POWDER, LYOPHILIZED, FOR SOLUTION INTRAMUSCULAR; INTRAVENOUS at 04:13

## 2021-08-03 RX ADMIN — ACETAMINOPHEN 650 MG: 325 TABLET, FILM COATED ORAL at 12:21

## 2021-08-03 RX ADMIN — HEPARIN SODIUM 5000 UNITS: 5000 INJECTION INTRAVENOUS; SUBCUTANEOUS at 08:25

## 2021-08-03 RX ADMIN — DOCUSATE SODIUM 100 MG: 100 CAPSULE, LIQUID FILLED ORAL at 08:24

## 2021-08-03 RX ADMIN — NAFCILLIN SODIUM 2 G: 2 INJECTION, POWDER, LYOPHILIZED, FOR SOLUTION INTRAMUSCULAR; INTRAVENOUS at 12:19

## 2021-08-03 RX ADMIN — OXYCODONE 10 MG: 5 TABLET ORAL at 08:25

## 2021-08-03 RX ADMIN — PANTOPRAZOLE SODIUM 40 MG: 40 TABLET, DELAYED RELEASE ORAL at 08:24

## 2021-08-03 RX ADMIN — NAFCILLIN SODIUM 2 G: 2 INJECTION, POWDER, LYOPHILIZED, FOR SOLUTION INTRAMUSCULAR; INTRAVENOUS at 20:13

## 2021-08-03 RX ADMIN — METHADONE HYDROCHLORIDE 25 MG: 10 TABLET ORAL at 09:39

## 2021-08-03 RX ADMIN — HEPARIN SODIUM 5000 UNITS: 5000 INJECTION INTRAVENOUS; SUBCUTANEOUS at 20:13

## 2021-08-03 NOTE — ANESTHESIA PREPROCEDURE EVALUATION
Anesthesia Evaluation     Patient summary reviewed and Nursing notes reviewed                Airway   Mallampati: I  TM distance: >3 FB  Neck ROM: full  No difficulty expected  Dental - normal exam     Pulmonary - negative pulmonary ROS and normal exam   Cardiovascular - negative cardio ROS and normal exam        Neuro/Psych  (+) headaches, psychiatric history,     GI/Hepatic/Renal/Endo - negative ROS     Musculoskeletal     Abdominal  - normal exam    Bowel sounds: normal.   Substance History   (+) alcohol use, drug use     OB/GYN    (+) Pregnant,         Other   arthritis,          Phys Exam Other: She has a spetic hip and is scheduled for I and D to follow                 Anesthesia Plan    ASA 3 - emergent     spinal       Anesthetic plan, all risks, benefits, and alternatives have been provided, discussed and informed consent has been obtained with: patient.  Use of blood products discussed with patient .   Plan discussed with attending.

## 2021-08-03 NOTE — ANESTHESIA PROCEDURE NOTES
Spinal Block      Patient reassessed immediately prior to procedure    Patient location during procedure: OR  Start Time: 7/31/2021 6:20 PM  Stop Time: 8/3/2021 6:21 PM  Indication:at surgeon's request  Performed By  Anesthesiologist: Ben Henderson MD  Preanesthetic Checklist  Completed: patient identified, IV checked, site marked, risks and benefits discussed, surgical consent, monitors and equipment checked, pre-op evaluation and timeout performed  Spinal Block Prep:  Patient Position:sitting  Sterile Tech:cap, gloves, sterile barriers and mask  Prep:Betadine  Patient Monitoring:EKG, continuous pulse oximetry and blood pressure monitoring  Spinal Block Procedure  Approach:midline  Guidance:palpation technique  Location:L2-L3  Needle Type:Jackie  Needle Gauge:25 G  Placement of Spinal needle event:cerebrospinal fluid aspirated  Paresthesia: no  Fluid Appearance:clear    Med Administered at 7/31/2021 6:21 PM   Post Assessment  Patient Tolerance:patient tolerated the procedure well with no apparent complications  Complications no

## 2021-08-04 LAB
ALP SERPL-CCNC: 109 U/L (ref 39–117)
ALT SERPL W P-5'-P-CCNC: 8 U/L (ref 1–33)
AST SERPL-CCNC: 12 U/L (ref 1–32)
BILIRUB SERPL-MCNC: 0.2 MG/DL (ref 0–1.2)
CREAT SERPL-MCNC: 0.45 MG/DL (ref 0.57–1)
DEPRECATED RDW RBC AUTO: 41 FL (ref 37–54)
ERYTHROCYTE [DISTWIDTH] IN BLOOD BY AUTOMATED COUNT: 12.4 % (ref 12.3–15.4)
HCT VFR BLD AUTO: 29 % (ref 34–46.6)
HGB BLD-MCNC: 10.1 G/DL (ref 12–15.9)
LDH SERPL-CCNC: 199 U/L (ref 135–214)
MCH RBC QN AUTO: 31.6 PG (ref 26.6–33)
MCHC RBC AUTO-ENTMCNC: 34.8 G/DL (ref 31.5–35.7)
MCV RBC AUTO: 90.6 FL (ref 79–97)
PLATELET # BLD AUTO: 288 10*3/MM3 (ref 140–450)
PMV BLD AUTO: 9.8 FL (ref 6–12)
RBC # BLD AUTO: 3.2 10*6/MM3 (ref 3.77–5.28)
URATE SERPL-MCNC: 2.5 MG/DL (ref 2.4–5.7)
WBC # BLD AUTO: 10.03 10*3/MM3 (ref 3.4–10.8)

## 2021-08-04 PROCEDURE — 25010000002 LORAZEPAM PER 2 MG: Performed by: OBSTETRICS & GYNECOLOGY

## 2021-08-04 PROCEDURE — C1751 CATH, INF, PER/CENT/MIDLINE: HCPCS

## 2021-08-04 PROCEDURE — 84550 ASSAY OF BLOOD/URIC ACID: CPT | Performed by: OBSTETRICS & GYNECOLOGY

## 2021-08-04 PROCEDURE — 99231 SBSQ HOSP IP/OBS SF/LOW 25: CPT | Performed by: NURSE PRACTITIONER

## 2021-08-04 PROCEDURE — 84450 TRANSFERASE (AST) (SGOT): CPT | Performed by: OBSTETRICS & GYNECOLOGY

## 2021-08-04 PROCEDURE — 25010000002 THIAMINE PER 100 MG: Performed by: NURSE PRACTITIONER

## 2021-08-04 PROCEDURE — 25010000002 HEPARIN (PORCINE) PER 1000 UNITS: Performed by: OBSTETRICS & GYNECOLOGY

## 2021-08-04 PROCEDURE — 82565 ASSAY OF CREATININE: CPT | Performed by: OBSTETRICS & GYNECOLOGY

## 2021-08-04 PROCEDURE — 82247 BILIRUBIN TOTAL: CPT | Performed by: OBSTETRICS & GYNECOLOGY

## 2021-08-04 PROCEDURE — 85027 COMPLETE CBC AUTOMATED: CPT | Performed by: OBSTETRICS & GYNECOLOGY

## 2021-08-04 PROCEDURE — 83615 LACTATE (LD) (LDH) ENZYME: CPT | Performed by: OBSTETRICS & GYNECOLOGY

## 2021-08-04 PROCEDURE — 99232 SBSQ HOSP IP/OBS MODERATE 35: CPT | Performed by: OBSTETRICS & GYNECOLOGY

## 2021-08-04 PROCEDURE — C1894 INTRO/SHEATH, NON-LASER: HCPCS

## 2021-08-04 PROCEDURE — 97116 GAIT TRAINING THERAPY: CPT | Performed by: PHYSICAL THERAPIST

## 2021-08-04 PROCEDURE — 97110 THERAPEUTIC EXERCISES: CPT | Performed by: PHYSICAL THERAPIST

## 2021-08-04 PROCEDURE — 84460 ALANINE AMINO (ALT) (SGPT): CPT | Performed by: OBSTETRICS & GYNECOLOGY

## 2021-08-04 PROCEDURE — 84075 ASSAY ALKALINE PHOSPHATASE: CPT | Performed by: OBSTETRICS & GYNECOLOGY

## 2021-08-04 PROCEDURE — 99024 POSTOP FOLLOW-UP VISIT: CPT | Performed by: ORTHOPAEDIC SURGERY

## 2021-08-04 RX ORDER — METHADONE HYDROCHLORIDE 10 MG/1
30 TABLET ORAL DAILY
Status: DISCONTINUED | OUTPATIENT
Start: 2021-08-05 | End: 2021-08-05

## 2021-08-04 RX ORDER — SODIUM CHLORIDE 0.9 % (FLUSH) 0.9 %
10 SYRINGE (ML) INJECTION EVERY 12 HOURS SCHEDULED
Status: DISCONTINUED | OUTPATIENT
Start: 2021-08-04 | End: 2021-08-20 | Stop reason: HOSPADM

## 2021-08-04 RX ORDER — SODIUM CHLORIDE 0.9 % (FLUSH) 0.9 %
20 SYRINGE (ML) INJECTION AS NEEDED
Status: DISCONTINUED | OUTPATIENT
Start: 2021-08-04 | End: 2021-08-20 | Stop reason: HOSPADM

## 2021-08-04 RX ORDER — OLANZAPINE 5 MG/1
5 TABLET ORAL DAILY
Status: DISCONTINUED | OUTPATIENT
Start: 2021-08-04 | End: 2021-08-04

## 2021-08-04 RX ORDER — OLANZAPINE 5 MG/1
5 TABLET ORAL DAILY
Status: DISCONTINUED | OUTPATIENT
Start: 2021-08-05 | End: 2021-08-07

## 2021-08-04 RX ORDER — SODIUM CHLORIDE 0.9 % (FLUSH) 0.9 %
10 SYRINGE (ML) INJECTION AS NEEDED
Status: DISCONTINUED | OUTPATIENT
Start: 2021-08-04 | End: 2021-08-20 | Stop reason: HOSPADM

## 2021-08-04 RX ORDER — CHOLECALCIFEROL (VITAMIN D3) 125 MCG
10 CAPSULE ORAL NIGHTLY
Status: DISCONTINUED | OUTPATIENT
Start: 2021-08-04 | End: 2021-08-20 | Stop reason: HOSPADM

## 2021-08-04 RX ADMIN — NAFCILLIN SODIUM 2 G: 2 INJECTION, POWDER, LYOPHILIZED, FOR SOLUTION INTRAMUSCULAR; INTRAVENOUS at 20:19

## 2021-08-04 RX ADMIN — OXYCODONE 10 MG: 5 TABLET ORAL at 20:17

## 2021-08-04 RX ADMIN — OXYCODONE 10 MG: 5 TABLET ORAL at 08:37

## 2021-08-04 RX ADMIN — ACETAMINOPHEN 650 MG: 325 TABLET, FILM COATED ORAL at 17:32

## 2021-08-04 RX ADMIN — HEPARIN SODIUM 5000 UNITS: 5000 INJECTION INTRAVENOUS; SUBCUTANEOUS at 20:17

## 2021-08-04 RX ADMIN — IBUPROFEN 600 MG: 600 TABLET, FILM COATED ORAL at 20:17

## 2021-08-04 RX ADMIN — Medication 1 PATCH: at 08:29

## 2021-08-04 RX ADMIN — NAFCILLIN SODIUM 2 G: 2 INJECTION, POWDER, LYOPHILIZED, FOR SOLUTION INTRAMUSCULAR; INTRAVENOUS at 08:36

## 2021-08-04 RX ADMIN — PANTOPRAZOLE SODIUM 40 MG: 40 TABLET, DELAYED RELEASE ORAL at 08:30

## 2021-08-04 RX ADMIN — LORAZEPAM 1 MG: 2 INJECTION INTRAMUSCULAR; INTRAVENOUS at 02:01

## 2021-08-04 RX ADMIN — OXYCODONE 10 MG: 5 TABLET ORAL at 04:02

## 2021-08-04 RX ADMIN — ACETAMINOPHEN 650 MG: 325 TABLET, FILM COATED ORAL at 11:29

## 2021-08-04 RX ADMIN — AZITHROMYCIN MONOHYDRATE 1000 MG: 250 TABLET ORAL at 00:19

## 2021-08-04 RX ADMIN — FAMOTIDINE 20 MG: 20 TABLET ORAL at 11:29

## 2021-08-04 RX ADMIN — IBUPROFEN 600 MG: 600 TABLET, FILM COATED ORAL at 08:30

## 2021-08-04 RX ADMIN — THIAMINE HYDROCHLORIDE 100 MG: 100 INJECTION, SOLUTION INTRAMUSCULAR; INTRAVENOUS at 09:45

## 2021-08-04 RX ADMIN — LORAZEPAM 2 MG: 1 TABLET ORAL at 11:28

## 2021-08-04 RX ADMIN — METHADONE HYDROCHLORIDE 25 MG: 10 TABLET ORAL at 08:30

## 2021-08-04 RX ADMIN — ACETAMINOPHEN 650 MG: 325 TABLET, FILM COATED ORAL at 00:19

## 2021-08-04 RX ADMIN — NAFCILLIN SODIUM 2 G: 2 INJECTION, POWDER, LYOPHILIZED, FOR SOLUTION INTRAMUSCULAR; INTRAVENOUS at 04:02

## 2021-08-04 RX ADMIN — ACETAMINOPHEN 650 MG: 325 TABLET, FILM COATED ORAL at 06:32

## 2021-08-04 RX ADMIN — FAMOTIDINE 20 MG: 20 TABLET ORAL at 20:17

## 2021-08-04 RX ADMIN — NAFCILLIN SODIUM 2 G: 2 INJECTION, POWDER, LYOPHILIZED, FOR SOLUTION INTRAMUSCULAR; INTRAVENOUS at 12:00

## 2021-08-04 RX ADMIN — HEPARIN SODIUM 5000 UNITS: 5000 INJECTION INTRAVENOUS; SUBCUTANEOUS at 08:30

## 2021-08-04 RX ADMIN — OXYCODONE 10 MG: 5 TABLET ORAL at 00:19

## 2021-08-04 RX ADMIN — OLANZAPINE 5 MG: 5 TABLET, FILM COATED ORAL at 08:36

## 2021-08-04 RX ADMIN — IBUPROFEN 600 MG: 600 TABLET, FILM COATED ORAL at 15:50

## 2021-08-04 RX ADMIN — NAFCILLIN SODIUM 2 G: 2 INJECTION, POWDER, LYOPHILIZED, FOR SOLUTION INTRAMUSCULAR; INTRAVENOUS at 15:49

## 2021-08-04 RX ADMIN — IBUPROFEN 600 MG: 600 TABLET, FILM COATED ORAL at 04:02

## 2021-08-04 RX ADMIN — NAFCILLIN SODIUM 2 G: 2 INJECTION, POWDER, LYOPHILIZED, FOR SOLUTION INTRAMUSCULAR; INTRAVENOUS at 00:19

## 2021-08-04 RX ADMIN — Medication 10 MG: at 20:18

## 2021-08-04 RX ADMIN — LORAZEPAM 1 MG: 1 TABLET ORAL at 20:17

## 2021-08-04 RX ADMIN — LORAZEPAM 1 MG: 2 INJECTION INTRAMUSCULAR; INTRAVENOUS at 08:29

## 2021-08-05 PROCEDURE — 97116 GAIT TRAINING THERAPY: CPT | Performed by: PHYSICAL THERAPIST

## 2021-08-05 PROCEDURE — 99232 SBSQ HOSP IP/OBS MODERATE 35: CPT | Performed by: OBSTETRICS & GYNECOLOGY

## 2021-08-05 PROCEDURE — 99232 SBSQ HOSP IP/OBS MODERATE 35: CPT | Performed by: INTERNAL MEDICINE

## 2021-08-05 PROCEDURE — 25010000002 THIAMINE PER 100 MG: Performed by: NURSE PRACTITIONER

## 2021-08-05 PROCEDURE — 97110 THERAPEUTIC EXERCISES: CPT | Performed by: PHYSICAL THERAPIST

## 2021-08-05 PROCEDURE — 25010000002 HEPARIN (PORCINE) PER 1000 UNITS: Performed by: OBSTETRICS & GYNECOLOGY

## 2021-08-05 PROCEDURE — 94799 UNLISTED PULMONARY SVC/PX: CPT

## 2021-08-05 RX ORDER — AMLODIPINE BESYLATE 2.5 MG/1
2.5 TABLET ORAL
Status: DISCONTINUED | OUTPATIENT
Start: 2021-08-05 | End: 2021-08-06

## 2021-08-05 RX ORDER — BUSPIRONE HYDROCHLORIDE 15 MG/1
7.5 TABLET ORAL 3 TIMES DAILY PRN
Status: DISCONTINUED | OUTPATIENT
Start: 2021-08-05 | End: 2021-08-20 | Stop reason: HOSPADM

## 2021-08-05 RX ORDER — METHADONE HYDROCHLORIDE 5 MG/1
5 TABLET ORAL ONCE
Status: COMPLETED | OUTPATIENT
Start: 2021-08-05 | End: 2021-08-05

## 2021-08-05 RX ORDER — HYDROXYZINE HYDROCHLORIDE 25 MG/1
25 TABLET, FILM COATED ORAL 3 TIMES DAILY PRN
Status: DISCONTINUED | OUTPATIENT
Start: 2021-08-05 | End: 2021-08-20 | Stop reason: HOSPADM

## 2021-08-05 RX ORDER — FLUOXETINE HYDROCHLORIDE 20 MG/1
20 CAPSULE ORAL DAILY
Status: DISCONTINUED | OUTPATIENT
Start: 2021-08-05 | End: 2021-08-15

## 2021-08-05 RX ORDER — CLONAZEPAM 0.5 MG/1
0.5 TABLET ORAL EVERY 8 HOURS SCHEDULED
Status: DISPENSED | OUTPATIENT
Start: 2021-08-05 | End: 2021-08-12

## 2021-08-05 RX ADMIN — IBUPROFEN 600 MG: 600 TABLET, FILM COATED ORAL at 08:35

## 2021-08-05 RX ADMIN — OXYCODONE 10 MG: 5 TABLET ORAL at 15:04

## 2021-08-05 RX ADMIN — PANTOPRAZOLE SODIUM 40 MG: 40 TABLET, DELAYED RELEASE ORAL at 08:36

## 2021-08-05 RX ADMIN — CLONAZEPAM 0.5 MG: 0.5 TABLET ORAL at 14:02

## 2021-08-05 RX ADMIN — NAFCILLIN SODIUM 2 G: 2 INJECTION, POWDER, LYOPHILIZED, FOR SOLUTION INTRAMUSCULAR; INTRAVENOUS at 03:50

## 2021-08-05 RX ADMIN — Medication 10 MG: at 21:36

## 2021-08-05 RX ADMIN — METHADONE HYDROCHLORIDE 5 MG: 5 TABLET ORAL at 10:29

## 2021-08-05 RX ADMIN — OXYCODONE 10 MG: 5 TABLET ORAL at 11:22

## 2021-08-05 RX ADMIN — IBUPROFEN 600 MG: 600 TABLET, FILM COATED ORAL at 21:35

## 2021-08-05 RX ADMIN — HEPARIN SODIUM 5000 UNITS: 5000 INJECTION INTRAVENOUS; SUBCUTANEOUS at 08:36

## 2021-08-05 RX ADMIN — HYDROXYZINE HYDROCHLORIDE 25 MG: 25 TABLET, FILM COATED ORAL at 21:36

## 2021-08-05 RX ADMIN — IBUPROFEN 600 MG: 600 TABLET, FILM COATED ORAL at 03:48

## 2021-08-05 RX ADMIN — NAFCILLIN SODIUM 2 G: 2 INJECTION, POWDER, LYOPHILIZED, FOR SOLUTION INTRAMUSCULAR; INTRAVENOUS at 19:55

## 2021-08-05 RX ADMIN — CLONAZEPAM 0.5 MG: 0.5 TABLET ORAL at 21:36

## 2021-08-05 RX ADMIN — NAFCILLIN SODIUM 2 G: 2 INJECTION, POWDER, LYOPHILIZED, FOR SOLUTION INTRAMUSCULAR; INTRAVENOUS at 00:19

## 2021-08-05 RX ADMIN — FLUOXETINE HYDROCHLORIDE 20 MG: 20 CAPSULE ORAL at 10:29

## 2021-08-05 RX ADMIN — THIAMINE HYDROCHLORIDE 100 MG: 100 INJECTION, SOLUTION INTRAMUSCULAR; INTRAVENOUS at 09:18

## 2021-08-05 RX ADMIN — NAFCILLIN SODIUM 2 G: 2 INJECTION, POWDER, LYOPHILIZED, FOR SOLUTION INTRAMUSCULAR; INTRAVENOUS at 08:36

## 2021-08-05 RX ADMIN — SODIUM CHLORIDE, PRESERVATIVE FREE 10 ML: 5 INJECTION INTRAVENOUS at 08:40

## 2021-08-05 RX ADMIN — METHADONE HYDROCHLORIDE 30 MG: 10 TABLET ORAL at 08:35

## 2021-08-05 RX ADMIN — ACETAMINOPHEN 650 MG: 325 TABLET, FILM COATED ORAL at 11:15

## 2021-08-05 RX ADMIN — NAFCILLIN SODIUM 2 G: 2 INJECTION, POWDER, LYOPHILIZED, FOR SOLUTION INTRAMUSCULAR; INTRAVENOUS at 11:15

## 2021-08-05 RX ADMIN — AMLODIPINE BESYLATE 2.5 MG: 2.5 TABLET ORAL at 09:19

## 2021-08-05 RX ADMIN — FAMOTIDINE 20 MG: 20 TABLET ORAL at 11:15

## 2021-08-05 RX ADMIN — OLANZAPINE 5 MG: 5 TABLET, FILM COATED ORAL at 21:36

## 2021-08-05 RX ADMIN — FAMOTIDINE 20 MG: 20 TABLET ORAL at 21:36

## 2021-08-05 RX ADMIN — ACETAMINOPHEN 650 MG: 325 TABLET, FILM COATED ORAL at 17:47

## 2021-08-05 RX ADMIN — ACETAMINOPHEN 650 MG: 325 TABLET, FILM COATED ORAL at 00:19

## 2021-08-05 RX ADMIN — HYDROXYZINE HYDROCHLORIDE 25 MG: 25 TABLET, FILM COATED ORAL at 09:19

## 2021-08-05 RX ADMIN — HEPARIN SODIUM 5000 UNITS: 5000 INJECTION INTRAVENOUS; SUBCUTANEOUS at 21:35

## 2021-08-05 RX ADMIN — Medication 1 PATCH: at 08:36

## 2021-08-05 RX ADMIN — NAFCILLIN SODIUM 2 G: 2 INJECTION, POWDER, LYOPHILIZED, FOR SOLUTION INTRAMUSCULAR; INTRAVENOUS at 16:05

## 2021-08-05 RX ADMIN — OXYCODONE 10 MG: 5 TABLET ORAL at 19:55

## 2021-08-05 RX ADMIN — IBUPROFEN 600 MG: 600 TABLET, FILM COATED ORAL at 14:02

## 2021-08-06 LAB
CANNABINOIDS UR QL CFM: POSITIVE
QT INTERVAL: 414 MS
QTC INTERVAL: 434 MS

## 2021-08-06 PROCEDURE — 94799 UNLISTED PULMONARY SVC/PX: CPT

## 2021-08-06 PROCEDURE — 97116 GAIT TRAINING THERAPY: CPT

## 2021-08-06 PROCEDURE — 99232 SBSQ HOSP IP/OBS MODERATE 35: CPT | Performed by: INTERNAL MEDICINE

## 2021-08-06 PROCEDURE — 97530 THERAPEUTIC ACTIVITIES: CPT

## 2021-08-06 PROCEDURE — 97110 THERAPEUTIC EXERCISES: CPT

## 2021-08-06 PROCEDURE — 25010000002 THIAMINE PER 100 MG: Performed by: NURSE PRACTITIONER

## 2021-08-06 PROCEDURE — 93010 ELECTROCARDIOGRAM REPORT: CPT | Performed by: INTERNAL MEDICINE

## 2021-08-06 PROCEDURE — 93005 ELECTROCARDIOGRAM TRACING: CPT | Performed by: INTERNAL MEDICINE

## 2021-08-06 PROCEDURE — 25010000002 HEPARIN (PORCINE) PER 1000 UNITS: Performed by: OBSTETRICS & GYNECOLOGY

## 2021-08-06 RX ORDER — TRAZODONE HYDROCHLORIDE 50 MG/1
50 TABLET ORAL NIGHTLY
Status: DISCONTINUED | OUTPATIENT
Start: 2021-08-06 | End: 2021-08-07

## 2021-08-06 RX ORDER — AMLODIPINE BESYLATE 5 MG/1
5 TABLET ORAL
Status: DISCONTINUED | OUTPATIENT
Start: 2021-08-07 | End: 2021-08-08

## 2021-08-06 RX ORDER — METHADONE HYDROCHLORIDE 10 MG/1
40 TABLET ORAL DAILY
Status: DISCONTINUED | OUTPATIENT
Start: 2021-08-07 | End: 2021-08-09

## 2021-08-06 RX ADMIN — ACETAMINOPHEN 650 MG: 325 TABLET, FILM COATED ORAL at 12:06

## 2021-08-06 RX ADMIN — NAFCILLIN SODIUM 2 G: 2 INJECTION, POWDER, LYOPHILIZED, FOR SOLUTION INTRAMUSCULAR; INTRAVENOUS at 08:09

## 2021-08-06 RX ADMIN — ACETAMINOPHEN 650 MG: 325 TABLET, FILM COATED ORAL at 17:37

## 2021-08-06 RX ADMIN — METHADONE HYDROCHLORIDE 35 MG: 10 TABLET ORAL at 08:10

## 2021-08-06 RX ADMIN — CLONAZEPAM 0.5 MG: 0.5 TABLET ORAL at 21:28

## 2021-08-06 RX ADMIN — IBUPROFEN 600 MG: 600 TABLET, FILM COATED ORAL at 03:20

## 2021-08-06 RX ADMIN — HYDROXYZINE HYDROCHLORIDE 25 MG: 25 TABLET, FILM COATED ORAL at 17:37

## 2021-08-06 RX ADMIN — OXYCODONE 10 MG: 5 TABLET ORAL at 17:37

## 2021-08-06 RX ADMIN — OXYCODONE 10 MG: 5 TABLET ORAL at 10:14

## 2021-08-06 RX ADMIN — NAFCILLIN SODIUM 2 G: 2 INJECTION, POWDER, LYOPHILIZED, FOR SOLUTION INTRAMUSCULAR; INTRAVENOUS at 12:06

## 2021-08-06 RX ADMIN — IBUPROFEN 600 MG: 600 TABLET, FILM COATED ORAL at 21:28

## 2021-08-06 RX ADMIN — OXYCODONE 10 MG: 5 TABLET ORAL at 00:21

## 2021-08-06 RX ADMIN — ACETAMINOPHEN 650 MG: 325 TABLET, FILM COATED ORAL at 05:34

## 2021-08-06 RX ADMIN — NAFCILLIN SODIUM 2 G: 2 INJECTION, POWDER, LYOPHILIZED, FOR SOLUTION INTRAMUSCULAR; INTRAVENOUS at 00:21

## 2021-08-06 RX ADMIN — TRAZODONE HYDROCHLORIDE 50 MG: 50 TABLET ORAL at 21:29

## 2021-08-06 RX ADMIN — FLUOXETINE HYDROCHLORIDE 20 MG: 20 CAPSULE ORAL at 08:11

## 2021-08-06 RX ADMIN — OXYCODONE 10 MG: 5 TABLET ORAL at 05:53

## 2021-08-06 RX ADMIN — HEPARIN SODIUM 5000 UNITS: 5000 INJECTION INTRAVENOUS; SUBCUTANEOUS at 08:09

## 2021-08-06 RX ADMIN — SODIUM CHLORIDE, PRESERVATIVE FREE 10 ML: 5 INJECTION INTRAVENOUS at 08:10

## 2021-08-06 RX ADMIN — ALUMINUM HYDROXIDE, MAGNESIUM HYDROXIDE, AND DIMETHICONE 15 ML: 400; 400; 40 SUSPENSION ORAL at 12:14

## 2021-08-06 RX ADMIN — Medication 1 PATCH: at 08:14

## 2021-08-06 RX ADMIN — NAFCILLIN SODIUM 2 G: 2 INJECTION, POWDER, LYOPHILIZED, FOR SOLUTION INTRAMUSCULAR; INTRAVENOUS at 03:20

## 2021-08-06 RX ADMIN — ACETAMINOPHEN 650 MG: 325 TABLET, FILM COATED ORAL at 00:21

## 2021-08-06 RX ADMIN — AMLODIPINE BESYLATE 2.5 MG: 2.5 TABLET ORAL at 08:11

## 2021-08-06 RX ADMIN — FAMOTIDINE 20 MG: 20 TABLET ORAL at 21:29

## 2021-08-06 RX ADMIN — CLONAZEPAM 0.5 MG: 0.5 TABLET ORAL at 05:34

## 2021-08-06 RX ADMIN — HYDROXYZINE HYDROCHLORIDE 25 MG: 25 TABLET, FILM COATED ORAL at 08:11

## 2021-08-06 RX ADMIN — Medication 10 MG: at 21:28

## 2021-08-06 RX ADMIN — OLANZAPINE 5 MG: 5 TABLET, FILM COATED ORAL at 21:30

## 2021-08-06 RX ADMIN — THIAMINE HYDROCHLORIDE 100 MG: 100 INJECTION, SOLUTION INTRAMUSCULAR; INTRAVENOUS at 09:06

## 2021-08-06 RX ADMIN — IBUPROFEN 600 MG: 600 TABLET, FILM COATED ORAL at 14:03

## 2021-08-06 RX ADMIN — PANTOPRAZOLE SODIUM 40 MG: 40 TABLET, DELAYED RELEASE ORAL at 05:34

## 2021-08-06 RX ADMIN — OXYCODONE 10 MG: 5 TABLET ORAL at 21:28

## 2021-08-06 RX ADMIN — NAFCILLIN SODIUM 2 G: 2 INJECTION, POWDER, LYOPHILIZED, FOR SOLUTION INTRAMUSCULAR; INTRAVENOUS at 20:17

## 2021-08-06 RX ADMIN — FAMOTIDINE 20 MG: 20 TABLET ORAL at 12:06

## 2021-08-06 RX ADMIN — CLONAZEPAM 0.5 MG: 0.5 TABLET ORAL at 14:03

## 2021-08-06 RX ADMIN — HEPARIN SODIUM 5000 UNITS: 5000 INJECTION INTRAVENOUS; SUBCUTANEOUS at 21:29

## 2021-08-06 RX ADMIN — OXYCODONE 10 MG: 5 TABLET ORAL at 14:03

## 2021-08-06 RX ADMIN — IBUPROFEN 600 MG: 600 TABLET, FILM COATED ORAL at 08:11

## 2021-08-06 RX ADMIN — NAFCILLIN SODIUM 2 G: 2 INJECTION, POWDER, LYOPHILIZED, FOR SOLUTION INTRAMUSCULAR; INTRAVENOUS at 16:25

## 2021-08-07 LAB
AMPHET+METHAMPHET UR QL: POSITIVE
BACTERIA SPEC AEROBE CULT: NORMAL
BACTERIA SPEC AEROBE CULT: NORMAL

## 2021-08-07 PROCEDURE — 25010000002 HEPARIN (PORCINE) PER 1000 UNITS: Performed by: OBSTETRICS & GYNECOLOGY

## 2021-08-07 PROCEDURE — 99232 SBSQ HOSP IP/OBS MODERATE 35: CPT | Performed by: NURSE PRACTITIONER

## 2021-08-07 PROCEDURE — 0503F POSTPARTUM CARE VISIT: CPT | Performed by: OBSTETRICS & GYNECOLOGY

## 2021-08-07 PROCEDURE — 97116 GAIT TRAINING THERAPY: CPT | Performed by: PHYSICAL THERAPIST

## 2021-08-07 PROCEDURE — 97110 THERAPEUTIC EXERCISES: CPT | Performed by: PHYSICAL THERAPIST

## 2021-08-07 RX ORDER — TRAZODONE HYDROCHLORIDE 100 MG/1
100 TABLET ORAL NIGHTLY
Status: DISCONTINUED | OUTPATIENT
Start: 2021-08-07 | End: 2021-08-14

## 2021-08-07 RX ORDER — OLANZAPINE 5 MG/1
10 TABLET ORAL DAILY
Status: DISCONTINUED | OUTPATIENT
Start: 2021-08-07 | End: 2021-08-20 | Stop reason: HOSPADM

## 2021-08-07 RX ADMIN — FAMOTIDINE 20 MG: 20 TABLET ORAL at 21:45

## 2021-08-07 RX ADMIN — HYDROXYZINE HYDROCHLORIDE 25 MG: 25 TABLET, FILM COATED ORAL at 12:47

## 2021-08-07 RX ADMIN — NAFCILLIN SODIUM 2 G: 2 INJECTION, POWDER, LYOPHILIZED, FOR SOLUTION INTRAMUSCULAR; INTRAVENOUS at 20:00

## 2021-08-07 RX ADMIN — OXYCODONE 10 MG: 5 TABLET ORAL at 21:44

## 2021-08-07 RX ADMIN — METHADONE HYDROCHLORIDE 40 MG: 10 TABLET ORAL at 08:01

## 2021-08-07 RX ADMIN — CLONAZEPAM 0.5 MG: 0.5 TABLET ORAL at 21:44

## 2021-08-07 RX ADMIN — NAFCILLIN SODIUM 2 G: 2 INJECTION, POWDER, LYOPHILIZED, FOR SOLUTION INTRAMUSCULAR; INTRAVENOUS at 12:47

## 2021-08-07 RX ADMIN — PANTOPRAZOLE SODIUM 40 MG: 40 TABLET, DELAYED RELEASE ORAL at 07:59

## 2021-08-07 RX ADMIN — ACETAMINOPHEN 650 MG: 325 TABLET, FILM COATED ORAL at 05:14

## 2021-08-07 RX ADMIN — CLONAZEPAM 0.5 MG: 0.5 TABLET ORAL at 05:14

## 2021-08-07 RX ADMIN — IBUPROFEN 600 MG: 600 TABLET, FILM COATED ORAL at 15:11

## 2021-08-07 RX ADMIN — FLUOXETINE HYDROCHLORIDE 20 MG: 20 CAPSULE ORAL at 08:01

## 2021-08-07 RX ADMIN — THIAMINE HCL TAB 100 MG 100 MG: 100 TAB at 08:02

## 2021-08-07 RX ADMIN — NAFCILLIN SODIUM 2 G: 2 INJECTION, POWDER, LYOPHILIZED, FOR SOLUTION INTRAMUSCULAR; INTRAVENOUS at 07:58

## 2021-08-07 RX ADMIN — HYDROXYZINE HYDROCHLORIDE 25 MG: 25 TABLET, FILM COATED ORAL at 21:44

## 2021-08-07 RX ADMIN — NAFCILLIN SODIUM 2 G: 2 INJECTION, POWDER, LYOPHILIZED, FOR SOLUTION INTRAMUSCULAR; INTRAVENOUS at 00:23

## 2021-08-07 RX ADMIN — HEPARIN SODIUM 5000 UNITS: 5000 INJECTION INTRAVENOUS; SUBCUTANEOUS at 21:44

## 2021-08-07 RX ADMIN — IBUPROFEN 600 MG: 600 TABLET, FILM COATED ORAL at 03:39

## 2021-08-07 RX ADMIN — NAFCILLIN SODIUM 2 G: 2 INJECTION, POWDER, LYOPHILIZED, FOR SOLUTION INTRAMUSCULAR; INTRAVENOUS at 17:00

## 2021-08-07 RX ADMIN — SODIUM CHLORIDE, PRESERVATIVE FREE 10 ML: 5 INJECTION INTRAVENOUS at 08:03

## 2021-08-07 RX ADMIN — OXYCODONE 10 MG: 5 TABLET ORAL at 17:25

## 2021-08-07 RX ADMIN — FAMOTIDINE 20 MG: 20 TABLET ORAL at 12:47

## 2021-08-07 RX ADMIN — IBUPROFEN 600 MG: 600 TABLET, FILM COATED ORAL at 08:00

## 2021-08-07 RX ADMIN — Medication 1 PATCH: at 08:01

## 2021-08-07 RX ADMIN — AMLODIPINE BESYLATE 5 MG: 5 TABLET ORAL at 08:02

## 2021-08-07 RX ADMIN — NAFCILLIN SODIUM 2 G: 2 INJECTION, POWDER, LYOPHILIZED, FOR SOLUTION INTRAMUSCULAR; INTRAVENOUS at 03:39

## 2021-08-07 RX ADMIN — CLONAZEPAM 0.5 MG: 0.5 TABLET ORAL at 15:11

## 2021-08-07 RX ADMIN — OXYCODONE 10 MG: 5 TABLET ORAL at 12:47

## 2021-08-07 RX ADMIN — OXYCODONE 10 MG: 5 TABLET ORAL at 05:14

## 2021-08-07 RX ADMIN — NAFCILLIN SODIUM 2 G: 2 INJECTION, POWDER, LYOPHILIZED, FOR SOLUTION INTRAMUSCULAR; INTRAVENOUS at 23:12

## 2021-08-07 RX ADMIN — HEPARIN SODIUM 5000 UNITS: 5000 INJECTION INTRAVENOUS; SUBCUTANEOUS at 08:03

## 2021-08-07 RX ADMIN — Medication 10 MG: at 21:45

## 2021-08-07 RX ADMIN — IBUPROFEN 600 MG: 600 TABLET, FILM COATED ORAL at 21:45

## 2021-08-07 RX ADMIN — TRAZODONE HYDROCHLORIDE 100 MG: 100 TABLET ORAL at 21:45

## 2021-08-07 RX ADMIN — OLANZAPINE 10 MG: 5 TABLET, FILM COATED ORAL at 21:45

## 2021-08-08 PROCEDURE — 97116 GAIT TRAINING THERAPY: CPT | Performed by: PHYSICAL THERAPIST

## 2021-08-08 PROCEDURE — 99232 SBSQ HOSP IP/OBS MODERATE 35: CPT | Performed by: NURSE PRACTITIONER

## 2021-08-08 PROCEDURE — 97110 THERAPEUTIC EXERCISES: CPT | Performed by: PHYSICAL THERAPIST

## 2021-08-08 PROCEDURE — 99232 SBSQ HOSP IP/OBS MODERATE 35: CPT | Performed by: OBSTETRICS & GYNECOLOGY

## 2021-08-08 PROCEDURE — 25010000002 HEPARIN (PORCINE) PER 1000 UNITS: Performed by: OBSTETRICS & GYNECOLOGY

## 2021-08-08 RX ORDER — AMLODIPINE BESYLATE 10 MG/1
10 TABLET ORAL
Status: DISCONTINUED | OUTPATIENT
Start: 2021-08-09 | End: 2021-08-20 | Stop reason: HOSPADM

## 2021-08-08 RX ORDER — AMLODIPINE BESYLATE 5 MG/1
5 TABLET ORAL ONCE
Status: COMPLETED | OUTPATIENT
Start: 2021-08-08 | End: 2021-08-08

## 2021-08-08 RX ADMIN — FAMOTIDINE 20 MG: 20 TABLET ORAL at 21:06

## 2021-08-08 RX ADMIN — ACETAMINOPHEN 650 MG: 325 TABLET, FILM COATED ORAL at 05:07

## 2021-08-08 RX ADMIN — THIAMINE HCL TAB 100 MG 100 MG: 100 TAB at 08:54

## 2021-08-08 RX ADMIN — CLONAZEPAM 0.5 MG: 0.5 TABLET ORAL at 13:19

## 2021-08-08 RX ADMIN — FLUOXETINE HYDROCHLORIDE 20 MG: 20 CAPSULE ORAL at 08:54

## 2021-08-08 RX ADMIN — MICONAZOLE NITRATE: 2 CREAM TOPICAL at 21:05

## 2021-08-08 RX ADMIN — OXYCODONE 10 MG: 5 TABLET ORAL at 08:54

## 2021-08-08 RX ADMIN — NAFCILLIN SODIUM 2 G: 2 INJECTION, POWDER, LYOPHILIZED, FOR SOLUTION INTRAMUSCULAR; INTRAVENOUS at 04:07

## 2021-08-08 RX ADMIN — NAFCILLIN SODIUM 2 G: 2 INJECTION, POWDER, LYOPHILIZED, FOR SOLUTION INTRAMUSCULAR; INTRAVENOUS at 23:27

## 2021-08-08 RX ADMIN — HEPARIN SODIUM 5000 UNITS: 5000 INJECTION INTRAVENOUS; SUBCUTANEOUS at 21:06

## 2021-08-08 RX ADMIN — METHADONE HYDROCHLORIDE 40 MG: 10 TABLET ORAL at 08:54

## 2021-08-08 RX ADMIN — Medication 10 MG: at 21:06

## 2021-08-08 RX ADMIN — FAMOTIDINE 20 MG: 20 TABLET ORAL at 08:54

## 2021-08-08 RX ADMIN — CLONAZEPAM 0.5 MG: 0.5 TABLET ORAL at 21:06

## 2021-08-08 RX ADMIN — IBUPROFEN 600 MG: 600 TABLET, FILM COATED ORAL at 08:54

## 2021-08-08 RX ADMIN — NAFCILLIN SODIUM 2 G: 2 INJECTION, POWDER, LYOPHILIZED, FOR SOLUTION INTRAMUSCULAR; INTRAVENOUS at 08:53

## 2021-08-08 RX ADMIN — HYDROXYZINE HYDROCHLORIDE 25 MG: 25 TABLET, FILM COATED ORAL at 20:40

## 2021-08-08 RX ADMIN — PANTOPRAZOLE SODIUM 40 MG: 40 TABLET, DELAYED RELEASE ORAL at 05:07

## 2021-08-08 RX ADMIN — ACETAMINOPHEN 650 MG: 325 TABLET, FILM COATED ORAL at 17:35

## 2021-08-08 RX ADMIN — IBUPROFEN 600 MG: 600 TABLET, FILM COATED ORAL at 20:38

## 2021-08-08 RX ADMIN — OXYCODONE 10 MG: 5 TABLET ORAL at 13:19

## 2021-08-08 RX ADMIN — ACETAMINOPHEN 650 MG: 325 TABLET, FILM COATED ORAL at 12:17

## 2021-08-08 RX ADMIN — OXYCODONE 10 MG: 5 TABLET ORAL at 21:06

## 2021-08-08 RX ADMIN — MICONAZOLE NITRATE: 2 CREAM TOPICAL at 13:38

## 2021-08-08 RX ADMIN — TRAZODONE HYDROCHLORIDE 100 MG: 100 TABLET ORAL at 21:06

## 2021-08-08 RX ADMIN — AMLODIPINE BESYLATE 5 MG: 5 TABLET ORAL at 08:54

## 2021-08-08 RX ADMIN — OLANZAPINE 10 MG: 5 TABLET, FILM COATED ORAL at 22:23

## 2021-08-08 RX ADMIN — HEPARIN SODIUM 5000 UNITS: 5000 INJECTION INTRAVENOUS; SUBCUTANEOUS at 08:53

## 2021-08-08 RX ADMIN — NAFCILLIN SODIUM 2 G: 2 INJECTION, POWDER, LYOPHILIZED, FOR SOLUTION INTRAMUSCULAR; INTRAVENOUS at 21:05

## 2021-08-08 RX ADMIN — Medication 1 PATCH: at 08:55

## 2021-08-08 RX ADMIN — IBUPROFEN 600 MG: 600 TABLET, FILM COATED ORAL at 16:13

## 2021-08-08 RX ADMIN — AMLODIPINE BESYLATE 5 MG: 5 TABLET ORAL at 12:17

## 2021-08-08 RX ADMIN — CLONAZEPAM 0.5 MG: 0.5 TABLET ORAL at 05:07

## 2021-08-08 RX ADMIN — OXYCODONE 10 MG: 5 TABLET ORAL at 17:35

## 2021-08-08 RX ADMIN — OXYCODONE 10 MG: 5 TABLET ORAL at 05:07

## 2021-08-08 RX ADMIN — NAFCILLIN SODIUM 2 G: 2 INJECTION, POWDER, LYOPHILIZED, FOR SOLUTION INTRAMUSCULAR; INTRAVENOUS at 12:18

## 2021-08-08 RX ADMIN — NAFCILLIN SODIUM 2 G: 2 INJECTION, POWDER, LYOPHILIZED, FOR SOLUTION INTRAMUSCULAR; INTRAVENOUS at 16:00

## 2021-08-09 PROCEDURE — 97530 THERAPEUTIC ACTIVITIES: CPT | Performed by: OCCUPATIONAL THERAPIST

## 2021-08-09 PROCEDURE — 97116 GAIT TRAINING THERAPY: CPT

## 2021-08-09 PROCEDURE — 99232 SBSQ HOSP IP/OBS MODERATE 35: CPT | Performed by: NURSE PRACTITIONER

## 2021-08-09 PROCEDURE — 99232 SBSQ HOSP IP/OBS MODERATE 35: CPT | Performed by: OBSTETRICS & GYNECOLOGY

## 2021-08-09 PROCEDURE — 94799 UNLISTED PULMONARY SVC/PX: CPT

## 2021-08-09 PROCEDURE — 97110 THERAPEUTIC EXERCISES: CPT

## 2021-08-09 PROCEDURE — 25010000002 HEPARIN (PORCINE) PER 1000 UNITS: Performed by: OBSTETRICS & GYNECOLOGY

## 2021-08-09 RX ORDER — METHADONE HYDROCHLORIDE 10 MG/1
50 TABLET ORAL DAILY
Status: DISCONTINUED | OUTPATIENT
Start: 2021-08-10 | End: 2021-08-13

## 2021-08-09 RX ORDER — METHADONE HYDROCHLORIDE 10 MG/1
10 TABLET ORAL ONCE
Status: COMPLETED | OUTPATIENT
Start: 2021-08-09 | End: 2021-08-09

## 2021-08-09 RX ADMIN — MICONAZOLE NITRATE: 2 CREAM TOPICAL at 08:19

## 2021-08-09 RX ADMIN — Medication 1 PATCH: at 08:16

## 2021-08-09 RX ADMIN — HYDROXYZINE HYDROCHLORIDE 25 MG: 25 TABLET, FILM COATED ORAL at 21:05

## 2021-08-09 RX ADMIN — Medication 10 MG: at 22:54

## 2021-08-09 RX ADMIN — TRAZODONE HYDROCHLORIDE 100 MG: 100 TABLET ORAL at 22:53

## 2021-08-09 RX ADMIN — HEPARIN SODIUM 5000 UNITS: 5000 INJECTION INTRAVENOUS; SUBCUTANEOUS at 08:15

## 2021-08-09 RX ADMIN — NAFCILLIN SODIUM 2 G: 2 INJECTION, POWDER, LYOPHILIZED, FOR SOLUTION INTRAMUSCULAR; INTRAVENOUS at 15:01

## 2021-08-09 RX ADMIN — OXYCODONE 10 MG: 5 TABLET ORAL at 15:03

## 2021-08-09 RX ADMIN — OLANZAPINE 10 MG: 5 TABLET, FILM COATED ORAL at 21:05

## 2021-08-09 RX ADMIN — CLONAZEPAM 0.5 MG: 0.5 TABLET ORAL at 15:02

## 2021-08-09 RX ADMIN — FLUOXETINE HYDROCHLORIDE 20 MG: 20 CAPSULE ORAL at 08:15

## 2021-08-09 RX ADMIN — IBUPROFEN 600 MG: 600 TABLET, FILM COATED ORAL at 15:02

## 2021-08-09 RX ADMIN — CLONAZEPAM 0.5 MG: 0.5 TABLET ORAL at 21:05

## 2021-08-09 RX ADMIN — HEPARIN SODIUM 5000 UNITS: 5000 INJECTION INTRAVENOUS; SUBCUTANEOUS at 21:06

## 2021-08-09 RX ADMIN — OXYCODONE 10 MG: 5 TABLET ORAL at 02:23

## 2021-08-09 RX ADMIN — OXYCODONE 10 MG: 5 TABLET ORAL at 21:13

## 2021-08-09 RX ADMIN — BUSPIRONE HYDROCHLORIDE 7.5 MG: 5 TABLET ORAL at 21:14

## 2021-08-09 RX ADMIN — METHADONE HYDROCHLORIDE 10 MG: 10 TABLET ORAL at 11:03

## 2021-08-09 RX ADMIN — NAFCILLIN SODIUM 2 G: 2 INJECTION, POWDER, LYOPHILIZED, FOR SOLUTION INTRAMUSCULAR; INTRAVENOUS at 22:54

## 2021-08-09 RX ADMIN — IBUPROFEN 600 MG: 600 TABLET, FILM COATED ORAL at 21:06

## 2021-08-09 RX ADMIN — NAFCILLIN SODIUM 2 G: 2 INJECTION, POWDER, LYOPHILIZED, FOR SOLUTION INTRAMUSCULAR; INTRAVENOUS at 03:49

## 2021-08-09 RX ADMIN — IBUPROFEN 600 MG: 600 TABLET, FILM COATED ORAL at 08:15

## 2021-08-09 RX ADMIN — FAMOTIDINE 20 MG: 20 TABLET ORAL at 21:05

## 2021-08-09 RX ADMIN — SODIUM CHLORIDE, PRESERVATIVE FREE 10 ML: 5 INJECTION INTRAVENOUS at 21:10

## 2021-08-09 RX ADMIN — HYDROXYZINE HYDROCHLORIDE 25 MG: 25 TABLET, FILM COATED ORAL at 11:08

## 2021-08-09 RX ADMIN — PANTOPRAZOLE SODIUM 40 MG: 40 TABLET, DELAYED RELEASE ORAL at 06:43

## 2021-08-09 RX ADMIN — METHADONE HYDROCHLORIDE 40 MG: 10 TABLET ORAL at 08:15

## 2021-08-09 RX ADMIN — IBUPROFEN 600 MG: 600 TABLET, FILM COATED ORAL at 02:23

## 2021-08-09 RX ADMIN — NAFCILLIN SODIUM 2 G: 2 INJECTION, POWDER, LYOPHILIZED, FOR SOLUTION INTRAMUSCULAR; INTRAVENOUS at 08:08

## 2021-08-09 RX ADMIN — OXYCODONE 10 MG: 5 TABLET ORAL at 08:22

## 2021-08-09 RX ADMIN — CLONAZEPAM 0.5 MG: 0.5 TABLET ORAL at 06:43

## 2021-08-09 RX ADMIN — THIAMINE HCL TAB 100 MG 100 MG: 100 TAB at 08:15

## 2021-08-09 RX ADMIN — NAFCILLIN SODIUM 2 G: 2 INJECTION, POWDER, LYOPHILIZED, FOR SOLUTION INTRAMUSCULAR; INTRAVENOUS at 11:03

## 2021-08-09 RX ADMIN — FAMOTIDINE 20 MG: 20 TABLET ORAL at 11:06

## 2021-08-09 RX ADMIN — MICONAZOLE NITRATE: 2 CREAM TOPICAL at 21:16

## 2021-08-09 RX ADMIN — AMLODIPINE BESYLATE 10 MG: 10 TABLET ORAL at 08:15

## 2021-08-10 LAB
BACTERIA SPEC ANAEROBE CULT: NORMAL
BZE UR QL: POSITIVE

## 2021-08-10 PROCEDURE — 99232 SBSQ HOSP IP/OBS MODERATE 35: CPT | Performed by: PHYSICIAN ASSISTANT

## 2021-08-10 PROCEDURE — 99231 SBSQ HOSP IP/OBS SF/LOW 25: CPT | Performed by: OBSTETRICS & GYNECOLOGY

## 2021-08-10 PROCEDURE — 97110 THERAPEUTIC EXERCISES: CPT | Performed by: PHYSICAL THERAPIST

## 2021-08-10 PROCEDURE — 97116 GAIT TRAINING THERAPY: CPT | Performed by: PHYSICAL THERAPIST

## 2021-08-10 PROCEDURE — 25010000002 HEPARIN (PORCINE) PER 1000 UNITS: Performed by: OBSTETRICS & GYNECOLOGY

## 2021-08-10 PROCEDURE — 94799 UNLISTED PULMONARY SVC/PX: CPT

## 2021-08-10 RX ADMIN — OXYCODONE 10 MG: 5 TABLET ORAL at 08:16

## 2021-08-10 RX ADMIN — MICONAZOLE NITRATE: 2 CREAM TOPICAL at 08:07

## 2021-08-10 RX ADMIN — PANTOPRAZOLE SODIUM 40 MG: 40 TABLET, DELAYED RELEASE ORAL at 08:05

## 2021-08-10 RX ADMIN — OXYCODONE 10 MG: 5 TABLET ORAL at 17:58

## 2021-08-10 RX ADMIN — TRAZODONE HYDROCHLORIDE 100 MG: 100 TABLET ORAL at 20:42

## 2021-08-10 RX ADMIN — SODIUM CHLORIDE, PRESERVATIVE FREE 10 ML: 5 INJECTION INTRAVENOUS at 20:42

## 2021-08-10 RX ADMIN — HYDROXYZINE HYDROCHLORIDE 25 MG: 25 TABLET, FILM COATED ORAL at 22:05

## 2021-08-10 RX ADMIN — IBUPROFEN 600 MG: 600 TABLET, FILM COATED ORAL at 08:04

## 2021-08-10 RX ADMIN — NAFCILLIN SODIUM 2 G: 2 INJECTION, POWDER, LYOPHILIZED, FOR SOLUTION INTRAMUSCULAR; INTRAVENOUS at 13:32

## 2021-08-10 RX ADMIN — NAFCILLIN SODIUM 2 G: 2 INJECTION, POWDER, LYOPHILIZED, FOR SOLUTION INTRAMUSCULAR; INTRAVENOUS at 04:04

## 2021-08-10 RX ADMIN — CLONAZEPAM 0.5 MG: 0.5 TABLET ORAL at 20:41

## 2021-08-10 RX ADMIN — FLUOXETINE HYDROCHLORIDE 20 MG: 20 CAPSULE ORAL at 08:05

## 2021-08-10 RX ADMIN — SODIUM CHLORIDE, PRESERVATIVE FREE 3 ML: 5 INJECTION INTRAVENOUS at 08:11

## 2021-08-10 RX ADMIN — Medication 10 MG: at 20:41

## 2021-08-10 RX ADMIN — NAFCILLIN SODIUM 2 G: 2 INJECTION, POWDER, LYOPHILIZED, FOR SOLUTION INTRAMUSCULAR; INTRAVENOUS at 16:45

## 2021-08-10 RX ADMIN — Medication 1 PATCH: at 08:05

## 2021-08-10 RX ADMIN — OXYCODONE 10 MG: 5 TABLET ORAL at 13:32

## 2021-08-10 RX ADMIN — IBUPROFEN 600 MG: 600 TABLET, FILM COATED ORAL at 04:16

## 2021-08-10 RX ADMIN — OXYCODONE 10 MG: 5 TABLET ORAL at 22:05

## 2021-08-10 RX ADMIN — NAFCILLIN SODIUM 2 G: 2 INJECTION, POWDER, LYOPHILIZED, FOR SOLUTION INTRAMUSCULAR; INTRAVENOUS at 20:41

## 2021-08-10 RX ADMIN — BUSPIRONE HYDROCHLORIDE 7.5 MG: 5 TABLET ORAL at 22:05

## 2021-08-10 RX ADMIN — MICONAZOLE NITRATE: 2 CREAM TOPICAL at 20:43

## 2021-08-10 RX ADMIN — AMLODIPINE BESYLATE 10 MG: 10 TABLET ORAL at 08:04

## 2021-08-10 RX ADMIN — HEPARIN SODIUM 5000 UNITS: 5000 INJECTION INTRAVENOUS; SUBCUTANEOUS at 20:42

## 2021-08-10 RX ADMIN — IBUPROFEN 600 MG: 600 TABLET, FILM COATED ORAL at 20:41

## 2021-08-10 RX ADMIN — OXYCODONE 10 MG: 5 TABLET ORAL at 04:16

## 2021-08-10 RX ADMIN — HYDROXYZINE HYDROCHLORIDE 25 MG: 25 TABLET, FILM COATED ORAL at 08:15

## 2021-08-10 RX ADMIN — FAMOTIDINE 20 MG: 20 TABLET ORAL at 13:32

## 2021-08-10 RX ADMIN — CLONAZEPAM 0.5 MG: 0.5 TABLET ORAL at 13:32

## 2021-08-10 RX ADMIN — NAFCILLIN SODIUM 2 G: 2 INJECTION, POWDER, LYOPHILIZED, FOR SOLUTION INTRAMUSCULAR; INTRAVENOUS at 08:10

## 2021-08-10 RX ADMIN — FAMOTIDINE 20 MG: 20 TABLET ORAL at 20:41

## 2021-08-10 RX ADMIN — HEPARIN SODIUM 5000 UNITS: 5000 INJECTION INTRAVENOUS; SUBCUTANEOUS at 08:04

## 2021-08-10 RX ADMIN — OLANZAPINE 10 MG: 5 TABLET, FILM COATED ORAL at 20:42

## 2021-08-10 RX ADMIN — ACETAMINOPHEN 650 MG: 325 TABLET, FILM COATED ORAL at 13:32

## 2021-08-10 RX ADMIN — NAFCILLIN SODIUM 2 G: 2 INJECTION, POWDER, LYOPHILIZED, FOR SOLUTION INTRAMUSCULAR; INTRAVENOUS at 02:29

## 2021-08-10 RX ADMIN — IBUPROFEN 600 MG: 600 TABLET, FILM COATED ORAL at 16:45

## 2021-08-10 RX ADMIN — BUSPIRONE HYDROCHLORIDE 7.5 MG: 5 TABLET ORAL at 08:15

## 2021-08-10 RX ADMIN — METHADONE HYDROCHLORIDE 50 MG: 10 TABLET ORAL at 08:04

## 2021-08-10 RX ADMIN — THIAMINE HCL TAB 100 MG 100 MG: 100 TAB at 08:05

## 2021-08-10 RX ADMIN — SODIUM CHLORIDE, PRESERVATIVE FREE 10 ML: 5 INJECTION INTRAVENOUS at 08:11

## 2021-08-11 ENCOUNTER — APPOINTMENT (OUTPATIENT)
Dept: CARDIOLOGY | Facility: HOSPITAL | Age: 41
End: 2021-08-11

## 2021-08-11 LAB
BH CV LOWER VASCULAR LEFT COMMON FEMORAL AUGMENT: NORMAL
BH CV LOWER VASCULAR LEFT COMMON FEMORAL COMPRESS: NORMAL
BH CV LOWER VASCULAR LEFT COMMON FEMORAL PHASIC: NORMAL
BH CV LOWER VASCULAR LEFT COMMON FEMORAL SPONT: NORMAL
BH CV LOWER VASCULAR RIGHT COMMON FEMORAL AUGMENT: NORMAL
BH CV LOWER VASCULAR RIGHT COMMON FEMORAL COMPRESS: NORMAL
BH CV LOWER VASCULAR RIGHT COMMON FEMORAL PHASIC: NORMAL
BH CV LOWER VASCULAR RIGHT COMMON FEMORAL SPONT: NORMAL
BH CV LOWER VASCULAR RIGHT DISTAL FEMORAL AUGMENT: NORMAL
BH CV LOWER VASCULAR RIGHT DISTAL FEMORAL COMPRESS: NORMAL
BH CV LOWER VASCULAR RIGHT DISTAL FEMORAL PHASIC: NORMAL
BH CV LOWER VASCULAR RIGHT DISTAL FEMORAL SPONT: NORMAL
BH CV LOWER VASCULAR RIGHT GASTRONEMIUS COMPRESS: NORMAL
BH CV LOWER VASCULAR RIGHT GREATER SAPH AK COMPRESS: NORMAL
BH CV LOWER VASCULAR RIGHT GREATER SAPH BK COMPRESS: NORMAL
BH CV LOWER VASCULAR RIGHT LESSER SAPH COMPRESS: NORMAL
BH CV LOWER VASCULAR RIGHT MID FEMORAL AUGMENT: NORMAL
BH CV LOWER VASCULAR RIGHT MID FEMORAL COMPRESS: NORMAL
BH CV LOWER VASCULAR RIGHT MID FEMORAL PHASIC: NORMAL
BH CV LOWER VASCULAR RIGHT MID FEMORAL SPONT: NORMAL
BH CV LOWER VASCULAR RIGHT PERONEAL COMPRESS: NORMAL
BH CV LOWER VASCULAR RIGHT POPLITEAL AUGMENT: NORMAL
BH CV LOWER VASCULAR RIGHT POPLITEAL COMPRESS: NORMAL
BH CV LOWER VASCULAR RIGHT POPLITEAL PHASIC: NORMAL
BH CV LOWER VASCULAR RIGHT POPLITEAL SPONT: NORMAL
BH CV LOWER VASCULAR RIGHT POSTERIOR TIBIAL COMPRESS: NORMAL
BH CV LOWER VASCULAR RIGHT PROFUNDA FEMORAL COMPRESS: NORMAL
BH CV LOWER VASCULAR RIGHT PROFUNDA FEMORAL PHASIC: NORMAL
BH CV LOWER VASCULAR RIGHT PROFUNDA FEMORAL SPONT: NORMAL
BH CV LOWER VASCULAR RIGHT PROXIMAL FEMORAL AUGMENT: NORMAL
BH CV LOWER VASCULAR RIGHT PROXIMAL FEMORAL COMPRESS: NORMAL
BH CV LOWER VASCULAR RIGHT PROXIMAL FEMORAL PHASIC: NORMAL
BH CV LOWER VASCULAR RIGHT PROXIMAL FEMORAL SPONT: NORMAL
BH CV LOWER VASCULAR RIGHT SAPHENOFEMORAL JUNCTION COMPRESS: NORMAL
BH CV LOWER VASCULAR RIGHT SAPHENOFEMORAL JUNCTION PHASIC: NORMAL
BH CV LOWER VASCULAR RIGHT SAPHENOFEMORAL JUNCTION SPONT: NORMAL

## 2021-08-11 PROCEDURE — 25010000002 HEPARIN (PORCINE) PER 1000 UNITS: Performed by: OBSTETRICS & GYNECOLOGY

## 2021-08-11 PROCEDURE — 97116 GAIT TRAINING THERAPY: CPT | Performed by: PHYSICAL THERAPIST

## 2021-08-11 PROCEDURE — 97110 THERAPEUTIC EXERCISES: CPT | Performed by: PHYSICAL THERAPIST

## 2021-08-11 PROCEDURE — 99231 SBSQ HOSP IP/OBS SF/LOW 25: CPT | Performed by: OBSTETRICS & GYNECOLOGY

## 2021-08-11 PROCEDURE — 93971 EXTREMITY STUDY: CPT | Performed by: INTERNAL MEDICINE

## 2021-08-11 PROCEDURE — 93971 EXTREMITY STUDY: CPT

## 2021-08-11 PROCEDURE — 99232 SBSQ HOSP IP/OBS MODERATE 35: CPT | Performed by: PHYSICIAN ASSISTANT

## 2021-08-11 RX ORDER — OXYCODONE HYDROCHLORIDE 5 MG/1
5 TABLET ORAL EVERY 8 HOURS PRN
Status: DISCONTINUED | OUTPATIENT
Start: 2021-08-11 | End: 2021-08-12

## 2021-08-11 RX ADMIN — FAMOTIDINE 20 MG: 20 TABLET ORAL at 21:06

## 2021-08-11 RX ADMIN — Medication 10 MG: at 21:06

## 2021-08-11 RX ADMIN — Medication 1 PATCH: at 09:00

## 2021-08-11 RX ADMIN — FLUOXETINE HYDROCHLORIDE 20 MG: 20 CAPSULE ORAL at 08:50

## 2021-08-11 RX ADMIN — THIAMINE HCL TAB 100 MG 100 MG: 100 TAB at 08:53

## 2021-08-11 RX ADMIN — SODIUM CHLORIDE, PRESERVATIVE FREE 10 ML: 5 INJECTION INTRAVENOUS at 21:06

## 2021-08-11 RX ADMIN — PANTOPRAZOLE SODIUM 40 MG: 40 TABLET, DELAYED RELEASE ORAL at 08:49

## 2021-08-11 RX ADMIN — IBUPROFEN 600 MG: 600 TABLET, FILM COATED ORAL at 03:43

## 2021-08-11 RX ADMIN — NAFCILLIN SODIUM 2 G: 2 INJECTION, POWDER, LYOPHILIZED, FOR SOLUTION INTRAMUSCULAR; INTRAVENOUS at 17:52

## 2021-08-11 RX ADMIN — HEPARIN SODIUM 5000 UNITS: 5000 INJECTION INTRAVENOUS; SUBCUTANEOUS at 21:05

## 2021-08-11 RX ADMIN — ACETAMINOPHEN 650 MG: 325 TABLET, FILM COATED ORAL at 05:15

## 2021-08-11 RX ADMIN — BUSPIRONE HYDROCHLORIDE 7.5 MG: 5 TABLET ORAL at 17:52

## 2021-08-11 RX ADMIN — TRAZODONE HYDROCHLORIDE 100 MG: 100 TABLET ORAL at 21:05

## 2021-08-11 RX ADMIN — NAFCILLIN SODIUM 2 G: 2 INJECTION, POWDER, LYOPHILIZED, FOR SOLUTION INTRAMUSCULAR; INTRAVENOUS at 08:47

## 2021-08-11 RX ADMIN — AMLODIPINE BESYLATE 10 MG: 10 TABLET ORAL at 08:49

## 2021-08-11 RX ADMIN — NAFCILLIN SODIUM 2 G: 2 INJECTION, POWDER, LYOPHILIZED, FOR SOLUTION INTRAMUSCULAR; INTRAVENOUS at 20:57

## 2021-08-11 RX ADMIN — METHADONE HYDROCHLORIDE 50 MG: 10 TABLET ORAL at 08:48

## 2021-08-11 RX ADMIN — CLONAZEPAM 0.5 MG: 0.5 TABLET ORAL at 21:06

## 2021-08-11 RX ADMIN — OXYCODONE 5 MG: 5 TABLET ORAL at 17:52

## 2021-08-11 RX ADMIN — ACETAMINOPHEN 650 MG: 325 TABLET, FILM COATED ORAL at 17:52

## 2021-08-11 RX ADMIN — NAFCILLIN SODIUM 2 G: 2 INJECTION, POWDER, LYOPHILIZED, FOR SOLUTION INTRAMUSCULAR; INTRAVENOUS at 00:27

## 2021-08-11 RX ADMIN — OLANZAPINE 10 MG: 5 TABLET, FILM COATED ORAL at 21:06

## 2021-08-11 RX ADMIN — NAFCILLIN SODIUM 2 G: 2 INJECTION, POWDER, LYOPHILIZED, FOR SOLUTION INTRAMUSCULAR; INTRAVENOUS at 03:43

## 2021-08-11 RX ADMIN — IBUPROFEN 600 MG: 600 TABLET, FILM COATED ORAL at 08:48

## 2021-08-11 RX ADMIN — HYDROXYZINE HYDROCHLORIDE 25 MG: 25 TABLET, FILM COATED ORAL at 17:52

## 2021-08-11 RX ADMIN — MICONAZOLE NITRATE: 2 CREAM TOPICAL at 21:12

## 2021-08-11 RX ADMIN — CLONAZEPAM 0.5 MG: 0.5 TABLET ORAL at 05:15

## 2021-08-11 RX ADMIN — OXYCODONE 5 MG: 5 TABLET ORAL at 08:48

## 2021-08-11 RX ADMIN — ACETAMINOPHEN 650 MG: 325 TABLET, FILM COATED ORAL at 13:34

## 2021-08-11 RX ADMIN — SODIUM CHLORIDE, PRESERVATIVE FREE 10 ML: 5 INJECTION INTRAVENOUS at 21:12

## 2021-08-11 RX ADMIN — IBUPROFEN 600 MG: 600 TABLET, FILM COATED ORAL at 21:06

## 2021-08-11 RX ADMIN — BUSPIRONE HYDROCHLORIDE 7.5 MG: 5 TABLET ORAL at 08:48

## 2021-08-11 RX ADMIN — NAFCILLIN SODIUM 2 G: 2 INJECTION, POWDER, LYOPHILIZED, FOR SOLUTION INTRAMUSCULAR; INTRAVENOUS at 12:45

## 2021-08-11 RX ADMIN — OXYCODONE 5 MG: 5 TABLET ORAL at 05:14

## 2021-08-11 RX ADMIN — HEPARIN SODIUM 5000 UNITS: 5000 INJECTION INTRAVENOUS; SUBCUTANEOUS at 08:50

## 2021-08-11 RX ADMIN — HYDROXYZINE HYDROCHLORIDE 25 MG: 25 TABLET, FILM COATED ORAL at 08:50

## 2021-08-11 RX ADMIN — CLONAZEPAM 0.5 MG: 0.5 TABLET ORAL at 13:34

## 2021-08-11 RX ADMIN — MICONAZOLE NITRATE: 2 CREAM TOPICAL at 09:01

## 2021-08-11 RX ADMIN — FAMOTIDINE 20 MG: 20 TABLET ORAL at 13:34

## 2021-08-12 PROCEDURE — 97535 SELF CARE MNGMENT TRAINING: CPT

## 2021-08-12 PROCEDURE — 99231 SBSQ HOSP IP/OBS SF/LOW 25: CPT | Performed by: OBSTETRICS & GYNECOLOGY

## 2021-08-12 PROCEDURE — 94799 UNLISTED PULMONARY SVC/PX: CPT

## 2021-08-12 PROCEDURE — 97116 GAIT TRAINING THERAPY: CPT | Performed by: PHYSICAL THERAPIST

## 2021-08-12 PROCEDURE — 25010000002 HEPARIN (PORCINE) PER 1000 UNITS: Performed by: OBSTETRICS & GYNECOLOGY

## 2021-08-12 PROCEDURE — 99232 SBSQ HOSP IP/OBS MODERATE 35: CPT | Performed by: PHYSICIAN ASSISTANT

## 2021-08-12 PROCEDURE — 97110 THERAPEUTIC EXERCISES: CPT | Performed by: PHYSICAL THERAPIST

## 2021-08-12 RX ORDER — OXYCODONE HYDROCHLORIDE 5 MG/1
5 TABLET ORAL 3 TIMES DAILY PRN
Status: DISCONTINUED | OUTPATIENT
Start: 2021-08-12 | End: 2021-08-20 | Stop reason: HOSPADM

## 2021-08-12 RX ORDER — OXYCODONE HYDROCHLORIDE 5 MG/1
5 TABLET ORAL EVERY 6 HOURS PRN
Status: DISCONTINUED | OUTPATIENT
Start: 2021-08-12 | End: 2021-08-12

## 2021-08-12 RX ADMIN — THIAMINE HCL TAB 100 MG 100 MG: 100 TAB at 08:50

## 2021-08-12 RX ADMIN — OXYCODONE 5 MG: 5 TABLET ORAL at 03:25

## 2021-08-12 RX ADMIN — IBUPROFEN 600 MG: 600 TABLET, FILM COATED ORAL at 15:26

## 2021-08-12 RX ADMIN — Medication 1 PATCH: at 08:51

## 2021-08-12 RX ADMIN — OXYCODONE 5 MG: 5 TABLET ORAL at 20:48

## 2021-08-12 RX ADMIN — NAFCILLIN SODIUM 2 G: 2 INJECTION, POWDER, LYOPHILIZED, FOR SOLUTION INTRAMUSCULAR; INTRAVENOUS at 09:17

## 2021-08-12 RX ADMIN — TRAZODONE HYDROCHLORIDE 100 MG: 100 TABLET ORAL at 20:48

## 2021-08-12 RX ADMIN — CLONAZEPAM 0.5 MG: 0.5 TABLET ORAL at 05:32

## 2021-08-12 RX ADMIN — IBUPROFEN 600 MG: 600 TABLET, FILM COATED ORAL at 03:26

## 2021-08-12 RX ADMIN — FLUOXETINE HYDROCHLORIDE 20 MG: 20 CAPSULE ORAL at 08:50

## 2021-08-12 RX ADMIN — HYDROXYZINE HYDROCHLORIDE 25 MG: 25 TABLET, FILM COATED ORAL at 20:49

## 2021-08-12 RX ADMIN — ACETAMINOPHEN 650 MG: 325 TABLET, FILM COATED ORAL at 00:20

## 2021-08-12 RX ADMIN — NAFCILLIN SODIUM 2 G: 2 INJECTION, POWDER, LYOPHILIZED, FOR SOLUTION INTRAMUSCULAR; INTRAVENOUS at 00:20

## 2021-08-12 RX ADMIN — AMLODIPINE BESYLATE 10 MG: 10 TABLET ORAL at 08:50

## 2021-08-12 RX ADMIN — NAFCILLIN SODIUM 2 G: 2 INJECTION, POWDER, LYOPHILIZED, FOR SOLUTION INTRAMUSCULAR; INTRAVENOUS at 15:26

## 2021-08-12 RX ADMIN — FAMOTIDINE 20 MG: 20 TABLET ORAL at 11:07

## 2021-08-12 RX ADMIN — OLANZAPINE 10 MG: 5 TABLET, FILM COATED ORAL at 20:48

## 2021-08-12 RX ADMIN — ALUMINUM HYDROXIDE, MAGNESIUM HYDROXIDE, AND DIMETHICONE 15 ML: 400; 400; 40 SUSPENSION ORAL at 17:27

## 2021-08-12 RX ADMIN — NAFCILLIN SODIUM 2 G: 2 INJECTION, POWDER, LYOPHILIZED, FOR SOLUTION INTRAMUSCULAR; INTRAVENOUS at 03:25

## 2021-08-12 RX ADMIN — METHADONE HYDROCHLORIDE 50 MG: 10 TABLET ORAL at 08:50

## 2021-08-12 RX ADMIN — IBUPROFEN 600 MG: 600 TABLET, FILM COATED ORAL at 20:48

## 2021-08-12 RX ADMIN — HEPARIN SODIUM 5000 UNITS: 5000 INJECTION INTRAVENOUS; SUBCUTANEOUS at 20:49

## 2021-08-12 RX ADMIN — MICONAZOLE NITRATE: 2 CREAM TOPICAL at 20:48

## 2021-08-12 RX ADMIN — BUSPIRONE HYDROCHLORIDE 7.5 MG: 5 TABLET ORAL at 03:26

## 2021-08-12 RX ADMIN — ACETAMINOPHEN 650 MG: 325 TABLET, FILM COATED ORAL at 05:32

## 2021-08-12 RX ADMIN — HYDROXYZINE HYDROCHLORIDE 25 MG: 25 TABLET, FILM COATED ORAL at 03:26

## 2021-08-12 RX ADMIN — PANTOPRAZOLE SODIUM 40 MG: 40 TABLET, DELAYED RELEASE ORAL at 05:32

## 2021-08-12 RX ADMIN — IBUPROFEN 600 MG: 600 TABLET, FILM COATED ORAL at 08:50

## 2021-08-12 RX ADMIN — HEPARIN SODIUM 5000 UNITS: 5000 INJECTION INTRAVENOUS; SUBCUTANEOUS at 09:17

## 2021-08-12 RX ADMIN — FAMOTIDINE 20 MG: 20 TABLET ORAL at 20:49

## 2021-08-12 RX ADMIN — NAFCILLIN SODIUM 2 G: 2 INJECTION, POWDER, LYOPHILIZED, FOR SOLUTION INTRAMUSCULAR; INTRAVENOUS at 11:07

## 2021-08-12 RX ADMIN — Medication 10 MG: at 20:48

## 2021-08-12 RX ADMIN — NAFCILLIN SODIUM 2 G: 2 INJECTION, POWDER, LYOPHILIZED, FOR SOLUTION INTRAMUSCULAR; INTRAVENOUS at 20:49

## 2021-08-12 RX ADMIN — OXYCODONE 5 MG: 5 TABLET ORAL at 11:07

## 2021-08-13 PROCEDURE — 99231 SBSQ HOSP IP/OBS SF/LOW 25: CPT | Performed by: OBSTETRICS & GYNECOLOGY

## 2021-08-13 PROCEDURE — 99233 SBSQ HOSP IP/OBS HIGH 50: CPT | Performed by: FAMILY MEDICINE

## 2021-08-13 PROCEDURE — 94799 UNLISTED PULMONARY SVC/PX: CPT

## 2021-08-13 PROCEDURE — 25010000002 HEPARIN (PORCINE) PER 1000 UNITS: Performed by: OBSTETRICS & GYNECOLOGY

## 2021-08-13 PROCEDURE — 97110 THERAPEUTIC EXERCISES: CPT

## 2021-08-13 PROCEDURE — 63710000001 ONDANSETRON PER 8 MG: Performed by: OBSTETRICS & GYNECOLOGY

## 2021-08-13 PROCEDURE — 97116 GAIT TRAINING THERAPY: CPT

## 2021-08-13 RX ORDER — METHADONE HYDROCHLORIDE 10 MG/1
50 TABLET ORAL DAILY
Status: DISCONTINUED | OUTPATIENT
Start: 2021-08-14 | End: 2021-08-15

## 2021-08-13 RX ORDER — LISINOPRIL 10 MG/1
10 TABLET ORAL
Status: DISCONTINUED | OUTPATIENT
Start: 2021-08-13 | End: 2021-08-14

## 2021-08-13 RX ADMIN — NAFCILLIN SODIUM 2 G: 2 INJECTION, POWDER, LYOPHILIZED, FOR SOLUTION INTRAMUSCULAR; INTRAVENOUS at 12:28

## 2021-08-13 RX ADMIN — NAFCILLIN SODIUM 2 G: 2 INJECTION, POWDER, LYOPHILIZED, FOR SOLUTION INTRAMUSCULAR; INTRAVENOUS at 08:31

## 2021-08-13 RX ADMIN — THIAMINE HCL TAB 100 MG 100 MG: 100 TAB at 08:30

## 2021-08-13 RX ADMIN — NAFCILLIN SODIUM 2 G: 2 INJECTION, POWDER, LYOPHILIZED, FOR SOLUTION INTRAMUSCULAR; INTRAVENOUS at 04:42

## 2021-08-13 RX ADMIN — IBUPROFEN 600 MG: 600 TABLET, FILM COATED ORAL at 14:16

## 2021-08-13 RX ADMIN — MICONAZOLE NITRATE: 2 CREAM TOPICAL at 08:31

## 2021-08-13 RX ADMIN — HYDROXYZINE HYDROCHLORIDE 25 MG: 25 TABLET, FILM COATED ORAL at 21:34

## 2021-08-13 RX ADMIN — NAFCILLIN SODIUM 2 G: 2 INJECTION, POWDER, LYOPHILIZED, FOR SOLUTION INTRAMUSCULAR; INTRAVENOUS at 16:15

## 2021-08-13 RX ADMIN — Medication 1 PATCH: at 08:35

## 2021-08-13 RX ADMIN — HEPARIN SODIUM 5000 UNITS: 5000 INJECTION INTRAVENOUS; SUBCUTANEOUS at 08:30

## 2021-08-13 RX ADMIN — ONDANSETRON HYDROCHLORIDE 4 MG: 4 TABLET, FILM COATED ORAL at 08:30

## 2021-08-13 RX ADMIN — OXYCODONE 5 MG: 5 TABLET ORAL at 16:16

## 2021-08-13 RX ADMIN — BUSPIRONE HYDROCHLORIDE 7.5 MG: 5 TABLET ORAL at 12:32

## 2021-08-13 RX ADMIN — METHADONE HYDROCHLORIDE 50 MG: 10 TABLET ORAL at 08:30

## 2021-08-13 RX ADMIN — NAFCILLIN SODIUM 2 G: 2 INJECTION, POWDER, LYOPHILIZED, FOR SOLUTION INTRAMUSCULAR; INTRAVENOUS at 00:24

## 2021-08-13 RX ADMIN — OLANZAPINE 10 MG: 5 TABLET, FILM COATED ORAL at 21:34

## 2021-08-13 RX ADMIN — IBUPROFEN 600 MG: 600 TABLET, FILM COATED ORAL at 21:34

## 2021-08-13 RX ADMIN — TRAZODONE HYDROCHLORIDE 100 MG: 100 TABLET ORAL at 21:34

## 2021-08-13 RX ADMIN — PANTOPRAZOLE SODIUM 40 MG: 40 TABLET, DELAYED RELEASE ORAL at 08:30

## 2021-08-13 RX ADMIN — OXYCODONE 5 MG: 5 TABLET ORAL at 05:45

## 2021-08-13 RX ADMIN — Medication 10 MG: at 21:34

## 2021-08-13 RX ADMIN — LISINOPRIL 10 MG: 10 TABLET ORAL at 14:16

## 2021-08-13 RX ADMIN — FLUOXETINE HYDROCHLORIDE 20 MG: 20 CAPSULE ORAL at 08:30

## 2021-08-13 RX ADMIN — HEPARIN SODIUM 5000 UNITS: 5000 INJECTION INTRAVENOUS; SUBCUTANEOUS at 21:33

## 2021-08-13 RX ADMIN — FAMOTIDINE 20 MG: 20 TABLET ORAL at 12:28

## 2021-08-13 RX ADMIN — NAFCILLIN SODIUM 2 G: 2 INJECTION, POWDER, LYOPHILIZED, FOR SOLUTION INTRAMUSCULAR; INTRAVENOUS at 19:40

## 2021-08-13 RX ADMIN — AMLODIPINE BESYLATE 10 MG: 10 TABLET ORAL at 08:30

## 2021-08-13 RX ADMIN — FAMOTIDINE 20 MG: 20 TABLET ORAL at 21:34

## 2021-08-13 RX ADMIN — OXYCODONE 5 MG: 5 TABLET ORAL at 19:40

## 2021-08-13 RX ADMIN — IBUPROFEN 600 MG: 600 TABLET, FILM COATED ORAL at 08:30

## 2021-08-14 LAB
ANION GAP SERPL CALCULATED.3IONS-SCNC: 13 MMOL/L (ref 5–15)
BUN SERPL-MCNC: 6 MG/DL (ref 6–20)
BUN/CREAT SERPL: 10.2 (ref 7–25)
CALCIUM SPEC-SCNC: 8.9 MG/DL (ref 8.6–10.5)
CHLORIDE SERPL-SCNC: 103 MMOL/L (ref 98–107)
CO2 SERPL-SCNC: 20 MMOL/L (ref 22–29)
CREAT SERPL-MCNC: 0.59 MG/DL (ref 0.57–1)
DEPRECATED RDW RBC AUTO: 41.1 FL (ref 37–54)
ERYTHROCYTE [DISTWIDTH] IN BLOOD BY AUTOMATED COUNT: 12.1 % (ref 12.3–15.4)
GFR SERPL CREATININE-BSD FRML MDRD: 113 ML/MIN/1.73
GLUCOSE SERPL-MCNC: 86 MG/DL (ref 65–99)
HCT VFR BLD AUTO: 29.6 % (ref 34–46.6)
HGB BLD-MCNC: 9.9 G/DL (ref 12–15.9)
MCH RBC QN AUTO: 30.9 PG (ref 26.6–33)
MCHC RBC AUTO-ENTMCNC: 33.4 G/DL (ref 31.5–35.7)
MCV RBC AUTO: 92.5 FL (ref 79–97)
PLATELET # BLD AUTO: 562 10*3/MM3 (ref 140–450)
PMV BLD AUTO: 8.8 FL (ref 6–12)
POTASSIUM SERPL-SCNC: 4.2 MMOL/L (ref 3.5–5.2)
RBC # BLD AUTO: 3.2 10*6/MM3 (ref 3.77–5.28)
SODIUM SERPL-SCNC: 136 MMOL/L (ref 136–145)
WBC # BLD AUTO: 5.81 10*3/MM3 (ref 3.4–10.8)

## 2021-08-14 PROCEDURE — 99231 SBSQ HOSP IP/OBS SF/LOW 25: CPT | Performed by: OBSTETRICS & GYNECOLOGY

## 2021-08-14 PROCEDURE — 84443 ASSAY THYROID STIM HORMONE: CPT | Performed by: FAMILY MEDICINE

## 2021-08-14 PROCEDURE — 99232 SBSQ HOSP IP/OBS MODERATE 35: CPT | Performed by: FAMILY MEDICINE

## 2021-08-14 PROCEDURE — 80048 BASIC METABOLIC PNL TOTAL CA: CPT | Performed by: FAMILY MEDICINE

## 2021-08-14 PROCEDURE — 85027 COMPLETE CBC AUTOMATED: CPT | Performed by: FAMILY MEDICINE

## 2021-08-14 PROCEDURE — 94799 UNLISTED PULMONARY SVC/PX: CPT

## 2021-08-14 PROCEDURE — 25010000002 HEPARIN (PORCINE) PER 1000 UNITS: Performed by: OBSTETRICS & GYNECOLOGY

## 2021-08-14 RX ORDER — LISINOPRIL 20 MG/1
20 TABLET ORAL
Status: DISCONTINUED | OUTPATIENT
Start: 2021-08-15 | End: 2021-08-20 | Stop reason: HOSPADM

## 2021-08-14 RX ADMIN — BUSPIRONE HYDROCHLORIDE 7.5 MG: 5 TABLET ORAL at 17:14

## 2021-08-14 RX ADMIN — NAFCILLIN SODIUM 2 G: 2 INJECTION, POWDER, LYOPHILIZED, FOR SOLUTION INTRAMUSCULAR; INTRAVENOUS at 04:44

## 2021-08-14 RX ADMIN — FAMOTIDINE 20 MG: 20 TABLET ORAL at 12:16

## 2021-08-14 RX ADMIN — FAMOTIDINE 20 MG: 20 TABLET ORAL at 21:31

## 2021-08-14 RX ADMIN — MICONAZOLE NITRATE: 2 CREAM TOPICAL at 08:46

## 2021-08-14 RX ADMIN — HEPARIN SODIUM 5000 UNITS: 5000 INJECTION INTRAVENOUS; SUBCUTANEOUS at 08:45

## 2021-08-14 RX ADMIN — Medication 1 PATCH: at 08:45

## 2021-08-14 RX ADMIN — BUSPIRONE HYDROCHLORIDE 7.5 MG: 5 TABLET ORAL at 08:59

## 2021-08-14 RX ADMIN — IBUPROFEN 600 MG: 600 TABLET, FILM COATED ORAL at 19:55

## 2021-08-14 RX ADMIN — TRAZODONE HYDROCHLORIDE 150 MG: 100 TABLET ORAL at 21:32

## 2021-08-14 RX ADMIN — NAFCILLIN SODIUM 2 G: 2 INJECTION, POWDER, LYOPHILIZED, FOR SOLUTION INTRAMUSCULAR; INTRAVENOUS at 23:54

## 2021-08-14 RX ADMIN — HYDROXYZINE HYDROCHLORIDE 25 MG: 25 TABLET, FILM COATED ORAL at 17:14

## 2021-08-14 RX ADMIN — ACETAMINOPHEN 650 MG: 325 TABLET, FILM COATED ORAL at 12:16

## 2021-08-14 RX ADMIN — NAFCILLIN SODIUM 2 G: 2 INJECTION, POWDER, LYOPHILIZED, FOR SOLUTION INTRAMUSCULAR; INTRAVENOUS at 08:43

## 2021-08-14 RX ADMIN — DOCUSATE SODIUM 100 MG: 100 CAPSULE, LIQUID FILLED ORAL at 08:45

## 2021-08-14 RX ADMIN — ACETAMINOPHEN 650 MG: 325 TABLET, FILM COATED ORAL at 05:12

## 2021-08-14 RX ADMIN — LISINOPRIL 10 MG: 10 TABLET ORAL at 08:45

## 2021-08-14 RX ADMIN — NAFCILLIN SODIUM 2 G: 2 INJECTION, POWDER, LYOPHILIZED, FOR SOLUTION INTRAMUSCULAR; INTRAVENOUS at 12:16

## 2021-08-14 RX ADMIN — HEPARIN SODIUM 5000 UNITS: 5000 INJECTION INTRAVENOUS; SUBCUTANEOUS at 21:31

## 2021-08-14 RX ADMIN — THIAMINE HCL TAB 100 MG 100 MG: 100 TAB at 08:45

## 2021-08-14 RX ADMIN — NAFCILLIN SODIUM 2 G: 2 INJECTION, POWDER, LYOPHILIZED, FOR SOLUTION INTRAMUSCULAR; INTRAVENOUS at 20:34

## 2021-08-14 RX ADMIN — AMLODIPINE BESYLATE 10 MG: 10 TABLET ORAL at 08:45

## 2021-08-14 RX ADMIN — IBUPROFEN 600 MG: 600 TABLET, FILM COATED ORAL at 16:17

## 2021-08-14 RX ADMIN — NAFCILLIN SODIUM 2 G: 2 INJECTION, POWDER, LYOPHILIZED, FOR SOLUTION INTRAMUSCULAR; INTRAVENOUS at 17:14

## 2021-08-14 RX ADMIN — METHADONE HYDROCHLORIDE 50 MG: 10 TABLET ORAL at 05:12

## 2021-08-14 RX ADMIN — OXYCODONE 5 MG: 5 TABLET ORAL at 16:17

## 2021-08-14 RX ADMIN — PANTOPRAZOLE SODIUM 40 MG: 40 TABLET, DELAYED RELEASE ORAL at 05:11

## 2021-08-14 RX ADMIN — FLUOXETINE HYDROCHLORIDE 20 MG: 20 CAPSULE ORAL at 08:44

## 2021-08-14 RX ADMIN — IBUPROFEN 600 MG: 600 TABLET, FILM COATED ORAL at 08:44

## 2021-08-14 RX ADMIN — Medication 10 MG: at 21:31

## 2021-08-14 RX ADMIN — HYDROXYZINE HYDROCHLORIDE 25 MG: 25 TABLET, FILM COATED ORAL at 08:59

## 2021-08-14 RX ADMIN — NAFCILLIN SODIUM 2 G: 2 INJECTION, POWDER, LYOPHILIZED, FOR SOLUTION INTRAMUSCULAR; INTRAVENOUS at 00:10

## 2021-08-14 RX ADMIN — OXYCODONE 5 MG: 5 TABLET ORAL at 19:54

## 2021-08-14 RX ADMIN — ACETAMINOPHEN 650 MG: 325 TABLET, FILM COATED ORAL at 17:14

## 2021-08-14 RX ADMIN — OLANZAPINE 10 MG: 5 TABLET, FILM COATED ORAL at 21:33

## 2021-08-14 RX ADMIN — OXYCODONE 5 MG: 5 TABLET ORAL at 05:15

## 2021-08-15 LAB — TSH SERPL DL<=0.05 MIU/L-ACNC: 2.48 UIU/ML (ref 0.27–4.2)

## 2021-08-15 PROCEDURE — 97116 GAIT TRAINING THERAPY: CPT

## 2021-08-15 PROCEDURE — 97110 THERAPEUTIC EXERCISES: CPT

## 2021-08-15 PROCEDURE — 99232 SBSQ HOSP IP/OBS MODERATE 35: CPT | Performed by: FAMILY MEDICINE

## 2021-08-15 PROCEDURE — 99231 SBSQ HOSP IP/OBS SF/LOW 25: CPT | Performed by: OBSTETRICS & GYNECOLOGY

## 2021-08-15 PROCEDURE — 25010000002 HEPARIN (PORCINE) PER 1000 UNITS: Performed by: OBSTETRICS & GYNECOLOGY

## 2021-08-15 RX ORDER — METHADONE HYDROCHLORIDE 10 MG/1
10 TABLET ORAL ONCE
Status: COMPLETED | OUTPATIENT
Start: 2021-08-15 | End: 2021-08-15

## 2021-08-15 RX ORDER — METHADONE HYDROCHLORIDE 10 MG/1
60 TABLET ORAL DAILY
Status: DISCONTINUED | OUTPATIENT
Start: 2021-08-16 | End: 2021-08-20 | Stop reason: HOSPADM

## 2021-08-15 RX ORDER — FLUOXETINE HYDROCHLORIDE 20 MG/1
40 CAPSULE ORAL DAILY
Status: DISCONTINUED | OUTPATIENT
Start: 2021-08-16 | End: 2021-08-20 | Stop reason: HOSPADM

## 2021-08-15 RX ORDER — FLUOXETINE HYDROCHLORIDE 20 MG/1
20 CAPSULE ORAL ONCE
Status: COMPLETED | OUTPATIENT
Start: 2021-08-15 | End: 2021-08-15

## 2021-08-15 RX ADMIN — BUSPIRONE HYDROCHLORIDE 7.5 MG: 5 TABLET ORAL at 18:07

## 2021-08-15 RX ADMIN — SODIUM CHLORIDE, PRESERVATIVE FREE 10 ML: 5 INJECTION INTRAVENOUS at 08:21

## 2021-08-15 RX ADMIN — METHADONE HYDROCHLORIDE 10 MG: 10 TABLET ORAL at 12:12

## 2021-08-15 RX ADMIN — HYDROXYZINE HYDROCHLORIDE 25 MG: 25 TABLET, FILM COATED ORAL at 07:39

## 2021-08-15 RX ADMIN — DOCUSATE SODIUM 100 MG: 100 CAPSULE, LIQUID FILLED ORAL at 15:13

## 2021-08-15 RX ADMIN — FLUOXETINE HYDROCHLORIDE 20 MG: 20 CAPSULE ORAL at 08:19

## 2021-08-15 RX ADMIN — NAFCILLIN SODIUM 2 G: 2 INJECTION, POWDER, LYOPHILIZED, FOR SOLUTION INTRAMUSCULAR; INTRAVENOUS at 03:58

## 2021-08-15 RX ADMIN — METHADONE HYDROCHLORIDE 50 MG: 10 TABLET ORAL at 05:02

## 2021-08-15 RX ADMIN — OXYCODONE 5 MG: 5 TABLET ORAL at 19:07

## 2021-08-15 RX ADMIN — IBUPROFEN 600 MG: 600 TABLET, FILM COATED ORAL at 15:13

## 2021-08-15 RX ADMIN — OLANZAPINE 10 MG: 5 TABLET, FILM COATED ORAL at 21:34

## 2021-08-15 RX ADMIN — ACETAMINOPHEN 650 MG: 325 TABLET, FILM COATED ORAL at 05:02

## 2021-08-15 RX ADMIN — PANTOPRAZOLE SODIUM 40 MG: 40 TABLET, DELAYED RELEASE ORAL at 05:02

## 2021-08-15 RX ADMIN — BUSPIRONE HYDROCHLORIDE 7.5 MG: 5 TABLET ORAL at 21:33

## 2021-08-15 RX ADMIN — IBUPROFEN 600 MG: 600 TABLET, FILM COATED ORAL at 20:26

## 2021-08-15 RX ADMIN — ACETAMINOPHEN 650 MG: 325 TABLET, FILM COATED ORAL at 18:04

## 2021-08-15 RX ADMIN — FAMOTIDINE 20 MG: 20 TABLET ORAL at 12:12

## 2021-08-15 RX ADMIN — ACETAMINOPHEN 650 MG: 325 TABLET, FILM COATED ORAL at 11:14

## 2021-08-15 RX ADMIN — FAMOTIDINE 20 MG: 20 TABLET ORAL at 21:33

## 2021-08-15 RX ADMIN — IBUPROFEN 600 MG: 600 TABLET, FILM COATED ORAL at 08:19

## 2021-08-15 RX ADMIN — HEPARIN SODIUM 5000 UNITS: 5000 INJECTION INTRAVENOUS; SUBCUTANEOUS at 08:21

## 2021-08-15 RX ADMIN — BUSPIRONE HYDROCHLORIDE 7.5 MG: 5 TABLET ORAL at 07:39

## 2021-08-15 RX ADMIN — NAFCILLIN SODIUM 2 G: 2 INJECTION, POWDER, LYOPHILIZED, FOR SOLUTION INTRAMUSCULAR; INTRAVENOUS at 16:11

## 2021-08-15 RX ADMIN — THIAMINE HCL TAB 100 MG 100 MG: 100 TAB at 08:19

## 2021-08-15 RX ADMIN — NAFCILLIN SODIUM 2 G: 2 INJECTION, POWDER, LYOPHILIZED, FOR SOLUTION INTRAMUSCULAR; INTRAVENOUS at 12:11

## 2021-08-15 RX ADMIN — LISINOPRIL 20 MG: 20 TABLET ORAL at 08:19

## 2021-08-15 RX ADMIN — Medication 10 MG: at 21:32

## 2021-08-15 RX ADMIN — FLUOXETINE HYDROCHLORIDE 20 MG: 20 CAPSULE ORAL at 12:12

## 2021-08-15 RX ADMIN — NAFCILLIN SODIUM 2 G: 2 INJECTION, POWDER, LYOPHILIZED, FOR SOLUTION INTRAMUSCULAR; INTRAVENOUS at 08:22

## 2021-08-15 RX ADMIN — Medication 1 PATCH: at 08:21

## 2021-08-15 RX ADMIN — OXYCODONE 5 MG: 5 TABLET ORAL at 11:14

## 2021-08-15 RX ADMIN — HEPARIN SODIUM 5000 UNITS: 5000 INJECTION INTRAVENOUS; SUBCUTANEOUS at 21:33

## 2021-08-15 RX ADMIN — HYDROXYZINE HYDROCHLORIDE 25 MG: 25 TABLET, FILM COATED ORAL at 18:07

## 2021-08-15 RX ADMIN — TRAZODONE HYDROCHLORIDE 150 MG: 100 TABLET ORAL at 21:33

## 2021-08-15 RX ADMIN — AMLODIPINE BESYLATE 10 MG: 10 TABLET ORAL at 08:18

## 2021-08-15 RX ADMIN — NAFCILLIN SODIUM 2 G: 2 INJECTION, POWDER, LYOPHILIZED, FOR SOLUTION INTRAMUSCULAR; INTRAVENOUS at 20:28

## 2021-08-15 RX ADMIN — OXYCODONE 5 MG: 5 TABLET ORAL at 05:02

## 2021-08-16 PROCEDURE — 63710000001 ONDANSETRON PER 8 MG: Performed by: OBSTETRICS & GYNECOLOGY

## 2021-08-16 PROCEDURE — 99024 POSTOP FOLLOW-UP VISIT: CPT | Performed by: ORTHOPAEDIC SURGERY

## 2021-08-16 PROCEDURE — 99231 SBSQ HOSP IP/OBS SF/LOW 25: CPT | Performed by: OBSTETRICS & GYNECOLOGY

## 2021-08-16 PROCEDURE — 25010000002 HEPARIN (PORCINE) PER 1000 UNITS: Performed by: OBSTETRICS & GYNECOLOGY

## 2021-08-16 PROCEDURE — 97116 GAIT TRAINING THERAPY: CPT

## 2021-08-16 PROCEDURE — 99232 SBSQ HOSP IP/OBS MODERATE 35: CPT | Performed by: NURSE PRACTITIONER

## 2021-08-16 PROCEDURE — 97110 THERAPEUTIC EXERCISES: CPT

## 2021-08-16 PROCEDURE — 25010000002 ONDANSETRON PER 1 MG: Performed by: OBSTETRICS & GYNECOLOGY

## 2021-08-16 RX ADMIN — OXYCODONE 5 MG: 5 TABLET ORAL at 10:31

## 2021-08-16 RX ADMIN — ONDANSETRON HYDROCHLORIDE 4 MG: 4 TABLET, FILM COATED ORAL at 21:19

## 2021-08-16 RX ADMIN — ACETAMINOPHEN 650 MG: 325 TABLET, FILM COATED ORAL at 17:59

## 2021-08-16 RX ADMIN — NAFCILLIN SODIUM 2 G: 2 INJECTION, POWDER, LYOPHILIZED, FOR SOLUTION INTRAMUSCULAR; INTRAVENOUS at 04:17

## 2021-08-16 RX ADMIN — ONDANSETRON 4 MG: 2 INJECTION INTRAMUSCULAR; INTRAVENOUS at 05:35

## 2021-08-16 RX ADMIN — NAFCILLIN SODIUM 2 G: 2 INJECTION, POWDER, LYOPHILIZED, FOR SOLUTION INTRAMUSCULAR; INTRAVENOUS at 21:00

## 2021-08-16 RX ADMIN — HEPARIN SODIUM 5000 UNITS: 5000 INJECTION INTRAVENOUS; SUBCUTANEOUS at 08:10

## 2021-08-16 RX ADMIN — MICONAZOLE NITRATE: 2 CREAM TOPICAL at 08:11

## 2021-08-16 RX ADMIN — Medication 1 PATCH: at 08:11

## 2021-08-16 RX ADMIN — ACETAMINOPHEN 650 MG: 325 TABLET, FILM COATED ORAL at 12:35

## 2021-08-16 RX ADMIN — SODIUM CHLORIDE, PRESERVATIVE FREE 10 ML: 5 INJECTION INTRAVENOUS at 21:10

## 2021-08-16 RX ADMIN — SODIUM CHLORIDE, PRESERVATIVE FREE 10 ML: 5 INJECTION INTRAVENOUS at 08:09

## 2021-08-16 RX ADMIN — Medication 10 MG: at 21:08

## 2021-08-16 RX ADMIN — AMLODIPINE BESYLATE 10 MG: 10 TABLET ORAL at 08:10

## 2021-08-16 RX ADMIN — NAFCILLIN SODIUM 2 G: 2 INJECTION, POWDER, LYOPHILIZED, FOR SOLUTION INTRAMUSCULAR; INTRAVENOUS at 08:09

## 2021-08-16 RX ADMIN — SODIUM CHLORIDE, PRESERVATIVE FREE 3 ML: 5 INJECTION INTRAVENOUS at 08:11

## 2021-08-16 RX ADMIN — HYDROXYZINE HYDROCHLORIDE 25 MG: 25 TABLET, FILM COATED ORAL at 05:00

## 2021-08-16 RX ADMIN — MICONAZOLE NITRATE: 2 CREAM TOPICAL at 21:16

## 2021-08-16 RX ADMIN — THIAMINE HCL TAB 100 MG 100 MG: 100 TAB at 08:10

## 2021-08-16 RX ADMIN — FLUOXETINE HYDROCHLORIDE 40 MG: 20 CAPSULE ORAL at 08:10

## 2021-08-16 RX ADMIN — METHADONE HYDROCHLORIDE 60 MG: 10 TABLET ORAL at 05:01

## 2021-08-16 RX ADMIN — FAMOTIDINE 20 MG: 20 TABLET ORAL at 12:35

## 2021-08-16 RX ADMIN — FAMOTIDINE 20 MG: 20 TABLET ORAL at 21:08

## 2021-08-16 RX ADMIN — OXYCODONE 5 MG: 5 TABLET ORAL at 05:01

## 2021-08-16 RX ADMIN — OXYCODONE 5 MG: 5 TABLET ORAL at 21:09

## 2021-08-16 RX ADMIN — IBUPROFEN 600 MG: 600 TABLET, FILM COATED ORAL at 04:17

## 2021-08-16 RX ADMIN — NAFCILLIN SODIUM 2 G: 2 INJECTION, POWDER, LYOPHILIZED, FOR SOLUTION INTRAMUSCULAR; INTRAVENOUS at 00:33

## 2021-08-16 RX ADMIN — ACETAMINOPHEN 650 MG: 325 TABLET, FILM COATED ORAL at 05:00

## 2021-08-16 RX ADMIN — NAFCILLIN SODIUM 2 G: 2 INJECTION, POWDER, LYOPHILIZED, FOR SOLUTION INTRAMUSCULAR; INTRAVENOUS at 12:35

## 2021-08-16 RX ADMIN — NAFCILLIN SODIUM 2 G: 2 INJECTION, POWDER, LYOPHILIZED, FOR SOLUTION INTRAMUSCULAR; INTRAVENOUS at 16:15

## 2021-08-16 RX ADMIN — HEPARIN SODIUM 5000 UNITS: 5000 INJECTION INTRAVENOUS; SUBCUTANEOUS at 21:10

## 2021-08-16 RX ADMIN — DOCUSATE SODIUM 100 MG: 100 CAPSULE, LIQUID FILLED ORAL at 05:00

## 2021-08-16 RX ADMIN — PANTOPRAZOLE SODIUM 40 MG: 40 TABLET, DELAYED RELEASE ORAL at 05:35

## 2021-08-16 RX ADMIN — IBUPROFEN 600 MG: 600 TABLET, FILM COATED ORAL at 08:10

## 2021-08-16 RX ADMIN — LISINOPRIL 20 MG: 20 TABLET ORAL at 08:10

## 2021-08-16 RX ADMIN — TRAZODONE HYDROCHLORIDE 150 MG: 100 TABLET ORAL at 22:26

## 2021-08-16 RX ADMIN — IBUPROFEN 600 MG: 600 TABLET, FILM COATED ORAL at 14:39

## 2021-08-16 RX ADMIN — OLANZAPINE 10 MG: 5 TABLET, FILM COATED ORAL at 21:08

## 2021-08-16 RX ADMIN — IBUPROFEN 600 MG: 600 TABLET, FILM COATED ORAL at 21:09

## 2021-08-17 PROCEDURE — 99232 SBSQ HOSP IP/OBS MODERATE 35: CPT | Performed by: INTERNAL MEDICINE

## 2021-08-17 PROCEDURE — 63710000001 ONDANSETRON PER 8 MG: Performed by: OBSTETRICS & GYNECOLOGY

## 2021-08-17 PROCEDURE — 25010000002 HEPARIN (PORCINE) PER 1000 UNITS: Performed by: OBSTETRICS & GYNECOLOGY

## 2021-08-17 PROCEDURE — 99231 SBSQ HOSP IP/OBS SF/LOW 25: CPT | Performed by: OBSTETRICS & GYNECOLOGY

## 2021-08-17 RX ADMIN — NAFCILLIN SODIUM 2 G: 2 INJECTION, POWDER, LYOPHILIZED, FOR SOLUTION INTRAMUSCULAR; INTRAVENOUS at 21:12

## 2021-08-17 RX ADMIN — HYDROXYZINE HYDROCHLORIDE 25 MG: 25 TABLET, FILM COATED ORAL at 21:14

## 2021-08-17 RX ADMIN — FAMOTIDINE 20 MG: 20 TABLET ORAL at 21:12

## 2021-08-17 RX ADMIN — LISINOPRIL 20 MG: 20 TABLET ORAL at 08:15

## 2021-08-17 RX ADMIN — NAFCILLIN SODIUM 2 G: 2 INJECTION, POWDER, LYOPHILIZED, FOR SOLUTION INTRAMUSCULAR; INTRAVENOUS at 03:48

## 2021-08-17 RX ADMIN — SODIUM CHLORIDE, PRESERVATIVE FREE 3 ML: 5 INJECTION INTRAVENOUS at 08:16

## 2021-08-17 RX ADMIN — HEPARIN SODIUM 5000 UNITS: 5000 INJECTION INTRAVENOUS; SUBCUTANEOUS at 21:14

## 2021-08-17 RX ADMIN — OXYCODONE 5 MG: 5 TABLET ORAL at 15:28

## 2021-08-17 RX ADMIN — NAFCILLIN SODIUM 2 G: 2 INJECTION, POWDER, LYOPHILIZED, FOR SOLUTION INTRAMUSCULAR; INTRAVENOUS at 08:15

## 2021-08-17 RX ADMIN — HYDROXYZINE HYDROCHLORIDE 25 MG: 25 TABLET, FILM COATED ORAL at 06:46

## 2021-08-17 RX ADMIN — Medication 1 PATCH: at 08:16

## 2021-08-17 RX ADMIN — OXYCODONE 5 MG: 5 TABLET ORAL at 06:46

## 2021-08-17 RX ADMIN — IBUPROFEN 600 MG: 600 TABLET, FILM COATED ORAL at 21:13

## 2021-08-17 RX ADMIN — METHADONE HYDROCHLORIDE 60 MG: 10 TABLET ORAL at 06:46

## 2021-08-17 RX ADMIN — ONDANSETRON HYDROCHLORIDE 4 MG: 4 TABLET, FILM COATED ORAL at 07:01

## 2021-08-17 RX ADMIN — MICONAZOLE NITRATE: 2 CREAM TOPICAL at 21:29

## 2021-08-17 RX ADMIN — Medication 10 MG: at 21:13

## 2021-08-17 RX ADMIN — HEPARIN SODIUM 5000 UNITS: 5000 INJECTION INTRAVENOUS; SUBCUTANEOUS at 08:15

## 2021-08-17 RX ADMIN — FLUOXETINE HYDROCHLORIDE 40 MG: 20 CAPSULE ORAL at 08:15

## 2021-08-17 RX ADMIN — MICONAZOLE NITRATE: 2 CREAM TOPICAL at 08:16

## 2021-08-17 RX ADMIN — ONDANSETRON HYDROCHLORIDE 4 MG: 4 TABLET, FILM COATED ORAL at 21:24

## 2021-08-17 RX ADMIN — SODIUM CHLORIDE, PRESERVATIVE FREE 10 ML: 5 INJECTION INTRAVENOUS at 21:15

## 2021-08-17 RX ADMIN — NAFCILLIN SODIUM 2 G: 2 INJECTION, POWDER, LYOPHILIZED, FOR SOLUTION INTRAMUSCULAR; INTRAVENOUS at 16:13

## 2021-08-17 RX ADMIN — OXYCODONE 5 MG: 5 TABLET ORAL at 21:13

## 2021-08-17 RX ADMIN — NAFCILLIN SODIUM 2 G: 2 INJECTION, POWDER, LYOPHILIZED, FOR SOLUTION INTRAMUSCULAR; INTRAVENOUS at 01:00

## 2021-08-17 RX ADMIN — OLANZAPINE 10 MG: 5 TABLET, FILM COATED ORAL at 21:13

## 2021-08-17 RX ADMIN — ACETAMINOPHEN 650 MG: 325 TABLET, FILM COATED ORAL at 06:46

## 2021-08-17 RX ADMIN — THIAMINE HCL TAB 100 MG 100 MG: 100 TAB at 08:15

## 2021-08-17 RX ADMIN — SODIUM CHLORIDE, PRESERVATIVE FREE 10 ML: 5 INJECTION INTRAVENOUS at 08:16

## 2021-08-17 RX ADMIN — NAFCILLIN SODIUM 2 G: 2 INJECTION, POWDER, LYOPHILIZED, FOR SOLUTION INTRAMUSCULAR; INTRAVENOUS at 11:51

## 2021-08-17 RX ADMIN — TRAZODONE HYDROCHLORIDE 150 MG: 100 TABLET ORAL at 21:12

## 2021-08-17 RX ADMIN — PANTOPRAZOLE SODIUM 40 MG: 40 TABLET, DELAYED RELEASE ORAL at 07:01

## 2021-08-17 RX ADMIN — ACETAMINOPHEN 650 MG: 325 TABLET, FILM COATED ORAL at 11:51

## 2021-08-17 RX ADMIN — FAMOTIDINE 20 MG: 20 TABLET ORAL at 11:51

## 2021-08-17 RX ADMIN — AMLODIPINE BESYLATE 10 MG: 10 TABLET ORAL at 08:15

## 2021-08-17 RX ADMIN — IBUPROFEN 600 MG: 600 TABLET, FILM COATED ORAL at 15:28

## 2021-08-17 RX ADMIN — IBUPROFEN 600 MG: 600 TABLET, FILM COATED ORAL at 08:14

## 2021-08-17 RX ADMIN — ACETAMINOPHEN 650 MG: 325 TABLET, FILM COATED ORAL at 18:03

## 2021-08-18 PROCEDURE — 25010000002 HEPARIN (PORCINE) PER 1000 UNITS: Performed by: OBSTETRICS & GYNECOLOGY

## 2021-08-18 PROCEDURE — 97110 THERAPEUTIC EXERCISES: CPT

## 2021-08-18 PROCEDURE — 99232 SBSQ HOSP IP/OBS MODERATE 35: CPT | Performed by: HOSPITALIST

## 2021-08-18 PROCEDURE — 99231 SBSQ HOSP IP/OBS SF/LOW 25: CPT | Performed by: OBSTETRICS & GYNECOLOGY

## 2021-08-18 PROCEDURE — 97116 GAIT TRAINING THERAPY: CPT

## 2021-08-18 PROCEDURE — 63710000001 ONDANSETRON PER 8 MG: Performed by: OBSTETRICS & GYNECOLOGY

## 2021-08-18 RX ADMIN — FAMOTIDINE 20 MG: 20 TABLET ORAL at 20:21

## 2021-08-18 RX ADMIN — IBUPROFEN 600 MG: 600 TABLET, FILM COATED ORAL at 16:20

## 2021-08-18 RX ADMIN — THIAMINE HCL TAB 100 MG 100 MG: 100 TAB at 09:01

## 2021-08-18 RX ADMIN — TRAZODONE HYDROCHLORIDE 150 MG: 100 TABLET ORAL at 23:58

## 2021-08-18 RX ADMIN — ACETAMINOPHEN 650 MG: 325 TABLET, FILM COATED ORAL at 05:25

## 2021-08-18 RX ADMIN — NAFCILLIN SODIUM 2 G: 2 INJECTION, POWDER, LYOPHILIZED, FOR SOLUTION INTRAMUSCULAR; INTRAVENOUS at 16:20

## 2021-08-18 RX ADMIN — HYDROXYZINE HYDROCHLORIDE 25 MG: 25 TABLET, FILM COATED ORAL at 16:20

## 2021-08-18 RX ADMIN — Medication 1 PATCH: at 09:02

## 2021-08-18 RX ADMIN — ACETAMINOPHEN 650 MG: 325 TABLET, FILM COATED ORAL at 18:37

## 2021-08-18 RX ADMIN — OLANZAPINE 10 MG: 5 TABLET, FILM COATED ORAL at 20:21

## 2021-08-18 RX ADMIN — MICONAZOLE NITRATE: 2 CREAM TOPICAL at 09:02

## 2021-08-18 RX ADMIN — LISINOPRIL 20 MG: 20 TABLET ORAL at 09:01

## 2021-08-18 RX ADMIN — SODIUM CHLORIDE, PRESERVATIVE FREE 10 ML: 5 INJECTION INTRAVENOUS at 20:22

## 2021-08-18 RX ADMIN — AMLODIPINE BESYLATE 10 MG: 10 TABLET ORAL at 09:01

## 2021-08-18 RX ADMIN — NAFCILLIN SODIUM 2 G: 2 INJECTION, POWDER, LYOPHILIZED, FOR SOLUTION INTRAMUSCULAR; INTRAVENOUS at 00:48

## 2021-08-18 RX ADMIN — ACETAMINOPHEN 650 MG: 325 TABLET, FILM COATED ORAL at 12:41

## 2021-08-18 RX ADMIN — FLUOXETINE HYDROCHLORIDE 40 MG: 20 CAPSULE ORAL at 11:16

## 2021-08-18 RX ADMIN — NAFCILLIN SODIUM 2 G: 2 INJECTION, POWDER, LYOPHILIZED, FOR SOLUTION INTRAMUSCULAR; INTRAVENOUS at 09:01

## 2021-08-18 RX ADMIN — ONDANSETRON HYDROCHLORIDE 4 MG: 4 TABLET, FILM COATED ORAL at 05:27

## 2021-08-18 RX ADMIN — METHADONE HYDROCHLORIDE 60 MG: 10 TABLET ORAL at 05:25

## 2021-08-18 RX ADMIN — HYDROXYZINE HYDROCHLORIDE 25 MG: 25 TABLET, FILM COATED ORAL at 09:01

## 2021-08-18 RX ADMIN — IBUPROFEN 600 MG: 600 TABLET, FILM COATED ORAL at 20:21

## 2021-08-18 RX ADMIN — NAFCILLIN SODIUM 2 G: 2 INJECTION, POWDER, LYOPHILIZED, FOR SOLUTION INTRAMUSCULAR; INTRAVENOUS at 20:21

## 2021-08-18 RX ADMIN — SODIUM CHLORIDE, PRESERVATIVE FREE 10 ML: 5 INJECTION INTRAVENOUS at 09:02

## 2021-08-18 RX ADMIN — HEPARIN SODIUM 5000 UNITS: 5000 INJECTION INTRAVENOUS; SUBCUTANEOUS at 20:25

## 2021-08-18 RX ADMIN — HEPARIN SODIUM 5000 UNITS: 5000 INJECTION INTRAVENOUS; SUBCUTANEOUS at 09:01

## 2021-08-18 RX ADMIN — IBUPROFEN 600 MG: 600 TABLET, FILM COATED ORAL at 09:02

## 2021-08-18 RX ADMIN — NAFCILLIN SODIUM 2 G: 2 INJECTION, POWDER, LYOPHILIZED, FOR SOLUTION INTRAMUSCULAR; INTRAVENOUS at 12:41

## 2021-08-18 RX ADMIN — PANTOPRAZOLE SODIUM 40 MG: 40 TABLET, DELAYED RELEASE ORAL at 09:01

## 2021-08-18 RX ADMIN — NAFCILLIN SODIUM 2 G: 2 INJECTION, POWDER, LYOPHILIZED, FOR SOLUTION INTRAMUSCULAR; INTRAVENOUS at 23:57

## 2021-08-18 RX ADMIN — NAFCILLIN SODIUM 2 G: 2 INJECTION, POWDER, LYOPHILIZED, FOR SOLUTION INTRAMUSCULAR; INTRAVENOUS at 04:07

## 2021-08-18 RX ADMIN — OXYCODONE 5 MG: 5 TABLET ORAL at 09:48

## 2021-08-18 RX ADMIN — OXYCODONE 5 MG: 5 TABLET ORAL at 20:21

## 2021-08-18 RX ADMIN — IBUPROFEN 600 MG: 600 TABLET, FILM COATED ORAL at 04:07

## 2021-08-18 RX ADMIN — SODIUM CHLORIDE, PRESERVATIVE FREE 3 ML: 5 INJECTION INTRAVENOUS at 09:02

## 2021-08-18 RX ADMIN — FAMOTIDINE 20 MG: 20 TABLET ORAL at 12:41

## 2021-08-18 RX ADMIN — MICONAZOLE NITRATE: 2 CREAM TOPICAL at 20:26

## 2021-08-18 RX ADMIN — BUSPIRONE HYDROCHLORIDE 7.5 MG: 5 TABLET ORAL at 09:48

## 2021-08-18 RX ADMIN — OXYCODONE 5 MG: 5 TABLET ORAL at 04:07

## 2021-08-18 RX ADMIN — Medication 10 MG: at 20:21

## 2021-08-19 LAB — SARS-COV-2 RDRP RESP QL NAA+PROBE: NORMAL

## 2021-08-19 PROCEDURE — 63710000001 ONDANSETRON PER 8 MG: Performed by: OBSTETRICS & GYNECOLOGY

## 2021-08-19 PROCEDURE — 25010000002 ONDANSETRON PER 1 MG: Performed by: OBSTETRICS & GYNECOLOGY

## 2021-08-19 PROCEDURE — 25010000002 HEPARIN (PORCINE) PER 1000 UNITS: Performed by: OBSTETRICS & GYNECOLOGY

## 2021-08-19 PROCEDURE — 99232 SBSQ HOSP IP/OBS MODERATE 35: CPT | Performed by: NURSE PRACTITIONER

## 2021-08-19 PROCEDURE — 87635 SARS-COV-2 COVID-19 AMP PRB: CPT | Performed by: OBSTETRICS & GYNECOLOGY

## 2021-08-19 RX ADMIN — NAFCILLIN SODIUM 2 G: 2 INJECTION, POWDER, LYOPHILIZED, FOR SOLUTION INTRAMUSCULAR; INTRAVENOUS at 20:24

## 2021-08-19 RX ADMIN — BUSPIRONE HYDROCHLORIDE 7.5 MG: 5 TABLET ORAL at 15:49

## 2021-08-19 RX ADMIN — HYDROXYZINE HYDROCHLORIDE 25 MG: 25 TABLET, FILM COATED ORAL at 20:43

## 2021-08-19 RX ADMIN — FAMOTIDINE 20 MG: 20 TABLET ORAL at 20:25

## 2021-08-19 RX ADMIN — OLANZAPINE 10 MG: 5 TABLET, FILM COATED ORAL at 20:24

## 2021-08-19 RX ADMIN — OXYCODONE 5 MG: 5 TABLET ORAL at 20:24

## 2021-08-19 RX ADMIN — SODIUM CHLORIDE, PRESERVATIVE FREE 3 ML: 5 INJECTION INTRAVENOUS at 08:40

## 2021-08-19 RX ADMIN — NAFCILLIN SODIUM 2 G: 2 INJECTION, POWDER, LYOPHILIZED, FOR SOLUTION INTRAMUSCULAR; INTRAVENOUS at 15:49

## 2021-08-19 RX ADMIN — SODIUM CHLORIDE, PRESERVATIVE FREE 10 ML: 5 INJECTION INTRAVENOUS at 20:26

## 2021-08-19 RX ADMIN — FLUOXETINE HYDROCHLORIDE 40 MG: 20 CAPSULE ORAL at 08:39

## 2021-08-19 RX ADMIN — OXYCODONE 5 MG: 5 TABLET ORAL at 08:39

## 2021-08-19 RX ADMIN — TRAZODONE HYDROCHLORIDE 150 MG: 100 TABLET ORAL at 20:25

## 2021-08-19 RX ADMIN — THIAMINE HCL TAB 100 MG 100 MG: 100 TAB at 08:39

## 2021-08-19 RX ADMIN — Medication 1 PATCH: at 08:43

## 2021-08-19 RX ADMIN — OXYCODONE 5 MG: 5 TABLET ORAL at 12:03

## 2021-08-19 RX ADMIN — NAFCILLIN SODIUM 2 G: 2 INJECTION, POWDER, LYOPHILIZED, FOR SOLUTION INTRAMUSCULAR; INTRAVENOUS at 08:40

## 2021-08-19 RX ADMIN — METHADONE HYDROCHLORIDE 60 MG: 10 TABLET ORAL at 05:26

## 2021-08-19 RX ADMIN — ACETAMINOPHEN 650 MG: 325 TABLET, FILM COATED ORAL at 05:26

## 2021-08-19 RX ADMIN — ACETAMINOPHEN 650 MG: 325 TABLET, FILM COATED ORAL at 17:55

## 2021-08-19 RX ADMIN — MICONAZOLE NITRATE: 2 CREAM TOPICAL at 20:27

## 2021-08-19 RX ADMIN — ONDANSETRON 4 MG: 2 INJECTION INTRAMUSCULAR; INTRAVENOUS at 20:32

## 2021-08-19 RX ADMIN — LISINOPRIL 20 MG: 20 TABLET ORAL at 08:40

## 2021-08-19 RX ADMIN — ONDANSETRON HYDROCHLORIDE 4 MG: 4 TABLET, FILM COATED ORAL at 04:14

## 2021-08-19 RX ADMIN — HEPARIN SODIUM 5000 UNITS: 5000 INJECTION INTRAVENOUS; SUBCUTANEOUS at 08:40

## 2021-08-19 RX ADMIN — IBUPROFEN 600 MG: 600 TABLET, FILM COATED ORAL at 08:43

## 2021-08-19 RX ADMIN — HYDROXYZINE HYDROCHLORIDE 25 MG: 25 TABLET, FILM COATED ORAL at 08:43

## 2021-08-19 RX ADMIN — IBUPROFEN 600 MG: 600 TABLET, FILM COATED ORAL at 04:14

## 2021-08-19 RX ADMIN — HEPARIN SODIUM 5000 UNITS: 5000 INJECTION INTRAVENOUS; SUBCUTANEOUS at 20:24

## 2021-08-19 RX ADMIN — IBUPROFEN 600 MG: 600 TABLET, FILM COATED ORAL at 15:49

## 2021-08-19 RX ADMIN — NAFCILLIN SODIUM 2 G: 2 INJECTION, POWDER, LYOPHILIZED, FOR SOLUTION INTRAMUSCULAR; INTRAVENOUS at 12:03

## 2021-08-19 RX ADMIN — NAFCILLIN SODIUM 2 G: 2 INJECTION, POWDER, LYOPHILIZED, FOR SOLUTION INTRAMUSCULAR; INTRAVENOUS at 23:54

## 2021-08-19 RX ADMIN — IBUPROFEN 600 MG: 600 TABLET, FILM COATED ORAL at 21:01

## 2021-08-19 RX ADMIN — MICONAZOLE NITRATE: 2 CREAM TOPICAL at 08:40

## 2021-08-19 RX ADMIN — NAFCILLIN SODIUM 2 G: 2 INJECTION, POWDER, LYOPHILIZED, FOR SOLUTION INTRAMUSCULAR; INTRAVENOUS at 04:13

## 2021-08-19 RX ADMIN — FAMOTIDINE 20 MG: 20 TABLET ORAL at 12:03

## 2021-08-19 RX ADMIN — SODIUM CHLORIDE, PRESERVATIVE FREE 10 ML: 5 INJECTION INTRAVENOUS at 08:40

## 2021-08-19 RX ADMIN — OXYCODONE 5 MG: 5 TABLET ORAL at 04:14

## 2021-08-19 RX ADMIN — PANTOPRAZOLE SODIUM 40 MG: 40 TABLET, DELAYED RELEASE ORAL at 08:39

## 2021-08-19 RX ADMIN — SODIUM CHLORIDE, PRESERVATIVE FREE 3 ML: 5 INJECTION INTRAVENOUS at 20:28

## 2021-08-19 RX ADMIN — Medication 10 MG: at 20:24

## 2021-08-19 RX ADMIN — HYDROXYZINE HYDROCHLORIDE 25 MG: 25 TABLET, FILM COATED ORAL at 04:14

## 2021-08-19 RX ADMIN — AMLODIPINE BESYLATE 10 MG: 10 TABLET ORAL at 08:39

## 2021-08-19 RX ADMIN — ACETAMINOPHEN 650 MG: 325 TABLET, FILM COATED ORAL at 12:03

## 2021-08-20 VITALS
BODY MASS INDEX: 28.16 KG/M2 | HEIGHT: 63 IN | OXYGEN SATURATION: 97 % | HEART RATE: 77 BPM | SYSTOLIC BLOOD PRESSURE: 126 MMHG | WEIGHT: 158.95 LBS | TEMPERATURE: 99 F | RESPIRATION RATE: 18 BRPM | DIASTOLIC BLOOD PRESSURE: 83 MMHG

## 2021-08-20 PROCEDURE — 99238 HOSP IP/OBS DSCHRG MGMT 30/<: CPT | Performed by: OBSTETRICS & GYNECOLOGY

## 2021-08-20 PROCEDURE — 63710000001 ONDANSETRON PER 8 MG: Performed by: OBSTETRICS & GYNECOLOGY

## 2021-08-20 PROCEDURE — 99231 SBSQ HOSP IP/OBS SF/LOW 25: CPT | Performed by: NURSE PRACTITIONER

## 2021-08-20 PROCEDURE — 25010000002 HEPARIN (PORCINE) PER 1000 UNITS: Performed by: OBSTETRICS & GYNECOLOGY

## 2021-08-20 RX ORDER — OXYCODONE HYDROCHLORIDE 5 MG/1
5 TABLET ORAL 3 TIMES DAILY PRN
Start: 2021-08-20 | End: 2021-12-07

## 2021-08-20 RX ORDER — CHOLECALCIFEROL (VITAMIN D3) 125 MCG
10 CAPSULE ORAL NIGHTLY
Start: 2021-08-20 | End: 2021-12-07

## 2021-08-20 RX ORDER — ACETAMINOPHEN 325 MG/1
650 TABLET ORAL EVERY 6 HOURS
Start: 2021-08-20 | End: 2021-12-07

## 2021-08-20 RX ORDER — OLANZAPINE 10 MG/1
10 TABLET ORAL DAILY
Start: 2021-08-20

## 2021-08-20 RX ORDER — TRAZODONE HYDROCHLORIDE 150 MG/1
150 TABLET ORAL NIGHTLY
Start: 2021-08-20

## 2021-08-20 RX ORDER — HYDROXYZINE HYDROCHLORIDE 25 MG/1
25 TABLET, FILM COATED ORAL 3 TIMES DAILY PRN
Start: 2021-08-20 | End: 2021-12-07

## 2021-08-20 RX ORDER — METHADONE HYDROCHLORIDE 10 MG/1
60 TABLET ORAL DAILY
Start: 2021-08-21

## 2021-08-20 RX ORDER — LISINOPRIL 20 MG/1
20 TABLET ORAL
Start: 2021-08-21

## 2021-08-20 RX ORDER — AMLODIPINE BESYLATE 10 MG/1
10 TABLET ORAL
Status: ON HOLD
Start: 2021-08-21 | End: 2022-03-12

## 2021-08-20 RX ORDER — IBUPROFEN 600 MG/1
600 TABLET ORAL EVERY 6 HOURS
Start: 2021-08-20 | End: 2021-12-07

## 2021-08-20 RX ORDER — PSEUDOEPHEDRINE HCL 30 MG
100 TABLET ORAL 2 TIMES DAILY PRN
Start: 2021-08-20 | End: 2021-12-07

## 2021-08-20 RX ORDER — FLUOXETINE HYDROCHLORIDE 40 MG/1
40 CAPSULE ORAL DAILY
Start: 2021-08-21 | End: 2021-12-07

## 2021-08-20 RX ORDER — PROMETHAZINE HYDROCHLORIDE 25 MG/1
25 TABLET ORAL EVERY 6 HOURS PRN
Start: 2021-08-20 | End: 2021-12-07

## 2021-08-20 RX ORDER — PANTOPRAZOLE SODIUM 40 MG/1
40 TABLET, DELAYED RELEASE ORAL
Start: 2021-08-21 | End: 2021-12-07

## 2021-08-20 RX ORDER — NICOTINE 21 MG/24HR
1 PATCH, TRANSDERMAL 24 HOURS TRANSDERMAL
Start: 2021-08-21 | End: 2021-12-07

## 2021-08-20 RX ORDER — ONDANSETRON 4 MG/1
4 TABLET, FILM COATED ORAL EVERY 6 HOURS PRN
Start: 2021-08-20 | End: 2021-12-07

## 2021-08-20 RX ORDER — BUSPIRONE HYDROCHLORIDE 7.5 MG/1
7.5 TABLET ORAL 3 TIMES DAILY PRN
Start: 2021-08-20 | End: 2021-12-07

## 2021-08-20 RX ADMIN — LISINOPRIL 20 MG: 20 TABLET ORAL at 08:02

## 2021-08-20 RX ADMIN — Medication 1 PATCH: at 08:06

## 2021-08-20 RX ADMIN — IBUPROFEN 600 MG: 600 TABLET, FILM COATED ORAL at 03:40

## 2021-08-20 RX ADMIN — THIAMINE HCL TAB 100 MG 100 MG: 100 TAB at 08:02

## 2021-08-20 RX ADMIN — FLUOXETINE HYDROCHLORIDE 40 MG: 20 CAPSULE ORAL at 08:02

## 2021-08-20 RX ADMIN — NAFCILLIN SODIUM 2 G: 2 INJECTION, POWDER, LYOPHILIZED, FOR SOLUTION INTRAMUSCULAR; INTRAVENOUS at 08:02

## 2021-08-20 RX ADMIN — NAFCILLIN SODIUM 2 G: 2 INJECTION, POWDER, LYOPHILIZED, FOR SOLUTION INTRAMUSCULAR; INTRAVENOUS at 11:55

## 2021-08-20 RX ADMIN — METHADONE HYDROCHLORIDE 60 MG: 10 TABLET ORAL at 05:22

## 2021-08-20 RX ADMIN — IBUPROFEN 600 MG: 600 TABLET, FILM COATED ORAL at 08:06

## 2021-08-20 RX ADMIN — MICONAZOLE NITRATE: 2 CREAM TOPICAL at 08:07

## 2021-08-20 RX ADMIN — BUSPIRONE HYDROCHLORIDE 7.5 MG: 5 TABLET ORAL at 13:58

## 2021-08-20 RX ADMIN — DOCUSATE SODIUM 100 MG: 100 CAPSULE, LIQUID FILLED ORAL at 05:23

## 2021-08-20 RX ADMIN — PANTOPRAZOLE SODIUM 40 MG: 40 TABLET, DELAYED RELEASE ORAL at 08:02

## 2021-08-20 RX ADMIN — NAFCILLIN SODIUM 2 G: 2 INJECTION, POWDER, LYOPHILIZED, FOR SOLUTION INTRAMUSCULAR; INTRAVENOUS at 03:39

## 2021-08-20 RX ADMIN — SODIUM CHLORIDE, PRESERVATIVE FREE 10 ML: 5 INJECTION INTRAVENOUS at 08:07

## 2021-08-20 RX ADMIN — OXYCODONE 5 MG: 5 TABLET ORAL at 08:02

## 2021-08-20 RX ADMIN — HYDROXYZINE HYDROCHLORIDE 25 MG: 25 TABLET, FILM COATED ORAL at 08:02

## 2021-08-20 RX ADMIN — AMLODIPINE BESYLATE 10 MG: 10 TABLET ORAL at 08:02

## 2021-08-20 RX ADMIN — ACETAMINOPHEN 650 MG: 325 TABLET, FILM COATED ORAL at 11:55

## 2021-08-20 RX ADMIN — ONDANSETRON HYDROCHLORIDE 4 MG: 4 TABLET, FILM COATED ORAL at 05:42

## 2021-08-20 RX ADMIN — SODIUM CHLORIDE, PRESERVATIVE FREE 3 ML: 5 INJECTION INTRAVENOUS at 08:07

## 2021-08-20 RX ADMIN — ACETAMINOPHEN 650 MG: 325 TABLET, FILM COATED ORAL at 05:22

## 2021-08-20 RX ADMIN — HEPARIN SODIUM 5000 UNITS: 5000 INJECTION INTRAVENOUS; SUBCUTANEOUS at 08:02

## 2021-08-20 RX ADMIN — FAMOTIDINE 20 MG: 20 TABLET ORAL at 11:55

## 2021-08-20 RX ADMIN — OXYCODONE 5 MG: 5 TABLET ORAL at 11:55

## 2021-08-24 ENCOUNTER — TELEPHONE (OUTPATIENT)
Dept: ORTHOPEDIC SURGERY | Facility: CLINIC | Age: 41
End: 2021-08-24

## 2021-08-24 NOTE — TELEPHONE ENCOUNTER
I called patient to check on her because she did not show for her Post op f/u of her right hip.  She stated that she is currently hospitalized in [facility], is currently receiving IV Antibiotics for her hip and under the care of Dr. Reddy infectious disease. She also stated that a  by the name of Tawnya took her to [facility] and that she would have rather been at [facility] for her care. I told her to call us to schedule an appointment when she is discharged from [facility], she understood.

## 2021-08-26 ENCOUNTER — TELEPHONE (OUTPATIENT)
Dept: ORTHOPEDIC SURGERY | Facility: CLINIC | Age: 41
End: 2021-08-26

## 2021-08-26 NOTE — TELEPHONE ENCOUNTER
CALLED PATIENT TO RESCHEDULE 8/24/21 NO SHOW APPOINTMENT WITH MEENA VO. PATIENT IS STILL IN THE HOSPITAL AND WILL CALL AND RESCHEDULE ONCE DISCHARGED.

## 2021-09-11 LAB
FUNGUS WND CULT: NORMAL
MYCOBACTERIUM SPEC CULT: NORMAL
NIGHT BLUE STAIN TISS: NORMAL

## 2021-09-17 ENCOUNTER — TELEPHONE (OUTPATIENT)
Dept: ORTHOPEDIC SURGERY | Facility: CLINIC | Age: 41
End: 2021-09-17

## 2021-10-07 ENCOUNTER — TELEPHONE (OUTPATIENT)
Dept: ORTHOPEDIC SURGERY | Facility: CLINIC | Age: 41
End: 2021-10-07

## 2021-10-07 NOTE — TELEPHONE ENCOUNTER
CALLED PATIENT REGARDING 10/6/21 NO SHOW APPOINTMENT WITH MEENA VO. CALL COULD NOT BE COMPLETED AS DIALED.

## 2021-12-07 ENCOUNTER — OFFICE VISIT (OUTPATIENT)
Dept: ORTHOPEDIC SURGERY | Facility: CLINIC | Age: 41
End: 2021-12-07

## 2021-12-07 VITALS
SYSTOLIC BLOOD PRESSURE: 121 MMHG | HEIGHT: 63 IN | HEART RATE: 89 BPM | WEIGHT: 205.8 LBS | BODY MASS INDEX: 36.46 KG/M2 | DIASTOLIC BLOOD PRESSURE: 70 MMHG

## 2021-12-07 DIAGNOSIS — M16.11 ARTHRITIS OF RIGHT HIP: ICD-10-CM

## 2021-12-07 DIAGNOSIS — M00.9 SEPTIC HIP (HCC): ICD-10-CM

## 2021-12-07 DIAGNOSIS — M25.552 BILATERAL HIP PAIN: Primary | ICD-10-CM

## 2021-12-07 DIAGNOSIS — M25.551 BILATERAL HIP PAIN: Primary | ICD-10-CM

## 2021-12-07 PROCEDURE — 99213 OFFICE O/P EST LOW 20 MIN: CPT | Performed by: PHYSICIAN ASSISTANT

## 2021-12-07 RX ORDER — LIDOCAINE 5 %
ADHESIVE PATCH, MEDICATED TOPICAL
COMMUNITY
Start: 2021-12-05

## 2021-12-07 RX ORDER — CYCLOBENZAPRINE HCL 5 MG
TABLET ORAL
COMMUNITY
Start: 2021-11-19

## 2021-12-07 RX ORDER — HYDROXYZINE PAMOATE 25 MG/1
CAPSULE ORAL
COMMUNITY
Start: 2021-11-15

## 2021-12-07 RX ORDER — BUSPIRONE HYDROCHLORIDE 10 MG/1
TABLET ORAL
COMMUNITY
Start: 2021-11-17

## 2021-12-07 RX ORDER — FLUOXETINE HYDROCHLORIDE 20 MG/1
CAPSULE ORAL
COMMUNITY
Start: 2021-10-18

## 2021-12-07 NOTE — PROGRESS NOTES
[facility] [place] [facility] [place]        Subjective     Follow-up of the Right Hip (4 months hospital follow up; 4 months s/p hip incision and drainage-right by Dr. Gusman 21)      HEIDI Flores is a 41 y.o. female.  Patient returns today for her right hip.  She is status post I&D right hip with Dr. Gusman on 2021.  She has been in rehab for substance abuse and has been unable to come to her follow-up visits.  She reports significant pain in her right hip with any weightbearing and walking.  Is groin and sharp.  She is using a cane for ambulation.  She is clean at this point and getting ready to return to school in January    Past Medical History:   Diagnosis Date   • Abdominal wall cellulitis - After  2019    Treated with IV antibiotics   • ADHD    • Alcoholism ([facility])    • Bipolar disorder ([facility])    • Chlamydia    • Chlamydia 2021   • Hepatitis C carrier ([facility])     Negative viral load   • Migraine    • Osteoarthritis    • PTSD (post-traumatic stress disorder)    • Septic right hip (CMS/HCC) 2021   • Substance abuse ([facility])    • Therapeutic opioid-induced constipation (OIC)    • Urogenital trichomoniasis       Past Surgical History:   Procedure Laterality Date   • BREAST LUMPECTOMY Right    •  SECTION Bilateral 3/9/2019    Procedure:  SECTION PRIMARY;  Surgeon: Shelley Hughes MD;  Location: [facility] LABOR DELIVERY;  Service: Obstetrics/Gynecology   •  SECTION N/A 2021    Procedure: Repeat C/S;  Surgeon: Fernando Pedersen MD;  Location: [facility] OR;  Service: Obstetrics/Gynecology;  Laterality: N/A;   • INCISION AND DRAINAGE HIP Right 2021    Procedure: HIP INCISION AND DRAINAGE RIGHT;  Surgeon: Martin Gusman MD;  Location: [facility] OR;  Service: Orthopedics;  Laterality: Right;      Family History   Problem Relation Age of Onset   • Heart disease Father    • Diabetes Mother    • Hypertension Mother    •  "Heart disease Mother      Social History     Socioeconomic History   • Marital status: Legally    Tobacco Use   • Smoking status: Current Some Day Smoker     Packs/day: 0.00     Types: Cigarettes   • Smokeless tobacco: Never Used   • Tobacco comment: vapes mainly; still has a cigarette every now and then   Vaping Use   • Vaping Use: Every day   • Substances: Nicotine, Flavoring   Substance and Sexual Activity   • Alcohol use: No     Comment: hasnt had anything since 8/18/18 per pt   • Drug use: Not Currently     Types: IV, \"Crack\" cocaine, Benzodiazepines, Heroin, Marijuana     Comment: 7/31/21 - still actively using; 4 months sober   • Sexual activity: Yes     Partners: Male      Current Outpatient Medications on File Prior to Visit   Medication Sig Dispense Refill   • amLODIPine (NORVASC) 10 MG tablet Take 1 tablet by mouth Daily.     • busPIRone (BUSPAR) 10 MG tablet      • cyclobenzaprine (FLEXERIL) 5 MG tablet      • FLUoxetine (PROzac) 20 MG capsule      • hydrOXYzine pamoate (VISTARIL) 25 MG capsule      • Lidoderm 5 %      • lisinopril (PRINIVIL,ZESTRIL) 20 MG tablet Take 1 tablet by mouth Daily.     • methadone (DOLOPHINE) 10 MG tablet Take 6 tablets by mouth Daily Indications: Opioid Use Disorder (Initiation of Therapy),     • OLANZapine (zyPREXA) 10 MG tablet Take 1 tablet by mouth Daily.     • traZODone (DESYREL) 150 MG tablet Take 1 tablet by mouth Every Night.     • Prenatal Vit-Fe Fumarate-FA (prenatal vitamin 27-0.8) 27-0.8 MG tablet tablet Take 1 tablet by mouth Daily. 30 tablet 5     No current facility-administered medications on file prior to visit.      Allergies   Allergen Reactions   • Aleve [Naproxen Sodium] Swelling     Pt had swelling in her face and in her neck. Pt also states that she lost her voice.           Review of Systems   Constitutional: Negative.    HENT: Negative.    Eyes: Negative.    Respiratory: Negative.    Cardiovascular: Negative.    Gastrointestinal: Negative.  " "  Endocrine: Negative.    Genitourinary: Negative.    Musculoskeletal: Positive for arthralgias.   Skin: Negative.    Allergic/Immunologic: Negative.    Neurological: Negative.    Hematological: Negative.    Psychiatric/Behavioral: Negative.         I reviewed the patient's chief complaint, history of present illness, review of systems, past medical history, surgical history, family history, social history, medications and allergy list.        Objective      Physical Exam  /70   Pulse 89   Ht 160 cm (62.99\")   Wt 93.4 kg (205 lb 12.8 oz)   BMI 36.47 kg/m²     Body mass index is 36.47 kg/m².    General  Mental Status - alert  General Appearance - cooperative, well groomed, not in acute distress  Orientation - Oriented X3  Build & Nutrition - well developed and well nourished  Posture - normal posture  Gait -using a cane       Ortho Exam  Right  hip    RANGE OF MOTION:   FLEXION CONTRACTURE: None   FLEXION: 90 degrees   INTERNAL ROTATION: 10 degrees at 90 degrees of flexion   EXTERNAL ROTATION: 20 degrees at 90 degrees of flexion    PAIN WITH HIP MOTION: Exquisite groin pain   PAIN WITH LOGROLL: Yes  STINCHFIELD TEST: Positive  STRENGTH:  5/5 hip adduction, abduction, flexion. 5/5 strength knee flexion, extension. 5/5 strength ankle dorsiflexion and plantarflexion.     GREATER TROCHANTER BURSAL PAIN:  No    SENSATION TO LIGHT TOUCH:  DEEP PERONEAL/SUPERFICIAL PERONEAL/SURAL/SAPHENOUS/TIBIAL:  intact    EDEMA:   no  ERYTHEMA:  no  WOUNDS/INCISIONS: Well-healed incision, no overlying skin problems.                Imaging/Studies  Imaging Results (Last 24 Hours)     Procedure Component Value Units Date/Time    XR Hips Bilateral With or Without Pelvis 3-4 View [[id number]] Resulted: 12/07/21 1154     Updated: 12/07/21 1158    Narrative:      Indication: Right hip pain status post I&D for septic hip    Comparison: Todays xrays were compared to previous xrays from 7/31/2021      Impression:           AP pelvis, " right hip: advanced, end-stage osteoarthritis with bone on bone   articulation, subchondral sclerosis, and subchondral cysts, superolateral   subluxation of femoral head within the acetabulum as well as flattening of   the femoral head.  There has been progression compared to prior   radiographs.              Assessment    Assessment:  1. Bilateral hip pain    2. Septic right hip (CMS/HCC)    3. Arthritis of right hip          Plan:  1. Recommend over-the-counter medication as needed for discomfort  Advanced right hip arthritis status post I&D right hip with Dr. Gusman on 7/31/2021.  I showed the patient her x-rays and showed her that she has end-stage arthritis was fasts flattening of the femoral head.  The only thing that is going to get her out of pain is a hip replacement.  She is a smoker and we do not have a surgeon here that is willing to operate for hip replacement on a smoker.  Plan today is that we refer her to [facility] to discuss surgical options.  I offered her prescription for meloxicam, however, she has an allergy to naproxen so I did not send this.  She will return to see us as needed.  History, diagnosis and treatment plan discussed with Dr. Gusman.            Tuyet Ryder PA-C  12/08/21  09:14 EST

## 2022-01-03 RX ORDER — ONDANSETRON 4 MG/1
4 TABLET, FILM COATED ORAL DAILY PRN
Qty: 30 TABLET | Refills: 1 | OUTPATIENT
Start: 2022-01-03 | End: 2023-01-03

## 2022-03-10 PROCEDURE — 85652 RBC SED RATE AUTOMATED: CPT | Performed by: EMERGENCY MEDICINE

## 2022-03-10 PROCEDURE — 83735 ASSAY OF MAGNESIUM: CPT | Performed by: EMERGENCY MEDICINE

## 2022-03-10 PROCEDURE — 36415 COLL VENOUS BLD VENIPUNCTURE: CPT

## 2022-03-10 PROCEDURE — 84145 PROCALCITONIN (PCT): CPT | Performed by: EMERGENCY MEDICINE

## 2022-03-10 PROCEDURE — 80053 COMPREHEN METABOLIC PANEL: CPT

## 2022-03-10 PROCEDURE — 86140 C-REACTIVE PROTEIN: CPT | Performed by: EMERGENCY MEDICINE

## 2022-03-10 PROCEDURE — 99284 EMERGENCY DEPT VISIT MOD MDM: CPT

## 2022-03-10 PROCEDURE — 83605 ASSAY OF LACTIC ACID: CPT

## 2022-03-10 PROCEDURE — 85025 COMPLETE CBC W/AUTO DIFF WBC: CPT

## 2022-03-10 RX ORDER — SODIUM CHLORIDE 0.9 % (FLUSH) 0.9 %
10 SYRINGE (ML) INJECTION AS NEEDED
Status: DISCONTINUED | OUTPATIENT
Start: 2022-03-10 | End: 2022-03-21 | Stop reason: HOSPADM

## 2022-03-11 ENCOUNTER — APPOINTMENT (OUTPATIENT)
Dept: GENERAL RADIOLOGY | Facility: HOSPITAL | Age: 42
End: 2022-03-11

## 2022-03-11 ENCOUNTER — APPOINTMENT (OUTPATIENT)
Dept: CT IMAGING | Facility: HOSPITAL | Age: 42
End: 2022-03-11

## 2022-03-11 ENCOUNTER — HOSPITAL ENCOUNTER (INPATIENT)
Facility: HOSPITAL | Age: 42
LOS: 10 days | Discharge: HOME | End: 2022-03-21
Attending: EMERGENCY MEDICINE | Admitting: INTERNAL MEDICINE

## 2022-03-11 DIAGNOSIS — E87.6 HYPOKALEMIA: ICD-10-CM

## 2022-03-11 DIAGNOSIS — M25.551 RIGHT HIP PAIN: Primary | ICD-10-CM

## 2022-03-11 DIAGNOSIS — M16.11 ARTHRITIS OF RIGHT HIP: ICD-10-CM

## 2022-03-11 DIAGNOSIS — M00.9 SEPTIC HIP: ICD-10-CM

## 2022-03-11 DIAGNOSIS — Z87.39 HISTORY OF SEPTIC ARTHRITIS: ICD-10-CM

## 2022-03-11 PROBLEM — Z72.0 TOBACCO ABUSE: Status: ACTIVE | Noted: 2022-03-11

## 2022-03-11 PROBLEM — R94.31 QT PROLONGATION: Status: ACTIVE | Noted: 2022-03-11

## 2022-03-11 LAB
ALBUMIN SERPL-MCNC: 3.5 G/DL (ref 3.5–5.2)
ALBUMIN SERPL-MCNC: 4 G/DL (ref 3.5–5.2)
ALBUMIN/GLOB SERPL: 1.3 G/DL
ALBUMIN/GLOB SERPL: 1.4 G/DL
ALP SERPL-CCNC: 111 U/L (ref 39–117)
ALP SERPL-CCNC: 91 U/L (ref 39–117)
ALT SERPL W P-5'-P-CCNC: 23 U/L (ref 1–33)
ALT SERPL W P-5'-P-CCNC: 27 U/L (ref 1–33)
AMORPH URATE CRY URNS QL MICRO: ABNORMAL /HPF
AMPHET+METHAMPHET UR QL: NEGATIVE
AMPHETAMINES UR QL: POSITIVE
ANION GAP SERPL CALCULATED.3IONS-SCNC: 11 MMOL/L (ref 5–15)
ANION GAP SERPL CALCULATED.3IONS-SCNC: 11 MMOL/L (ref 5–15)
APPEARANCE FLD: ABNORMAL
APPEARANCE FLD: CLEAR
AST SERPL-CCNC: 24 U/L (ref 1–32)
AST SERPL-CCNC: 31 U/L (ref 1–32)
B-HCG UR QL: NEGATIVE
BACTERIA UR QL AUTO: ABNORMAL /HPF
BARBITURATES UR QL SCN: NEGATIVE
BASOPHILS # BLD AUTO: 0.03 10*3/MM3 (ref 0–0.2)
BASOPHILS NFR BLD AUTO: 0.4 % (ref 0–1.5)
BENZODIAZ UR QL SCN: NEGATIVE
BILIRUB SERPL-MCNC: 0.5 MG/DL (ref 0–1.2)
BILIRUB SERPL-MCNC: 0.5 MG/DL (ref 0–1.2)
BILIRUB UR QL STRIP: ABNORMAL
BUN SERPL-MCNC: 6 MG/DL (ref 6–20)
BUN SERPL-MCNC: 7 MG/DL (ref 6–20)
BUN/CREAT SERPL: 11.3 (ref 7–25)
BUN/CREAT SERPL: 11.9 (ref 7–25)
BUPRENORPHINE SERPL-MCNC: NEGATIVE NG/ML
CALCIUM SPEC-SCNC: 8.6 MG/DL (ref 8.6–10.5)
CALCIUM SPEC-SCNC: 9.5 MG/DL (ref 8.6–10.5)
CANNABINOIDS SERPL QL: POSITIVE
CHLORIDE SERPL-SCNC: 100 MMOL/L (ref 98–107)
CHLORIDE SERPL-SCNC: 106 MMOL/L (ref 98–107)
CK SERPL-CCNC: 224 U/L (ref 20–180)
CLARITY UR: ABNORMAL
CO2 SERPL-SCNC: 27 MMOL/L (ref 22–29)
CO2 SERPL-SCNC: 28 MMOL/L (ref 22–29)
COCAINE UR QL: POSITIVE
COLOR FLD: ABNORMAL
COLOR FLD: YELLOW
COLOR UR: ABNORMAL
CREAT SERPL-MCNC: 0.53 MG/DL (ref 0.57–1)
CREAT SERPL-MCNC: 0.59 MG/DL (ref 0.57–1)
CRP SERPL-MCNC: 4.44 MG/DL (ref 0–0.5)
CRYSTALS FLD MICRO: NORMAL
CRYSTALS FLD MICRO: NORMAL
D-LACTATE SERPL-SCNC: 1.2 MMOL/L (ref 0.5–2)
D-LACTATE SERPL-SCNC: 1.6 MMOL/L (ref 0.5–2)
DEPRECATED RDW RBC AUTO: 41.1 FL (ref 37–54)
DEPRECATED RDW RBC AUTO: 41.7 FL (ref 37–54)
EGFRCR SERPLBLD CKD-EPI 2021: 117 ML/MIN/1.73
EGFRCR SERPLBLD CKD-EPI 2021: 120.1 ML/MIN/1.73
EOSINOPHIL # BLD AUTO: 0.38 10*3/MM3 (ref 0–0.4)
EOSINOPHIL NFR BLD AUTO: 4.8 % (ref 0.3–6.2)
ERYTHROCYTE [DISTWIDTH] IN BLOOD BY AUTOMATED COUNT: 13.4 % (ref 12.3–15.4)
ERYTHROCYTE [DISTWIDTH] IN BLOOD BY AUTOMATED COUNT: 13.7 % (ref 12.3–15.4)
ERYTHROCYTE [SEDIMENTATION RATE] IN BLOOD: 13 MM/HR (ref 0–20)
FLUAV SUBTYP SPEC NAA+PROBE: NOT DETECTED
FLUBV RNA ISLT QL NAA+PROBE: NOT DETECTED
GLOBULIN UR ELPH-MCNC: 2.5 GM/DL
GLOBULIN UR ELPH-MCNC: 3.1 GM/DL
GLUCOSE SERPL-MCNC: 112 MG/DL (ref 65–99)
GLUCOSE SERPL-MCNC: 115 MG/DL (ref 65–99)
GLUCOSE UR STRIP-MCNC: NEGATIVE MG/DL
HCT VFR BLD AUTO: 33.6 % (ref 34–46.6)
HCT VFR BLD AUTO: 37.8 % (ref 34–46.6)
HGB BLD-MCNC: 11.5 G/DL (ref 12–15.9)
HGB BLD-MCNC: 13.1 G/DL (ref 12–15.9)
HGB UR QL STRIP.AUTO: NEGATIVE
HOLD SPECIMEN: NORMAL
HYALINE CASTS UR QL AUTO: ABNORMAL /LPF
IMM GRANULOCYTES # BLD AUTO: 0.03 10*3/MM3 (ref 0–0.05)
IMM GRANULOCYTES NFR BLD AUTO: 0.4 % (ref 0–0.5)
KETONES UR QL STRIP: ABNORMAL
LEUKOCYTE ESTERASE UR QL STRIP.AUTO: ABNORMAL
LYMPHOCYTES # BLD AUTO: 2.09 10*3/MM3 (ref 0.7–3.1)
LYMPHOCYTES NFR BLD AUTO: 26.1 % (ref 19.6–45.3)
LYMPHOCYTES NFR FLD MANUAL: 16 %
LYMPHOCYTES NFR FLD MANUAL: 18 %
MAGNESIUM SERPL-MCNC: 1.9 MG/DL (ref 1.6–2.6)
MCH RBC QN AUTO: 29 PG (ref 26.6–33)
MCH RBC QN AUTO: 29.4 PG (ref 26.6–33)
MCHC RBC AUTO-ENTMCNC: 34.2 G/DL (ref 31.5–35.7)
MCHC RBC AUTO-ENTMCNC: 34.7 G/DL (ref 31.5–35.7)
MCV RBC AUTO: 84.8 FL (ref 79–97)
MCV RBC AUTO: 84.8 FL (ref 79–97)
METHADONE UR QL SCN: POSITIVE
MONOCYTES # BLD AUTO: 0.53 10*3/MM3 (ref 0.1–0.9)
MONOCYTES NFR BLD AUTO: 6.6 % (ref 5–12)
MONOCYTES NFR FLD: 18 %
MONOCYTES NFR FLD: 26 %
NEUTROPHILS NFR BLD AUTO: 4.94 10*3/MM3 (ref 1.7–7)
NEUTROPHILS NFR BLD AUTO: 61.7 % (ref 42.7–76)
NEUTROPHILS NFR FLD MANUAL: 56 %
NEUTROPHILS NFR FLD MANUAL: 66 %
NITRITE UR QL STRIP: NEGATIVE
NRBC BLD AUTO-RTO: 0 /100 WBC (ref 0–0.2)
OPIATES UR QL: POSITIVE
OXYCODONE UR QL SCN: NEGATIVE
PCP UR QL SCN: NEGATIVE
PH UR STRIP.AUTO: 5.5 [PH] (ref 5–8)
PLATELET # BLD AUTO: 239 10*3/MM3 (ref 140–450)
PLATELET # BLD AUTO: 287 10*3/MM3 (ref 140–450)
PMV BLD AUTO: 9.8 FL (ref 6–12)
PMV BLD AUTO: 9.9 FL (ref 6–12)
POTASSIUM SERPL-SCNC: 2.8 MMOL/L (ref 3.5–5.2)
POTASSIUM SERPL-SCNC: 3.6 MMOL/L (ref 3.5–5.2)
PROCALCITONIN SERPL-MCNC: 0.04 NG/ML (ref 0–0.25)
PROPOXYPH UR QL: NEGATIVE
PROT SERPL-MCNC: 6 G/DL (ref 6–8.5)
PROT SERPL-MCNC: 7.1 G/DL (ref 6–8.5)
PROT UR QL STRIP: ABNORMAL
RBC # BLD AUTO: 3.96 10*6/MM3 (ref 3.77–5.28)
RBC # BLD AUTO: 4.46 10*6/MM3 (ref 3.77–5.28)
RBC # FLD AUTO: <2000 /MM3
RBC # FLD AUTO: ABNORMAL /MM3
RBC # UR STRIP: ABNORMAL /HPF
REF LAB TEST METHOD: ABNORMAL
SARS-COV-2 RNA PNL SPEC NAA+PROBE: NOT DETECTED
SODIUM SERPL-SCNC: 139 MMOL/L (ref 136–145)
SODIUM SERPL-SCNC: 144 MMOL/L (ref 136–145)
SP GR UR STRIP: 1.05 (ref 1–1.03)
SQUAMOUS #/AREA URNS HPF: ABNORMAL /HPF
TRICYCLICS UR QL SCN: NEGATIVE
UROBILINOGEN UR QL STRIP: ABNORMAL
WBC # FLD AUTO: 164 /MM3
WBC # FLD AUTO: 197 /MM3
WBC # UR STRIP: ABNORMAL /HPF
WBC NRBC COR # BLD: 6.59 10*3/MM3 (ref 3.4–10.8)
WBC NRBC COR # BLD: 8 10*3/MM3 (ref 3.4–10.8)
WHOLE BLOOD HOLD SPECIMEN: NORMAL
WHOLE BLOOD HOLD SPECIMEN: NORMAL

## 2022-03-11 PROCEDURE — 0S993ZX DRAINAGE OF RIGHT HIP JOINT, PERCUTANEOUS APPROACH, DIAGNOSTIC: ICD-10-PCS | Performed by: ORTHOPAEDIC SURGERY

## 2022-03-11 PROCEDURE — 25010000002 DAPTOMYCIN PER 1 MG: Performed by: INTERNAL MEDICINE

## 2022-03-11 PROCEDURE — 81001 URINALYSIS AUTO W/SCOPE: CPT | Performed by: INTERNAL MEDICINE

## 2022-03-11 PROCEDURE — 87116 MYCOBACTERIA CULTURE: CPT | Performed by: ORTHOPAEDIC SURGERY

## 2022-03-11 PROCEDURE — 87206 SMEAR FLUORESCENT/ACID STAI: CPT | Performed by: ORTHOPAEDIC SURGERY

## 2022-03-11 PROCEDURE — 80053 COMPREHEN METABOLIC PANEL: CPT | Performed by: PHYSICIAN ASSISTANT

## 2022-03-11 PROCEDURE — 89051 BODY FLUID CELL COUNT: CPT | Performed by: ORTHOPAEDIC SURGERY

## 2022-03-11 PROCEDURE — 25010000002 PIPERACILLIN SOD-TAZOBACTAM PER 1 G

## 2022-03-11 PROCEDURE — 25010000002 CEFEPIME PER 500 MG

## 2022-03-11 PROCEDURE — 87075 CULTR BACTERIA EXCEPT BLOOD: CPT | Performed by: ORTHOPAEDIC SURGERY

## 2022-03-11 PROCEDURE — 25010000002 MORPHINE PER 10 MG: Performed by: EMERGENCY MEDICINE

## 2022-03-11 PROCEDURE — 87070 CULTURE OTHR SPECIMN AEROBIC: CPT | Performed by: ORTHOPAEDIC SURGERY

## 2022-03-11 PROCEDURE — 25010000002 ONDANSETRON PER 1 MG: Performed by: EMERGENCY MEDICINE

## 2022-03-11 PROCEDURE — 80306 DRUG TEST PRSMV INSTRMNT: CPT | Performed by: PHYSICIAN ASSISTANT

## 2022-03-11 PROCEDURE — 87636 SARSCOV2 & INF A&B AMP PRB: CPT | Performed by: PHYSICIAN ASSISTANT

## 2022-03-11 PROCEDURE — 87040 BLOOD CULTURE FOR BACTERIA: CPT

## 2022-03-11 PROCEDURE — 77002 NEEDLE LOCALIZATION BY XRAY: CPT

## 2022-03-11 PROCEDURE — 82550 ASSAY OF CK (CPK): CPT | Performed by: INTERNAL MEDICINE

## 2022-03-11 PROCEDURE — 93010 ELECTROCARDIOGRAM REPORT: CPT | Performed by: INTERNAL MEDICINE

## 2022-03-11 PROCEDURE — 87205 SMEAR GRAM STAIN: CPT | Performed by: ORTHOPAEDIC SURGERY

## 2022-03-11 PROCEDURE — 89060 EXAM SYNOVIAL FLUID CRYSTALS: CPT | Performed by: ORTHOPAEDIC SURGERY

## 2022-03-11 PROCEDURE — 87086 URINE CULTURE/COLONY COUNT: CPT | Performed by: INTERNAL MEDICINE

## 2022-03-11 PROCEDURE — 81025 URINE PREGNANCY TEST: CPT | Performed by: INTERNAL MEDICINE

## 2022-03-11 PROCEDURE — 87102 FUNGUS ISOLATION CULTURE: CPT | Performed by: ORTHOPAEDIC SURGERY

## 2022-03-11 PROCEDURE — 25010000002 IOPAMIDOL 61 % SOLUTION: Performed by: EMERGENCY MEDICINE

## 2022-03-11 PROCEDURE — 99222 1ST HOSP IP/OBS MODERATE 55: CPT | Performed by: HOSPITALIST

## 2022-03-11 PROCEDURE — 85027 COMPLETE CBC AUTOMATED: CPT | Performed by: PHYSICIAN ASSISTANT

## 2022-03-11 PROCEDURE — 93005 ELECTROCARDIOGRAM TRACING: CPT | Performed by: HOSPITALIST

## 2022-03-11 PROCEDURE — 83605 ASSAY OF LACTIC ACID: CPT | Performed by: PHYSICIAN ASSISTANT

## 2022-03-11 PROCEDURE — 25010000002 VANCOMYCIN 10 G RECONSTITUTED SOLUTION

## 2022-03-11 PROCEDURE — 0 POTASSIUM CHLORIDE 10 MEQ/100ML SOLUTION: Performed by: EMERGENCY MEDICINE

## 2022-03-11 PROCEDURE — 74177 CT ABD & PELVIS W/CONTRAST: CPT

## 2022-03-11 PROCEDURE — 73560 X-RAY EXAM OF KNEE 1 OR 2: CPT

## 2022-03-11 RX ORDER — POTASSIUM CHLORIDE 7.45 MG/ML
10 INJECTION INTRAVENOUS
Status: DISCONTINUED | OUTPATIENT
Start: 2022-03-11 | End: 2022-03-21 | Stop reason: HOSPADM

## 2022-03-11 RX ORDER — FLUOXETINE HYDROCHLORIDE 20 MG/1
20 CAPSULE ORAL DAILY
Status: DISCONTINUED | OUTPATIENT
Start: 2022-03-11 | End: 2022-03-21 | Stop reason: HOSPADM

## 2022-03-11 RX ORDER — OLANZAPINE 5 MG/1
10 TABLET ORAL DAILY
Status: DISCONTINUED | OUTPATIENT
Start: 2022-03-11 | End: 2022-03-21 | Stop reason: HOSPADM

## 2022-03-11 RX ORDER — MORPHINE SULFATE 4 MG/ML
4 INJECTION, SOLUTION INTRAMUSCULAR; INTRAVENOUS
Status: COMPLETED | OUTPATIENT
Start: 2022-03-11 | End: 2022-03-11

## 2022-03-11 RX ORDER — ONDANSETRON 2 MG/ML
4 INJECTION INTRAMUSCULAR; INTRAVENOUS
Status: DISCONTINUED | OUTPATIENT
Start: 2022-03-11 | End: 2022-03-21 | Stop reason: HOSPADM

## 2022-03-11 RX ORDER — SODIUM CHLORIDE 0.9 % (FLUSH) 0.9 %
10 SYRINGE (ML) INJECTION EVERY 12 HOURS SCHEDULED
Status: DISCONTINUED | OUTPATIENT
Start: 2022-03-11 | End: 2022-03-21 | Stop reason: HOSPADM

## 2022-03-11 RX ORDER — LIDOCAINE HYDROCHLORIDE 10 MG/ML
5 INJECTION, SOLUTION EPIDURAL; INFILTRATION; INTRACAUDAL; PERINEURAL ONCE
Status: COMPLETED | OUTPATIENT
Start: 2022-03-11 | End: 2022-03-11

## 2022-03-11 RX ORDER — POTASSIUM CHLORIDE 1.5 G/1.77G
40 POWDER, FOR SOLUTION ORAL AS NEEDED
Status: DISCONTINUED | OUTPATIENT
Start: 2022-03-11 | End: 2022-03-21 | Stop reason: HOSPADM

## 2022-03-11 RX ORDER — MORPHINE SULFATE 2 MG/ML
2 INJECTION, SOLUTION INTRAMUSCULAR; INTRAVENOUS
Status: DISCONTINUED | OUTPATIENT
Start: 2022-03-11 | End: 2022-03-12

## 2022-03-11 RX ORDER — CYCLOBENZAPRINE HCL 10 MG
5 TABLET ORAL 3 TIMES DAILY PRN
Status: DISCONTINUED | OUTPATIENT
Start: 2022-03-11 | End: 2022-03-21 | Stop reason: HOSPADM

## 2022-03-11 RX ORDER — POTASSIUM CHLORIDE 750 MG/1
40 CAPSULE, EXTENDED RELEASE ORAL ONCE
Status: COMPLETED | OUTPATIENT
Start: 2022-03-11 | End: 2022-03-11

## 2022-03-11 RX ORDER — ONDANSETRON 2 MG/ML
4 INJECTION INTRAMUSCULAR; INTRAVENOUS EVERY 6 HOURS PRN
Status: DISCONTINUED | OUTPATIENT
Start: 2022-03-11 | End: 2022-03-21 | Stop reason: HOSPADM

## 2022-03-11 RX ORDER — METHADONE HYDROCHLORIDE 10 MG/1
60 TABLET ORAL DAILY
Status: DISCONTINUED | OUTPATIENT
Start: 2022-03-11 | End: 2022-03-21 | Stop reason: HOSPADM

## 2022-03-11 RX ORDER — LISINOPRIL 20 MG/1
20 TABLET ORAL
Status: DISCONTINUED | OUTPATIENT
Start: 2022-03-11 | End: 2022-03-21 | Stop reason: HOSPADM

## 2022-03-11 RX ORDER — SODIUM CHLORIDE 0.9 % (FLUSH) 0.9 %
10 SYRINGE (ML) INJECTION AS NEEDED
Status: DISCONTINUED | OUTPATIENT
Start: 2022-03-11 | End: 2022-03-21 | Stop reason: HOSPADM

## 2022-03-11 RX ORDER — POTASSIUM CHLORIDE 750 MG/1
40 CAPSULE, EXTENDED RELEASE ORAL AS NEEDED
Status: DISCONTINUED | OUTPATIENT
Start: 2022-03-11 | End: 2022-03-21 | Stop reason: HOSPADM

## 2022-03-11 RX ORDER — ACETAMINOPHEN 325 MG/1
650 TABLET ORAL EVERY 4 HOURS PRN
Status: DISCONTINUED | OUTPATIENT
Start: 2022-03-11 | End: 2022-03-21 | Stop reason: HOSPADM

## 2022-03-11 RX ORDER — AMLODIPINE BESYLATE 10 MG/1
10 TABLET ORAL
Status: DISCONTINUED | OUTPATIENT
Start: 2022-03-11 | End: 2022-03-21 | Stop reason: HOSPADM

## 2022-03-11 RX ORDER — L.ACID,PARA/B.BIFIDUM/S.THERM 8B CELL
1 CAPSULE ORAL DAILY
Status: DISCONTINUED | OUTPATIENT
Start: 2022-03-11 | End: 2022-03-21 | Stop reason: HOSPADM

## 2022-03-11 RX ORDER — CHOLECALCIFEROL (VITAMIN D3) 125 MCG
5 CAPSULE ORAL NIGHTLY PRN
Status: DISCONTINUED | OUTPATIENT
Start: 2022-03-11 | End: 2022-03-21 | Stop reason: HOSPADM

## 2022-03-11 RX ORDER — OXYCODONE AND ACETAMINOPHEN 10; 325 MG/1; MG/1
1 TABLET ORAL EVERY 6 HOURS PRN
Status: DISCONTINUED | OUTPATIENT
Start: 2022-03-11 | End: 2022-03-12

## 2022-03-11 RX ADMIN — Medication 10 ML: at 20:20

## 2022-03-11 RX ADMIN — MORPHINE SULFATE 4 MG: 4 INJECTION, SOLUTION INTRAMUSCULAR; INTRAVENOUS at 02:01

## 2022-03-11 RX ADMIN — ONDANSETRON 4 MG: 2 INJECTION INTRAMUSCULAR; INTRAVENOUS at 03:21

## 2022-03-11 RX ADMIN — ONDANSETRON 4 MG: 2 INJECTION INTRAMUSCULAR; INTRAVENOUS at 02:01

## 2022-03-11 RX ADMIN — POTASSIUM CHLORIDE 10 MEQ: 7.46 INJECTION, SOLUTION INTRAVENOUS at 02:01

## 2022-03-11 RX ADMIN — DAPTOMYCIN 400 MG: 500 INJECTION, POWDER, LYOPHILIZED, FOR SOLUTION INTRAVENOUS at 12:50

## 2022-03-11 RX ADMIN — METRONIDAZOLE 500 MG: 500 INJECTION, SOLUTION INTRAVENOUS at 09:31

## 2022-03-11 RX ADMIN — POTASSIUM CHLORIDE 10 MEQ: 7.46 INJECTION, SOLUTION INTRAVENOUS at 06:24

## 2022-03-11 RX ADMIN — METHADONE HYDROCHLORIDE 60 MG: 10 TABLET ORAL at 09:27

## 2022-03-11 RX ADMIN — Medication 1 CAPSULE: at 09:28

## 2022-03-11 RX ADMIN — METRONIDAZOLE 500 MG: 500 INJECTION, SOLUTION INTRAVENOUS at 22:23

## 2022-03-11 RX ADMIN — TRAZODONE HYDROCHLORIDE 150 MG: 100 TABLET ORAL at 20:20

## 2022-03-11 RX ADMIN — Medication 10 ML: at 09:33

## 2022-03-11 RX ADMIN — LIDOCAINE HYDROCHLORIDE 5 ML: 10 INJECTION, SOLUTION EPIDURAL; INFILTRATION; INTRACAUDAL; PERINEURAL at 15:49

## 2022-03-11 RX ADMIN — POTASSIUM CHLORIDE 10 MEQ: 7.46 INJECTION, SOLUTION INTRAVENOUS at 03:20

## 2022-03-11 RX ADMIN — AMLODIPINE BESYLATE 10 MG: 10 TABLET ORAL at 09:28

## 2022-03-11 RX ADMIN — TAZOBACTAM SODIUM AND PIPERACILLIN SODIUM 4.5 G: 500; 4 INJECTION, SOLUTION INTRAVENOUS at 04:48

## 2022-03-11 RX ADMIN — POTASSIUM CHLORIDE 10 MEQ: 7.46 INJECTION, SOLUTION INTRAVENOUS at 04:48

## 2022-03-11 RX ADMIN — OXYCODONE HYDROCHLORIDE AND ACETAMINOPHEN 1 TABLET: 10; 325 TABLET ORAL at 20:33

## 2022-03-11 RX ADMIN — IOPAMIDOL 100 ML: 612 INJECTION, SOLUTION INTRAVENOUS at 02:47

## 2022-03-11 RX ADMIN — CEFEPIME 2 G: 20 INJECTION, POWDER, FOR SOLUTION INTRAVENOUS at 12:36

## 2022-03-11 RX ADMIN — LIDOCAINE HYDROCHLORIDE 5 ML: 10 INJECTION, SOLUTION EPIDURAL; INFILTRATION; INTRACAUDAL; PERINEURAL at 15:48

## 2022-03-11 RX ADMIN — CEFEPIME 2 G: 20 INJECTION, POWDER, FOR SOLUTION INTRAVENOUS at 22:23

## 2022-03-11 RX ADMIN — MORPHINE SULFATE 4 MG: 4 INJECTION, SOLUTION INTRAMUSCULAR; INTRAVENOUS at 12:34

## 2022-03-11 RX ADMIN — MORPHINE SULFATE 4 MG: 4 INJECTION, SOLUTION INTRAMUSCULAR; INTRAVENOUS at 03:21

## 2022-03-11 RX ADMIN — FLUOXETINE HYDROCHLORIDE 20 MG: 20 CAPSULE ORAL at 09:28

## 2022-03-11 RX ADMIN — METRONIDAZOLE 500 MG: 500 INJECTION, SOLUTION INTRAVENOUS at 16:25

## 2022-03-11 RX ADMIN — OLANZAPINE 10 MG: 5 TABLET, FILM COATED ORAL at 09:25

## 2022-03-11 RX ADMIN — LISINOPRIL 20 MG: 20 TABLET ORAL at 09:28

## 2022-03-11 RX ADMIN — POTASSIUM CHLORIDE 40 MEQ: 750 CAPSULE, EXTENDED RELEASE ORAL at 02:01

## 2022-03-11 RX ADMIN — VANCOMYCIN HYDROCHLORIDE 1750 MG: 10 INJECTION, POWDER, LYOPHILIZED, FOR SOLUTION INTRAVENOUS at 06:24

## 2022-03-11 NOTE — PROGRESS NOTES
"Pharmacy Consult-Vancomycin Dosing  Frieda Flores is a  41 y.o. female receiving vancomycin therapy.     Indication: Septic arthritis  Consulting Provider: Leon Lopez PA-C  ID Consult:     Goal AUC: 400 - 600 mg/L*hr    Current Antimicrobial Therapy  Anti-Infectives (From admission, onward)      Ordered     Dose/Rate Route Frequency Start Stop    03/11/22 0434  vancomycin 1250 mg/250 mL 0.9% NS IVPB ([facility])        Ordering Provider: Broderick Calvo RPH    1,250 mg  over 90 Minutes Intravenous Every 12 Hours 03/11/22 1800 03/18/22 1759    03/11/22 0403  piperacillin-tazobactam (ZOSYN) 4.5 g in iso-osmotic dextrose 100 mL IVPB (premix)        Ordering Provider: Broderick Calvo RPH    4.5 g  over 4 Hours Intravenous Every 8 Hours 03/11/22 1200 03/18/22 1159    03/11/22 0431  vancomycin 1750 mg/500 mL 0.9% NS IVPB ([facility])        Ordering Provider: Broderick Calvo RPH    20 mg/kg × 81.6 kg  over 120 Minutes Intravenous Once 03/11/22 0530      03/11/22 0403  piperacillin-tazobactam (ZOSYN) 4.5 g in iso-osmotic dextrose 100 mL IVPB (premix)        Ordering Provider: Broderick Calvo RPH    4.5 g  over 30 Minutes Intravenous Once 03/11/22 0445      03/11/22 0358  Pharmacy to dose vancomycin        Ordering Provider: Leon Lopez PA-C     Does not apply Continuous PRN 03/11/22 0357 03/18/22 0456            Allergies  Allergies as of 03/10/2022 - Reviewed 03/10/2022   Allergen Reaction Noted    Aleve [naproxen sodium] Swelling 10/01/2018       Labs    Results from last 7 days   Lab Units 03/10/22  2357   BUN mg/dL 7   CREATININE mg/dL 0.59       Results from last 7 days   Lab Units 03/10/22  2357   WBC 10*3/mm3 8.00       Evaluation of Dosing     Last Dose Received in the ED/Outside Facility: None noted  Is Patient on Dialysis or Renal Replacement:     Ht - 160 cm (63\")  Wt - 81.6 kg (180 lb)    Estimated Creatinine Clearance: 128.3 mL/min (by C-G formula based on SCr of 0.59 mg/dL).    Intake & Output (last 3 days)         " 03/08 0701  03/09 0700 03/09 0701  03/10 0700 03/10 0701 03/11 0700    IV Piggyback   100    Total Intake(mL/kg)   100 (1.2)    Net   +100                   Microbiology and Radiology  Microbiology Results (last 10 days)       ** No results found for the last 240 hours. **            Reported Vancomycin Levels                         [facility] AUC Calculation:    Current AUC: 0 mg/L*hr    Predicted Steady State AUC on Current Dose: 459 mg/L*hr (1250 mg Q12H)  _________________________________    Predicted Steady State AUC on New Dose: mg/L*hr    Assessment/Plan:   Vancomycin 1750 mg IV x 1, then Vancomycin 1250 mg IV Q12H (15 mg/kg/dose)  Vancomycin level before 4th dose (18:00 dose on Saturday 3/12/22)  Pharmacy to follow    Broderick Calvo [facility]  3/11/22 04:45

## 2022-03-11 NOTE — CONSULTS
Frieda Flores  1980  [id number]    Date of Consult: 3/11/2022        Requesting Provider: Brianna Garcia DO  Evaluating Physician: Broderick Wilhelm MD    Chief Complaint: right hip and left knee pain, lumbar back pain;  IVDU    Reason for Consultation:  right hip and left knee pain, lumbar back pain;  IVDU      History of present illness:     Patient is a 41 y.o.  Yr old female with history of polysubstance abuse with prior cocaine/heroin/methamphetamine/THC, chronic hepatitis C and PTSD/bipolar disease; treated for right septic hip with MSSA in September 2021, surgery at that time by Dr. Gusman and subsequently transitioned to [facility], abnormal LFTs with empiric change from Zosyn to cefazolin with improvement although unclear if related to penicillin class versus hep C or combination.  Noncompliant with follow-up.  Apparently plans to be seen at [facility] regarding possible right hip surgery in the future but specifics of that are unclear.  She reports being in longterm recently, lost her spot in methadone clinic and reverted to injection drug abuse approximately 1 week prior to admission.  After injection had developed worsening right hip and left knee pain, worsening low back pain and ultimately presented to [facility] on March 11.    Aside from injection drug abuse, she denies any other specific exposures. No raw or undercooked food.  No unpasteurized milk or milk products.  No animal insect or arthropod exposure.  No tick bites.  No outdoor camping or hunting exposure.  No travel exposure.  No ill exposure.  No history of TB or TB exposure.   Denies a history of MRSA/VRE and no history of C. difficile or ESBL/KPC  Organisms.    She has severe right hip pain over 1 week, constant, sharp, nonradiating, worse with movement and unable to bear weight.  Pain 7-9 out of 10 in severity.  Denies new trauma there    She has left knee pain and swelling, warm to touch and present for a week.  Pain is  not as severe as the right hip, constant, sharp, nonradiating,; unable to bear weight per her albeit able to bend the knee but with pain.  Pain 5-8 out of 10 in severity.  No other trauma there.    Acute on chronic lumbar back pain, constant, sharp, worse with movement, somewhat better with pain meds but not completely relieved and 5 out of 10 in severity at present.  She denies any new focal weakness or numbness.  No bowel or bladder incontinence.  Denies trauma    Denies headache photophobia or neck stiffness.  No shortness of breath cough or hemoptysis.  No nausea vomiting diarrhea or abdominal pain.  No dysuria hematuria or pyuria.    HCG pending;  Nursing to report when back    Past Medical History:   Diagnosis Date   • Abdominal wall cellulitis - After  2019    Treated with IV antibiotics   • ADHD    • Alcoholism (HCC)    • Bipolar disorder ([facility])    • Chlamydia    • Chlamydia 2021   • Hepatitis C carrier ([facility])     Negative viral load   • Migraine    • Osteoarthritis    • PTSD (post-traumatic stress disorder)    • Septic right hip (CMS/HCC) 2021   • Substance abuse (HCC)    • Therapeutic opioid-induced constipation (OIC)    • Urogenital trichomoniasis        Past Surgical History:   Procedure Laterality Date   • BREAST LUMPECTOMY Right    •  SECTION Bilateral 3/9/2019    Procedure:  SECTION PRIMARY;  Surgeon: Shelley Hughes MD;  Location: [facility] LABOR DELIVERY;  Service: Obstetrics/Gynecology   •  SECTION N/A 2021    Procedure: Repeat C/S;  Surgeon: Fernando Pedersen MD;  Location: [facility] OR;  Service: Obstetrics/Gynecology;  Laterality: N/A;   • INCISION AND DRAINAGE HIP Right 2021    Procedure: HIP INCISION AND DRAINAGE RIGHT;  Surgeon: Martin Gusman MD;  Location: [facility] OR;  Service: Orthopedics;  Laterality: Right;       Pediatric History   Patient Parents   • KOKO FREEMAN (Father)     Other Topics Concern   •  Not on file   Social History Narrative   • Not on file       family history includes Diabetes in her mother; Heart disease in her father and mother; Hypertension in her mother.    Allergies   Allergen Reactions   • Aleve [Naproxen Sodium] Swelling     Pt had swelling in her face and in her neck. Pt also states that she lost her voice.        Medication:  Current Facility-Administered Medications   Medication Dose Route Frequency Provider Last Rate Last Admin   • acetaminophen (TYLENOL) tablet 650 mg  650 mg Oral Q4H PRN Leon Lopez PA-C       • amLODIPine (NORVASC) tablet 10 mg  10 mg Oral Q24H Leon Lopez PA-C       • ceFEPime (MAXIPIME) in NS 2g/10ml IV PUSH syringe  2 g Intravenous Q8H Broderick Calvo [facility]       • cyclobenzaprine (FLEXERIL) tablet 5 mg  5 mg Oral TID PRN Leon Lopez PA-C       • DAPTOmycin (CUBICIN) 400 mg in sodium chloride 0.9 % 50 mL IVPB  6 mg/kg (Adjusted) Intravenous Q24H Broderick Wilhelm MD       • FLUoxetine (PROzac) capsule 20 mg  20 mg Oral Daily Leon Lopez PA-C       • lactobacillus acidophilus (RISAQUAD) capsule 1 capsule  1 capsule Oral Daily Leon Lopez PA-C       • lisinopril (PRINIVIL,ZESTRIL) tablet 20 mg  20 mg Oral Q24H Leon Lopez PA-C       • melatonin tablet 5 mg  5 mg Oral Nightly PRN Leon Lopez PA-C       • methadone (DOLOPHINE) tablet 60 mg  60 mg Oral Daily Brianna Garcia,        • metroNIDAZOLE (FLAGYL) 500 mg/100mL IVPB  500 mg Intravenous Q6H Broderick Wilhelm MD       • Morphine sulfate (PF) injection 4 mg  4 mg Intravenous Q30 Min PRN Federico Welsh DO   4 mg at 03/11/22 0321   • OLANZapine (zyPREXA) tablet 10 mg  10 mg Oral Daily Leon Lopez PA-C       • ondansetron (ZOFRAN) injection 4 mg  4 mg Intravenous Q30 Min PRN Federico Welsh DO   4 mg at 03/11/22 0321   • ondansetron (ZOFRAN) injection 4 mg  4 mg Intravenous Q6H PRN Leon Lopez PA-C       • oxyCODONE-acetaminophen  (PERCOCET)  MG per tablet 1 tablet  1 tablet Oral Q6H PRN Brianna Garcia, DO       • Pharmacy to dose vancomycin   Does not apply Continuous PRN Leon Lopez PA-C       • potassium chloride (MICRO-K) CR capsule 40 mEq  40 mEq Oral PRN Leon Lopez PA-C        Or   • potassium chloride (KLOR-CON) packet 40 mEq  40 mEq Oral PRN Leon Lopez PA-C        Or   • potassium chloride 10 mEq in 100 mL IVPB  10 mEq Intravenous Q1H PRN Leon Lopez PA-C       • potassium chloride 10 mEq in 100 mL IVPB  10 mEq Intravenous Q1H PRN Federico Welsh  mL/hr at 03/11/22 0624 10 mEq at 03/11/22 0624   • sodium chloride 0.9 % flush 10 mL  10 mL Intravenous PRN Federico Welsh DO       • sodium chloride 0.9 % flush 10 mL  10 mL Intravenous Q12H Leon Lopez PA-C       • sodium chloride 0.9 % flush 10 mL  10 mL Intravenous PRN Leon Lopez PA-C       • traZODone (DESYREL) tablet 150 mg  150 mg Oral Nightly Leon Lopez PA-C           Antibiotics:  Anti-Infectives (From admission, onward)    Ordered     Dose/Rate Route Frequency Start Stop    03/11/22 0823  DAPTOmycin (CUBICIN) 400 mg in sodium chloride 0.9 % 50 mL IVPB        Ordering Provider: Broderick Wilhelm MD    6 mg/kg × 64.1 kg (Adjusted)  100 mL/hr over 30 Minutes Intravenous Every 24 Hours 03/11/22 0915 03/21/22 0914    03/11/22 0823  metroNIDAZOLE (FLAGYL) 500 mg/100mL IVPB        Ordering Provider: Broderick Wilhelm MD    500 mg  100 mL/hr over 60 Minutes Intravenous Every 6 Hours 03/11/22 0915 03/21/22 0914    03/11/22 0755  ceFEPime (MAXIPIME) in NS 2g/10ml IV PUSH syringe        Ordering Provider: Broderick Calvo [facility]    2 g  over 5 Minutes Intravenous Every 8 Hours Scheduled 03/11/22 0845 03/21/22 0559    03/11/22 0403  piperacillin-tazobactam (ZOSYN) 4.5 g in iso-osmotic dextrose 100 mL IVPB (premix)        Ordering Provider: Broderick Calvo RPH    4.5 g  over 30 Minutes Intravenous Once 03/11/22  "0445 03/11/22 0614    03/11/22 0358  Pharmacy to dose vancomycin        Ordering Provider: Leon Lopez PA-C     Does not apply Continuous PRN 03/11/22 0357 03/18/22 0456            Review of Systems    Constitutional--subjective fevers and chills.  Appetite diminished with fatigue  Heent-- No new vision, hearing or throat complaints.  No epistaxis or oral sores.  Denies odynophagia or dysphagia.  No flashers, floaters or eye pain. No odynophagia or dysphagia. No headache, photophobia or neck stiffness.  CV-- No chest pain, palpitation or syncope  Resp-- No SOB/cough/Hemoptysis  GI- No nausea, vomiting, or diarrhea.  No hematochezia, melena, or hematemesis. Denies jaundice or chronic liver disease.  -- No dysuria, hematuria, or flank pain.  Denies hesitancy, urgency or flank pain.  Lymph- no swollen lymph nodes in neck/axilla or groin.   Heme- No active bruising or bleeding; no Hx of DVT or PE.  MS-- no swelling or pain in the bones or joints of arms/legs aside from above.     Neuro-- No acute focal weakness or numbness in the arms or legs.  No seizures.    Full 12 point review of systems reviewed and negative otherwise for acute complaints, except for above    Physical Exam: Nursing/chaperone present     vital Signs   /58   Pulse 95   Temp 99 °F (37.2 °C) (Oral)   Resp 22   Ht 160 cm (63\")   Wt 81.6 kg (180 lb)   LMP 02/17/2022   SpO2 94%   Breastfeeding No   BMI 31.89 kg/m²     GENERAL: Awake  , appears in pain with movement particularly at the right hip/left knee; oriented to P/P/T  HEENT: Normocephalic, atraumatic.  PERRL. EOMI. No conjunctival injection. No icterus. Oropharynx clear without evidence of thrush or exudate. No evidence of peridontal disease.    NECK: Supple without nuchal rigidity. No mass.  LYMPH: No cervical, axillary or inguinal lymphadenopathy.  HEART: RRR; No murmur, rubs, gallops.   LUNGS: Clear to auscultation bilaterally without wheezing, rales, rhonchi. Normal " respiratory effort. Nonlabored. No dullness.  ABDOMEN: Soft, nontender, nondistended. Positive bowel sounds. No rebound or guarding. NO mass or HSM.  EXT:  No cyanosis, clubbing . No cord.  : Genitalia generally unremarkable.  Without Savage catheter.  MSK: FROM without joint effusions noted arms/legs.    SKIN: Warm and dry without cutaneous eruptions on Inspection/palpation.    NEURO: Oriented to PPT.  She will not cooperate with a detailed motor or sensory exam given above pain    She will not allow range of motion exam at the right hip.  No obvious warmth or redness there.  Prior incision noted with no dehiscence or drainage.  No discrete mass bulge or fluctuance.  No crepitus or bulla    Spine with no redness bulge fluctuance or point tenderness; diffusely tender in lumbar spine.  No thoracic or cervical spine tenderness.    Left knee edematous compared to right with possible  Effusion, slight warmth compared to right side although only minimal if any skin discoloration faintly pink; no discrete fluctuance but exam limited with pain and patient not cooperative with a detailed exam    Track marks from drug use noted in upper extremities    IV without obvious redness or drainage    Laboratory Data    Results from last 7 days   Lab Units 03/11/22  0614 03/10/22  2357   WBC 10*3/mm3 6.59 8.00   HEMOGLOBIN g/dL 11.5* 13.1   HEMATOCRIT % 33.6* 37.8   PLATELETS 10*3/mm3 239 287     Results from last 7 days   Lab Units 03/11/22  0614   SODIUM mmol/L 144   POTASSIUM mmol/L 3.6   CHLORIDE mmol/L 106   CO2 mmol/L 27.0   BUN mg/dL 6   CREATININE mg/dL 0.53*   GLUCOSE mg/dL 112*   CALCIUM mg/dL 8.6     Results from last 7 days   Lab Units 03/11/22  0614   ALK PHOS U/L 91   BILIRUBIN mg/dL 0.5   ALT (SGPT) U/L 23   AST (SGOT) U/L 24     Results from last 7 days   Lab Units 03/10/22  2357   SED RATE mm/hr 13     Results from last 7 days   Lab Units 03/10/22  2357   CRP mg/dL 4.44*       Estimated Creatinine Clearance: 142.8  mL/min (A) (by C-G formula based on SCr of 0.53 mg/dL (L)).      Microbiology:      Radiology:  Imaging Results (Last 72 Hours)     Procedure Component Value Units Date/Time    XR Knee 1 or 2 View Left [[id number]] Collected: 03/11/22 0619     Updated: 03/11/22 0621    Narrative:      Two-view left knee.    DATE: 3/11/2022.    COMPARISON: None available.    CLINICAL HISTORY: Fall with swelling.    FINDINGS:    There is a moderate knee joint effusion.    There is no fracture, subluxation or dislocation otherwise seen.    The joint spaces appear preserved.      Impression:        Moderate effusion with no acute osseous abnormality seen radiographically.    Electronically signed by:  Rob Rincon D.O.    3/11/2022 4:20 AM [place] Time    CT Abdomen Pelvis With Contrast [[id number]] Collected: 03/11/22 0308     Updated: 03/11/22 0314    Narrative:      EXAMINATION: CT ABDOMEN AND PELVIS WITH IV CONTRAST       DATE OF EXAMINATION: 3/11/2022.    COMPARISON: 3/13/2019.    INDICATION: Joint pain with history of hip septic arthritis. Back and bilateral hip pain.    PROCEDURE:  Axial CT of the abdomen and pelvis was performed with contrast and sagittal and coronal reformatted images were performed.  100 mL of Isovue-300 was given intravenously. CT dose lowering techniques were used, to include: automated exposure   control, adjustment for patient size, and/or use of iterative reconstruction.    FINDINGS:    LOWER CHEST :  The visualized lung bases are clear.  There are no pleural or pericardial effusions.    ABDOMEN:    Liver and Biliary system:  Normal.    Adrenal glands:  Normal.    Kidneys and ureters: Normal.    Spleen:  Normal.    Pancreas:  Normal.    Gallbladder:  Normal.    Lymph nodes, Peritoneum and mesentery:  There is no mesenteric or retroperitoneal lymphadenopathy.    Gastrointestinal tract:  There are no dilated loops of bowel or free intraperitoneal air.    The appendix is normal.     Aorta/IVC:   No aortic  aneurysm.  IVC normal.    Abdominal wall:  Normal.    PELVIS:    Fluid: There is no free fluid in the pelvis.    Lymph Nodes:  There is no pelvic or inguinal lymphadenopathy..    Urinary bladder:  Normal.    BONES:  There is severe osteoarthritis involving the right hip heterotopic ossification seen along the inferior aspect of the hip joint space. There is some moderate superolateral subluxation of the femur with respect to the acetabulum. There is   significant subchondral sclerosis and cystic changes. There is some soft tissue thickening of the joint capsule and a small effusion around the right hip joint as this could relate to the patient's history of septic arthritis. Active infection would be   difficult to exclude. Mild degenerative changes in the left hip joint spaces otherwise noted.    ADDITIONAL  SIGNIFICANT FINDINGS:  None.      Impression:        1. There is severe osteoarthritis involving the right hip heterotopic ossification seen along the inferior aspect of the hip joint space. There is some moderate superolateral subluxation of the femur with respect to the acetabulum. There is significant   subchondral sclerosis and cystic changes. There is some soft tissue thickening of the joint capsule and a small effusion around the right hip joint as this could relate to the patient's history of septic arthritis. Active infection would be difficult to   exclude.  2. No acute process otherwise seen within the abdomen or pelvis.          Electronically signed by:  Rob Rincon D.O.    3/11/2022 1:13 AM [place] Time            Impression:     --Acute subjective constitutional symptoms with low-grade temp 99, active injection drug abuse , polysubstance with acute right hip/lumbar back pain out of proportion to baseline and acute left knee pain/swelling.  She is at risk for bloodstream infection and hematogenous spread with risk for metastatic foci of involvement including risk for septic  joints/osteomyelitis, endovascular focus etc.  Broad empiric antibiotics ongoing with daptomycin/cefepime and Flagyl; orthopedics to see for aspiration of joints and consideration for surgery at their discretion.  MRI is planned at lumbar spine and right hip at least but may require further imaging at other sites at orthopedic discretion; blood cultures negative so far; she denies cough and respiratory exam nonfocal.  No acute inflammatory process on abdominal CT.    --Acute/chronic right hip pain with abrupt worsening over last week, recent injection drug abuse and risk for new versus recurrent infection.  Empiric antibiotics ongoing and orthopedics to see for aspiration and surgical decisions/timing/threshold    --Acute/chronic lumbar back pain with worsening over 1 week and recent injection drug abuse with risk for metastatic seeding and spinal/paraspinal infection risk.  MRI lumbar spine planned; if evidence for inflammatory process/fluid collection you should give consideration to neurosurgical consultation.    --Acute left knee pain/swelling, recent injection drug abuse and risk for septic joint.  Orthopedics to see for aspiration and surgical decision/timing and threshold at their discretion    --Right septic hip arthritis in August/September 2021 with surgery by Dr. Gusman    --Chronic hepatitis C.  I do not treat viral hepatitis as a part of my practice.  If you desire further input regarding this during this hospitalization you should give consideration to gastroenterology consultation.    --Polysubstance abuse as previously noted.  Strategy for minimizing risk of withdrawal and additional supportive measures at discretion of medicine team    --Possible Zosyn adverse drug reaction with abnormal LFTs in 2021.  Not clear-cut but trying to avoid.  Subsequently tolerated cephalosporin class antibiotics with improvements in liver tests    --Abnormal CPK prior to daptomycin.  Monitor.    --PTSD/bipolar  disease    --Medical noncompliance        PLAN: Thank you for asking us to see Frieda Flores, I recommend the following:    --IV daptomycin/cefepime and Flagyl    --Check/review labs cultures and scans    --Partial history per nursing staff    --Discussed with microbiology    --Discussed with Dr. Ortega; he will facilitate orthopedic consultation, potential aspiration of right hip/left knee and MRIs    --Highly complex set of issues with high risk for further serious morbidity and other serious sequela    --HCG pending and nursing to communicate result when back       Broderick Wilhelm MD  3/11/2022

## 2022-03-11 NOTE — ED PROVIDER NOTES
[place]    EMERGENCY DEPARTMENT ENCOUNTER      Pt Name: Frieda Flores  MRN: [id number]  YOB: 1980  Date of evaluation: 3/10/2022  Provider: Federico Welsh DO    CHIEF COMPLAINT       Chief Complaint   Patient presents with   • Weakness - Generalized         HISTORY OF PRESENT ILLNESS  (Location/Symptom, Timing/Onset, Context/Setting, Quality, Duration, Modifying Factors, Severity.)   Frieda Flores is a 41 y.o. female who presents to the emergency department for evaluation of a few different complaints.  The patient notes history of recurrent right hip dysfunction with a severe arthritic changes, has a history of septic arthritis in this joint as well which has required washout in the past.  She is seeing orthopedic surgery at our facility Dr. Gusman for this complaint in the past.  She notes that her significant discomfort in her right hip as returned, feeling similar from my prior admission to the hospital with septic arthritis.  She does note was supposed to follow with Dr. Stanton at [facility] for possible right hip replacement at some point in the near future, but this is not to be reevaluated until May of this upcoming year.  She denies any fevers, she notes the right hip is significantly tender with really any type of ambulation and movement at this time.  She denies any fall, injury to the joint.  No other joint injury or pain.  She has been using crutches to get around and is unable to bear weight this time.  No other acute systemic complaints.  She does have a history of polysubstance drug abuse, is currently on methadone.      Nursing notes were reviewed.    REVIEW OF SYSTEMS    (2-9 systems for level 4, 10 or more for level 5)   ROS:  General:  No fevers, no chills, no weakness  Cardiovascular:  No chest pain, no palpitations  Respiratory:  No shortness of breath, no cough, no wheezing  Gastrointestinal:  No pain, no nausea, no vomiting, no diarrhea  Musculoskeletal:  No muscle  pain, positive right hip pain, positive chronic back pain  Skin:  No rash  Neurologic:  No headache  Psychiatric:  No anxiety  Genitourinary:  No dysuria, no hematuria    Except as noted above the remainder of the review of systems was reviewed and negative.       PAST MEDICAL HISTORY     Past Medical History:   Diagnosis Date   • Abdominal wall cellulitis - After  2019    Treated with IV antibiotics   • ADHD    • Alcoholism ([facility])    • Bipolar disorder ([facility])    • Chlamydia    • Chlamydia 2021   • Hepatitis C carrier ([facility])     Negative viral load   • Migraine    • Osteoarthritis    • PTSD (post-traumatic stress disorder)    • Septic right hip (CMS/HCC) 2021   • Substance abuse ([facility])    • Therapeutic opioid-induced constipation ([facility])    • Urogenital trichomoniasis          SURGICAL HISTORY       Past Surgical History:   Procedure Laterality Date   • BREAST LUMPECTOMY Right    •  SECTION Bilateral 3/9/2019    Procedure:  SECTION PRIMARY;  Surgeon: Shelley Hughes MD;  Location: [facility] LABOR DELIVERY;  Service: Obstetrics/Gynecology   •  SECTION N/A 2021    Procedure: Repeat C/S;  Surgeon: Fernando Pedersen MD;  Location: [facility] OR;  Service: Obstetrics/Gynecology;  Laterality: N/A;   • INCISION AND DRAINAGE HIP Right 2021    Procedure: HIP INCISION AND DRAINAGE RIGHT;  Surgeon: Martin Gusman MD;  Location: [facility] OR;  Service: Orthopedics;  Laterality: Right;         CURRENT MEDICATIONS       Current Facility-Administered Medications:   •  Morphine sulfate (PF) injection 4 mg, 4 mg, Intravenous, Q30 Min PRN, Federico Welsh DO, 4 mg at 22 032  •  ondansetron (ZOFRAN) injection 4 mg, 4 mg, Intravenous, Q30 Min PRN, Federico Welsh DO, 4 mg at 22 032  •  potassium chloride 10 mEq in 100 mL IVPB, 10 mEq, Intravenous, Q1H PRN, Federico Welsh DO, Last Rate: 100 mL/hr at 22 0320, 10 mEq  "at 03/11/22 0320  •  sodium chloride 0.9 % flush 10 mL, 10 mL, Intravenous, PRN, Federico Welsh, DO    Current Outpatient Medications:   •  amLODIPine (NORVASC) 10 MG tablet, Take 1 tablet by mouth Daily., Disp: , Rfl:   •  busPIRone (BUSPAR) 10 MG tablet, , Disp: , Rfl:   •  cyclobenzaprine (FLEXERIL) 5 MG tablet, , Disp: , Rfl:   •  FLUoxetine (PROzac) 20 MG capsule, , Disp: , Rfl:   •  hydrOXYzine pamoate (VISTARIL) 25 MG capsule, , Disp: , Rfl:   •  Lidoderm 5 %, , Disp: , Rfl:   •  lisinopril (PRINIVIL,ZESTRIL) 20 MG tablet, Take 1 tablet by mouth Daily., Disp: , Rfl:   •  methadone (DOLOPHINE) 10 MG tablet, Take 6 tablets by mouth Daily Indications: Opioid Use Disorder (Initiation of Therapy),, Disp: , Rfl:   •  OLANZapine (zyPREXA) 10 MG tablet, Take 1 tablet by mouth Daily., Disp: , Rfl:   •  Prenatal Vit-Fe Fumarate-FA (prenatal vitamin 27-0.8) 27-0.8 MG tablet tablet, Take 1 tablet by mouth Daily., Disp: 30 tablet, Rfl: 5  •  traZODone (DESYREL) 150 MG tablet, Take 1 tablet by mouth Every Night., Disp: , Rfl:     ALLERGIES     Aleve [naproxen sodium]    FAMILY HISTORY       Family History   Problem Relation Age of Onset   • Heart disease Father    • Diabetes Mother    • Hypertension Mother    • Heart disease Mother           SOCIAL HISTORY       Social History     Socioeconomic History   • Marital status: Legally    Tobacco Use   • Smoking status: Current Some Day Smoker     Packs/day: 0.00     Types: Cigarettes   • Smokeless tobacco: Never Used   • Tobacco comment: vapes mainly; still has a cigarette every now and then   Vaping Use   • Vaping Use: Every day   • Substances: Nicotine, Flavoring   Substance and Sexual Activity   • Alcohol use: No     Comment: hasnt had anything since 8/18/18 per pt   • Drug use: Not Currently     Types: IV, \"Crack\" cocaine, Benzodiazepines, Heroin, Marijuana     Comment: 7/31/21 - still actively using; 4 months sober   • Sexual activity: Yes     Partners: " "Male         PHYSICAL EXAM    (up to 7 for level 4, 8 or more for level 5)     Vitals:    03/10/22 2337 03/11/22 0130 03/11/22 0300 03/11/22 0330   BP: 136/99 103/88 131/69 131/58   BP Location: Right arm      Patient Position: Sitting      Pulse: 95      Resp: 22      Temp: 99 °F (37.2 °C)      TempSrc: Oral      SpO2: 97% 94% 94% 94%   Weight: 81.6 kg (180 lb)      Height: 160 cm (63\")          Physical Exam  General : Patient is awake, alert, oriented, in acute painful distress, tearful during examination  HEENT: Pupils are equally round and reactive to light, EOMI, conjunctivae clear, sclerae white  Neck: Neck is supple, full range of motion, trachea midline  Cardiac: Heart regular rate, rhythm, no murmurs, rubs, or gallops  Lungs: Lungs are clear to auscultation, there is no wheezing, rhonchi, or rales. There is no use of accessory muscles  Abdomen: Abdomen is soft, nontender, nondistended. There are no firm or pulsatile masses, no rebound rigidity or guarding.   Musculoskeletal: There is significant discomfort with any type of movement even soft tissue palpation on the right hip with any type of flexion or extension.  Distal pulses neurovascular status are intact.  I am unable to arrange the right hip secondary to significant discomfort per the patient.  5 out of 5 strength in all remaining extremities.  No focal muscle deficits are appreciated  Neuro: Motor intact, sensory intact, level of consciousness is normal  Dermatology: Skin is warm and dry  Psych: Mentation is grossly normal, cognition is grossly normal. Affect is appropriate.      DIAGNOSTIC RESULTS     EKG: All EKGs are interpreted by the Emergency Department Physician who either signs or Co-signs this chart in the absence of a cardiologist.    No orders to display       RADIOLOGY:   Non-plain film images such as CT, Ultrasound and MRI are read by the radiologist. Plain radiographic images are visualized and preliminarily interpreted by the " emergency physician with the below findings:      [] Radiologist's Report Reviewed:  CT Abdomen Pelvis With Contrast   Final Result      1. There is severe osteoarthritis involving the right hip heterotopic ossification seen along the inferior aspect of the hip joint space. There is some moderate superolateral subluxation of the femur with respect to the acetabulum. There is significant    subchondral sclerosis and cystic changes. There is some soft tissue thickening of the joint capsule and a small effusion around the right hip joint as this could relate to the patient's history of septic arthritis. Active infection would be difficult to    exclude.   2. No acute process otherwise seen within the abdomen or pelvis.               Electronically signed by:  Rob Rincon D.O.     3/11/2022 1:13 AM Mountain Time            ED BEDSIDE ULTRASOUND:   Performed by ED Physician - none    LABS:    I have reviewed and interpreted all of the currently available lab results from this visit (if applicable):  Results for orders placed or performed during the hospital encounter of 03/11/22   Comprehensive Metabolic Panel    Specimen: Blood   Result Value Ref Range    Glucose 115 (H) 65 - 99 mg/dL    BUN 7 6 - 20 mg/dL    Creatinine 0.59 0.57 - 1.00 mg/dL    Sodium 139 136 - 145 mmol/L    Potassium 2.8 (L) 3.5 - 5.2 mmol/L    Chloride 100 98 - 107 mmol/L    CO2 28.0 22.0 - 29.0 mmol/L    Calcium 9.5 8.6 - 10.5 mg/dL    Total Protein 7.1 6.0 - 8.5 g/dL    Albumin 4.00 3.50 - 5.20 g/dL    ALT (SGPT) 27 1 - 33 U/L    AST (SGOT) 31 1 - 32 U/L    Alkaline Phosphatase 111 39 - 117 U/L    Total Bilirubin 0.5 0.0 - 1.2 mg/dL    Globulin 3.1 gm/dL    A/G Ratio 1.3 g/dL    BUN/Creatinine Ratio 11.9 7.0 - 25.0    Anion Gap 11.0 5.0 - 15.0 mmol/L    eGFR 117.0 >60.0 mL/min/1.73   CBC Auto Differential    Specimen: Blood   Result Value Ref Range    WBC 8.00 3.40 - 10.80 10*3/mm3    RBC 4.46 3.77 - 5.28 10*6/mm3    Hemoglobin 13.1 12.0 - 15.9  g/dL    Hematocrit 37.8 34.0 - 46.6 %    MCV 84.8 79.0 - 97.0 fL    MCH 29.4 26.6 - 33.0 pg    MCHC 34.7 31.5 - 35.7 g/dL    RDW 13.4 12.3 - 15.4 %    RDW-SD 41.1 37.0 - 54.0 fl    MPV 9.9 6.0 - 12.0 fL    Platelets 287 140 - 450 10*3/mm3    Neutrophil % 61.7 42.7 - 76.0 %    Lymphocyte % 26.1 19.6 - 45.3 %    Monocyte % 6.6 5.0 - 12.0 %    Eosinophil % 4.8 0.3 - 6.2 %    Basophil % 0.4 0.0 - 1.5 %    Immature Grans % 0.4 0.0 - 0.5 %    Neutrophils, Absolute 4.94 1.70 - 7.00 10*3/mm3    Lymphocytes, Absolute 2.09 0.70 - 3.10 10*3/mm3    Monocytes, Absolute 0.53 0.10 - 0.90 10*3/mm3    Eosinophils, Absolute 0.38 0.00 - 0.40 10*3/mm3    Basophils, Absolute 0.03 0.00 - 0.20 10*3/mm3    Immature Grans, Absolute 0.03 0.00 - 0.05 10*3/mm3    nRBC 0.0 0.0 - 0.2 /100 WBC   Lactic Acid, Plasma    Specimen: Blood   Result Value Ref Range    Lactate 1.6 0.5 - 2.0 mmol/L   Procalcitonin    Specimen: Blood   Result Value Ref Range    Procalcitonin 0.04 0.00 - 0.25 ng/mL   Sedimentation Rate    Specimen: Blood   Result Value Ref Range    Sed Rate 13 0 - 20 mm/hr   C-reactive Protein    Specimen: Blood   Result Value Ref Range    C-Reactive Protein 4.44 (H) 0.00 - 0.50 mg/dL   Magnesium    Specimen: Blood   Result Value Ref Range    Magnesium 1.9 1.6 - 2.6 mg/dL   Green Top (Gel)   Result Value Ref Range    Extra Tube Hold for add-ons.    Lavender Top   Result Value Ref Range    Extra Tube hold for add-on    Gold Top - SST   Result Value Ref Range    Extra Tube Hold for add-ons.    Light Blue Top   Result Value Ref Range    Extra Tube hold for add-on         All other labs were within normal range or not returned as of this dictation.      EMERGENCY DEPARTMENT COURSE and DIFFERENTIAL DIAGNOSIS/MDM:   Vitals:    Vitals:    03/10/22 2337 03/11/22 0130 03/11/22 0300 03/11/22 0330   BP: 136/99 103/88 131/69 131/58   BP Location: Right arm      Patient Position: Sitting      Pulse: 95      Resp: 22      Temp: 99 °F (37.2 °C)     "  TempSrc: Oral      SpO2: 97% 94% 94% 94%   Weight: 81.6 kg (180 lb)      Height: 160 cm (63\")               Patient with a history of severe right hip osteoarthritis with recurrent septic arthritis.  Presents this evening with worsening pain, history feels similar to her prior presentation where she was treated for septic arthritis.  On arrival the patient is in acute painful distress.  She does have a pretty significant history of polysubstance abuse.  We do obtain IV, labs, imaging for further evaluation.  Potassium 2.8, she was rile replaced both orally and through IV.  Imaging of the hip reveals a lot of chronic pretty severe degenerative changes with some mild joint effusion, septic arthritis cannot be excluded.  As the patient unable to bear weight, symptoms feel similar to prior presentations we will plan on admission to the hospital, orthopedic consultation for further work-up and evaluation.  Case discussed with our hospitalist team for admission.    MEDICATIONS ADMINISTERED IN ED:  Medications   sodium chloride 0.9 % flush 10 mL (has no administration in time range)   Morphine sulfate (PF) injection 4 mg (4 mg Intravenous Given 3/11/22 0321)   ondansetron (ZOFRAN) injection 4 mg (4 mg Intravenous Given 3/11/22 0321)   potassium chloride 10 mEq in 100 mL IVPB (10 mEq Intravenous New Bag 3/11/22 0320)   potassium chloride (MICRO-K) CR capsule 40 mEq (40 mEq Oral Given 3/11/22 0201)   iopamidol (ISOVUE-300) 61 % injection 100 mL (100 mL Intravenous Given 3/11/22 0247)       PROCEDURES:  Procedures    CRITICAL CARE TIME    Total Critical Care time was 0 minutes, excluding separately reportable procedures.   There was a high probability of clinically significant/life threatening deterioration in the patient's condition which required my urgent intervention.      FINAL IMPRESSION      1. Right hip pain    2. Arthritis of right hip    3. History of septic arthritis    4. Hypokalemia          DISPOSITION/PLAN "     ED Disposition     ED Disposition   Decision to Admit    Condition   --    Comment   Level of Care: Telemetry [5]   Diagnosis: Hip pain, acute, right [[id number]]   Admitting Physician: GIANNA FERGUSON [[id number]]                 Comment: Please note this report has been produced using speech recognition software.      Federico Welsh DO  Attending Emergency Physician               Federico Welsh DO  03/11/22 0354

## 2022-03-11 NOTE — CASE MANAGEMENT/SOCIAL WORK
Continued Stay Note  [facility]     Patient Name: Frieda Flores  MRN: [id number]  Today's Date: 3/11/2022    Admit Date: 3/11/2022     Discharge Plan     Row Name 03/11/22 2544       Plan    Plan Ongoing    Plan Comments This patient is well known to our service line, having seen her in consultation on her last admission.  I have reviewed her meds- she has been restarted on Methadone- thank you.  Her PRN opioids are appropriate as well.  I will continue to follow.  UDS + Cocaine, METH and Methadone.    Since she is on Methadone maintenance, she will not qualify for inpatient [facility] rehab.  Will offer [facility] recovery support and peer support options.               Discharge Codes    No documentation.                     Mary Pollock RN ,BSN   Addiction Coordinator

## 2022-03-11 NOTE — CONSULTS
Patient: Frieda Flores    Date of Admission: 3/11/2022  1:10 AM    YOB: 1980    Medical Record Number: [id number]    Attending Physician: Lionel Ortega MD    Consulting Physician: Noe Perdomo MD      Chief Complaints: Low back, right hip and left knee pain    History of Present Illness: Elinor is a 41-year-old female with a past medical history significant for polysubstance abuse including recent IV drug abuse.  She has a history of a right hip irrigation and debridement for septic joint by Dr. Gusman July 2021.  She was noncompliant with follow-up.  Apparently was scheduled to see Dr. Stanton at  regarding possible hip replacement in May.  She ambulates with cane and walker assist due to her right hip pain.  Reports a 1 week history of low back and right hip pain.  Also has a recent onset of left knee pain and swelling.  Denies any recent injury.     Allergies   Allergen Reactions   • Aleve [Naproxen Sodium] Swelling     Pt had swelling in her face and in her neck. Pt also states that she lost her voice.         Home Medications:  Medications Prior to Admission   Medication Sig Dispense Refill Last Dose   • amLODIPine (NORVASC) 10 MG tablet Take 1 tablet by mouth Daily.      • busPIRone (BUSPAR) 10 MG tablet       • cyclobenzaprine (FLEXERIL) 5 MG tablet       • FLUoxetine (PROzac) 20 MG capsule       • hydrOXYzine pamoate (VISTARIL) 25 MG capsule       • Lidoderm 5 %       • lisinopril (PRINIVIL,ZESTRIL) 20 MG tablet Take 1 tablet by mouth Daily.      • methadone (DOLOPHINE) 10 MG tablet Take 6 tablets by mouth Daily Indications: Opioid Use Disorder (Initiation of Therapy),      • OLANZapine (zyPREXA) 10 MG tablet Take 1 tablet by mouth Daily.      • Prenatal Vit-Fe Fumarate-FA (prenatal vitamin 27-0.8) 27-0.8 MG tablet tablet Take 1 tablet by mouth Daily. 30 tablet 5    • traZODone (DESYREL) 150 MG tablet Take 1 tablet by mouth Every Night.          Current Medications:  Scheduled  Meds:amLODIPine, 10 mg, Oral, Q24H  ceFEPime (MAXIPIME) in NS 2g/10ml IV PUSH syringe, 2 g, Intravenous, Q8H  DAPTOmycin, 6 mg/kg (Adjusted), Intravenous, Q24H  FLUoxetine, 20 mg, Oral, Daily  lactobacillus acidophilus, 1 capsule, Oral, Daily  lisinopril, 20 mg, Oral, Q24H  methadone, 60 mg, Oral, Daily  metroNIDAZOLE, 500 mg, Intravenous, Q6H  OLANZapine, 10 mg, Oral, Daily  sodium chloride, 10 mL, Intravenous, Q12H  traZODone, 150 mg, Oral, Nightly      Continuous Infusions:   PRN Meds:.•  acetaminophen  •  cyclobenzaprine  •  melatonin  •  Morphine  •  ondansetron  •  ondansetron  •  oxyCODONE-acetaminophen  •  potassium chloride **OR** potassium chloride **OR** potassium chloride  •  potassium chloride  •  sodium chloride  •  sodium chloride    Past Medical History:   Diagnosis Date   • Abdominal wall cellulitis - After  2019    Treated with IV antibiotics   • ADHD    • Alcoholism ([facility])    • Bipolar disorder ([facility])    • Chlamydia    • Chlamydia 2021   • Hepatitis C carrier ([facility])     Negative viral load   • Migraine    • Osteoarthritis    • PTSD (post-traumatic stress disorder)    • Septic right hip (CMS/HCC) 2021   • Substance abuse ([facility])    • Therapeutic opioid-induced constipation (OIC)    • Urogenital trichomoniasis         Past Surgical History:   Procedure Laterality Date   • BREAST LUMPECTOMY Right    •  SECTION Bilateral 3/9/2019    Procedure:  SECTION PRIMARY;  Surgeon: Shelley Hughes MD;  Location: [facility] LABOR DELIVERY;  Service: Obstetrics/Gynecology   •  SECTION N/A 2021    Procedure: Repeat C/S;  Surgeon: Fernando Pedersen MD;  Location:  [facility];  Service: Obstetrics/Gynecology;  Laterality: N/A;   • INCISION AND DRAINAGE HIP Right 2021    Procedure: HIP INCISION AND DRAINAGE RIGHT;  Surgeon: Martin Gusman MD;  Location:  [facility];  Service: Orthopedics;  Laterality: Right;        Social History  "    Occupational History   • Not on file   Tobacco Use   • Smoking status: Current Some Day Smoker     Packs/day: 0.00     Types: Cigarettes   • Smokeless tobacco: Never Used   • Tobacco comment: vapes mainly; still has a cigarette every now and then   Vaping Use   • Vaping Use: Every day   • Substances: Nicotine, Flavoring   Substance and Sexual Activity   • Alcohol use: No     Comment: hasnt had anything since 8/18/18 per pt   • Drug use: Not Currently     Types: IV, \"Crack\" cocaine, Benzodiazepines, Heroin, Marijuana     Comment: 7/31/21 - still actively using; 4 months sober   • Sexual activity: Yes     Partners: Male      Social History     Social History Narrative   • Not on file        Family History   Problem Relation Age of Onset   • Heart disease Father    • Diabetes Mother    • Hypertension Mother    • Heart disease Mother          Review of Systems:     Musculoskeletal: Low back, right hip and left knee pain.  Difficulty ambulating.  Otherwise difficult to obtain due to patient's somnolence.    Physical Exam: 41 y.o. female  General Appearance:    Alert, cooperative, in no acute distress                   Vitals:    03/11/22 0925 03/11/22 0930 03/11/22 1545 03/11/22 1625   BP: 123/80 123/80 119/71    BP Location:  Right arm Right arm    Patient Position:  Lying Lying    Pulse: 76 73 74    Resp:  22 20    Temp:  98.8 °F (37.1 °C) 98.7 °F (37.1 °C)    TempSrc:  Oral Oral    SpO2:  94% 93%    Weight:    83.8 kg (184 lb 12.8 oz)   Height:            Extremities:  Right posterior lateral hip scar healed well with no surrounding erythema  Significant pain with active and passive range of motion of her right hip joint.  Thigh and calf soft and nontender  Neurovascular intact distally bilaterally  Left knee with mild to moderate effusion and mild warmth but no erythema  Significant pain as well with active and passive range of motion of her left knee      Diagnostic Tests:    Admission on 03/11/2022   Component " Date Value Ref Range Status   • Glucose 03/10/2022 115 (A) 65 - 99 mg/dL Final   • BUN 03/10/2022 7  6 - 20 mg/dL Final   • Creatinine 03/10/2022 0.59  0.57 - 1.00 mg/dL Final   • Sodium 03/10/2022 139  136 - 145 mmol/L Final   • Potassium 03/10/2022 2.8 (A) 3.5 - 5.2 mmol/L Final   • Chloride 03/10/2022 100  98 - 107 mmol/L Final   • CO2 03/10/2022 28.0  22.0 - 29.0 mmol/L Final   • Calcium 03/10/2022 9.5  8.6 - 10.5 mg/dL Final   • Total Protein 03/10/2022 7.1  6.0 - 8.5 g/dL Final   • Albumin 03/10/2022 4.00  3.50 - 5.20 g/dL Final   • ALT (SGPT) 03/10/2022 27  1 - 33 U/L Final   • AST (SGOT) 03/10/2022 31  1 - 32 U/L Final   • Alkaline Phosphatase 03/10/2022 111  39 - 117 U/L Final   • Total Bilirubin 03/10/2022 0.5  0.0 - 1.2 mg/dL Final   • Globulin 03/10/2022 3.1  gm/dL Final    Calculated Result   • A/G Ratio 03/10/2022 1.3  g/dL Final   • BUN/Creatinine Ratio 03/10/2022 11.9  7.0 - 25.0 Final   • Anion Gap 03/10/2022 11.0  5.0 - 15.0 mmol/L Final   • eGFR 03/10/2022 117.0  >60.0 mL/min/1.73 Final    National Kidney Foundation and American Society of Nephrology (ASN) Task Force recommended calculation based on the Chronic Kidney Disease Epidemiology Collaboration (CKD-EPI) equation refit without adjustment for race.   • Extra Tube 03/10/2022 Hold for add-ons.   Final    Auto resulted.   • Extra Tube 03/10/2022 hold for add-on   Final    Auto resulted   • Extra Tube 03/10/2022 Hold for add-ons.   Final    Auto resulted.   • Extra Tube 03/10/2022 Hold for add-ons.   Final    Auto resulted.   • Extra Tube 03/10/2022 hold for add-on   Final    Auto resulted   • WBC 03/10/2022 8.00  3.40 - 10.80 10*3/mm3 Final   • RBC 03/10/2022 4.46  3.77 - 5.28 10*6/mm3 Final   • Hemoglobin 03/10/2022 13.1  12.0 - 15.9 g/dL Final   • Hematocrit 03/10/2022 37.8  34.0 - 46.6 % Final   • MCV 03/10/2022 84.8  79.0 - 97.0 fL Final   • MCH 03/10/2022 29.4  26.6 - 33.0 pg Final   • MCHC 03/10/2022 34.7  31.5 - 35.7 g/dL Final   • RDW  03/10/2022 13.4  12.3 - 15.4 % Final   • RDW-SD 03/10/2022 41.1  37.0 - 54.0 fl Final   • MPV 03/10/2022 9.9  6.0 - 12.0 fL Final   • Platelets 03/10/2022 287  140 - 450 10*3/mm3 Final   • Neutrophil % 03/10/2022 61.7  42.7 - 76.0 % Final   • Lymphocyte % 03/10/2022 26.1  19.6 - 45.3 % Final   • Monocyte % 03/10/2022 6.6  5.0 - 12.0 % Final   • Eosinophil % 03/10/2022 4.8  0.3 - 6.2 % Final   • Basophil % 03/10/2022 0.4  0.0 - 1.5 % Final   • Immature Grans % 03/10/2022 0.4  0.0 - 0.5 % Final   • Neutrophils, Absolute 03/10/2022 4.94  1.70 - 7.00 10*3/mm3 Final   • Lymphocytes, Absolute 03/10/2022 2.09  0.70 - 3.10 10*3/mm3 Final   • Monocytes, Absolute 03/10/2022 0.53  0.10 - 0.90 10*3/mm3 Final   • Eosinophils, Absolute 03/10/2022 0.38  0.00 - 0.40 10*3/mm3 Final   • Basophils, Absolute 03/10/2022 0.03  0.00 - 0.20 10*3/mm3 Final   • Immature Grans, Absolute 03/10/2022 0.03  0.00 - 0.05 10*3/mm3 Final   • nRBC 03/10/2022 0.0  0.0 - 0.2 /100 WBC Final   • Lactate 03/10/2022 1.6  0.5 - 2.0 mmol/L Final    Falsely depressed results may occur on samples drawn from patients receiving N-Acetylcysteine (NAC) or Metamizole.   • Procalcitonin 03/10/2022 0.04  0.00 - 0.25 ng/mL Final   • Sed Rate 03/10/2022 13  0 - 20 mm/hr Final   • C-Reactive Protein 03/10/2022 4.44 (A) 0.00 - 0.50 mg/dL Final   • Magnesium 03/10/2022 1.9  1.6 - 2.6 mg/dL Final   • COVID19 03/11/2022 Not Detected  Not Detected - Ref. Range Final   • Influenza A PCR 03/11/2022 Not Detected  Not Detected Final   • Influenza B PCR 03/11/2022 Not Detected  Not Detected Final   • THC, Screen, Urine 03/11/2022 Positive (A) Negative Final   • Phencyclidine (PCP), Urine 03/11/2022 Negative  Negative Final   • Cocaine Screen, Urine 03/11/2022 Positive (A) Negative Final   • Methamphetamine, Ur 03/11/2022 Positive (A) Negative Final   • Opiate Screen 03/11/2022 Positive (A) Negative Final   • Amphetamine Screen, Urine 03/11/2022 Negative  Negative Final   •  Benzodiazepine Screen, Urine 03/11/2022 Negative  Negative Final   • Tricyclic Antidepressants Screen 03/11/2022 Negative  Negative Final   • Methadone Screen, Urine 03/11/2022 Positive (A) Negative Final   • Barbiturates Screen, Urine 03/11/2022 Negative  Negative Final   • Oxycodone Screen, Urine 03/11/2022 Negative  Negative Final   • Propoxyphene Screen 03/11/2022 Negative  Negative Final   • Buprenorphine, Screen, Urine 03/11/2022 Negative  Negative Final   • Lactate 03/11/2022 1.2  0.5 - 2.0 mmol/L Final    Falsely depressed results may occur on samples drawn from patients receiving N-Acetylcysteine (NAC) or Metamizole.   • WBC 03/11/2022 6.59  3.40 - 10.80 10*3/mm3 Final   • RBC 03/11/2022 3.96  3.77 - 5.28 10*6/mm3 Final   • Hemoglobin 03/11/2022 11.5 (A) 12.0 - 15.9 g/dL Final   • Hematocrit 03/11/2022 33.6 (A) 34.0 - 46.6 % Final   • MCV 03/11/2022 84.8  79.0 - 97.0 fL Final   • MCH 03/11/2022 29.0  26.6 - 33.0 pg Final   • MCHC 03/11/2022 34.2  31.5 - 35.7 g/dL Final   • RDW 03/11/2022 13.7  12.3 - 15.4 % Final   • RDW-SD 03/11/2022 41.7  37.0 - 54.0 fl Final   • MPV 03/11/2022 9.8  6.0 - 12.0 fL Final   • Platelets 03/11/2022 239  140 - 450 10*3/mm3 Final   • Glucose 03/11/2022 112 (A) 65 - 99 mg/dL Final   • BUN 03/11/2022 6  6 - 20 mg/dL Final   • Creatinine 03/11/2022 0.53 (A) 0.57 - 1.00 mg/dL Final   • Sodium 03/11/2022 144  136 - 145 mmol/L Final   • Potassium 03/11/2022 3.6  3.5 - 5.2 mmol/L Final    Slight hemolysis detected by analyzer. Results may be affected.   • Chloride 03/11/2022 106  98 - 107 mmol/L Final   • CO2 03/11/2022 27.0  22.0 - 29.0 mmol/L Final   • Calcium 03/11/2022 8.6  8.6 - 10.5 mg/dL Final   • Total Protein 03/11/2022 6.0  6.0 - 8.5 g/dL Final   • Albumin 03/11/2022 3.50  3.50 - 5.20 g/dL Final   • ALT (SGPT) 03/11/2022 23  1 - 33 U/L Final   • AST (SGOT) 03/11/2022 24  1 - 32 U/L Final   • Alkaline Phosphatase 03/11/2022 91  39 - 117 U/L Final   • Total Bilirubin 03/11/2022  0.5  0.0 - 1.2 mg/dL Final   • Globulin 03/11/2022 2.5  gm/dL Final    Calculated Result   • A/G Ratio 03/11/2022 1.4  g/dL Final   • BUN/Creatinine Ratio 03/11/2022 11.3  7.0 - 25.0 Final   • Anion Gap 03/11/2022 11.0  5.0 - 15.0 mmol/L Final   • eGFR 03/11/2022 120.1  >60.0 mL/min/1.73 Final    National Kidney Foundation and American Society of Nephrology (ASN) Task Force recommended calculation based on the Chronic Kidney Disease Epidemiology Collaboration (CKD-EPI) equation refit without adjustment for race.   • Creatine Kinase 03/11/2022 224 (A) 20 - 180 U/L Final   • HCG, Urine QL 03/11/2022 Negative  Negative Final   • Color, UA 03/11/2022 Dark Yellow (A) Yellow, Straw Final   • Appearance, UA 03/11/2022 Turbid (A) Clear Final   • pH, UA 03/11/2022 5.5  5.0 - 8.0 Final   • Specific Gravity, UA 03/11/2022 1.050 (A) 1.001 - 1.030 Final   • Glucose, UA 03/11/2022 Negative  Negative Final   • Ketones, UA 03/11/2022 Trace (A) Negative Final   • Bilirubin, UA 03/11/2022 Small (1+) (A) Negative Final   • Blood, UA 03/11/2022 Negative  Negative Final   • Protein, UA 03/11/2022 Trace (A) Negative Final   • Leuk Esterase, UA 03/11/2022 Small (1+) (A) Negative Final   • Nitrite, UA 03/11/2022 Negative  Negative Final   • Urobilinogen, UA 03/11/2022 1.0 E.U./dL  0.2 - 1.0 E.U./dL Final   • RBC, UA 03/11/2022 0-2  None Seen, 0-2 /HPF Final   • WBC, UA 03/11/2022 6-12 (A) None Seen, 0-2 /HPF Final   • Bacteria, UA 03/11/2022 2+ (A) None Seen, Trace /HPF Final   • Squamous Epithelial Cells, UA 03/11/2022 7-12 (A) None Seen, 0-2 /HPF Final   • Hyaline Casts, UA 03/11/2022 0-6  0 - 6 /LPF Final   • Amorphous Crystals, UA 03/11/2022 Small/1+  None Seen /HPF Final   • Methodology 03/11/2022 Manual Light Microscopy   Final   • QT Interval 03/11/2022 426  ms Preliminary   • QTC Interval 03/11/2022 491  ms Preliminary   • Crystals, Fluid 03/11/2022 No crystals seen   Final   • Color, Fluid 03/11/2022 Yellow   Final   • Appearance,  Fluid 03/11/2022 Clear  Clear Final   • WBC, Fluid 03/11/2022 197  /mm3 Final   • RBC, Fluid 03/11/2022 <2,000    /mm3 Final   • Crystals, Fluid 03/11/2022 No crystals seen   Final   • Color, Fluid 03/11/2022 Red   Final   • Appearance, Fluid 03/11/2022 Hazy (A) Clear Final   • WBC, Fluid 03/11/2022 164  /mm3 Final   • RBC, Fluid 03/11/2022 34,000    /mm3 Final   • Neutrophils, Fluid 03/11/2022 66  % Final   • Lymphocytes, Fluid 03/11/2022 16  % Final   • Monocytes, Fluid 03/11/2022 18  % Final   • Neutrophils, Fluid 03/11/2022 56  % Final   • Lymphocytes, Fluid 03/11/2022 18  % Final   • Monocytes, Fluid 03/11/2022 26  % Final       Left knee x-rays 3/11 reveal a moderate effusion with no other acute findings and no significant degenerative changes  Pelvis CT scan 3/11 reveals severe degenerative changes of the right hip joint with severe collapse of the femoral head and superior lateral subluxation out of the acetabulum.  There is synovitis with an effusion as well.  Labs were reviewed including elevated CRP at 4.44.  ESR is normal.  White blood cell count not elevated.  Left knee aspiration cell count reveals white cell count of 197 with red blood cells less than 2000 and 56% neutrophils.  No crystals seen.  Right hip aspiration cell count reveals white cell count of 164 with 34,000 red blood cells, 66% neutrophils and no crystals.  Aspiration Gram stain and culture results are pending.      Assessment: 41-year-old female with history of polysubstance abuse and right hip septic joint requiring washout by Dr. Gusman July 2021 with recent IV drug abuse presents with 1 week of low back, right hip and left knee pain.    Right hip and left knee aspirations performed by radiology this afternoon are not concerning for septic joint with white blood cell count just in the 100s from both joints.      Patient Active Problem List   Diagnosis   • Alcohol abuse   • Polysubstance abuse (HCC)   • Abnormal liver function tests    • Septic right hip (CMS/HCC)   • Therapeutic opioid-induced constipation (OIC)   • Postpartum care following LTCS (1 layer) 7/31/21   • Cigarette smoker   • Tobacco abuse   • Hypokalemia   • Right hip pain   • QT prolongation           Plan:  The patient voiced understanding of the risks, benefits, and alternative forms of treatment that were discussed and the patient consents to proceed with conservative management of right hip and left knee pain.   -Discussed with Elinor the results of the right hip and left knee aspirations.  Cell count reveals white blood cell count just 197 from the left knee and 164 from the right hip.  This is well below the threshold of 50,000 for a septic joint.  She is still awaiting a lumbar spine MRI.  No concern for right hip or left knee septic joint so far with the initial results of the aspirations.  -Recommend symptomatic treatment measures and mobilize with therapy as tolerated pending the results of the lumbar spine MRI.  -I will follow along for now.  Please call with any questions or concerns.      Discharge Plan: TBD      Date: 3/11/2022    Noe Perdomo MD

## 2022-03-11 NOTE — PROGRESS NOTES
[facility] Medicine Services  ADMISSION FOLLOW-UP NOTE          Patient admitted after midnight, H&P by my partner performed earlier on today's date reviewed.  Interim findings, labs, and charting also reviewed.        The [facility] Hospital Problem List has been managed and updated to include any new diagnoses:  Active Hospital Problems    Diagnosis  POA   • Tobacco abuse [Z72.0]  Yes   • Hypokalemia [E87.6]  Yes   • Right hip pain [M25.551]  Yes   • QT prolongation [R94.31]  Yes   • Septic right hip (CMS/HCC) [M00.9]  Yes   • Polysubstance abuse (HCC) [F19.10]  Yes   • Alcohol abuse [F10.10]  Yes      Resolved Hospital Problems   No resolved problems to display.         ADDITIONAL PLAN:  - detailed assessment and plan from admission reviewed  - somnolent today.  Falls asleep quickly  - EKG today to check QTc as she is on Methadone  - seen by Chemical Dependency team today  - after review of medications, they seem appropriate for now  - she will need a lot of support  - Discussed case with Dr. Wilhelm this morning  - MRI low back  - MRI Right Hip  - Ortho consultation  - Right Hip and Left Knee Aspirations today          Lionel Ortega MD  03/11/22

## 2022-03-11 NOTE — PAYOR COMM NOTE
"EFRAIN LIANG, RN  UTILIZATION REVIEW  P:  [phone]  F:  [phone]      Notification of INPT admission.                Eileen Flores (41 y.o. Female)             Date of Birth   1980    Social Security Number       Address   [address]  [place] [id number]    Home Phone   [phone]    MRN   [id number]       Buddhism   Jainism    Marital Status   Legally                             Admission Date   3/11/22    Admission Type   Emergency    Admitting Provider   Lionel Ortega MD    Attending Provider   Lionel Ortega MD    Department, Room/Bed   [facility] 6A, N608/1       Discharge Date       Discharge Disposition       Discharge Destination                               Attending Provider: Lionel Ortega MD    Allergies: Aleve [Naproxen Sodium]    Isolation: None   Infection: None   Code Status: CPR   Advance Care Planning Activity    Ht: 160 cm (63\")   Wt: 81.6 kg (180 lb)    Admission Cmt: None   Principal Problem: None                Active Insurance as of 3/11/2022     Primary Coverage     Payor Plan Insurance Group Employer/Plan Group    [employer] [place] [employer] [place]      Payor Plan Address Payor Plan Phone Number Payor Plan Fax Number Effective Dates    [address] [address] [phone]  2019 - None Entered    [facility] [id number]       Subscriber Name Subscriber Birth Date Member ID       EILEEN FLORES 1980 [id number]                 Emergency Contacts      (Rel.) Home Phone Work Phone Mobile Phone    KOKO FLORES (Father) [phone] -- [phone]               History & Physical      Brianna Garcia DO at [phone]              [facility]  HISTORY AND PHYSICAL    Patient Name: Eileen Flores  : 1980  MRN: [id number]  Primary Care Physician: System, Provider Not In  Date of admission: 3/11/2022    Subjective   Subjective     Chief Complaint:  Right hip pain    HPI:  Eileen Flores is a " 41 y.o. female with a past medical history significant for ADHD/Bipolar disorder, prior alcohol abuse, and polysubstance abuse. She is postpartum as of 7/31/21. Present today with complaints of recurrent right hip pain going on for several months. Worse with weight bearing and movement. There is swelling and redness. Patient notes a history of right hip dysfunction with severe arthritic changes due to septic arthritis. Has required washout in the past. Previously monitored by Dr. Gusman per orthopedic surgery. Patient is scheduled to follow up with Dr. Stanton with orthopedic surgery at  in May concerning possible right hip replacement.   Patient also volunteers recurrent falls over the past week. States she cannot remember what she hurt or what she fell on. Reports severe pain and swelling in left knee. Exquisitely tender to palpation and worse with weight bearing. Notes associated subjective fever and chills. Was concerned and presented to ED for further evaluation and treatment.  Currently there are no complaints of cough, congestion, SOB, or chest pain. No abdominal pain or N/v/D. No headache or focal weakness/parathesias. Will admit to inpatient.      COVID Details:    Symptoms:    [] NONE [] Fever [x]  Cough [] Shortness of breath [] Change in taste/smell      Review of Systems   Constitutional: Positive for chills, fatigue and fever.   HENT: Negative for congestion and trouble swallowing.    Eyes: Negative for photophobia and visual disturbance.   Respiratory: Negative for cough and shortness of breath.    Cardiovascular: Negative for chest pain and leg swelling.   Gastrointestinal: Negative for abdominal pain, diarrhea, nausea and vomiting.   Endocrine: Negative for cold intolerance and heat intolerance.   Genitourinary: Negative for dysuria and flank pain.   Musculoskeletal: Positive for back pain, gait problem and joint swelling.   Skin: Positive for color change and wound.   Allergic/Immunologic:  "Positive for immunocompromised state.   Neurological: Negative for dizziness, weakness and headaches.   Hematological: Negative for adenopathy.   Psychiatric/Behavioral: Negative for agitation and confusion.        All other systems reviewed and are negative.     Personal History     Past Medical History:   Diagnosis Date   • Abdominal wall cellulitis - After  2019    Treated with IV antibiotics   • ADHD    • Alcoholism (HCC)    • Bipolar disorder ([facility])    • Chlamydia    • Chlamydia 2021   • Hepatitis C carrier ([facility])     Negative viral load   • Migraine    • Osteoarthritis    • PTSD (post-traumatic stress disorder)    • Septic right hip (CMS/HCC) 2021   • Substance abuse (HCC)    • Therapeutic opioid-induced constipation (OIC)    • Urogenital trichomoniasis        Past Surgical History:   Procedure Laterality Date   • BREAST LUMPECTOMY Right    •  SECTION Bilateral 3/9/2019    Procedure:  SECTION PRIMARY;  Surgeon: Shelley Hughes MD;  Location: [facility] LABOR DELIVERY;  Service: Obstetrics/Gynecology   •  SECTION N/A 2021    Procedure: Repeat C/S;  Surgeon: Fernando Pedersen MD;  Location:  [facility] OR;  Service: Obstetrics/Gynecology;  Laterality: N/A;   • INCISION AND DRAINAGE HIP Right 2021    Procedure: HIP INCISION AND DRAINAGE RIGHT;  Surgeon: Martin Gusman MD;  Location:  [facility] OR;  Service: Orthopedics;  Laterality: Right;       Family History: family history includes Diabetes in her mother; Heart disease in her father and mother; Hypertension in her mother. Otherwise pertinent FHx was reviewed and unremarkable.     Social History:  reports that she has been smoking cigarettes. She has been smoking about 0.00 packs per day. She has never used smokeless tobacco. She reports previous drug use. Drugs: IV, \"Crack\" cocaine, Benzodiazepines, Heroin, and Marijuana. She reports that she does not drink alcohol.  Social " History     Social History Narrative   • Not on file       Medications:  FLUoxetine, OLANZapine, amLODIPine, busPIRone, cyclobenzaprine, hydrOXYzine pamoate, lidocaine, lisinopril, methadone, prenatal vitamin 27-0.8, and traZODone    Allergies   Allergen Reactions   • Aleve [Naproxen Sodium] Swelling     Pt had swelling in her face and in her neck. Pt also states that she lost her voice.        Objective   Objective     Vital Signs:   Temp:  [99 °F (37.2 °C)] 99 °F (37.2 °C)  Heart Rate:  [95] 95  Resp:  [22] 22  BP: (103-136)/(58-99) 131/58    Physical Exam   Constitutional: Awake, alert  Eyes: PERRLA, sclerae anicteric, no conjunctival injection  HENT: NCAT, mucous membranes moist  Neck: Supple, no thyromegaly, no lymphadenopathy, trachea midline  Respiratory: Clear to auscultation bilaterally, nonlabored respirations   Cardiovascular: RRR, no murmurs, rubs, or gallops, palpable pedal pulses bilaterally  Gastrointestinal: Positive bowel sounds, soft, nontender, nondistended  Musculoskeletal: No bilateral ankle edema, no clubbing or cyanosis to extremities  Left knee swelling. TTP  Psychiatric: Appropriate affect, cooperative  Neurologic: Oriented x 3, strength symmetric in all extremities, Cranial Nerves grossly intact to confrontation, speech clear  Skin: track marks, right hip incision scar      Results Reviewed:  I have personally reviewed most recent indicated data and agree with findings including:  [x]  Laboratory  [x]  Radiology  [x]  EKG/Telemetry  []  Pathology  []  Cardiac/Vascular Studies  []  Old records  []  Other:      LAB RESULTS:      Lab 03/10/22  2357   WBC 8.00   HEMOGLOBIN 13.1   HEMATOCRIT 37.8   PLATELETS 287   NEUTROS ABS 4.94   IMMATURE GRANS (ABS) 0.03   LYMPHS ABS 2.09   MONOS ABS 0.53   EOS ABS 0.38   MCV 84.8   SED RATE 13   CRP 4.44*   PROCALCITONIN 0.04   LACTATE 1.6         Lab 03/10/22  2357   SODIUM 139   POTASSIUM 2.8*   CHLORIDE 100   CO2 28.0   ANION GAP 11.0   BUN 7    CREATININE 0.59   EGFR 117.0   GLUCOSE 115*   CALCIUM 9.5   MAGNESIUM 1.9         Lab 03/10/22  2357   TOTAL PROTEIN 7.1   ALBUMIN 4.00   GLOBULIN 3.1   ALT (SGPT) 27   AST (SGOT) 31   BILIRUBIN 0.5   ALK PHOS 111                     Brief Urine Lab Results     None        Microbiology Results (last 10 days)     ** No results found for the last 240 hours. **          CT Abdomen Pelvis With Contrast    Result Date: 3/11/2022  EXAMINATION: CT ABDOMEN AND PELVIS WITH IV CONTRAST   DATE OF EXAMINATION: 3/11/2022. COMPARISON: 3/13/2019. INDICATION: Joint pain with history of hip septic arthritis. Back and bilateral hip pain. PROCEDURE:  Axial CT of the abdomen and pelvis was performed with contrast and sagittal and coronal reformatted images were performed.  100 mL of Isovue-300 was given intravenously. CT dose lowering techniques were used, to include: automated exposure control, adjustment for patient size, and/or use of iterative reconstruction. FINDINGS: LOWER CHEST :  The visualized lung bases are clear.  There are no pleural or pericardial effusions. ABDOMEN: Liver and Biliary system:  Normal. Adrenal glands:  Normal. Kidneys and ureters: Normal. Spleen:  Normal. Pancreas:  Normal. Gallbladder:  Normal. Lymph nodes, Peritoneum and mesentery:  There is no mesenteric or retroperitoneal lymphadenopathy. Gastrointestinal tract:  There are no dilated loops of bowel or free intraperitoneal air.    The appendix is normal. Aorta/IVC:   No aortic aneurysm.  IVC normal. Abdominal wall:  Normal. PELVIS: Fluid: There is no free fluid in the pelvis. Lymph Nodes:  There is no pelvic or inguinal lymphadenopathy.. Urinary bladder:  Normal. BONES:  There is severe osteoarthritis involving the right hip heterotopic ossification seen along the inferior aspect of the hip joint space. There is some moderate superolateral subluxation of the femur with respect to the acetabulum. There is significant subchondral sclerosis and cystic  changes. There is some soft tissue thickening of the joint capsule and a small effusion around the right hip joint as this could relate to the patient's history of septic arthritis. Active infection would be difficult to exclude. Mild degenerative changes in the left hip joint spaces otherwise noted. ADDITIONAL  SIGNIFICANT FINDINGS:  None.     Impression: 1. There is severe osteoarthritis involving the right hip heterotopic ossification seen along the inferior aspect of the hip joint space. There is some moderate superolateral subluxation of the femur with respect to the acetabulum. There is significant subchondral sclerosis and cystic changes. There is some soft tissue thickening of the joint capsule and a small effusion around the right hip joint as this could relate to the patient's history of septic arthritis. Active infection would be difficult to exclude. 2. No acute process otherwise seen within the abdomen or pelvis. Electronically signed by:  Rob Rincon D.O.  3/11/2022 1:13 AM Mountain Time      Results for orders placed during the hospital encounter of 07/31/21    Adult Transthoracic Echo Complete W/ Cont if Necessary Per Protocol    Interpretation Summary  · Left ventricular ejection fraction appears to be 61 - 65%. Left ventricular systolic function is normal.  · The cardiac valves are structurally and functionally within normal limits.      Assessment/Plan   Assessment & Plan       Septic right hip (CMS/HCC)    Hypokalemia    Alcohol abuse    Polysubstance abuse (HCC)    Tobacco abuse      1. Septic Right Hip:      Left knee swelling  - febrile to 99.0. WBC favorable  - obtain blood cultures  - started on vancomycin and zosyn  - consult to orthopedic surgery  - consult to ID  - obtain x ray imaging of left knee  - CT imaging shows severe arthritic changes in right hip  - MRI right hip  - PT/OT  - pain control as needed  - am labs    2. Hypokalemia:  - check magnesium  - obtain EKG  - replace as  needed per protocol    3. Polysubstance abuse:  - check UDS  - on methadone. Continue  - add PO oxycodone as needed  - consult to addition medicine, case management    DVT prophylaxis: mechanical    CODE STATUS:  Full code  Level Of Support Discussed With: Patient  Code Status (Patient has no pulse and is not breathing): CPR (Attempt to Resuscitate)  Medical Interventions (Patient has pulse or is breathing): Full Support      This note has been completed as part of a split-shared workflow.     Electronically signed by Leon Lopez PA-C, 03/11/22, 4:53 AM EST.        Attending   Admission Attestation       I have seen and examined the patient, performing an independent face-to-face diagnostic evaluation with plan of care reviewed and developed with the advanced practice clinician (APC).      Brief Summary Statement:   Frieda Flores is a 41 y.o. female with a past medical history of polysubstance abuse, prior alcohol abuse, ADHD/Bipolar disorder, and chronic right hip pain with septic arthritis s/p washout by Dr. Gusman in 2021. The patient presented to the ED complaining of recurrent right hip pain that has worsened over the last few months to the point now that she cannot bear weight on it and is walking with crutches. Patient is extremely lethargic at the bedside and continues to fall asleep making history taking difficult. Patient was to follow up with Dr. Stanton at  regarding possible hip replacement but the appointment is not until May. Patient states she has fallen at home and complains of left knee pain along with her right hip pain. Her left knee is swollen and tender to even light touch. She has no ROM in the left knee. Due to patient's immobility and difficulty with range of motion of her hip and knee, she will be admitted to the hospitalist service for further evaluation.    Remainder of detailed HPI is as noted by APC and has been reviewed and/or edited by me for completeness.    Attending Physical  Exam:  Constitutional: lethargic, difficult to keep awake  Eyes: PERRLA, sclerae anicteric, no conjunctival injection  HENT: NCAT, mucous membranes moist  Neck: Supple, no thyromegaly, no lymphadenopathy, trachea midline  Respiratory: Clear to auscultation bilaterally, nonlabored respirations   Cardiovascular: RRR, no murmurs, rubs, or gallops, palpable pedal pulses bilaterally  Gastrointestinal: Positive bowel sounds, soft, nontender, nondistended  Musculoskeletal: No bilateral ankle edema, no clubbing or cyanosis to extremities, decreased ROM with flexion and extension of left knee, tenderness to light palpation of left knee, significant swelling of left knee present, tenderness to palpation of right hip and unable to flex or extend the right leg due to right hip pain.  Psychiatric: Appropriate affect, cooperative  Neurologic: Oriented x 3, strength symmetric in all extremities, Cranial Nerves grossly intact to confrontation, speech clear  Skin: No rashes, tracks marks present on b/l arms, right hip incision scar stable    Brief Assessment/Plan :  See detailed assessment and plan developed with APC which I have reviewed and/or edited for completeness.        Admission Status: I believe that this patient meets OBSERVATION status, however if further evaluation or treatment plans warrant, status may change.  Based upon current information, I predict patient's care encounter to be less than or equal to 2 midnights.        Brianna Garcia DO  03/11/22                          Electronically signed by Brianna Garcia DO at 03/11/22 0515

## 2022-03-11 NOTE — H&P
[facility] Medicine Services  HISTORY AND PHYSICAL    Patient Name: Frieda Flores  : 1980  MRN: [id number]  Primary Care Physician: System, Provider Not In  Date of admission: 3/11/2022    Subjective   Subjective     Chief Complaint:  Right hip pain    HPI:  Frieda Flores is a 41 y.o. female with a past medical history significant for ADHD/Bipolar disorder, prior alcohol abuse, and polysubstance abuse. She is postpartum as of 22. Present today with complaints of recurrent right hip pain going on for several months. Worse with weight bearing and movement. There is swelling and redness. Patient notes a history of right hip dysfunction with severe arthritic changes due to septic arthritis. Has required washout in the past. Previously monitored by Dr. Gusman per orthopedic surgery. Patient is scheduled to follow up with Dr. Stanton with orthopedic surgery at  in May concerning possible right hip replacement.   Patient also volunteers recurrent falls over the past week. States she cannot remember what she hurt or what she fell on. Reports severe pain and swelling in left knee. Exquisitely tender to palpation and worse with weight bearing. Notes associated subjective fever and chills. Was concerned and presented to ED for further evaluation and treatment.  Currently there are no complaints of cough, congestion, SOB, or chest pain. No abdominal pain or N/v/D. No headache or focal weakness/parathesias. Will admit to inpatient.      COVID Details:    Symptoms:    [] NONE [] Fever [x]  Cough [] Shortness of breath [] Change in taste/smell      Review of Systems   Constitutional: Positive for chills, fatigue and fever.   HENT: Negative for congestion and trouble swallowing.    Eyes: Negative for photophobia and visual disturbance.   Respiratory: Negative for cough and shortness of breath.    Cardiovascular: Negative for chest pain and leg swelling.   Gastrointestinal: Negative for abdominal  pain, diarrhea, nausea and vomiting.   Endocrine: Negative for cold intolerance and heat intolerance.   Genitourinary: Negative for dysuria and flank pain.   Musculoskeletal: Positive for back pain, gait problem and joint swelling.   Skin: Positive for color change and wound.   Allergic/Immunologic: Positive for immunocompromised state.   Neurological: Negative for dizziness, weakness and headaches.   Hematological: Negative for adenopathy.   Psychiatric/Behavioral: Negative for agitation and confusion.        All other systems reviewed and are negative.     Personal History     Past Medical History:   Diagnosis Date   • Abdominal wall cellulitis - After  2019    Treated with IV antibiotics   • ADHD    • Alcoholism ([facility])    • Bipolar disorder ([facility])    • Chlamydia    • Chlamydia 2021   • Hepatitis C carrier ([facility])     Negative viral load   • Migraine    • Osteoarthritis    • PTSD (post-traumatic stress disorder)    • Septic right hip (CMS/HCC) 2021   • Substance abuse ([facility])    • Therapeutic opioid-induced constipation ([facility])    • Urogenital trichomoniasis        Past Surgical History:   Procedure Laterality Date   • BREAST LUMPECTOMY Right    •  SECTION Bilateral 3/9/2019    Procedure:  SECTION PRIMARY;  Surgeon: Shelley Hughes MD;  Location: [facility] LABOR DELIVERY;  Service: Obstetrics/Gynecology   •  SECTION N/A 2021    Procedure: Repeat C/S;  Surgeon: Fernando Pedersen MD;  Location:  [facility] OR;  Service: Obstetrics/Gynecology;  Laterality: N/A;   • INCISION AND DRAINAGE HIP Right 2021    Procedure: HIP INCISION AND DRAINAGE RIGHT;  Surgeon: Martin Gusman MD;  Location:  [facility] OR;  Service: Orthopedics;  Laterality: Right;       Family History: family history includes Diabetes in her mother; Heart disease in her father and mother; Hypertension in her mother. Otherwise pertinent FHx was reviewed and unremarkable.  "    Social History:  reports that she has been smoking cigarettes. She has been smoking about 0.00 packs per day. She has never used smokeless tobacco. She reports previous drug use. Drugs: IV, \"Crack\" cocaine, Benzodiazepines, Heroin, and Marijuana. She reports that she does not drink alcohol.  Social History     Social History Narrative   • Not on file       Medications:  FLUoxetine, OLANZapine, amLODIPine, busPIRone, cyclobenzaprine, hydrOXYzine pamoate, lidocaine, lisinopril, methadone, prenatal vitamin 27-0.8, and traZODone    Allergies   Allergen Reactions   • Aleve [Naproxen Sodium] Swelling     Pt had swelling in her face and in her neck. Pt also states that she lost her voice.        Objective   Objective     Vital Signs:   Temp:  [99 °F (37.2 °C)] 99 °F (37.2 °C)  Heart Rate:  [95] 95  Resp:  [22] 22  BP: (103-136)/(58-99) 131/58    Physical Exam   Constitutional: Awake, alert  Eyes: PERRLA, sclerae anicteric, no conjunctival injection  HENT: NCAT, mucous membranes moist  Neck: Supple, no thyromegaly, no lymphadenopathy, trachea midline  Respiratory: Clear to auscultation bilaterally, nonlabored respirations   Cardiovascular: RRR, no murmurs, rubs, or gallops, palpable pedal pulses bilaterally  Gastrointestinal: Positive bowel sounds, soft, nontender, nondistended  Musculoskeletal: No bilateral ankle edema, no clubbing or cyanosis to extremities  Left knee swelling. TTP  Psychiatric: Appropriate affect, cooperative  Neurologic: Oriented x 3, strength symmetric in all extremities, Cranial Nerves grossly intact to confrontation, speech clear  Skin: track marks, right hip incision scar      Results Reviewed:  I have personally reviewed most recent indicated data and agree with findings including:  [x]  Laboratory  [x]  Radiology  [x]  EKG/Telemetry  []  Pathology  []  Cardiac/Vascular Studies  []  Old records  []  Other:      LAB RESULTS:      Lab 03/10/22  2357   WBC 8.00   HEMOGLOBIN 13.1   HEMATOCRIT 37.8 "   PLATELETS 287   NEUTROS ABS 4.94   IMMATURE GRANS (ABS) 0.03   LYMPHS ABS 2.09   MONOS ABS 0.53   EOS ABS 0.38   MCV 84.8   SED RATE 13   CRP 4.44*   PROCALCITONIN 0.04   LACTATE 1.6         Lab 03/10/22  2357   SODIUM 139   POTASSIUM 2.8*   CHLORIDE 100   CO2 28.0   ANION GAP 11.0   BUN 7   CREATININE 0.59   EGFR 117.0   GLUCOSE 115*   CALCIUM 9.5   MAGNESIUM 1.9         Lab 03/10/22  2357   TOTAL PROTEIN 7.1   ALBUMIN 4.00   GLOBULIN 3.1   ALT (SGPT) 27   AST (SGOT) 31   BILIRUBIN 0.5   ALK PHOS 111                     Brief Urine Lab Results     None        Microbiology Results (last 10 days)     ** No results found for the last 240 hours. **          CT Abdomen Pelvis With Contrast    Result Date: 3/11/2022  EXAMINATION: CT ABDOMEN AND PELVIS WITH IV CONTRAST   DATE OF EXAMINATION: 3/11/2022. COMPARISON: 3/13/2019. INDICATION: Joint pain with history of hip septic arthritis. Back and bilateral hip pain. PROCEDURE:  Axial CT of the abdomen and pelvis was performed with contrast and sagittal and coronal reformatted images were performed.  100 mL of Isovue-300 was given intravenously. CT dose lowering techniques were used, to include: automated exposure control, adjustment for patient size, and/or use of iterative reconstruction. FINDINGS: LOWER CHEST :  The visualized lung bases are clear.  There are no pleural or pericardial effusions. ABDOMEN: Liver and Biliary system:  Normal. Adrenal glands:  Normal. Kidneys and ureters: Normal. Spleen:  Normal. Pancreas:  Normal. Gallbladder:  Normal. Lymph nodes, Peritoneum and mesentery:  There is no mesenteric or retroperitoneal lymphadenopathy. Gastrointestinal tract:  There are no dilated loops of bowel or free intraperitoneal air.    The appendix is normal. Aorta/IVC:   No aortic aneurysm.  IVC normal. Abdominal wall:  Normal. PELVIS: Fluid: There is no free fluid in the pelvis. Lymph Nodes:  There is no pelvic or inguinal lymphadenopathy.. Urinary bladder:  Normal.  BONES:  There is severe osteoarthritis involving the right hip heterotopic ossification seen along the inferior aspect of the hip joint space. There is some moderate superolateral subluxation of the femur with respect to the acetabulum. There is significant subchondral sclerosis and cystic changes. There is some soft tissue thickening of the joint capsule and a small effusion around the right hip joint as this could relate to the patient's history of septic arthritis. Active infection would be difficult to exclude. Mild degenerative changes in the left hip joint spaces otherwise noted. ADDITIONAL  SIGNIFICANT FINDINGS:  None.     Impression: 1. There is severe osteoarthritis involving the right hip heterotopic ossification seen along the inferior aspect of the hip joint space. There is some moderate superolateral subluxation of the femur with respect to the acetabulum. There is significant subchondral sclerosis and cystic changes. There is some soft tissue thickening of the joint capsule and a small effusion around the right hip joint as this could relate to the patient's history of septic arthritis. Active infection would be difficult to exclude. 2. No acute process otherwise seen within the abdomen or pelvis. Electronically signed by:  Rob Rincon D.O.  3/11/2022 1:13 AM Mountain Time      Results for orders placed during the hospital encounter of 07/31/21    Adult Transthoracic Echo Complete W/ Cont if Necessary Per Protocol    Interpretation Summary  · Left ventricular ejection fraction appears to be 61 - 65%. Left ventricular systolic function is normal.  · The cardiac valves are structurally and functionally within normal limits.      Assessment/Plan   Assessment & Plan       Septic right hip (CMS/HCC)    Hypokalemia    Alcohol abuse    Polysubstance abuse (HCC)    Tobacco abuse      1. Septic Right Hip:      Left knee swelling  - febrile to 99.0. WBC favorable  - obtain blood cultures  - started on  vancomycin and zosyn  - consult to orthopedic surgery  - consult to ID  - obtain x ray imaging of left knee  - CT imaging shows severe arthritic changes in right hip  - MRI right hip  - PT/OT  - pain control as needed  - am labs    2. Hypokalemia:  - check magnesium  - obtain EKG  - replace as needed per protocol    3. Polysubstance abuse:  - check UDS  - on methadone. Continue  - add PO oxycodone as needed  - consult to addition medicine, case management    DVT prophylaxis: mechanical    CODE STATUS:  Full code  Level Of Support Discussed With: Patient  Code Status (Patient has no pulse and is not breathing): CPR (Attempt to Resuscitate)  Medical Interventions (Patient has pulse or is breathing): Full Support      This note has been completed as part of a split-shared workflow.     Electronically signed by Leon Lopez PA-C, 03/11/22, 4:53 AM EST.        Attending   Admission Attestation       I have seen and examined the patient, performing an independent face-to-face diagnostic evaluation with plan of care reviewed and developed with the advanced practice clinician (APC).      Brief Summary Statement:   Frieda Flores is a 41 y.o. female with a past medical history of polysubstance abuse, prior alcohol abuse, ADHD/Bipolar disorder, and chronic right hip pain with septic arthritis s/p washout by Dr. Gusman in 2021. The patient presented to the ED complaining of recurrent right hip pain that has worsened over the last few months to the point now that she cannot bear weight on it and is walking with crutches. Patient is extremely lethargic at the bedside and continues to fall asleep making history taking difficult. Patient was to follow up with Dr. Stanton at  regarding possible hip replacement but the appointment is not until May. Patient states she has fallen at home and complains of left knee pain along with her right hip pain. Her left knee is swollen and tender to even light touch. She has no ROM in the left  knee. Due to patient's immobility and difficulty with range of motion of her hip and knee, she will be admitted to the hospitalist service for further evaluation.    Remainder of detailed HPI is as noted by APC and has been reviewed and/or edited by me for completeness.    Attending Physical Exam:  Constitutional: lethargic, difficult to keep awake  Eyes: PERRLA, sclerae anicteric, no conjunctival injection  HENT: NCAT, mucous membranes moist  Neck: Supple, no thyromegaly, no lymphadenopathy, trachea midline  Respiratory: Clear to auscultation bilaterally, nonlabored respirations   Cardiovascular: RRR, no murmurs, rubs, or gallops, palpable pedal pulses bilaterally  Gastrointestinal: Positive bowel sounds, soft, nontender, nondistended  Musculoskeletal: No bilateral ankle edema, no clubbing or cyanosis to extremities, decreased ROM with flexion and extension of left knee, tenderness to light palpation of left knee, significant swelling of left knee present, tenderness to palpation of right hip and unable to flex or extend the right leg due to right hip pain.  Psychiatric: Appropriate affect, cooperative  Neurologic: Oriented x 3, strength symmetric in all extremities, Cranial Nerves grossly intact to confrontation, speech clear  Skin: No rashes, tracks marks present on b/l arms, right hip incision scar stable    Brief Assessment/Plan :  See detailed assessment and plan developed with APC which I have reviewed and/or edited for completeness.        Admission Status: I believe that this patient meets OBSERVATION status, however if further evaluation or treatment plans warrant, status may change.  Based upon current information, I predict patient's care encounter to be less than or equal to 2 midnights.        Brianna Garcia DO  03/11/22

## 2022-03-11 NOTE — CASE MANAGEMENT/SOCIAL WORK
Discharge Planning Assessment  [facility]     Patient Name: Frieda Flores  MRN: [id number]  Today's Date: 3/11/2022    Admit Date: 3/11/2022     Discharge Needs Assessment     Row Name 03/11/22 1115       Living Environment    People in Home alone    Primary Care Provided by self    Provides Primary Care For no one, unable/limited ability to care for self    Quality of Family Relationships unable to assess       Transition Planning    Patient/Family Anticipated Services at Transition        Discharge Needs Assessment    Concerns to be Addressed discharge planning;substance/tobacco abuse/use    Anticipated Changes Related to Illness inability to care for self    Outpatient/Agency/Support Group Needs outpatient substance abuse treatment    Discharge Facility/Level of Care Needs LTACH (long term acute care hospital)    Provided Post Acute Provider List? N/A    Current Discharge Risk lives alone;substance use/abuse               Discharge Plan     Row Name 03/11/22 1119       Plan    Plan TBD    Patient/Family in Agreement with Plan unable to assess    Plan Comments Attemptedy to speak with pt. at bedside. Cannot arouse pt. for conversation. Unable to reach he father by phone. Was here in Aug last year and ended up going to [facility]. Opioid CM saw [facility] Has had very unfortunate life events.    Final Discharge Disposition Code 30 - still a patient              Continued Care and Services - Admitted Since 3/11/2022    Coordination has not been started for this encounter.          Demographic Summary    No documentation.                Functional Status     Row Name 03/11/22 1113       Mental Status Summary    Recent Changes in Mental Status/Cognitive Functioning unable to assess               Psychosocial    No documentation.                Abuse/Neglect    No documentation.                Legal    No documentation.                Substance Abuse    No documentation.                 Patient Forms    No documentation.                   La Carroll RN

## 2022-03-12 LAB
ALBUMIN SERPL-MCNC: 2.9 G/DL (ref 3.5–5.2)
ALBUMIN/GLOB SERPL: 1.2 G/DL
ALP SERPL-CCNC: 88 U/L (ref 39–117)
ALT SERPL W P-5'-P-CCNC: 17 U/L (ref 1–33)
ANION GAP SERPL CALCULATED.3IONS-SCNC: 9 MMOL/L (ref 5–15)
AST SERPL-CCNC: 15 U/L (ref 1–32)
BACTERIA SPEC AEROBE CULT: NORMAL
BASOPHILS # BLD AUTO: 0.02 10*3/MM3 (ref 0–0.2)
BASOPHILS NFR BLD AUTO: 0.3 % (ref 0–1.5)
BILIRUB SERPL-MCNC: 0.3 MG/DL (ref 0–1.2)
BUN SERPL-MCNC: 7 MG/DL (ref 6–20)
BUN/CREAT SERPL: 12.3 (ref 7–25)
CALCIUM SPEC-SCNC: 8.3 MG/DL (ref 8.6–10.5)
CHLORIDE SERPL-SCNC: 106 MMOL/L (ref 98–107)
CK SERPL-CCNC: 71 U/L (ref 20–180)
CO2 SERPL-SCNC: 26 MMOL/L (ref 22–29)
CREAT SERPL-MCNC: 0.57 MG/DL (ref 0.57–1)
DEPRECATED RDW RBC AUTO: 44.6 FL (ref 37–54)
DEPRECATED RDW RBC AUTO: 44.7 FL (ref 37–54)
EGFRCR SERPLBLD CKD-EPI 2021: 118 ML/MIN/1.73
EOSINOPHIL # BLD AUTO: 0.15 10*3/MM3 (ref 0–0.4)
EOSINOPHIL NFR BLD AUTO: 2.4 % (ref 0.3–6.2)
ERYTHROCYTE [DISTWIDTH] IN BLOOD BY AUTOMATED COUNT: 13.8 % (ref 12.3–15.4)
ERYTHROCYTE [DISTWIDTH] IN BLOOD BY AUTOMATED COUNT: 14.1 % (ref 12.3–15.4)
GLOBULIN UR ELPH-MCNC: 2.5 GM/DL
GLUCOSE SERPL-MCNC: 92 MG/DL (ref 65–99)
HCT VFR BLD AUTO: 34.4 % (ref 34–46.6)
HCT VFR BLD AUTO: 34.5 % (ref 34–46.6)
HGB BLD-MCNC: 11.3 G/DL (ref 12–15.9)
HGB BLD-MCNC: 11.4 G/DL (ref 12–15.9)
IMM GRANULOCYTES # BLD AUTO: 0.02 10*3/MM3 (ref 0–0.05)
IMM GRANULOCYTES NFR BLD AUTO: 0.3 % (ref 0–0.5)
LYMPHOCYTES # BLD AUTO: 1.49 10*3/MM3 (ref 0.7–3.1)
LYMPHOCYTES NFR BLD AUTO: 24 % (ref 19.6–45.3)
MCH RBC QN AUTO: 29.2 PG (ref 26.6–33)
MCH RBC QN AUTO: 29.4 PG (ref 26.6–33)
MCHC RBC AUTO-ENTMCNC: 32.8 G/DL (ref 31.5–35.7)
MCHC RBC AUTO-ENTMCNC: 33.1 G/DL (ref 31.5–35.7)
MCV RBC AUTO: 88 FL (ref 79–97)
MCV RBC AUTO: 89.8 FL (ref 79–97)
MONOCYTES # BLD AUTO: 0.32 10*3/MM3 (ref 0.1–0.9)
MONOCYTES NFR BLD AUTO: 5.2 % (ref 5–12)
NEUTROPHILS NFR BLD AUTO: 4.21 10*3/MM3 (ref 1.7–7)
NEUTROPHILS NFR BLD AUTO: 67.8 % (ref 42.7–76)
NRBC BLD AUTO-RTO: 0 /100 WBC (ref 0–0.2)
PLATELET # BLD AUTO: 215 10*3/MM3 (ref 140–450)
PLATELET # BLD AUTO: 221 10*3/MM3 (ref 140–450)
PMV BLD AUTO: 10.5 FL (ref 6–12)
PMV BLD AUTO: 10.5 FL (ref 6–12)
POTASSIUM SERPL-SCNC: 3.1 MMOL/L (ref 3.5–5.2)
PROT SERPL-MCNC: 5.4 G/DL (ref 6–8.5)
RBC # BLD AUTO: 3.84 10*6/MM3 (ref 3.77–5.28)
RBC # BLD AUTO: 3.91 10*6/MM3 (ref 3.77–5.28)
SODIUM SERPL-SCNC: 141 MMOL/L (ref 136–145)
WBC NRBC COR # BLD: 6.13 10*3/MM3 (ref 3.4–10.8)
WBC NRBC COR # BLD: 6.21 10*3/MM3 (ref 3.4–10.8)

## 2022-03-12 PROCEDURE — 99233 SBSQ HOSP IP/OBS HIGH 50: CPT | Performed by: INTERNAL MEDICINE

## 2022-03-12 PROCEDURE — 85027 COMPLETE CBC AUTOMATED: CPT | Performed by: INTERNAL MEDICINE

## 2022-03-12 PROCEDURE — 25010000002 DAPTOMYCIN PER 1 MG: Performed by: INTERNAL MEDICINE

## 2022-03-12 PROCEDURE — 82550 ASSAY OF CK (CPK): CPT | Performed by: INTERNAL MEDICINE

## 2022-03-12 PROCEDURE — 85025 COMPLETE CBC W/AUTO DIFF WBC: CPT | Performed by: INTERNAL MEDICINE

## 2022-03-12 PROCEDURE — 97162 PT EVAL MOD COMPLEX 30 MIN: CPT

## 2022-03-12 PROCEDURE — 97166 OT EVAL MOD COMPLEX 45 MIN: CPT

## 2022-03-12 PROCEDURE — 25010000002 CEFEPIME PER 500 MG

## 2022-03-12 PROCEDURE — 80053 COMPREHEN METABOLIC PANEL: CPT | Performed by: INTERNAL MEDICINE

## 2022-03-12 RX ORDER — MORPHINE SULFATE 2 MG/ML
1 INJECTION, SOLUTION INTRAMUSCULAR; INTRAVENOUS
Status: DISCONTINUED | OUTPATIENT
Start: 2022-03-12 | End: 2022-03-13

## 2022-03-12 RX ORDER — OXYCODONE AND ACETAMINOPHEN 10; 325 MG/1; MG/1
1 TABLET ORAL EVERY 4 HOURS PRN
Status: DISCONTINUED | OUTPATIENT
Start: 2022-03-12 | End: 2022-03-18

## 2022-03-12 RX ADMIN — Medication 1 CAPSULE: at 08:30

## 2022-03-12 RX ADMIN — METHADONE HYDROCHLORIDE 60 MG: 10 TABLET ORAL at 08:30

## 2022-03-12 RX ADMIN — METRONIDAZOLE 500 MG: 500 INJECTION, SOLUTION INTRAVENOUS at 03:39

## 2022-03-12 RX ADMIN — OXYCODONE HYDROCHLORIDE AND ACETAMINOPHEN 1 TABLET: 10; 325 TABLET ORAL at 10:49

## 2022-03-12 RX ADMIN — CEFEPIME 2 G: 20 INJECTION, POWDER, FOR SOLUTION INTRAVENOUS at 20:18

## 2022-03-12 RX ADMIN — Medication 10 ML: at 20:17

## 2022-03-12 RX ADMIN — Medication 10 ML: at 08:34

## 2022-03-12 RX ADMIN — DAPTOMYCIN 400 MG: 500 INJECTION, POWDER, LYOPHILIZED, FOR SOLUTION INTRAVENOUS at 10:50

## 2022-03-12 RX ADMIN — CEFEPIME 2 G: 20 INJECTION, POWDER, FOR SOLUTION INTRAVENOUS at 08:28

## 2022-03-12 RX ADMIN — METRONIDAZOLE 500 MG: 500 INJECTION, SOLUTION INTRAVENOUS at 20:17

## 2022-03-12 RX ADMIN — LISINOPRIL 20 MG: 20 TABLET ORAL at 08:31

## 2022-03-12 RX ADMIN — POTASSIUM CHLORIDE 40 MEQ: 750 CAPSULE, EXTENDED RELEASE ORAL at 15:44

## 2022-03-12 RX ADMIN — CEFEPIME 2 G: 20 INJECTION, POWDER, FOR SOLUTION INTRAVENOUS at 15:43

## 2022-03-12 RX ADMIN — POTASSIUM CHLORIDE 40 MEQ: 750 CAPSULE, EXTENDED RELEASE ORAL at 20:28

## 2022-03-12 RX ADMIN — Medication 5 MG: at 20:16

## 2022-03-12 RX ADMIN — AMLODIPINE BESYLATE 10 MG: 10 TABLET ORAL at 08:30

## 2022-03-12 RX ADMIN — OLANZAPINE 10 MG: 5 TABLET, FILM COATED ORAL at 08:31

## 2022-03-12 RX ADMIN — METRONIDAZOLE 500 MG: 500 INJECTION, SOLUTION INTRAVENOUS at 15:43

## 2022-03-12 RX ADMIN — FLUOXETINE HYDROCHLORIDE 20 MG: 20 CAPSULE ORAL at 08:31

## 2022-03-12 RX ADMIN — POTASSIUM CHLORIDE 40 MEQ: 750 CAPSULE, EXTENDED RELEASE ORAL at 08:30

## 2022-03-12 RX ADMIN — OXYCODONE HYDROCHLORIDE AND ACETAMINOPHEN 1 TABLET: 10; 325 TABLET ORAL at 20:16

## 2022-03-12 RX ADMIN — METRONIDAZOLE 500 MG: 500 INJECTION, SOLUTION INTRAVENOUS at 08:39

## 2022-03-12 RX ADMIN — CYCLOBENZAPRINE 5 MG: 10 TABLET, FILM COATED ORAL at 08:45

## 2022-03-12 RX ADMIN — TRAZODONE HYDROCHLORIDE 150 MG: 100 TABLET ORAL at 20:16

## 2022-03-12 RX ADMIN — OXYCODONE HYDROCHLORIDE AND ACETAMINOPHEN 1 TABLET: 10; 325 TABLET ORAL at 03:41

## 2022-03-12 NOTE — PROGRESS NOTES
[facility] Medicine Services  PROGRESS NOTE    Patient Name: Frieda Flores  : 1980  MRN: [id number]    Date of Admission: 3/11/2022  Primary Care Physician: System, Provider Not In    Subjective   Subjective     CC:  Right hip and low back pain    HPI:  Pt very lethargic this am, but wakes up when I call her name. States that her Methadone is not at her home dose (per MAR, it is).  Denies any pain currently.     ROS:  Gen- No fevers, chills  CV- No chest pain, palpitations  Resp- No cough, dyspnea  GI- No N/V/D, abd pain        Objective   Objective     Vital Signs:   Temp:  [97.8 °F (36.6 °C)-99.6 °F (37.6 °C)] 97.8 °F (36.6 °C)  Heart Rate:  [73-82] 76  Resp:  [16-22] 16  BP: (119-150)/(55-84) 138/84     Physical Exam:  Constitutional: No acute distress, lethargic  HENT: NCAT, mucous membranes moist  Respiratory: Clear to auscultation bilaterally, respiratory effort normal   Cardiovascular: RRR, no murmurs, rubs, or gallops  Gastrointestinal: Positive bowel sounds, soft, nontender, nondistended  Musculoskeletal: No bilateral ankle edema  Psychiatric: flat affect  Neurologic: Oriented x 3, strength symmetric in all extremities, Cranial Nerves grossly intact to confrontation, speech slurred  Skin: No rashes    Results Reviewed:  LAB RESULTS:      Lab 22  0614 03/10/22  2357   WBC 6.59 8.00   HEMOGLOBIN 11.5* 13.1   HEMATOCRIT 33.6* 37.8   PLATELETS 239 287   NEUTROS ABS  --  4.94   IMMATURE GRANS (ABS)  --  0.03   LYMPHS ABS  --  2.09   MONOS ABS  --  0.53   EOS ABS  --  0.38   MCV 84.8 84.8   SED RATE  --  13   CRP  --  4.44*   PROCALCITONIN  --  0.04   LACTATE 1.2 1.6         Lab 22  0614 03/10/22  2357   SODIUM 144 139   POTASSIUM 3.6 2.8*   CHLORIDE 106 100   CO2 27.0 28.0   ANION GAP 11.0 11.0   BUN 6 7   CREATININE 0.53* 0.59   EGFR 120.1 117.0   GLUCOSE 112* 115*   CALCIUM 8.6 9.5   MAGNESIUM  --  1.9         Lab 22  0614 03/10/22  0474   TOTAL PROTEIN 6.0 7.1    ALBUMIN 3.50 4.00   GLOBULIN 2.5 3.1   ALT (SGPT) 23 27   AST (SGOT) 24 31   BILIRUBIN 0.5 0.5   ALK PHOS 91 111                     Brief Urine Lab Results  (Last result in the past 365 days)      Color   Clarity   Blood   Leuk Est   Nitrite   Protein   CREAT   Urine HCG        03/11/22 0415 Dark Yellow   Turbid   Negative   Small (1+)   Negative   Trace           03/11/22 0415               Negative             Microbiology Results Abnormal     Procedure Component Value - Date/Time    Blood Culture - Blood, Arm, Left [[id number]]  (Normal) Collected: 03/11/22 0000    Lab Status: Preliminary result Specimen: Blood from Arm, Left Updated: 03/12/22 0032     Blood Culture No growth at 24 hours    Blood Culture - Blood, Arm, Right [[id number]]  (Normal) Collected: 03/11/22 0000    Lab Status: Preliminary result Specimen: Blood from Arm, Right Updated: 03/12/22 0032     Blood Culture No growth at 24 hours    Body Fluid Culture - Aspirate, Hip, Right [[id number]] Collected: 03/11/22 1535    Lab Status: Preliminary result Specimen: Aspirate from Hip, Right Updated: 03/11/22 1846     Gram Stain Rare (1+) WBCs per low power field      No organisms seen    Body Fluid Culture - Aspirate, Knee, Left [[id number]] Collected: 03/11/22 1535    Lab Status: Preliminary result Specimen: Aspirate from Knee, Left Updated: 03/11/22 1844     Gram Stain Rare (1+) WBCs per low power field      No organisms seen    COVID PRE-OP / PRE-PROCEDURE SCREENING ORDER (NO ISOLATION) - Swab, Nasopharynx [[id number]]  (Normal) Collected: 03/11/22 0415    Lab Status: Final result Specimen: Swab from Nasopharynx Updated: 03/11/22 0501    Narrative:      The following orders were created for panel order COVID PRE-OP / PRE-PROCEDURE SCREENING ORDER (NO ISOLATION) - Swab, Nasopharynx.  Procedure                               Abnormality         Status                     ---------                               -----------         ------                      COVID-19 and FLU A/B PCR...[[id number]]  Normal              Final result                 Please view results for these tests on the individual orders.    COVID-19 and FLU A/B PCR - Swab, Nasopharynx [[id number]]  (Normal) Collected: 03/11/22 0415    Lab Status: Final result Specimen: Swab from Nasopharynx Updated: 03/11/22 0501     COVID19 Not Detected     Influenza A PCR Not Detected     Influenza B PCR Not Detected    Narrative:      Fact sheet for providers: https://www.fda.gov/media/350963/download    Fact sheet for patients: https://www.fda.gov/media/562608/download    Test performed by PCR.          XR Knee 1 or 2 View Left    Result Date: 3/11/2022  Two-view left knee. DATE: 3/11/2022. COMPARISON: None available. CLINICAL HISTORY: Fall with swelling. FINDINGS: There is a moderate knee joint effusion. There is no fracture, subluxation or dislocation otherwise seen. The joint spaces appear preserved.     Impression: Moderate effusion with no acute osseous abnormality seen radiographically. Electronically signed by:  Rob Rincon D.O.  3/11/2022 4:20 AM Mountain Time    CT Abdomen Pelvis With Contrast    Result Date: 3/11/2022  EXAMINATION: CT ABDOMEN AND PELVIS WITH IV CONTRAST   DATE OF EXAMINATION: 3/11/2022. COMPARISON: 3/13/2019. INDICATION: Joint pain with history of hip septic arthritis. Back and bilateral hip pain. PROCEDURE:  Axial CT of the abdomen and pelvis was performed with contrast and sagittal and coronal reformatted images were performed.  100 mL of Isovue-300 was given intravenously. CT dose lowering techniques were used, to include: automated exposure control, adjustment for patient size, and/or use of iterative reconstruction. FINDINGS: LOWER CHEST :  The visualized lung bases are clear.  There are no pleural or pericardial effusions. ABDOMEN: Liver and Biliary system:  Normal. Adrenal glands:  Normal. Kidneys and ureters: Normal. Spleen:  Normal. Pancreas:  Normal. Gallbladder:  Normal.  Lymph nodes, Peritoneum and mesentery:  There is no mesenteric or retroperitoneal lymphadenopathy. Gastrointestinal tract:  There are no dilated loops of bowel or free intraperitoneal air.    The appendix is normal. Aorta/IVC:   No aortic aneurysm.  IVC normal. Abdominal wall:  Normal. PELVIS: Fluid: There is no free fluid in the pelvis. Lymph Nodes:  There is no pelvic or inguinal lymphadenopathy.. Urinary bladder:  Normal. BONES:  There is severe osteoarthritis involving the right hip heterotopic ossification seen along the inferior aspect of the hip joint space. There is some moderate superolateral subluxation of the femur with respect to the acetabulum. There is significant subchondral sclerosis and cystic changes. There is some soft tissue thickening of the joint capsule and a small effusion around the right hip joint as this could relate to the patient's history of septic arthritis. Active infection would be difficult to exclude. Mild degenerative changes in the left hip joint spaces otherwise noted. ADDITIONAL  SIGNIFICANT FINDINGS:  None.     Impression: 1. There is severe osteoarthritis involving the right hip heterotopic ossification seen along the inferior aspect of the hip joint space. There is some moderate superolateral subluxation of the femur with respect to the acetabulum. There is significant subchondral sclerosis and cystic changes. There is some soft tissue thickening of the joint capsule and a small effusion around the right hip joint as this could relate to the patient's history of septic arthritis. Active infection would be difficult to exclude. 2. No acute process otherwise seen within the abdomen or pelvis. Electronically signed by:  Rob Rincon D.O.  3/11/2022 1:13 AM Mountain Time    FL Guided Aspiration Joint    Result Date: 3/11/2022  DATE OF EXAM: 3/11/2022 3:29 PM  PROCEDURE: FL GUIDED ASPIRATION JOINT-  INDICATIONS: possible septic joint; M25.551-Pain in right hip;  M16.11-Unilateral primary osteoarthritis, right hip; Z87.39-Personal history of other diseases of the musculoskeletal system and connective tissue; E87.6-Hypokalemia  COMPARISON: No comparisons available.  TECHNIQUE: 6 seconds of fluoroscopic time was used for this exam. 2 associated images were saved. Procedure, risks, complications, and side effects of joint aspiration were discussed and reviewed in detail with the patient including the possibility for bleeding, infection, needle damage to surrounding structures, and the potential for a nondiagnostic exam. The patient indicated understanding and consented to the procedure.  The right hip was marked, prepped, and draped in a sterile fashion. 1% lidocaine was used as a local anesthetic to the skin and subcutaneous tissues. Under fluoroscopic guidance a 20-gauge needle was advanced to the joint space. Approximately 8 cc of bloody synovial fluid was aspirated, labeled, and sent to pathology for further analysis. The needle was removed.  FINDINGS: The patient tolerated the procedure well, and no immediate complications occurred.      Impression: Successful fluoroscopic guided right hip joint aspiration as above with no immediate complications.  This report was finalized on 3/11/2022 4:59 PM by Dr. Erick Agrawal MD.      FL Guided Aspiration Joint    Result Date: 3/11/2022  DATE OF EXAM: 3/11/2022 3:30 PM  PROCEDURE: FL GUIDED ASPIRATION JOINT-  INDICATIONS: possible septic joint; M25.551-Pain in right hip; M16.11-Unilateral primary osteoarthritis, right hip; Z87.39-Personal history of other diseases of the musculoskeletal system and connective tissue; E87.6-Hypokalemia  COMPARISON: No comparisons available.  TECHNIQUE: 1 associated  image was saved. Fluoroscopy was not utilized for this procedure. Procedure, risks, complications, and side effects of joint aspiration were discussed and reviewed in detail with the patient including the possibility for bleeding,  infection, needle damage to surrounding structures, and the potential for a nondiagnostic exam. The patient indicated understanding and consented to the procedure.  The left knee was marked, prepped, and draped in a sterile fashion. 1% lidocaine was used as a local anesthetic to the skin and subcutaneous tissues. A 20-gauge needle was advanced to the joint space, and approximately 12 cc of synovial fluid was aspirated, labeled, and sent to pathology for further analysis. The needle was removed.  FINDINGS: The patient tolerated the procedure well, and no immediate complications occurred.      Impression: Successful left knee joint aspiration as above with no immediate complications. Fluoroscopy was not utilized for this procedure.  This report was finalized on 3/11/2022 4:59 PM by Dr. Erick Agrawal MD.        Results for orders placed during the hospital encounter of 07/31/21    Adult Transthoracic Echo Complete W/ Cont if Necessary Per Protocol    Interpretation Summary  · Left ventricular ejection fraction appears to be 61 - 65%. Left ventricular systolic function is normal.  · The cardiac valves are structurally and functionally within normal limits.      I have reviewed the medications:  Scheduled Meds:amLODIPine, 10 mg, Oral, Q24H  ceFEPime (MAXIPIME) in NS 2g/10ml IV PUSH syringe, 2 g, Intravenous, Q8H  DAPTOmycin, 6 mg/kg (Adjusted), Intravenous, Q24H  FLUoxetine, 20 mg, Oral, Daily  lactobacillus acidophilus, 1 capsule, Oral, Daily  lisinopril, 20 mg, Oral, Q24H  methadone, 60 mg, Oral, Daily  metroNIDAZOLE, 500 mg, Intravenous, Q6H  OLANZapine, 10 mg, Oral, Daily  sodium chloride, 10 mL, Intravenous, Q12H  traZODone, 150 mg, Oral, Nightly      Continuous Infusions:   PRN Meds:.•  acetaminophen  •  cyclobenzaprine  •  melatonin  •  Morphine  •  ondansetron  •  ondansetron  •  oxyCODONE-acetaminophen  •  potassium chloride **OR** potassium chloride **OR** potassium chloride  •  potassium chloride  •  sodium  chloride  •  sodium chloride    Assessment/Plan   Assessment & Plan     Active Hospital Problems    Diagnosis  POA   • Tobacco abuse [Z72.0]  Yes   • Hypokalemia [E87.6]  Yes   • Right hip pain [M25.551]  Yes   • QT prolongation [R94.31]  Yes   • Septic right hip (CMS/HCC) [M00.9]  Yes   • Polysubstance abuse (HCC) [F19.10]  Yes   • Alcohol abuse [F10.10]  Yes      Resolved Hospital Problems   No resolved problems to display.        Brief Hospital Course to date:  Frieda Flores is a 41 y.o. female with a past medical history of polysubstance abuse, prior alcohol abuse, ADHD/Bipolar disorder, and chronic right hip pain with septic arthritis s/p washout by Dr. Gusman in 2021 who presented to the ED complaining of recurrent right hip and low back pain that has worsened over the last few months to the point where she can no longer bear weight. She was admitted to our service and Ortho and ID were consulted.    Plan:    Right Hip/Low Back pain  H/o right hip septic arthritis s/p washout 7/21  -- joint aspiration from L knee and right hip are not c/w septic joint.  Cultures PENDING  -- MRI hip and L spine PENDING  -- ID/Ortho following  -- continue Cefepime, Dapto and Flagyl for time being per Dr. Wilhelm  --PT/OT    Hypokalemia  --replaced per protocol    Polysubstance abuse  -- UDS positive for Cocaine, Methamphetamine, Methadone and THC  -- On chronic Methadone. Continue- per pt, current dosage is not her home dose, but, according to MAR, it is  -- continue Percocet, wean IV Morphine (has not had any in >24 hours now, so likely can d/c this tomorrow)  -- Addiction Medicine following    Mood disorder  -- continue Olanzapine     QTc Prolongation  -- EKG shows QTc of 491.  Likely d/t Methadone.  Will monitor for now and avoid other QTc prolonging medications        DVT prophylaxis:  Mechanical DVT prophylaxis orders are present.       AM-PAC 6 Clicks Score (PT): 20 (03/11/22 2000)    Disposition: I expect the patient to be  discharged TBD    CODE STATUS:   Code Status and Medical Interventions:   Ordered at: 03/11/22 0401     Level Of Support Discussed With:    Patient     Code Status (Patient has no pulse and is not breathing):    CPR (Attempt to Resuscitate)     Medical Interventions (Patient has pulse or is breathing):    Full Support       Julia Ambriz MD  03/12/22

## 2022-03-12 NOTE — PLAN OF CARE
Goal Outcome Evaluation:  Plan of Care Reviewed With: patient        Progress: no change  Outcome Evaluation: PT eval completed.  Patient presents w/ decreased strength, pain w/ mobility, impaired balance, gait instability, and decreased safety awareness, and functional decline.  She completed transfers w/ min A and ambulated 5ft w/ RW and min A.  Skilled IPPT warranted to improve pt's safety and independence w/ functional mobility.  Recommend IP rehab at D/C.

## 2022-03-12 NOTE — THERAPY EVALUATION
Patient Name: Frieda Flores  : 1980    MRN: [id number]                              Today's Date: 3/12/2022       Admit Date: 3/11/2022    Visit Dx:     ICD-10-CM ICD-9-CM   1. Right hip pain  M25.551 719.45   2. Arthritis of right hip  M16.11 716.95   3. History of septic arthritis  Z87.39 V13.4   4. Hypokalemia  E87.6 276.8     Patient Active Problem List   Diagnosis   • Alcohol abuse   • Polysubstance abuse (HCC)   • Abnormal liver function tests   • Septic right hip (CMS/HCC)   • Therapeutic opioid-induced constipation (OIC)   • Postpartum care following LTCS (1 layer) 21   • Cigarette smoker   • Tobacco abuse   • Hypokalemia   • Right hip pain   • QT prolongation     Past Medical History:   Diagnosis Date   • Abdominal wall cellulitis - After  2019    Treated with IV antibiotics   • ADHD    • Alcoholism (HCC)    • Bipolar disorder (HCC)    • Chlamydia    • Chlamydia 2021   • Hepatitis C carrier ([facility])     Negative viral load   • Migraine    • Osteoarthritis    • PTSD (post-traumatic stress disorder)    • Septic right hip (CMS/HCC) 2021   • Substance abuse (HCC)    • Therapeutic opioid-induced constipation (OIC)    • Urogenital trichomoniasis      Past Surgical History:   Procedure Laterality Date   • BREAST LUMPECTOMY Right    •  SECTION Bilateral 3/9/2019    Procedure:  SECTION PRIMARY;  Surgeon: Shelley Hughes MD;  Location: [facility] LABOR DELIVERY;  Service: Obstetrics/Gynecology   •  SECTION N/A 2021    Procedure: Repeat C/S;  Surgeon: Fernando Pedersen MD;  Location:  [facility] OR;  Service: Obstetrics/Gynecology;  Laterality: N/A;   • INCISION AND DRAINAGE HIP Right 2021    Procedure: HIP INCISION AND DRAINAGE RIGHT;  Surgeon: Martin Gusman MD;  Location:  [facility] OR;  Service: Orthopedics;  Laterality: Right;      General Information     Row Name 22 6979          Physical Therapy Time and Intention     Document Type evaluation  -MB     Mode of Treatment physical therapy  -MB     Row Name 03/12/22 0930          General Information    Patient Profile Reviewed yes  -MB     Prior Level of Function independent:;bed mobility;transfer;ADL's;gait;all household mobility;driving;using stairs;work  per patient  -MB     Existing Precautions/Restrictions fall  -MB     Barriers to Rehab medically complex  -MB     Row Name 03/12/22 0930          Living Environment    People in Home alone  -MB     Row Name 03/12/22 0930          Home Main Entrance    Number of Stairs, Main Entrance other (see comments)  16  -MB     Stair Railings, Main Entrance railing on right side (ascending)  -MB     Row Name 03/12/22 0930          Stairs Within Home, Primary    Number of Stairs, Within Home, Primary none  -MB     Row Name 03/12/22 0930          Cognition    Orientation Status (Cognition) oriented to;person;place  -MB     Row Name 03/12/22 0930          Safety Issues, Functional Mobility    Safety Issues Affecting Function (Mobility) awareness of need for assistance;insight into deficits/self-awareness;judgment;positioning of assistive device;safety precaution awareness;safety precautions follow-through/compliance;sequencing abilities  -MB     Impairments Affecting Function (Mobility) balance;endurance/activity tolerance;grasp;pain;strength  -MB     Comment, Safety Issues/Impairments (Mobility) Pt. intermittently lethargic.  -MB           User Key  (r) = Recorded By, (t) = Taken By, (c) = Cosigned By    Initials Name Provider Type    MB Cherise Duval, PT Physical Therapist               Mobility     Row Name 03/12/22 0930          Bed Mobility    Bed Mobility supine-sit  -MB     Supine-Sit [place] (Bed Mobility) modified independence  -MB     Assistive Device (Bed Mobility) bed rails;head of bed elevated  -MB     Comment, (Bed Mobility) Pt. advanced to EOB w/out assist.  -MB     Row Name 03/12/22 0930          Transfers    Comment,  (Transfers) VCs/tactile cues for safe hand placement, sequencing, and upright posture in standing.  -MB     Row Name 03/12/22 0930          Bed-Chair Transfer    Bed-Chair [place] (Transfers) minimum assist (75% patient effort);verbal cues  -MB     Assistive Device (Bed-Chair Transfers) walker, front-wheeled  -MB     Row Name 03/12/22 0930          Sit-Stand Transfer    Sit-Stand [place] (Transfers) minimum assist (75% patient effort);verbal cues  -MB     Assistive Device (Sit-Stand Transfers) walker, front-wheeled  -MB     Row Name 03/12/22 0930          Gait/Stairs (Locomotion)    [place] Level (Gait) minimum assist (75% patient effort);verbal cues  -MB     Assistive Device (Gait) walker, front-wheeled  -MB     Distance in Feet (Gait) 5  -MB     Deviations/Abnormal Patterns (Gait) antalgic;vickey decreased;gait speed decreased;stride length decreased  -MB     Bilateral Gait Deviations foot drop/toe drag;forward flexed posture  -MB     Comment, (Gait/Stairs) Pt. ambulated w/ step to antalgic gait pattern w/ slow vickey and decreased stance phase RLE.  VCs for upright posture, increased B step length, and safe chair approach. Gait distance limited by weakness, pain.  -MB           User Key  (r) = Recorded By, (t) = Taken By, (c) = Cosigned By    Initials Name Provider Type    Cherise Babin, PT Physical Therapist               Obj/Interventions     Row Name 03/12/22 0930          Range of Motion Comprehensive    General Range of Motion bilateral lower extremity ROM WFL  -MB     Comment, General Range of Motion BLE grossly WFL; c/o pain w/ R hip flex and L knee flex/ext.  -MB     Row Name 03/12/22 0930          Strength Comprehensive (MMT)    General Manual Muscle Testing (MMT) Assessment lower extremity strength deficits identified  -MB     Comment, General Manual Muscle Testing (MMT) Assessment BLEs grossly 4-/5  -MB     Row Name 03/12/22 0930          Motor Skills    Therapeutic Exercise  hip;knee;ankle  -MB     Row Name 03/12/22 0930          Hip (Therapeutic Exercise)    Hip (Therapeutic Exercise) AAROM (active assistive range of motion)  -MB     Hip AAROM (Therapeutic Exercise) bilateral;flexion;aBduction;5 repetitions  -MB     Row Name 03/12/22 0930          Knee (Therapeutic Exercise)    Knee (Therapeutic Exercise) AAROM (active assistive range of motion)  -MB     Knee AAROM (Therapeutic Exercise) bilateral;flexion;extension;5 repetitions  -MB     Row Name 03/12/22 0930          Ankle (Therapeutic Exercise)    Ankle (Therapeutic Exercise) AAROM (active assistive range of motion)  -MB     Ankle AAROM (Therapeutic Exercise) bilateral;dorsiflexion;plantarflexion;5 repetitions  -MB     Row Name 03/12/22 0930          Balance    Balance Assessment sitting static balance;standing static balance;standing dynamic balance  -MB     Static Sitting Balance supervision  -MB     Position, Sitting Balance unsupported;sitting edge of bed  -MB     Static Standing Balance minimal assist  -MB     Dynamic Standing Balance minimal assist  -MB     Position/Device Used, Standing Balance walker, rolling  -MB     Balance Interventions standing;sit to stand;weight shifting activity  -MB     Row Name 03/12/22 0930          Sensory Assessment (Somatosensory)    Sensory Assessment (Somatosensory) LE sensation intact;UE sensation intact  -MB           User Key  (r) = Recorded By, (t) = Taken By, (c) = Cosigned By    Initials Name Provider Type    Cherise Babin, PT Physical Therapist               Goals/Plan     Row Name 03/12/22 1197          Bed Mobility Goal 1 (PT)    Activity/Assistive Device (Bed Mobility Goal 1, PT) bed mobility activities, all  bed flat  -MB     [place] Level/Cues Needed (Bed Mobility Goal 1, PT) independent  -MB     Time Frame (Bed Mobility Goal 1, PT) 1 week  -MB     Progress/Outcomes (Bed Mobility Goal 1, PT) goal ongoing  -MB     Row Name 03/12/22 8082          Transfer Goal 1 (PT)     Activity/Assistive Device (Transfer Goal 1, PT) transfers, all;walker, rolling  -MB     [place] Level/Cues Needed (Transfer Goal 1, PT) contact guard required  -MB     Time Frame (Transfer Goal 1, PT) 10 days  -MB     Progress/Outcome (Transfer Goal 1, PT) goal ongoing  -MB     Row Name 03/12/22 1535          Gait Training Goal 1 (PT)    Activity/Assistive Device (Gait Training Goal 1, PT) gait (walking locomotion);walker, rolling  -MB     [place] Level (Gait Training Goal 1, PT) contact guard required  -MB     Distance (Gait Training Goal 1, PT) 150  -MB     Time Frame (Gait Training Goal 1, PT) 10 days  -MB     Progress/Outcome (Gait Training Goal 1, PT) goal ongoing  -MB     Row Name 03/12/22 1535          Stairs Goal 1 (PT)    Activity/Assistive Device (Stairs Goal 1, PT) stairs, all skills;using handrail, right  -MB     [place] Level/Cues Needed (Stairs Goal 1, PT) modified independence  -MB     Number of Stairs (Stairs Goal 1, PT) 16  -MB     Time Frame (Stairs Goal 1, PT) by discharge  -MB     Progress/Outcome (Stairs Goal 1, PT) goal ongoing  -MB     Row Name 03/12/22 2210          Patient Education Goal (PT)    Activity (Patient Education Goal, PT) HEP  -MB     [place]/Cues/Accuracy (Memory Goal 2, PT) demonstrates adequately  -MB     Time Frame (Patient Education Goal, PT) 10 days  -MB     Progress/Outcome (Patient Education Goal, PT) goal ongoing  -MB           User Key  (r) = Recorded By, (t) = Taken By, (c) = Cosigned By    Initials Name Provider Type    Cherise Babin, PT Physical Therapist               Clinical Impression     Row Name 03/12/22 2804          Pain    Pretreatment Pain Rating 8/10  -MB     Posttreatment Pain Rating 8/10  -MB     Pain Location - Side/Orientation Right  -MB     Pain Location - hip  -MB     Pre/Posttreatment Pain Comment RN premedicated.  -MB     Pain Intervention(s) Medication (See MAR);Ambulation/increased activity;Repositioned  -MB     Row  Name 03/12/22 0930          Plan of Care Review    Plan of Care Reviewed With patient  -MB     Progress no change  -MB     Outcome Evaluation PT eval completed.  Patient presents w/ decreased strength, pain w/ mobility, impaired balance, gait instability, and decreased safety awareness, and functional decline.  She completed transfers w/ min A and ambulated 5ft w/ RW and min A.  Skilled IPPT warranted to improve pt's safety and independence w/ functional mobility.  Recommend IP rehab at D/C.  -MB     Row Name 03/12/22 0930          Therapy Assessment/Plan (PT)    Patient/Family Therapy Goals Statement (PT) Walk independently, dec pain  -MB     Rehab Potential (PT) fair, will monitor progress closely  -MB     Criteria for Skilled Interventions Met (PT) yes;meets criteria;skilled treatment is necessary  -MB     Row Name 03/12/22 0930          Vital Signs    Pre Systolic BP Rehab 133  -MB     Pre Treatment Diastolic BP 89  -MB     Pre SpO2 (%) 94  -MB     O2 Delivery Pre Treatment room air  -MB     O2 Delivery Intra Treatment room air  -MB     Post SpO2 (%) 93  -MB     O2 Delivery Post Treatment room air  -MB     Pre Patient Position Supine  -MB     Intra Patient Position Standing  -MB     Post Patient Position Sitting  -MB     Row Name 03/12/22 0930          Positioning and Restraints    Pre-Treatment Position in bed  -MB     Post Treatment Position chair  -MB     In Chair notified nsg;reclined;call light within reach;encouraged to call for assist;exit alarm on;waffle cushion;legs elevated;heels elevated  -MB           User Key  (r) = Recorded By, (t) = Taken By, (c) = Cosigned By    Initials Name Provider Type    Cherise Babin, PT Physical Therapist               Outcome Measures     Row Name 03/12/22 5107          How much help from another person do you currently need...    Turning from your back to your side while in flat bed without using bedrails? 4  -MB     Moving from lying on back to sitting on the  side of a flat bed without bedrails? 4  -MB     Moving to and from a bed to a chair (including a wheelchair)? 3  -MB     Standing up from a chair using your arms (e.g., wheelchair, bedside chair)? 3  -MB     Climbing 3-5 steps with a railing? 2  -MB     To walk in hospital room? 3  -MB     AM-PAC 6 Clicks Score (PT) 19  -MB     Row Name 03/12/22 1537 03/12/22 1459       Functional Assessment    Outcome Measure Options AM-PAC 6 Clicks Basic Mobility (PT)  -MB AM-PAC 6 Clicks Daily Activity (OT)  -          User Key  (r) = Recorded By, (t) = Taken By, (c) = Cosigned By    Initials Name Provider Type    JR Felipa Medina, OT Occupational Therapist    Cherise Babin, PT Physical Therapist                             Physical Therapy Education                 Title: PT OT SLP Therapies (In Progress)     Topic: Physical Therapy (In Progress)     Point: Mobility training (In Progress)     Learning Progress Summary           Patient Acceptance, E,D, NR by MB at 3/12/2022 1538                   Point: Home exercise program (In Progress)     Learning Progress Summary           Patient Acceptance, E,D, NR by MB at 3/12/2022 1538                   Point: Body mechanics (In Progress)     Learning Progress Summary           Patient Acceptance, E,D, NR by MB at 3/12/2022 1538                   Point: Precautions (In Progress)     Learning Progress Summary           Patient Acceptance, E,D, NR by MB at 3/12/2022 1538                               User Key     Initials Effective Dates Name Provider Type Discipline    MB 06/16/21 -  Cherise Duval, PT Physical Therapist PT              PT Recommendation and Plan  Planned Therapy Interventions (PT): balance training, bed mobility training, gait training, home exercise program, ROM (range of motion), stair training, strengthening, patient/family education  Plan of Care Reviewed With: patient  Progress: no change  Outcome Evaluation: PT eval completed.  Patient  presents w/ decreased strength, pain w/ mobility, impaired balance, gait instability, and decreased safety awareness, and functional decline.  She completed transfers w/ min A and ambulated 5ft w/ RW and min A.  Skilled IPPT warranted to improve pt's safety and independence w/ functional mobility.  Recommend IP rehab at D/C.     Time Calculation:    PT Charges     Row Name 03/12/22 1539             Time Calculation    Start Time 0930  -MB      PT Received On 03/12/22  -MB      PT Goal Re-Cert Due Date 03/22/22  -MB              Untimed Charges    PT Eval/Re-eval Minutes 48  -MB              Total Minutes    Untimed Charges Total Minutes 48  -MB       Total Minutes 48  -MB            User Key  (r) = Recorded By, (t) = Taken By, (c) = Cosigned By    Initials Name Provider Type    Cherise Babin, PT Physical Therapist              Therapy Charges for Today     Code Description Service Date Service Provider Modifiers Qty    33300287125 HC PT EVAL MOD COMPLEXITY 4 3/12/2022 Cherise Duval, PT GP 1          PT G-Codes  Outcome Measure Options: AM-PAC 6 Clicks Basic Mobility (PT)  AM-PAC 6 Clicks Score (PT): 19  AM-PAC 6 Clicks Score (OT): 13    Cherise Duval PT  3/12/2022

## 2022-03-12 NOTE — PLAN OF CARE
Goal Outcome Evaluation:  Plan of Care Reviewed With: patient           Outcome Evaluation: OT initial eval and expanded chart review completed. Pt presents with multiple comorbidities and decreased independence with ADL's and mobility. Recommend continued skilled OT services and transfer to IRF at d/c.

## 2022-03-12 NOTE — PROGRESS NOTES
[facility] Progress Note        Antibiotics:  Anti-Infectives (From admission, onward)    Ordered     Dose/Rate Route Frequency Start Stop    03/11/22 0823  DAPTOmycin (CUBICIN) 400 mg in sodium chloride 0.9 % 50 mL IVPB        Ordering Provider: Broderick Wilhelm MD    6 mg/kg × 64.1 kg (Adjusted)  100 mL/hr over 30 Minutes Intravenous Every 24 Hours 03/11/22 1000 03/21/22 0959    03/11/22 0823  metroNIDAZOLE (FLAGYL) 500 mg/100mL IVPB        Ordering Provider: Broderick Wilhelm MD    500 mg  100 mL/hr over 60 Minutes Intravenous Every 6 Hours 03/11/22 1000 03/21/22 0959    03/11/22 0755  ceFEPime (MAXIPIME) in NS 2g/10ml IV PUSH syringe        Ordering Provider: Broderick Calvo RPH    2 g  over 5 Minutes Intravenous Every 8 Hours Scheduled 03/11/22 0845 03/21/22 0559    03/11/22 0403  piperacillin-tazobactam (ZOSYN) 4.5 g in iso-osmotic dextrose 100 mL IVPB (premix)        Ordering Provider: Broderick Calvo RPH    4.5 g  over 30 Minutes Intravenous Once 03/11/22 0445 03/11/22 0614          CC: right hip/left knee/low back pain    HPI:      Patient is a 41 y.o.  Yr old female with history of polysubstance abuse with prior cocaine/heroin/methamphetamine/THC, chronic hepatitis C and PTSD/bipolar disease; treated for right septic hip with MSSA in September 2021, surgery at that time by Dr. Gusman and subsequently transitioned to [facility], abnormal LFTs with empiric change from Zosyn to cefazolin with improvement although unclear if related to penicillin class versus hep C or combination.  Noncompliant with follow-up.  Apparently plans to be seen at [facility] regarding possible right hip surgery in the future but specifics of that are unclear.  She reports being in senior living recently, lost her spot in methadone clinic and reverted to injection drug abuse approximately 1 week prior to admission.  After injection had developed worsening right hip and left knee pain, worsening low back pain and ultimately presented to  [facility] on March 11.     Aside from injection drug abuse, she denies any other specific exposures. No raw or undercooked food.  No unpasteurized milk or milk products.  No animal insect or arthropod exposure.  No tick bites.  No outdoor camping or hunting exposure.  No travel exposure.  No ill exposure.  No history of TB or TB exposure.   Denies a history of MRSA/VRE and no history of C. difficile or ESBL/KPC  Organisms.     3/12/22 Seen early and sleepy;  Per nursing, right  Hip and left knee  pain constant, sharp, nonradiating, worse with movement and unable to bear weight.   pain 5-6/10      Acute on chronic lumbar back pain, constant, sharp, worse with movement, somewhat better with pain meds but not completely relieved and 5 out of 10 in severity at present.  She denies any new focal weakness or numbness.  No bowel or bladder incontinence.        Denies headache photophobia or neck stiffness.  No shortness of breath cough or hemoptysis.  No nausea vomiting diarrhea or abdominal pain.  No dysuria hematuria or pyuria.     HCG negative        ROS:     3/12/22  Per nursing; No f/c/s. No n/v/d. No rash. No new ADR to Abx.       Constitutional--subjective fevers and chills.  Appetite diminished with fatigue  Heent-- No new vision, hearing or throat complaints.  No epistaxis or oral sores.  Denies odynophagia or dysphagia.  No flashers, floaters or eye pain. No odynophagia or dysphagia. No headache, photophobia or neck stiffness.  CV-- No chest pain, palpitation or syncope  Resp-- No SOB/cough/Hemoptysis  GI- No nausea, vomiting, or diarrhea.  No hematochezia, melena, or hematemesis. Denies jaundice or chronic liver disease.  -- No dysuria, hematuria, or flank pain.  Denies hesitancy, urgency or flank pain.  Lymph- no swollen lymph nodes in neck/axilla or groin.   Heme- No active bruising or bleeding; no Hx of DVT or PE.  MS-- no swelling or pain in the bones or joints of arms/legs aside from above.  "    Neuro-- No acute focal weakness or numbness in the arms or legs.  No seizures.     Full 12 point review of systems reviewed and negative otherwise for acute complaints, except for above      PE: nursing/chaperone present  /84 (BP Location: Right arm, Patient Position: Lying)   Pulse 76   Temp 97.8 °F (36.6 °C) (Oral)   Resp 16   Ht 160 cm (63\")   Wt 83.8 kg (184 lb 12.8 oz)   LMP 02/17/2022   SpO2 91%   Breastfeeding No   BMI 32.74 kg/m²       GENERAL: sleepy; NAD  HEENT: Normocephalic, atraumatic.  PERRL. EOMI. No conjunctival injection. No icterus. Oropharynx clear without evidence of thrush or exudate. No evidence of peridontal disease.    NECK: Supple without nuchal rigidity. No mass.  LYMPH: No cervical, axillary or inguinal lymphadenopathy.  HEART: RRR; No murmur, rubs, gallops.   LUNGS: Clear to auscultation bilaterally without wheezing, rales, rhonchi. Normal respiratory effort. Nonlabored. No dullness.  ABDOMEN: Soft, nontender, nondistended. Positive bowel sounds. No rebound or guarding. NO mass or HSM.  EXT:  No cyanosis, clubbing . No cord.  : Genitalia generally unremarkable.  Without Savage catheter.  MSK: FROM without joint effusions noted arms/legs.    SKIN: Warm and dry without cutaneous eruptions on Inspection/palpation.    NEURO: Oriented to PPT.  She will not cooperate with a detailed motor or sensory exam given above pain     She will not allow range of motion exam at the right hip.  No obvious warmth or redness there.  Prior incision noted with no dehiscence or drainage.  No discrete mass bulge or fluctuance.  No crepitus or bulla     Spine with no redness bulge fluctuance or point tenderness; diffusely tender in lumbar spine.  No thoracic or cervical spine tenderness.     Left knee edematous compared to right with possible  Effusion, slight warmth compared to right side although no erythema; no discrete fluctuance but exam limited with pain and patient not cooperative with " a detailed exam     Track marks from drug use noted in upper extremities     IV without obvious redness or drainage    Laboratory Data    Results from last 7 days   Lab Units 03/12/22  0530 03/12/22  0334 03/11/22  0614   WBC 10*3/mm3 6.21 6.13 6.59   HEMOGLOBIN g/dL 11.4* 11.3* 11.5*   HEMATOCRIT % 34.4 34.5 33.6*   PLATELETS 10*3/mm3 221 215 239     Results from last 7 days   Lab Units 03/12/22  0334   SODIUM mmol/L 141   POTASSIUM mmol/L 3.1*   CHLORIDE mmol/L 106   CO2 mmol/L 26.0   BUN mg/dL 7   CREATININE mg/dL 0.57   GLUCOSE mg/dL 92   CALCIUM mg/dL 8.3*     Results from last 7 days   Lab Units 03/12/22  0334   ALK PHOS U/L 88   BILIRUBIN mg/dL 0.3   ALT (SGPT) U/L 17   AST (SGOT) U/L 15     Results from last 7 days   Lab Units 03/10/22  2357   SED RATE mm/hr 13     Results from last 7 days   Lab Units 03/10/22  2357   CRP mg/dL 4.44*       Estimated Creatinine Clearance: 134.6 mL/min (by C-G formula based on SCr of 0.57 mg/dL).      Microbiology:      Radiology:  Imaging Results (Last 72 Hours)     Procedure Component Value Units Date/Time    FL Guided Aspiration Joint [[id number]] Collected: 03/11/22 1601     Updated: 03/11/22 1702    Narrative:      DATE OF EXAM: 3/11/2022 3:29 PM     PROCEDURE: FL GUIDED ASPIRATION JOINT-     INDICATIONS: possible septic joint; M25.551-Pain in right hip;  M16.11-Unilateral primary osteoarthritis, right hip; Z87.39-Personal  history of other diseases of the musculoskeletal system and connective  tissue; E87.6-Hypokalemia     COMPARISON: No comparisons available.     TECHNIQUE: 6 seconds of fluoroscopic time was used for this exam. 2  associated images were saved. Procedure, risks, complications, and side  effects of joint aspiration were discussed and reviewed in detail with  the patient including the possibility for bleeding, infection, needle  damage to surrounding structures, and the potential for a nondiagnostic  exam. The patient indicated understanding and consented  to the  procedure.     The right hip was marked, prepped, and draped in a sterile fashion. 1%  lidocaine was used as a local anesthetic to the skin and subcutaneous  tissues. Under fluoroscopic guidance a 20-gauge needle was advanced to  the joint space. Approximately 8 cc of bloody synovial fluid was  aspirated, labeled, and sent to pathology for further analysis. The  needle was removed.     FINDINGS:   The patient tolerated the procedure well, and no immediate complications  occurred.        Impression:      Successful fluoroscopic guided right hip joint aspiration as above with  no immediate complications.     This report was finalized on 3/11/2022 4:59 PM by Dr. Erick Agrawal MD.       FL Guided Aspiration Joint [[id number]] Collected: 03/11/22 1601     Updated: 03/11/22 1702    Narrative:      DATE OF EXAM: 3/11/2022 3:30 PM     PROCEDURE: FL GUIDED ASPIRATION JOINT-     INDICATIONS: possible septic joint; M25.551-Pain in right hip;  M16.11-Unilateral primary osteoarthritis, right hip; Z87.39-Personal  history of other diseases of the musculoskeletal system and connective  tissue; E87.6-Hypokalemia     COMPARISON: No comparisons available.     TECHNIQUE: 1 associated  image was saved. Fluoroscopy was not  utilized for this procedure. Procedure, risks, complications, and side  effects of joint aspiration were discussed and reviewed in detail with  the patient including the possibility for bleeding, infection, needle  damage to surrounding structures, and the potential for a nondiagnostic  exam. The patient indicated understanding and consented to the  procedure.     The left knee was marked, prepped, and draped in a sterile fashion. 1%  lidocaine was used as a local anesthetic to the skin and subcutaneous  tissues. A 20-gauge needle was advanced to the joint space, and  approximately 12 cc of synovial fluid was aspirated, labeled, and sent  to pathology for further analysis. The needle was removed.      FINDINGS:   The patient tolerated the procedure well, and no immediate complications  occurred.        Impression:      Successful left knee joint aspiration as above with no immediate  complications. Fluoroscopy was not utilized for this procedure.     This report was finalized on 3/11/2022 4:59 PM by Dr. Erick Agrawal MD.       XR Knee 1 or 2 View Left [[id number]] Collected: 03/11/22 0619     Updated: 03/11/22 0621    Narrative:      Two-view left knee.    DATE: 3/11/2022.    COMPARISON: None available.    CLINICAL HISTORY: Fall with swelling.    FINDINGS:    There is a moderate knee joint effusion.    There is no fracture, subluxation or dislocation otherwise seen.    The joint spaces appear preserved.      Impression:        Moderate effusion with no acute osseous abnormality seen radiographically.    Electronically signed by:  Rob Rincon D.O.    3/11/2022 4:20 AM Mountain Time    CT Abdomen Pelvis With Contrast [[id number]] Collected: 03/11/22 0308     Updated: 03/11/22 0314    Narrative:      EXAMINATION: CT ABDOMEN AND PELVIS WITH IV CONTRAST       DATE OF EXAMINATION: 3/11/2022.    COMPARISON: 3/13/2019.    INDICATION: Joint pain with history of hip septic arthritis. Back and bilateral hip pain.    PROCEDURE:  Axial CT of the abdomen and pelvis was performed with contrast and sagittal and coronal reformatted images were performed.  100 mL of Isovue-300 was given intravenously. CT dose lowering techniques were used, to include: automated exposure   control, adjustment for patient size, and/or use of iterative reconstruction.    FINDINGS:    LOWER CHEST :  The visualized lung bases are clear.  There are no pleural or pericardial effusions.    ABDOMEN:    Liver and Biliary system:  Normal.    Adrenal glands:  Normal.    Kidneys and ureters: Normal.    Spleen:  Normal.    Pancreas:  Normal.    Gallbladder:  Normal.    Lymph nodes, Peritoneum and mesentery:  There is no mesenteric or retroperitoneal  lymphadenopathy.    Gastrointestinal tract:  There are no dilated loops of bowel or free intraperitoneal air.    The appendix is normal.     Aorta/IVC:   No aortic aneurysm.  IVC normal.    Abdominal wall:  Normal.    PELVIS:    Fluid: There is no free fluid in the pelvis.    Lymph Nodes:  There is no pelvic or inguinal lymphadenopathy..    Urinary bladder:  Normal.    BONES:  There is severe osteoarthritis involving the right hip heterotopic ossification seen along the inferior aspect of the hip joint space. There is some moderate superolateral subluxation of the femur with respect to the acetabulum. There is   significant subchondral sclerosis and cystic changes. There is some soft tissue thickening of the joint capsule and a small effusion around the right hip joint as this could relate to the patient's history of septic arthritis. Active infection would be   difficult to exclude. Mild degenerative changes in the left hip joint spaces otherwise noted.    ADDITIONAL  SIGNIFICANT FINDINGS:  None.      Impression:        1. There is severe osteoarthritis involving the right hip heterotopic ossification seen along the inferior aspect of the hip joint space. There is some moderate superolateral subluxation of the femur with respect to the acetabulum. There is significant   subchondral sclerosis and cystic changes. There is some soft tissue thickening of the joint capsule and a small effusion around the right hip joint as this could relate to the patient's history of septic arthritis. Active infection would be difficult to   exclude.  2. No acute process otherwise seen within the abdomen or pelvis.          Electronically signed by:  Rob Rincon D.O.    3/11/2022 1:13 AM Mountain Time            Impression:         --Acute subjective constitutional symptoms with low-grade temp 99, active injection drug abuse , polysubstance with acute right hip/lumbar back pain out of proportion to baseline and acute left knee  pain/swelling.  She is at risk for bloodstream infection and hematogenous spread with risk for metastatic foci of involvement including risk for septic joints/osteomyelitis, endovascular focus etc.  Broad empiric antibiotics ongoing with daptomycin/cefepime and Flagyl; orthopedics to see for aspiration of joints and consideration for surgery at their discretion.  MRI's pending  ; blood cultures negative so far; she denies cough and respiratory exam nonfocal.  No acute inflammatory process on abdominal CT.    **right hip and left knee aspirate NOT c/w septic joints by cell counts;  Cultures negative so far     --Acute/chronic right hip pain with abrupt worsening over last week, recent injection drug abuse and risk for new versus recurrent infection.      **aspirate NOT c/w septic joints by cell counts;  Cultures negative so far     --Acute/chronic lumbar back pain with worsening over 1 week and recent injection drug abuse with risk for metastatic seeding and spinal/paraspinal infection risk.  MRI lumbar spine pending; if evidence for inflammatory process/fluid collection you should give consideration to neurosurgical consultation.     --Acute left knee pain/swelling, recent injection drug abuse and risk for septic joint.     **aspirate NOT c/w septic joints by cell counts;  Cultures negative so far     --Right septic hip arthritis in August/September 2021 with surgery by Dr. Gusman     --Chronic hepatitis C.  I do not treat viral hepatitis as a part of my practice.  If you desire further input regarding this during this hospitalization you should give consideration to gastroenterology consultation.     --Polysubstance abuse as previously noted.  Strategy for minimizing risk of withdrawal and additional supportive measures at discretion of medicine team     --Possible Zosyn adverse drug reaction with abnormal LFTs in 2021.  Not clear-cut but trying to avoid.  Subsequently tolerated cephalosporin class antibiotics  with improvements in liver tests     --Abnormal CPK prior to daptomycin.  Monitor.     --PTSD/bipolar disease     --Medical noncompliance           PLAN:       --IV daptomycin/cefepime and Flagyl     --Check/review labs cultures and scans     --Partial history per nursing staff     --Discussed with microbiology     --Discussed with Dr. Ambriz      --Highly complex set of issues with high risk for further serious morbidity and other serious sequela     --HCG negative          Broderick Wilhelm MD  3/12/2022

## 2022-03-12 NOTE — PLAN OF CARE
Goal Outcome Evaluation:  Plan of Care Reviewed With: patient        Progress: no change   Patient vitals are stable and on room air. Patient c/o of right hip pain and given prn pain medication. Patient denies nausea and shortness of air. Patient appeared asleep during the night and no problems identified. Fall and safety precautions maintained.

## 2022-03-12 NOTE — THERAPY EVALUATION
Patient Name: Frieda Flores  : 1980    MRN: [id number]                              Today's Date: 3/12/2022       Admit Date: 3/11/2022    Visit Dx:     ICD-10-CM ICD-9-CM   1. Right hip pain  M25.551 719.45   2. Arthritis of right hip  M16.11 716.95   3. History of septic arthritis  Z87.39 V13.4   4. Hypokalemia  E87.6 276.8     Patient Active Problem List   Diagnosis   • Alcohol abuse   • Polysubstance abuse (HCC)   • Abnormal liver function tests   • Septic right hip (CMS/HCC)   • Therapeutic opioid-induced constipation (OIC)   • Postpartum care following LTCS (1 layer) 21   • Cigarette smoker   • Tobacco abuse   • Hypokalemia   • Right hip pain   • QT prolongation     Past Medical History:   Diagnosis Date   • Abdominal wall cellulitis - After  2019    Treated with IV antibiotics   • ADHD    • Alcoholism (HCC)    • Bipolar disorder (HCC)    • Chlamydia    • Chlamydia 2021   • Hepatitis C carrier ([facility])     Negative viral load   • Migraine    • Osteoarthritis    • PTSD (post-traumatic stress disorder)    • Septic right hip (CMS/HCC) 2021   • Substance abuse (HCC)    • Therapeutic opioid-induced constipation (OIC)    • Urogenital trichomoniasis      Past Surgical History:   Procedure Laterality Date   • BREAST LUMPECTOMY Right    •  SECTION Bilateral 3/9/2019    Procedure:  SECTION PRIMARY;  Surgeon: Shelley Hughes MD;  Location:  [facility];  Service: Obstetrics/Gynecology   •  SECTION N/A 2021    Procedure: Repeat C/S;  Surgeon: Fernando Pedersen MD;  Location:  [facility];  Service: Obstetrics/Gynecology;  Laterality: N/A;   • INCISION AND DRAINAGE HIP Right 2021    Procedure: HIP INCISION AND DRAINAGE RIGHT;  Surgeon: Martin Gusman MD;  Location:  [facility];  Service: Orthopedics;  Laterality: Right;      General Information     Row Name 22 1450          OT Time and Intention    Document Type  evaluation  -     Mode of Treatment occupational therapy  -     Row Name 03/12/22 1450          General Information    Patient Profile Reviewed yes  -JR     Prior Level of Function --  Unable to assess PLOF, pt very lethargic and unable to maintain alert level.  -JR     Existing Precautions/Restrictions fall  -JR     Barriers to Rehab medically complex  -     Row Name 03/12/22 1450          Living Environment    People in Home alone  -     Row Name 03/12/22 1450          Home Main Entrance    Number of Stairs, Main Entrance --  Unable to assess, pt unable to maintain alert level this date  -     Row Name 03/12/22 1450          Cognition    Orientation Status (Cognition) oriented to;person;place  -     Row Name 03/12/22 1450          Safety Issues, Functional Mobility    Safety Issues Affecting Function (Mobility) awareness of need for assistance;insight into deficits/self-awareness;problem-solving;safety precaution awareness  -     Impairments Affecting Function (Mobility) balance;endurance/activity tolerance;grasp;pain;strength  -           User Key  (r) = Recorded By, (t) = Taken By, (c) = Cosigned By    Initials Name Provider Type    Felipa Schroeder, OT Occupational Therapist                 Mobility/ADL's     Row Name 03/12/22 1452          Bed Mobility    Comment, (Bed Mobility) Defer-pt with decreased alert level.  -     Row Name 03/12/22 1452          Activities of Daily Living    BADL Assessment/Intervention grooming  -     Row Name 03/12/22 1452          Grooming Assessment/Training    [place] Level (Grooming) wash face, hands;set up  -JR     Position (Grooming) supine  -           User Key  (r) = Recorded By, (t) = Taken By, (c) = Cosigned By    Initials Name Provider Type    Felipa Schroeder OT Occupational Therapist               Obj/Interventions     Row Name 03/12/22 1452          Sensory Assessment (Somatosensory)    Sensory Assessment (Somatosensory) sensation  intact  -     Row Name 03/12/22 1452          Range of Motion Comprehensive    General Range of Motion upper extremity range of motion deficits identified  -     Comment, General Range of Motion R shoulder flexion limited to approx 90 degrees, remainder WFL  -     Row Name 03/12/22 1452          Strength Comprehensive (MMT)    General Manual Muscle Testing (MMT) Assessment upper extremity strength deficits identified  -     Comment, General Manual Muscle Testing (MMT) Assessment R UE functionally 3+/5, l UE functionally 4/5  -     Row Name 03/12/22 1452          Motor Skills    Motor Skills coordination  -     Coordination bilateral;finger to nose;WFL  -           User Key  (r) = Recorded By, (t) = Taken By, (c) = Cosigned By    Initials Name Provider Type    JR Felipa Medina, OT Occupational Therapist               Goals/Plan     Row Name 03/12/22 1458          Bed Mobility Goal 1 (OT)    Activity/Assistive Device (Bed Mobility Goal 1, OT) bed mobility activities, all  -JR     [place] Level/Cues Needed (Bed Mobility Goal 1, OT) standby assist  -JR     Time Frame (Bed Mobility Goal 1, OT) long term goal (LTG);1 week  -JR     Progress/Outcomes (Bed Mobility Goal 1, OT) goal ongoing  -     Row Name 03/12/22 1458          Transfer Goal 1 (OT)    Activity/Assistive Device (Transfer Goal 1, OT) transfers, all;walker, rolling  -JR     [place] Level/Cues Needed (Transfer Goal 1, OT) contact guard required;verbal cues required  -JR     Time Frame (Transfer Goal 1, OT) long term goal (LTG);1 week  -JR     Progress/Outcome (Transfer Goal 1, OT) goal ongoing  -     Row Name 03/12/22 1458          Dressing Goal 1 (OT)    Activity/Device (Dressing Goal 1, OT) lower body dressing;other (see comments)  with AE PRN  -JR     [place]/Cues Needed (Dressing Goal 1, OT) minimum assist (75% or more patient effort);verbal cues required  -JR     Time Frame (Dressing Goal 1, OT) long term goal (LTG);1  week  -JR     Progress/Outcome (Dressing Goal 1, OT) goal ongoing  -JR     Row Name 03/12/22 1458          Toileting Goal 1 (OT)    Activity/Device (Toileting Goal 1, OT) toileting skills, all  -JR     [place] Level/Cues Needed (Toileting Goal 1, OT) minimum assist (75% or more patient effort);verbal cues required  -JR     Time Frame (Toileting Goal 1, OT) long term goal (LTG);1 week  -JR     Progress/Outcome (Toileting Goal 1, OT) goal ongoing  -JR     Row Name 03/12/22 1459          Therapy Assessment/Plan (OT)    Planned Therapy Interventions (OT) activity tolerance training;adaptive equipment training;BADL retraining;ROM/therapeutic exercise;transfer/mobility retraining;strengthening exercise;patient/caregiver education/training;functional balance retraining;occupation/activity based interventions  -JR           User Key  (r) = Recorded By, (t) = Taken By, (c) = Cosigned By    Initials Name Provider Type    JR Felipa Medina, OT Occupational Therapist               Clinical Impression     Row Name 03/12/22 145          Pain Assessment    Pretreatment Pain Rating 5/10  -JR     Posttreatment Pain Rating 5/10  -JR     Pain Location - Side/Orientation Bilateral  -JR     Pain Location - foot  -JR     Pain Intervention(s) Repositioned  -JR     Additional Documentation Pain Scale: Word Pre/Post-Treatment (Group)  -JR     Row Name 03/12/22 1454          Plan of Care Review    Plan of Care Reviewed With patient  -JR     Outcome Evaluation OT initial eval and expanded chart review completed. Pt presents with multiple comorbidities and decreased independence with ADL's and mobility. Recommend continued skilled OT services and transfer to IRF at d/c.  -JR     Row Name 03/12/22 1452          Therapy Assessment/Plan (OT)    Patient/Family Therapy Goal Statement (OT) Pt with decreased alert level and unable to state goal  -JR     Rehab Potential (OT) fair, will monitor progress closely  -JR     Criteria for Skilled  Therapeutic Interventions Met (OT) yes;meets criteria;skilled treatment is necessary  -JR     Therapy Frequency (OT) daily  -JR     Row Name 03/12/22 1453          Therapy Plan Review/Discharge Plan (OT)    Anticipated Discharge Disposition (OT) inpatient rehabilitation facility  -     Row Name 03/12/22 1453          Vital Signs    Pre Systolic BP Rehab 105  -JR     Pre Treatment Diastolic BP 70  -JR     Post Systolic BP Rehab 89  -JR     Post Treatment Diastolic BP 56  -JR     Pretreatment Heart Rate (beats/min) 75  -JR     Posttreatment Heart Rate (beats/min) 78  -JR     Pre SpO2 (%) 94  -JR     O2 Delivery Pre Treatment room air  -JR     Post SpO2 (%) 91  -JR     O2 Delivery Post Treatment room air  -JR     Pre Patient Position Supine  -JR     Intra Patient Position Supine  -JR     Post Patient Position Supine  -JR     Row Name 03/12/22 1453          Positioning and Restraints    Pre-Treatment Position in bed  -JR     Post Treatment Position bed  -JR     In Bed notified nsg;supine;call light within reach;encouraged to call for assist;exit alarm on  -JR           User Key  (r) = Recorded By, (t) = Taken By, (c) = Cosigned By    Initials Name Provider Type    JR Felipa Medina, OT Occupational Therapist               Outcome Measures     Row Name 03/12/22 1459          How much help from another is currently needed...    Putting on and taking off regular lower body clothing? 1  -JR     Bathing (including washing, rinsing, and drying) 2  -JR     Toileting (which includes using toilet bed pan or urinal) 2  -JR     Putting on and taking off regular upper body clothing 2  -JR     Taking care of personal grooming (such as brushing teeth) 3  -JR     Eating meals 3  -JR     AM-PAC 6 Clicks Score (OT) 13  -     Row Name 03/12/22 1459          Functional Assessment    Outcome Measure Options AM-PAC 6 Clicks Daily Activity (OT)  -           User Key  (r) = Recorded By, (t) = Taken By, (c) = Cosigned By    Initials  Name Provider Type     Felipa Medina OT Occupational Therapist                Occupational Therapy Education                 Title: PT OT SLP Therapies (In Progress)     Topic: Occupational Therapy (In Progress)     Point: ADL training (In Progress)     Description:   Instruct learner(s) on proper safety adaptation and remediation techniques during self care or transfers.   Instruct in proper use of assistive devices.              Learning Progress Summary           Patient Acceptance, E, NR by  at 3/12/2022 1330    Comment: Educated pt regarding role of therapy                   Point: Home exercise program (In Progress)     Description:   Instruct learner(s) on appropriate technique for monitoring, assisting and/or progressing therapeutic exercises/activities.              Learning Progress Summary           Patient Acceptance, E, NR by  at 3/12/2022 1330    Comment: Educated pt regarding role of therapy                   Point: Precautions (Not Started)     Description:   Instruct learner(s) on prescribed precautions during self-care and functional transfers.              Learner Progress:  Not documented in this visit.          Point: Body mechanics (Not Started)     Description:   Instruct learner(s) on proper positioning and spine alignment during self-care, functional mobility activities and/or exercises.              Learner Progress:  Not documented in this visit.                      User Key     Initials Effective Dates Name Provider Type Discipline     06/16/21 -  Felipa Medina OT Occupational Therapist OT              OT Recommendation and Plan  Planned Therapy Interventions (OT): activity tolerance training, adaptive equipment training, BADL retraining, ROM/therapeutic exercise, transfer/mobility retraining, strengthening exercise, patient/caregiver education/training, functional balance retraining, occupation/activity based interventions  Therapy Frequency (OT): daily  Plan of Care  Review  Plan of Care Reviewed With: patient  Outcome Evaluation: OT initial eval and expanded chart review completed. Pt presents with multiple comorbidities and decreased independence with ADL's and mobility. Recommend continued skilled OT services and transfer to IRF at d/c.     Time Calculation:    Time Calculation- OT     Row Name 03/12/22 1501             Time Calculation- OT    OT Start Time 1330  -JR      OT Received On 03/12/22  -JR      OT Goal Re-Cert Due Date 03/22/22  -JR              Untimed Charges    OT Eval/Re-eval Minutes 35  -JR              Total Minutes    Untimed Charges Total Minutes 35  -JR       Total Minutes 35  -JR            User Key  (r) = Recorded By, (t) = Taken By, (c) = Cosigned By    Initials Name Provider Type     Felipa Medina OT Occupational Therapist              Therapy Charges for Today     Code Description Service Date Service Provider Modifiers Qty    [id number]  OT EVAL MOD COMPLEXITY 3 3/12/2022 Felipa Medina OT GO 1               Felipa Medina OT  3/12/2022

## 2022-03-13 ENCOUNTER — APPOINTMENT (OUTPATIENT)
Dept: MRI IMAGING | Facility: HOSPITAL | Age: 42
End: 2022-03-13

## 2022-03-13 LAB
ANION GAP SERPL CALCULATED.3IONS-SCNC: 7 MMOL/L (ref 5–15)
BASOPHILS # BLD AUTO: 0.02 10*3/MM3 (ref 0–0.2)
BASOPHILS NFR BLD AUTO: 0.5 % (ref 0–1.5)
BUN SERPL-MCNC: 8 MG/DL (ref 6–20)
BUN/CREAT SERPL: 14.3 (ref 7–25)
CALCIUM SPEC-SCNC: 8.6 MG/DL (ref 8.6–10.5)
CHLORIDE SERPL-SCNC: 108 MMOL/L (ref 98–107)
CO2 SERPL-SCNC: 26 MMOL/L (ref 22–29)
CREAT SERPL-MCNC: 0.56 MG/DL (ref 0.57–1)
DEPRECATED RDW RBC AUTO: 46.4 FL (ref 37–54)
EGFRCR SERPLBLD CKD-EPI 2021: 118.5 ML/MIN/1.73
EOSINOPHIL # BLD AUTO: 0.17 10*3/MM3 (ref 0–0.4)
EOSINOPHIL NFR BLD AUTO: 4.3 % (ref 0.3–6.2)
ERYTHROCYTE [DISTWIDTH] IN BLOOD BY AUTOMATED COUNT: 14 % (ref 12.3–15.4)
GLUCOSE SERPL-MCNC: 104 MG/DL (ref 65–99)
HCT VFR BLD AUTO: 34.9 % (ref 34–46.6)
HGB BLD-MCNC: 11.1 G/DL (ref 12–15.9)
IMM GRANULOCYTES # BLD AUTO: 0.02 10*3/MM3 (ref 0–0.05)
IMM GRANULOCYTES NFR BLD AUTO: 0.5 % (ref 0–0.5)
LYMPHOCYTES # BLD AUTO: 1.68 10*3/MM3 (ref 0.7–3.1)
LYMPHOCYTES NFR BLD AUTO: 42.2 % (ref 19.6–45.3)
MAGNESIUM SERPL-MCNC: 1.7 MG/DL (ref 1.6–2.6)
MCH RBC QN AUTO: 29.1 PG (ref 26.6–33)
MCHC RBC AUTO-ENTMCNC: 31.8 G/DL (ref 31.5–35.7)
MCV RBC AUTO: 91.4 FL (ref 79–97)
MONOCYTES # BLD AUTO: 0.22 10*3/MM3 (ref 0.1–0.9)
MONOCYTES NFR BLD AUTO: 5.5 % (ref 5–12)
NEUTROPHILS NFR BLD AUTO: 1.87 10*3/MM3 (ref 1.7–7)
NEUTROPHILS NFR BLD AUTO: 47 % (ref 42.7–76)
NRBC BLD AUTO-RTO: 0 /100 WBC (ref 0–0.2)
PLATELET # BLD AUTO: 217 10*3/MM3 (ref 140–450)
PMV BLD AUTO: 10.5 FL (ref 6–12)
POTASSIUM SERPL-SCNC: 4.4 MMOL/L (ref 3.5–5.2)
RBC # BLD AUTO: 3.82 10*6/MM3 (ref 3.77–5.28)
SODIUM SERPL-SCNC: 141 MMOL/L (ref 136–145)
WBC NRBC COR # BLD: 3.98 10*3/MM3 (ref 3.4–10.8)

## 2022-03-13 PROCEDURE — 99232 SBSQ HOSP IP/OBS MODERATE 35: CPT | Performed by: INTERNAL MEDICINE

## 2022-03-13 PROCEDURE — 0 GADOBENATE DIMEGLUMINE 529 MG/ML SOLUTION: Performed by: INTERNAL MEDICINE

## 2022-03-13 PROCEDURE — 73723 MRI JOINT LWR EXTR W/O&W/DYE: CPT

## 2022-03-13 PROCEDURE — 85025 COMPLETE CBC W/AUTO DIFF WBC: CPT | Performed by: INTERNAL MEDICINE

## 2022-03-13 PROCEDURE — 80048 BASIC METABOLIC PNL TOTAL CA: CPT

## 2022-03-13 PROCEDURE — 25010000002 DAPTOMYCIN PER 1 MG: Performed by: INTERNAL MEDICINE

## 2022-03-13 PROCEDURE — 83735 ASSAY OF MAGNESIUM: CPT | Performed by: INTERNAL MEDICINE

## 2022-03-13 PROCEDURE — A9577 INJ MULTIHANCE: HCPCS | Performed by: INTERNAL MEDICINE

## 2022-03-13 PROCEDURE — 72158 MRI LUMBAR SPINE W/O & W/DYE: CPT

## 2022-03-13 PROCEDURE — 25010000002 CEFEPIME PER 500 MG

## 2022-03-13 PROCEDURE — 0 MAGNESIUM SULFATE 4 GM/100ML SOLUTION: Performed by: INTERNAL MEDICINE

## 2022-03-13 RX ORDER — MAGNESIUM SULFATE HEPTAHYDRATE 40 MG/ML
2 INJECTION, SOLUTION INTRAVENOUS AS NEEDED
Status: DISCONTINUED | OUTPATIENT
Start: 2022-03-13 | End: 2022-03-21 | Stop reason: HOSPADM

## 2022-03-13 RX ORDER — POLYETHYLENE GLYCOL 3350 17 G/17G
17 POWDER, FOR SOLUTION ORAL DAILY PRN
Status: DISCONTINUED | OUTPATIENT
Start: 2022-03-13 | End: 2022-03-21 | Stop reason: HOSPADM

## 2022-03-13 RX ORDER — AMOXICILLIN 250 MG
2 CAPSULE ORAL 2 TIMES DAILY
Status: DISCONTINUED | OUTPATIENT
Start: 2022-03-13 | End: 2022-03-21 | Stop reason: HOSPADM

## 2022-03-13 RX ORDER — MAGNESIUM SULFATE HEPTAHYDRATE 40 MG/ML
4 INJECTION, SOLUTION INTRAVENOUS AS NEEDED
Status: DISCONTINUED | OUTPATIENT
Start: 2022-03-13 | End: 2022-03-21 | Stop reason: HOSPADM

## 2022-03-13 RX ORDER — BISACODYL 10 MG
10 SUPPOSITORY, RECTAL RECTAL DAILY PRN
Status: DISCONTINUED | OUTPATIENT
Start: 2022-03-13 | End: 2022-03-21 | Stop reason: HOSPADM

## 2022-03-13 RX ORDER — BISACODYL 5 MG/1
5 TABLET, DELAYED RELEASE ORAL DAILY PRN
Status: DISCONTINUED | OUTPATIENT
Start: 2022-03-13 | End: 2022-03-21 | Stop reason: HOSPADM

## 2022-03-13 RX ADMIN — Medication 1 CAPSULE: at 10:01

## 2022-03-13 RX ADMIN — Medication 5 MG: at 20:50

## 2022-03-13 RX ADMIN — DAPTOMYCIN 400 MG: 500 INJECTION, POWDER, LYOPHILIZED, FOR SOLUTION INTRAVENOUS at 10:07

## 2022-03-13 RX ADMIN — AMLODIPINE BESYLATE 10 MG: 10 TABLET ORAL at 10:01

## 2022-03-13 RX ADMIN — Medication 10 ML: at 10:01

## 2022-03-13 RX ADMIN — METRONIDAZOLE 500 MG: 500 INJECTION, SOLUTION INTRAVENOUS at 04:55

## 2022-03-13 RX ADMIN — TRAZODONE HYDROCHLORIDE 150 MG: 100 TABLET ORAL at 20:50

## 2022-03-13 RX ADMIN — OLANZAPINE 10 MG: 5 TABLET, FILM COATED ORAL at 10:01

## 2022-03-13 RX ADMIN — METRONIDAZOLE 500 MG: 500 INJECTION, SOLUTION INTRAVENOUS at 15:35

## 2022-03-13 RX ADMIN — SENNOSIDES AND DOCUSATE SODIUM 2 TABLET: 50; 8.6 TABLET ORAL at 20:50

## 2022-03-13 RX ADMIN — CEFEPIME 2 G: 20 INJECTION, POWDER, FOR SOLUTION INTRAVENOUS at 15:35

## 2022-03-13 RX ADMIN — LISINOPRIL 20 MG: 20 TABLET ORAL at 10:01

## 2022-03-13 RX ADMIN — METRONIDAZOLE 500 MG: 500 INJECTION, SOLUTION INTRAVENOUS at 10:07

## 2022-03-13 RX ADMIN — FLUOXETINE HYDROCHLORIDE 20 MG: 20 CAPSULE ORAL at 10:01

## 2022-03-13 RX ADMIN — SENNOSIDES AND DOCUSATE SODIUM 2 TABLET: 50; 8.6 TABLET ORAL at 12:13

## 2022-03-13 RX ADMIN — Medication 10 ML: at 20:50

## 2022-03-13 RX ADMIN — METRONIDAZOLE 500 MG: 500 INJECTION, SOLUTION INTRAVENOUS at 20:49

## 2022-03-13 RX ADMIN — OXYCODONE HYDROCHLORIDE AND ACETAMINOPHEN 1 TABLET: 10; 325 TABLET ORAL at 20:50

## 2022-03-13 RX ADMIN — GADOBENATE DIMEGLUMINE 17 ML: 529 INJECTION, SOLUTION INTRAVENOUS at 04:10

## 2022-03-13 RX ADMIN — METHADONE HYDROCHLORIDE 60 MG: 10 TABLET ORAL at 10:07

## 2022-03-13 RX ADMIN — CEFEPIME 2 G: 20 INJECTION, POWDER, FOR SOLUTION INTRAVENOUS at 04:55

## 2022-03-13 RX ADMIN — OXYCODONE HYDROCHLORIDE AND ACETAMINOPHEN 1 TABLET: 10; 325 TABLET ORAL at 04:55

## 2022-03-13 RX ADMIN — MAGNESIUM SULFATE HEPTAHYDRATE 4 G: 40 INJECTION, SOLUTION INTRAVENOUS at 12:13

## 2022-03-13 RX ADMIN — CEFEPIME 2 G: 20 INJECTION, POWDER, FOR SOLUTION INTRAVENOUS at 20:49

## 2022-03-13 RX ADMIN — OXYCODONE HYDROCHLORIDE AND ACETAMINOPHEN 1 TABLET: 10; 325 TABLET ORAL at 10:14

## 2022-03-13 NOTE — PROGRESS NOTES
[facility] Progress Note        Antibiotics:  Anti-Infectives (From admission, onward)    Ordered     Dose/Rate Route Frequency Start Stop    03/11/22 0823  DAPTOmycin (CUBICIN) 400 mg in sodium chloride 0.9 % 50 mL IVPB        Ordering Provider: Broderick Wilhelm MD    6 mg/kg × 64.1 kg (Adjusted)  100 mL/hr over 30 Minutes Intravenous Every 24 Hours 03/11/22 1000 03/21/22 0959    03/11/22 0823  metroNIDAZOLE (FLAGYL) 500 mg/100mL IVPB        Ordering Provider: Broderick Wilhelm MD    500 mg  100 mL/hr over 60 Minutes Intravenous Every 6 Hours 03/11/22 1000 03/21/22 0959    03/11/22 0755  ceFEPime (MAXIPIME) in NS 2g/10ml IV PUSH syringe        Ordering Provider: Broderick Calvo RPH    2 g  over 5 Minutes Intravenous Every 8 Hours Scheduled 03/11/22 0845 03/21/22 0559    03/11/22 0403  piperacillin-tazobactam (ZOSYN) 4.5 g in iso-osmotic dextrose 100 mL IVPB (premix)        Ordering Provider: Broderick Calvo RPH    4.5 g  over 30 Minutes Intravenous Once 03/11/22 0445 03/11/22 0614          CC: right hip/left knee/low back pain    HPI:      Patient is a 41 y.o.  Yr old female with history of polysubstance abuse with prior cocaine/heroin/methamphetamine/THC, chronic hepatitis C and PTSD/bipolar disease; treated for right septic hip with MSSA in September 2021, surgery at that time by Dr. Gusman and subsequently transitioned to [facility], abnormal LFTs with empiric change from Zosyn to cefazolin with improvement although unclear if related to penicillin class versus hep C or combination.  Noncompliant with follow-up.  Apparently plans to be seen at [facility] regarding possible right hip surgery in the future but specifics of that are unclear.  She reports being in nursing home recently, lost her spot in methadone clinic and reverted to injection drug abuse approximately 1 week prior to admission.  After injection had developed worsening right hip and left knee pain, worsening low back pain and ultimately presented to  [facility] on March 11.     Aside from injection drug abuse, she denies any other specific exposures. No raw or undercooked food.  No unpasteurized milk or milk products.  No animal insect or arthropod exposure.  No tick bites.  No outdoor camping or hunting exposure.  No travel exposure.  No ill exposure.  No history of TB or TB exposure.   Denies a history of MRSA/VRE and no history of C. difficile or ESBL/KPC  Organisms.     3/13/22 Seen early and sleepy;  Per nursing, right  Hip and left knee  pain constant, sharp, nonradiating, worse with movement and unable to bear weight.   pain 5-6/10      Acute on chronic lumbar back pain, constant, sharp, worse with movement, somewhat better with pain meds but not completely relieved and 5 out of 10 in severity at present.  She denies any new focal weakness or numbness.  No bowel or bladder incontinence.        Denies headache photophobia or neck stiffness.  No shortness of breath cough or hemoptysis.  No nausea vomiting diarrhea or abdominal pain.  No dysuria hematuria or pyuria.     HCG negative        ROS:     3/13/22  Per nursing; No f/c/s. No n/v/d. No rash. No new ADR to Abx.       Constitutional--subjective fevers and chills.  Appetite diminished with fatigue  Heent-- No new vision, hearing or throat complaints.  No epistaxis or oral sores.  Denies odynophagia or dysphagia.  No flashers, floaters or eye pain. No odynophagia or dysphagia. No headache, photophobia or neck stiffness.  CV-- No chest pain, palpitation or syncope  Resp-- No SOB/cough/Hemoptysis  GI- No nausea, vomiting, or diarrhea.  No hematochezia, melena, or hematemesis. Denies jaundice or chronic liver disease.  -- No dysuria, hematuria, or flank pain.  Denies hesitancy, urgency or flank pain.  Lymph- no swollen lymph nodes in neck/axilla or groin.   Heme- No active bruising or bleeding; no Hx of DVT or PE.  MS-- no swelling or pain in the bones or joints of arms/legs aside from above.  "    Neuro-- No acute focal weakness or numbness in the arms or legs.  No seizures.     Full 12 point review of systems reviewed and negative otherwise for acute complaints, except for above      PE: nursing/chaperone present  /85 (BP Location: Right arm, Patient Position: Lying)   Pulse 69   Temp 98.1 °F (36.7 °C) (Oral)   Resp 20   Ht 160 cm (63\")   Wt 83.8 kg (184 lb 12.8 oz)   LMP 02/17/2022   SpO2 93%   Breastfeeding No   BMI 32.74 kg/m²       GENERAL: sleepy; NAD  HEENT: Normocephalic, atraumatic.  PERRL. EOMI. No conjunctival injection. No icterus. Oropharynx clear without evidence of thrush or exudate. No evidence of peridontal disease.    NECK: Supple without nuchal rigidity. No mass.  LYMPH: No cervical, axillary or inguinal lymphadenopathy.  HEART: RRR; No murmur, rubs, gallops.   LUNGS: Clear to auscultation bilaterally without wheezing, rales, rhonchi. Normal respiratory effort. Nonlabored. No dullness.  ABDOMEN: Soft, nontender, nondistended. Positive bowel sounds. No rebound or guarding. NO mass or HSM.  EXT:  No cyanosis, clubbing . No cord.  : Genitalia generally unremarkable.  Without Savage catheter.  MSK: FROM without joint effusions noted arms/legs.    SKIN: Warm and dry without cutaneous eruptions on Inspection/palpation.    NEURO: Oriented to PPT.  She will not cooperate with a detailed motor or sensory exam given above pain     She will not allow range of motion exam at the right hip.  No obvious warmth or redness there.  Prior incision noted with no dehiscence or drainage.  No discrete mass bulge or fluctuance.  No crepitus or bulla     Spine with no redness bulge fluctuance or point tenderness; diffusely tender in lumbar spine.  No thoracic or cervical spine tenderness.     Left knee edematous compared to right with possible  Effusion, slight warmth compared to right side although no erythema; no discrete fluctuance but exam limited with pain and patient not cooperative with " a detailed exam     Track marks from drug use noted in upper extremities     IV without obvious redness or drainage    Laboratory Data    Results from last 7 days   Lab Units 03/13/22  0456 03/12/22  0530 03/12/22  0334   WBC 10*3/mm3 3.98 6.21 6.13   HEMOGLOBIN g/dL 11.1* 11.4* 11.3*   HEMATOCRIT % 34.9 34.4 34.5   PLATELETS 10*3/mm3 217 221 215     Results from last 7 days   Lab Units 03/13/22  0456   SODIUM mmol/L 141   POTASSIUM mmol/L 4.4   CHLORIDE mmol/L 108*   CO2 mmol/L 26.0   BUN mg/dL 8   CREATININE mg/dL 0.56*   GLUCOSE mg/dL 104*   CALCIUM mg/dL 8.6     Results from last 7 days   Lab Units 03/12/22  0334   ALK PHOS U/L 88   BILIRUBIN mg/dL 0.3   ALT (SGPT) U/L 17   AST (SGOT) U/L 15     Results from last 7 days   Lab Units 03/10/22  2357   SED RATE mm/hr 13     Results from last 7 days   Lab Units 03/10/22  2357   CRP mg/dL 4.44*       Estimated Creatinine Clearance: 137 mL/min (A) (by C-G formula based on SCr of 0.56 mg/dL (L)).      Microbiology:      Radiology:  Imaging Results (Last 72 Hours)     Procedure Component Value Units Date/Time    MRI Lumbar Spine With & Without Contrast [[id number]] Resulted: 03/13/22 0955     Updated: 03/13/22 0429    MRI Hip Right With & Without Contrast [[id number]] Resulted: 03/13/22 0331     Updated: 03/13/22 0410    FL Guided Aspiration Joint [[id number]] Collected: 03/11/22 1601     Updated: 03/11/22 1702    Narrative:      DATE OF EXAM: 3/11/2022 3:29 PM     PROCEDURE: FL GUIDED ASPIRATION JOINT-     INDICATIONS: possible septic joint; M25.551-Pain in right hip;  M16.11-Unilateral primary osteoarthritis, right hip; Z87.39-Personal  history of other diseases of the musculoskeletal system and connective  tissue; E87.6-Hypokalemia     COMPARISON: No comparisons available.     TECHNIQUE: 6 seconds of fluoroscopic time was used for this exam. 2  associated images were saved. Procedure, risks, complications, and side  effects of joint aspiration were discussed and reviewed  in detail with  the patient including the possibility for bleeding, infection, needle  damage to surrounding structures, and the potential for a nondiagnostic  exam. The patient indicated understanding and consented to the  procedure.     The right hip was marked, prepped, and draped in a sterile fashion. 1%  lidocaine was used as a local anesthetic to the skin and subcutaneous  tissues. Under fluoroscopic guidance a 20-gauge needle was advanced to  the joint space. Approximately 8 cc of bloody synovial fluid was  aspirated, labeled, and sent to pathology for further analysis. The  needle was removed.     FINDINGS:   The patient tolerated the procedure well, and no immediate complications  occurred.        Impression:      Successful fluoroscopic guided right hip joint aspiration as above with  no immediate complications.     This report was finalized on 3/11/2022 4:59 PM by Dr. Erick Agrawal MD.       FL Guided Aspiration Joint [[id number]] Collected: 03/11/22 1601     Updated: 03/11/22 1702    Narrative:      DATE OF EXAM: 3/11/2022 3:30 PM     PROCEDURE: FL GUIDED ASPIRATION JOINT-     INDICATIONS: possible septic joint; M25.551-Pain in right hip;  M16.11-Unilateral primary osteoarthritis, right hip; Z87.39-Personal  history of other diseases of the musculoskeletal system and connective  tissue; E87.6-Hypokalemia     COMPARISON: No comparisons available.     TECHNIQUE: 1 associated  image was saved. Fluoroscopy was not  utilized for this procedure. Procedure, risks, complications, and side  effects of joint aspiration were discussed and reviewed in detail with  the patient including the possibility for bleeding, infection, needle  damage to surrounding structures, and the potential for a nondiagnostic  exam. The patient indicated understanding and consented to the  procedure.     The left knee was marked, prepped, and draped in a sterile fashion. 1%  lidocaine was used as a local anesthetic to the skin and  subcutaneous  tissues. A 20-gauge needle was advanced to the joint space, and  approximately 12 cc of synovial fluid was aspirated, labeled, and sent  to pathology for further analysis. The needle was removed.     FINDINGS:   The patient tolerated the procedure well, and no immediate complications  occurred.        Impression:      Successful left knee joint aspiration as above with no immediate  complications. Fluoroscopy was not utilized for this procedure.     This report was finalized on 3/11/2022 4:59 PM by Dr. Erick Agrawal MD.       XR Knee 1 or 2 View Left [[id number]] Collected: 03/11/22 0619     Updated: 03/11/22 0621    Narrative:      Two-view left knee.    DATE: 3/11/2022.    COMPARISON: None available.    CLINICAL HISTORY: Fall with swelling.    FINDINGS:    There is a moderate knee joint effusion.    There is no fracture, subluxation or dislocation otherwise seen.    The joint spaces appear preserved.      Impression:        Moderate effusion with no acute osseous abnormality seen radiographically.    Electronically signed by:  Rbo Rincon D.O.    3/11/2022 4:20 AM Mountain Time    CT Abdomen Pelvis With Contrast [[id number]] Collected: 03/11/22 0308     Updated: 03/11/22 0314    Narrative:      EXAMINATION: CT ABDOMEN AND PELVIS WITH IV CONTRAST       DATE OF EXAMINATION: 3/11/2022.    COMPARISON: 3/13/2019.    INDICATION: Joint pain with history of hip septic arthritis. Back and bilateral hip pain.    PROCEDURE:  Axial CT of the abdomen and pelvis was performed with contrast and sagittal and coronal reformatted images were performed.  100 mL of Isovue-300 was given intravenously. CT dose lowering techniques were used, to include: automated exposure   control, adjustment for patient size, and/or use of iterative reconstruction.    FINDINGS:    LOWER CHEST :  The visualized lung bases are clear.  There are no pleural or pericardial effusions.    ABDOMEN:    Liver and Biliary system:  Normal.    Adrenal  glands:  Normal.    Kidneys and ureters: Normal.    Spleen:  Normal.    Pancreas:  Normal.    Gallbladder:  Normal.    Lymph nodes, Peritoneum and mesentery:  There is no mesenteric or retroperitoneal lymphadenopathy.    Gastrointestinal tract:  There are no dilated loops of bowel or free intraperitoneal air.    The appendix is normal.     Aorta/IVC:   No aortic aneurysm.  IVC normal.    Abdominal wall:  Normal.    PELVIS:    Fluid: There is no free fluid in the pelvis.    Lymph Nodes:  There is no pelvic or inguinal lymphadenopathy..    Urinary bladder:  Normal.    BONES:  There is severe osteoarthritis involving the right hip heterotopic ossification seen along the inferior aspect of the hip joint space. There is some moderate superolateral subluxation of the femur with respect to the acetabulum. There is   significant subchondral sclerosis and cystic changes. There is some soft tissue thickening of the joint capsule and a small effusion around the right hip joint as this could relate to the patient's history of septic arthritis. Active infection would be   difficult to exclude. Mild degenerative changes in the left hip joint spaces otherwise noted.    ADDITIONAL  SIGNIFICANT FINDINGS:  None.      Impression:        1. There is severe osteoarthritis involving the right hip heterotopic ossification seen along the inferior aspect of the hip joint space. There is some moderate superolateral subluxation of the femur with respect to the acetabulum. There is significant   subchondral sclerosis and cystic changes. There is some soft tissue thickening of the joint capsule and a small effusion around the right hip joint as this could relate to the patient's history of septic arthritis. Active infection would be difficult to   exclude.  2. No acute process otherwise seen within the abdomen or pelvis.          Electronically signed by:  Rob Rincon D.O.    3/11/2022 1:13 AM Mountain Time            Impression:          --Acute subjective constitutional symptoms with low-grade temp 99, active injection drug abuse , polysubstance with acute right hip/lumbar back pain out of proportion to baseline and acute left knee pain/swelling.  She is at risk for bloodstream infection and hematogenous spread with risk for metastatic foci of involvement including risk for septic joints/osteomyelitis, endovascular focus etc.  Broad empiric antibiotics ongoing with daptomycin/cefepime and Flagyl; orthopedics to see for aspiration of joints and consideration for surgery at their discretion.  MRI's pending  ; blood cultures negative so far; she denies cough and respiratory exam nonfocal.  No acute inflammatory process on abdominal CT.    **right hip and left knee aspirate NOT c/w septic joints by cell counts;  Cultures negative so far     --Acute/chronic right hip pain with abrupt worsening over last week, recent injection drug abuse and risk for new versus recurrent infection.      **aspirate NOT c/w septic joints by cell counts;  Cultures negative so far     --Acute/chronic lumbar back pain with worsening over 1 week and recent injection drug abuse with risk for metastatic seeding and spinal/paraspinal infection risk.  MRI lumbar spine pending; if evidence for inflammatory process/fluid collection you should give consideration to neurosurgical consultation.     --Acute left knee pain/swelling, recent injection drug abuse and risk for septic joint.     **aspirate NOT c/w septic joints by cell counts;  Cultures negative so far     --Right septic hip arthritis in August/September 2021 with surgery by Dr. Gusman     --Chronic hepatitis C.  I do not treat viral hepatitis as a part of my practice.  If you desire further input regarding this during this hospitalization you should give consideration to gastroenterology consultation.     --Polysubstance abuse as previously noted.  Strategy for minimizing risk of withdrawal and additional supportive  measures at discretion of medicine team     --Possible Zosyn adverse drug reaction with abnormal LFTs in 2021.  Not clear-cut but trying to avoid.  Subsequently tolerated cephalosporin class antibiotics with improvements in liver tests     --Abnormal CPK prior to daptomycin.  Monitor.     --PTSD/bipolar disease     --Medical noncompliance           PLAN:       --IV daptomycin/cefepime and Flagyl     --Check/review labs cultures and scans     --Partial history per nursing staff     --Discussed with microbiology     --Discussed with Dr. Ambriz      --Highly complex set of issues with high risk for further serious morbidity and other serious sequela     --HCG negative          Broderick Wilhelm MD  3/13/2022

## 2022-03-13 NOTE — PLAN OF CARE
Goal Outcome Evaluation:  Plan of Care Reviewed With: patient           Outcome Evaluation: Pt a/o, VSS, RA, Sat. 94%, c/o pain adressed with PRN meds. Continue monitoring.

## 2022-03-13 NOTE — PROGRESS NOTES
[facility] Medicine Services  PROGRESS NOTE    Patient Name: Frieda Flores  : 1980  MRN: [id number]    Date of Admission: 3/11/2022  Primary Care Physician: System, Provider Not In    Subjective   Subjective     CC:  Right hip and low back pain    HPI:  Pt states that she is in too much pain to ambulate.     ROS:  Gen- No fevers, chills  CV- No chest pain, palpitations  Resp- No cough, dyspnea  GI- No N/V/D, abd pain        Objective   Objective     Vital Signs:   Temp:  [97.6 °F (36.4 °C)-98.4 °F (36.9 °C)] 98.1 °F (36.7 °C)  Heart Rate:  [69-82] 69  Resp:  [20-22] 20  BP: (100-121)/(45-85) 121/85  Flow (L/min):  [2] 2     Physical Exam:  Constitutional: No acute distress, lethargic, sleeping when I walked in, but wakes easily  HENT: NCAT, mucous membranes moist  Respiratory: Clear to auscultation bilaterally, respiratory effort normal   Cardiovascular: RRR, no murmurs, rubs, or gallops  Gastrointestinal: Positive bowel sounds, soft, nontender, nondistended  Musculoskeletal: No bilateral ankle edema  Psychiatric: flat affect  Neurologic: Oriented x 3, strength symmetric in all extremities, Cranial Nerves grossly intact to confrontation, speech slurred  Skin: No rashes    Results Reviewed:  LAB RESULTS:      Lab 22  0456 22  0530 22  0334 22  0614 03/10/22  2357   WBC 3.98 6.21 6.13 6.59 8.00   HEMOGLOBIN 11.1* 11.4* 11.3* 11.5* 13.1   HEMATOCRIT 34.9 34.4 34.5 33.6* 37.8   PLATELETS 217 221 215 239 287   NEUTROS ABS 1.87 4.21  --   --  4.94   IMMATURE GRANS (ABS) 0.02 0.02  --   --  0.03   LYMPHS ABS 1.68 1.49  --   --  2.09   MONOS ABS 0.22 0.32  --   --  0.53   EOS ABS 0.17 0.15  --   --  0.38   MCV 91.4 88.0 89.8 84.8 84.8   SED RATE  --   --   --   --  13   CRP  --   --   --   --  4.44*   PROCALCITONIN  --   --   --   --  0.04   LACTATE  --   --   --  1.2 1.6         Lab 22  0456 22  0334 22  0614 03/10/22  2357   SODIUM 141 141 144 139    POTASSIUM 4.4 3.1* 3.6 2.8*   CHLORIDE 108* 106 106 100   CO2 26.0 26.0 27.0 28.0   ANION GAP 7.0 9.0 11.0 11.0   BUN 8 7 6 7   CREATININE 0.56* 0.57 0.53* 0.59   EGFR 118.5 118.0 120.1 117.0   GLUCOSE 104* 92 112* 115*   CALCIUM 8.6 8.3* 8.6 9.5   MAGNESIUM 1.7  --   --  1.9         Lab 03/12/22  0334 03/11/22  0614 03/10/22  2357   TOTAL PROTEIN 5.4* 6.0 7.1   ALBUMIN 2.90* 3.50 4.00   GLOBULIN 2.5 2.5 3.1   ALT (SGPT) 17 23 27   AST (SGOT) 15 24 31   BILIRUBIN 0.3 0.5 0.5   ALK PHOS 88 91 111                     Brief Urine Lab Results  (Last result in the past 365 days)      Color   Clarity   Blood   Leuk Est   Nitrite   Protein   CREAT   Urine HCG        03/11/22 0415 Dark Yellow   Turbid   Negative   Small (1+)   Negative   Trace           03/11/22 0415               Negative             Microbiology Results Abnormal     Procedure Component Value - Date/Time    Body Fluid Culture - Aspirate, Hip, Right [[id number]] Collected: 03/11/22 1535    Lab Status: Preliminary result Specimen: Aspirate from Hip, Right Updated: 03/13/22 0747     Body Fluid Culture No growth at 2 days     Gram Stain Rare (1+) WBCs per low power field      No organisms seen    Body Fluid Culture - Aspirate, Knee, Left [[id number]] Collected: 03/11/22 1535    Lab Status: Preliminary result Specimen: Aspirate from Knee, Left Updated: 03/13/22 0747     Body Fluid Culture No growth at 2 days     Gram Stain Rare (1+) WBCs per low power field      No organisms seen    Blood Culture - Blood, Arm, Left [[id number]]  (Normal) Collected: 03/11/22 0000    Lab Status: Preliminary result Specimen: Blood from Arm, Left Updated: 03/13/22 0030     Blood Culture No growth at 2 days    Blood Culture - Blood, Arm, Right [[id number]]  (Normal) Collected: 03/11/22 0000    Lab Status: Preliminary result Specimen: Blood from Arm, Right Updated: 03/13/22 0030     Blood Culture No growth at 2 days    Urine Culture - Urine, Urine, Clean Catch [[id number]] Collected:  03/11/22 0415    Lab Status: Final result Specimen: Urine, Clean Catch Updated: 03/12/22 1217     Urine Culture 50,000 CFU/mL Normal Urogenital Diane    AFB Culture - Aspirate, Knee, Left [[id number]] Collected: 03/11/22 1535    Lab Status: Preliminary result Specimen: Aspirate from Knee, Left Updated: 03/12/22 1113     AFB Stain No acid fast bacilli seen on concentrated smear    AFB Culture - Aspirate, Hip, Right [[id number]] Collected: 03/11/22 1535    Lab Status: Preliminary result Specimen: Aspirate from Hip, Right Updated: 03/12/22 1113     AFB Stain No acid fast bacilli seen on concentrated smear    COVID PRE-OP / PRE-PROCEDURE SCREENING ORDER (NO ISOLATION) - Swab, Nasopharynx [[id number]]  (Normal) Collected: 03/11/22 0415    Lab Status: Final result Specimen: Swab from Nasopharynx Updated: 03/11/22 0501    Narrative:      The following orders were created for panel order COVID PRE-OP / PRE-PROCEDURE SCREENING ORDER (NO ISOLATION) - Swab, Nasopharynx.  Procedure                               Abnormality         Status                     ---------                               -----------         ------                     COVID-19 and FLU A/B PCR...[[id number]]  Normal              Final result                 Please view results for these tests on the individual orders.    COVID-19 and FLU A/B PCR - Swab, Nasopharynx [[id number]]  (Normal) Collected: 03/11/22 0415    Lab Status: Final result Specimen: Swab from Nasopharynx Updated: 03/11/22 0501     COVID19 Not Detected     Influenza A PCR Not Detected     Influenza B PCR Not Detected    Narrative:      Fact sheet for providers: https://www.fda.gov/media/623997/download    Fact sheet for patients: https://www.fda.gov/media/058913/download    Test performed by PCR.          FL Guided Aspiration Joint    Result Date: 3/11/2022  DATE OF EXAM: 3/11/2022 3:29 PM  PROCEDURE: FL GUIDED ASPIRATION JOINT-  INDICATIONS: possible septic joint; M25.551-Pain in right hip;  M16.11-Unilateral primary osteoarthritis, right hip; Z87.39-Personal history of other diseases of the musculoskeletal system and connective tissue; E87.6-Hypokalemia  COMPARISON: No comparisons available.  TECHNIQUE: 6 seconds of fluoroscopic time was used for this exam. 2 associated images were saved. Procedure, risks, complications, and side effects of joint aspiration were discussed and reviewed in detail with the patient including the possibility for bleeding, infection, needle damage to surrounding structures, and the potential for a nondiagnostic exam. The patient indicated understanding and consented to the procedure.  The right hip was marked, prepped, and draped in a sterile fashion. 1% lidocaine was used as a local anesthetic to the skin and subcutaneous tissues. Under fluoroscopic guidance a 20-gauge needle was advanced to the joint space. Approximately 8 cc of bloody synovial fluid was aspirated, labeled, and sent to pathology for further analysis. The needle was removed.  FINDINGS: The patient tolerated the procedure well, and no immediate complications occurred.      Impression: Successful fluoroscopic guided right hip joint aspiration as above with no immediate complications.  This report was finalized on 3/11/2022 4:59 PM by Dr. Erick Agrawal MD.      FL Guided Aspiration Joint    Result Date: 3/11/2022  DATE OF EXAM: 3/11/2022 3:30 PM  PROCEDURE: FL GUIDED ASPIRATION JOINT-  INDICATIONS: possible septic joint; M25.551-Pain in right hip; M16.11-Unilateral primary osteoarthritis, right hip; Z87.39-Personal history of other diseases of the musculoskeletal system and connective tissue; E87.6-Hypokalemia  COMPARISON: No comparisons available.  TECHNIQUE: 1 associated  image was saved. Fluoroscopy was not utilized for this procedure. Procedure, risks, complications, and side effects of joint aspiration were discussed and reviewed in detail with the patient including the possibility for bleeding,  infection, needle damage to surrounding structures, and the potential for a nondiagnostic exam. The patient indicated understanding and consented to the procedure.  The left knee was marked, prepped, and draped in a sterile fashion. 1% lidocaine was used as a local anesthetic to the skin and subcutaneous tissues. A 20-gauge needle was advanced to the joint space, and approximately 12 cc of synovial fluid was aspirated, labeled, and sent to pathology for further analysis. The needle was removed.  FINDINGS: The patient tolerated the procedure well, and no immediate complications occurred.      Impression: Successful left knee joint aspiration as above with no immediate complications. Fluoroscopy was not utilized for this procedure.  This report was finalized on 3/11/2022 4:59 PM by Dr. Erick Agrawal MD.        Results for orders placed during the hospital encounter of 07/31/21    Adult Transthoracic Echo Complete W/ Cont if Necessary Per Protocol    Interpretation Summary  · Left ventricular ejection fraction appears to be 61 - 65%. Left ventricular systolic function is normal.  · The cardiac valves are structurally and functionally within normal limits.      I have reviewed the medications:  Scheduled Meds:amLODIPine, 10 mg, Oral, Q24H  ceFEPime (MAXIPIME) in NS 2g/10ml IV PUSH syringe, 2 g, Intravenous, Q8H  DAPTOmycin, 6 mg/kg (Adjusted), Intravenous, Q24H  FLUoxetine, 20 mg, Oral, Daily  lactobacillus acidophilus, 1 capsule, Oral, Daily  lisinopril, 20 mg, Oral, Q24H  methadone, 60 mg, Oral, Daily  metroNIDAZOLE, 500 mg, Intravenous, Q6H  OLANZapine, 10 mg, Oral, Daily  sodium chloride, 10 mL, Intravenous, Q12H  traZODone, 150 mg, Oral, Nightly      Continuous Infusions:   PRN Meds:.•  acetaminophen  •  cyclobenzaprine  •  melatonin  •  Morphine  •  ondansetron  •  ondansetron  •  oxyCODONE-acetaminophen  •  potassium chloride **OR** potassium chloride **OR** potassium chloride  •  potassium chloride  •  sodium  chloride  •  sodium chloride    Assessment/Plan   Assessment & Plan     Active Hospital Problems    Diagnosis  POA   • **Right hip pain [M25.551]  Yes     Priority: High   • Septic right hip (CMS/HCC) [M00.9]  Yes     Priority: High   • Hypokalemia [E87.6]  Yes     Priority: Medium   • QT prolongation [R94.31]  Yes     Priority: Medium   • Polysubstance abuse (HCC) [F19.10]  Yes     Priority: Low   • Alcohol abuse [F10.10]  Yes     Priority: Low   • Tobacco abuse [Z72.0]  Yes      Resolved Hospital Problems   No resolved problems to display.        Brief Hospital Course to date:  Frieda Flores is a 41 y.o. female with a past medical history of polysubstance abuse, prior alcohol abuse, ADHD/Bipolar disorder, and chronic right hip pain with septic arthritis s/p washout by Dr. Gusman in 2021 who presented to the ED complaining of recurrent right hip and low back pain that has worsened over the last few months to the point where she can no longer bear weight. She was admitted to our service and Ortho and ID were consulted.    Plan:    Right Hip/Low Back pain  H/o right hip septic arthritis s/p washout 7/21  -- joint aspiration from L knee and right hip are not c/w septic joint.  Cultures NGTD  -- MRI hip and L spine PENDING read  -- ID/Ortho following  -- continue Cefepime, Dapto and Flagyl for time being per Dr. Wilhelm  --PT/OT    Hypokalemia  --replaced per protocol    Polysubstance abuse  -- UDS positive for Cocaine, Methamphetamine, Methadone and THC  -- On chronic Methadone. Continue- per pt, current dosage is not her home dose, but, according to MAR, it is  -- continue Percocet, weaning IV Morphine (has not had any in >48 hours now, so will d/c)  -- Addiction Medicine following    Mood disorder  -- continue Olanzapine     QTc Prolongation  -- EKG shows QTc of 491.  Likely d/t Methadone.  Will monitor for now and avoid other QTc prolonging medications        DVT prophylaxis:  Mechanical DVT prophylaxis orders are  present.       AM-PAC 6 Clicks Score (PT): 19 (03/12/22 2016)    Disposition: I expect the patient to be discharged TBD    CODE STATUS:   Code Status and Medical Interventions:   Ordered at: 03/11/22 0401     Level Of Support Discussed With:    Patient     Code Status (Patient has no pulse and is not breathing):    CPR (Attempt to Resuscitate)     Medical Interventions (Patient has pulse or is breathing):    Full Support       Julai Ambriz MD  03/13/22

## 2022-03-13 NOTE — PLAN OF CARE
Goal Outcome Evaluation:  Plan of Care Reviewed With: patient        Progress: no change   Patient vital signs stable and on room air. Patient required 2L nasal cannula while asleep for a few hours. Patient c/o pain and given prn pain medication. Patient denies nausea and shortness of air. Fall and safety precautions maintained.

## 2022-03-14 LAB
ANION GAP SERPL CALCULATED.3IONS-SCNC: 6 MMOL/L (ref 5–15)
BACTERIA UR QL AUTO: ABNORMAL /HPF
BASOPHILS # BLD AUTO: 0.03 10*3/MM3 (ref 0–0.2)
BASOPHILS NFR BLD AUTO: 0.8 % (ref 0–1.5)
BILIRUB UR QL STRIP: NEGATIVE
BUN SERPL-MCNC: 7 MG/DL (ref 6–20)
BUN/CREAT SERPL: 13.5 (ref 7–25)
CALCIUM SPEC-SCNC: 8.5 MG/DL (ref 8.6–10.5)
CHLORIDE SERPL-SCNC: 107 MMOL/L (ref 98–107)
CLARITY UR: ABNORMAL
CO2 SERPL-SCNC: 27 MMOL/L (ref 22–29)
COLOR UR: YELLOW
CREAT SERPL-MCNC: 0.52 MG/DL (ref 0.57–1)
DEPRECATED RDW RBC AUTO: 44.7 FL (ref 37–54)
EGFRCR SERPLBLD CKD-EPI 2021: 120.6 ML/MIN/1.73
EOSINOPHIL # BLD AUTO: 0.22 10*3/MM3 (ref 0–0.4)
EOSINOPHIL NFR BLD AUTO: 5.6 % (ref 0.3–6.2)
ERYTHROCYTE [DISTWIDTH] IN BLOOD BY AUTOMATED COUNT: 13.5 % (ref 12.3–15.4)
GLUCOSE SERPL-MCNC: 106 MG/DL (ref 65–99)
GLUCOSE UR STRIP-MCNC: NEGATIVE MG/DL
HCT VFR BLD AUTO: 34.6 % (ref 34–46.6)
HGB BLD-MCNC: 11 G/DL (ref 12–15.9)
HGB UR QL STRIP.AUTO: NEGATIVE
HYALINE CASTS UR QL AUTO: ABNORMAL /LPF
IMM GRANULOCYTES # BLD AUTO: 0.02 10*3/MM3 (ref 0–0.05)
IMM GRANULOCYTES NFR BLD AUTO: 0.5 % (ref 0–0.5)
KETONES UR QL STRIP: ABNORMAL
LEUKOCYTE ESTERASE UR QL STRIP.AUTO: ABNORMAL
LYMPHOCYTES # BLD AUTO: 1.57 10*3/MM3 (ref 0.7–3.1)
LYMPHOCYTES NFR BLD AUTO: 40.1 % (ref 19.6–45.3)
MAGNESIUM SERPL-MCNC: 1.9 MG/DL (ref 1.6–2.6)
MCH RBC QN AUTO: 28.6 PG (ref 26.6–33)
MCHC RBC AUTO-ENTMCNC: 31.8 G/DL (ref 31.5–35.7)
MCV RBC AUTO: 90.1 FL (ref 79–97)
MONOCYTES # BLD AUTO: 0.24 10*3/MM3 (ref 0.1–0.9)
MONOCYTES NFR BLD AUTO: 6.1 % (ref 5–12)
NEUTROPHILS NFR BLD AUTO: 1.84 10*3/MM3 (ref 1.7–7)
NEUTROPHILS NFR BLD AUTO: 46.9 % (ref 42.7–76)
NITRITE UR QL STRIP: POSITIVE
NRBC BLD AUTO-RTO: 0 /100 WBC (ref 0–0.2)
PH UR STRIP.AUTO: 5.5 [PH] (ref 5–8)
PLATELET # BLD AUTO: 238 10*3/MM3 (ref 140–450)
PMV BLD AUTO: 10.3 FL (ref 6–12)
POTASSIUM SERPL-SCNC: 4.5 MMOL/L (ref 3.5–5.2)
PROT UR QL STRIP: NEGATIVE
RBC # BLD AUTO: 3.84 10*6/MM3 (ref 3.77–5.28)
RBC # UR STRIP: ABNORMAL /HPF
REF LAB TEST METHOD: ABNORMAL
SODIUM SERPL-SCNC: 140 MMOL/L (ref 136–145)
SP GR UR STRIP: 1.03 (ref 1–1.03)
SQUAMOUS #/AREA URNS HPF: ABNORMAL /HPF
UROBILINOGEN UR QL STRIP: ABNORMAL
WBC # UR STRIP: ABNORMAL /HPF
WBC NRBC COR # BLD: 3.92 10*3/MM3 (ref 3.4–10.8)
YEAST URNS QL MICRO: ABNORMAL /HPF

## 2022-03-14 PROCEDURE — 80048 BASIC METABOLIC PNL TOTAL CA: CPT

## 2022-03-14 PROCEDURE — 87491 CHLMYD TRACH DNA AMP PROBE: CPT | Performed by: INTERNAL MEDICINE

## 2022-03-14 PROCEDURE — 85025 COMPLETE CBC W/AUTO DIFF WBC: CPT | Performed by: INTERNAL MEDICINE

## 2022-03-14 PROCEDURE — 87086 URINE CULTURE/COLONY COUNT: CPT | Performed by: INTERNAL MEDICINE

## 2022-03-14 PROCEDURE — 99232 SBSQ HOSP IP/OBS MODERATE 35: CPT | Performed by: INTERNAL MEDICINE

## 2022-03-14 PROCEDURE — 25010000002 CEFEPIME PER 500 MG

## 2022-03-14 PROCEDURE — 83735 ASSAY OF MAGNESIUM: CPT | Performed by: INTERNAL MEDICINE

## 2022-03-14 PROCEDURE — 87591 N.GONORRHOEAE DNA AMP PROB: CPT | Performed by: INTERNAL MEDICINE

## 2022-03-14 PROCEDURE — 81001 URINALYSIS AUTO W/SCOPE: CPT | Performed by: INTERNAL MEDICINE

## 2022-03-14 PROCEDURE — 0 MAGNESIUM SULFATE 4 GM/100ML SOLUTION: Performed by: INTERNAL MEDICINE

## 2022-03-14 PROCEDURE — 25010000002 CEFTRIAXONE PER 250 MG: Performed by: INTERNAL MEDICINE

## 2022-03-14 RX ORDER — DOXYCYCLINE 100 MG/1
100 CAPSULE ORAL EVERY 12 HOURS SCHEDULED
Status: DISCONTINUED | OUTPATIENT
Start: 2022-03-14 | End: 2022-03-21 | Stop reason: HOSPADM

## 2022-03-14 RX ADMIN — SENNOSIDES AND DOCUSATE SODIUM 2 TABLET: 50; 8.6 TABLET ORAL at 08:59

## 2022-03-14 RX ADMIN — METRONIDAZOLE 500 MG: 500 INJECTION, SOLUTION INTRAVENOUS at 05:20

## 2022-03-14 RX ADMIN — FLUOXETINE HYDROCHLORIDE 20 MG: 20 CAPSULE ORAL at 09:00

## 2022-03-14 RX ADMIN — OXYCODONE HYDROCHLORIDE AND ACETAMINOPHEN 1 TABLET: 10; 325 TABLET ORAL at 21:39

## 2022-03-14 RX ADMIN — DOXYCYCLINE 100 MG: 100 CAPSULE ORAL at 21:39

## 2022-03-14 RX ADMIN — OXYCODONE HYDROCHLORIDE AND ACETAMINOPHEN 1 TABLET: 10; 325 TABLET ORAL at 05:20

## 2022-03-14 RX ADMIN — METHADONE HYDROCHLORIDE 60 MG: 10 TABLET ORAL at 08:59

## 2022-03-14 RX ADMIN — OLANZAPINE 10 MG: 5 TABLET, FILM COATED ORAL at 09:01

## 2022-03-14 RX ADMIN — MAGNESIUM SULFATE HEPTAHYDRATE 4 G: 40 INJECTION, SOLUTION INTRAVENOUS at 14:34

## 2022-03-14 RX ADMIN — CEFEPIME 2 G: 20 INJECTION, POWDER, FOR SOLUTION INTRAVENOUS at 05:20

## 2022-03-14 RX ADMIN — OXYCODONE HYDROCHLORIDE AND ACETAMINOPHEN 1 TABLET: 10; 325 TABLET ORAL at 17:12

## 2022-03-14 RX ADMIN — SENNOSIDES AND DOCUSATE SODIUM 2 TABLET: 50; 8.6 TABLET ORAL at 21:39

## 2022-03-14 RX ADMIN — Medication 10 ML: at 21:39

## 2022-03-14 RX ADMIN — SODIUM CHLORIDE 1 G: 900 INJECTION INTRAVENOUS at 09:04

## 2022-03-14 RX ADMIN — TRAZODONE HYDROCHLORIDE 150 MG: 100 TABLET ORAL at 21:39

## 2022-03-14 RX ADMIN — LISINOPRIL 20 MG: 20 TABLET ORAL at 09:00

## 2022-03-14 RX ADMIN — DOXYCYCLINE 100 MG: 100 CAPSULE ORAL at 09:05

## 2022-03-14 RX ADMIN — OXYCODONE HYDROCHLORIDE AND ACETAMINOPHEN 1 TABLET: 10; 325 TABLET ORAL at 11:16

## 2022-03-14 RX ADMIN — AMLODIPINE BESYLATE 10 MG: 10 TABLET ORAL at 09:00

## 2022-03-14 RX ADMIN — Medication 1 CAPSULE: at 08:59

## 2022-03-14 NOTE — PROGRESS NOTES
[facility] Medicine Services  PROGRESS NOTE    Patient Name: Frieda Flores  : 1980  MRN: [id number]    Date of Admission: 3/11/2022  Primary Care Physician: System, Provider Not In    Subjective   Subjective     CC:  Right hip and low back pain    HPI:  Pt sleeping this am.     ROS:  SAMIR as pt sleeping        Objective   Objective     Vital Signs:   Temp:  [97.6 °F (36.4 °C)-98.1 °F (36.7 °C)] 97.8 °F (36.6 °C)  Heart Rate:  [64-74] 67  Resp:  [18-20] 18  BP: (100-122)/(45-85) 121/67     Physical Exam:  Constitutional: No acute distress, lethargic, sleeping   HENT: NCAT, mucous membranes moist  Respiratory: Clear to auscultation bilaterally, respiratory effort normal   Cardiovascular: RRR, no murmurs, rubs, or gallops  Gastrointestinal: Positive bowel sounds, soft, nontender, nondistended  Musculoskeletal: No bilateral ankle edema  Psychiatric: flat affect  Neurologic: sleeping  Skin: No rashes    Results Reviewed:  LAB RESULTS:      Lab 22  0323 22  0456 22  0530 22  0334 22  0614 03/10/22  2357   WBC 3.92 3.98 6.21 6.13 6.59 8.00   HEMOGLOBIN 11.0* 11.1* 11.4* 11.3* 11.5* 13.1   HEMATOCRIT 34.6 34.9 34.4 34.5 33.6* 37.8   PLATELETS 238 217 221 215 239 287   NEUTROS ABS 1.84 1.87 4.21  --   --  4.94   IMMATURE GRANS (ABS) 0.02 0.02 0.02  --   --  0.03   LYMPHS ABS 1.57 1.68 1.49  --   --  2.09   MONOS ABS 0.24 0.22 0.32  --   --  0.53   EOS ABS 0.22 0.17 0.15  --   --  0.38   MCV 90.1 91.4 88.0 89.8 84.8 84.8   SED RATE  --   --   --   --   --  13   CRP  --   --   --   --   --  4.44*   PROCALCITONIN  --   --   --   --   --  0.04   LACTATE  --   --   --   --  1.2 1.6         Lab 22  0323 22  0456 22  0334 22  0614 03/10/22  2357   SODIUM 140 141 141 144 139   POTASSIUM 4.5 4.4 3.1* 3.6 2.8*   CHLORIDE 107 108* 106 106 100   CO2 27.0 26.0 26.0 27.0 28.0   ANION GAP 6.0 7.0 9.0 11.0 11.0   BUN 7 8 7 6 7   CREATININE 0.52* 0.56* 0.57  0.53* 0.59   EGFR 120.6 118.5 118.0 120.1 117.0   GLUCOSE 106* 104* 92 112* 115*   CALCIUM 8.5* 8.6 8.3* 8.6 9.5   MAGNESIUM 1.9 1.7  --   --  1.9         Lab 03/12/22  0334 03/11/22  0614 03/10/22  2357   TOTAL PROTEIN 5.4* 6.0 7.1   ALBUMIN 2.90* 3.50 4.00   GLOBULIN 2.5 2.5 3.1   ALT (SGPT) 17 23 27   AST (SGOT) 15 24 31   BILIRUBIN 0.3 0.5 0.5   ALK PHOS 88 91 111                     Brief Urine Lab Results  (Last result in the past 365 days)      Color   Clarity   Blood   Leuk Est   Nitrite   Protein   CREAT   Urine HCG        03/11/22 0415 Dark Yellow   Turbid   Negative   Small (1+)   Negative   Trace           03/11/22 0415               Negative             Microbiology Results Abnormal     Procedure Component Value - Date/Time    Blood Culture - Blood, Arm, Left [[id number]]  (Normal) Collected: 03/11/22 0000    Lab Status: Preliminary result Specimen: Blood from Arm, Left Updated: 03/14/22 0132     Blood Culture No growth at 3 days    Blood Culture - Blood, Arm, Right [[id number]]  (Normal) Collected: 03/11/22 0000    Lab Status: Preliminary result Specimen: Blood from Arm, Right Updated: 03/14/22 0132     Blood Culture No growth at 3 days    Body Fluid Culture - Aspirate, Hip, Right [[id number]] Collected: 03/11/22 1535    Lab Status: Preliminary result Specimen: Aspirate from Hip, Right Updated: 03/13/22 0747     Body Fluid Culture No growth at 2 days     Gram Stain Rare (1+) WBCs per low power field      No organisms seen    Body Fluid Culture - Aspirate, Knee, Left [[id number]] Collected: 03/11/22 1535    Lab Status: Preliminary result Specimen: Aspirate from Knee, Left Updated: 03/13/22 0747     Body Fluid Culture No growth at 2 days     Gram Stain Rare (1+) WBCs per low power field      No organisms seen    Urine Culture - Urine, Urine, Clean Catch [[id number]] Collected: 03/11/22 0415    Lab Status: Final result Specimen: Urine, Clean Catch Updated: 03/12/22 1217     Urine Culture 50,000 CFU/mL Normal  Urogenital Diane    AFB Culture - Aspirate, Knee, Left [[id number]] Collected: 03/11/22 1535    Lab Status: Preliminary result Specimen: Aspirate from Knee, Left Updated: 03/12/22 1113     AFB Stain No acid fast bacilli seen on concentrated smear    AFB Culture - Aspirate, Hip, Right [[id number]] Collected: 03/11/22 1535    Lab Status: Preliminary result Specimen: Aspirate from Hip, Right Updated: 03/12/22 1113     AFB Stain No acid fast bacilli seen on concentrated smear    COVID PRE-OP / PRE-PROCEDURE SCREENING ORDER (NO ISOLATION) - Swab, Nasopharynx [[id number]]  (Normal) Collected: 03/11/22 0415    Lab Status: Final result Specimen: Swab from Nasopharynx Updated: 03/11/22 0501    Narrative:      The following orders were created for panel order COVID PRE-OP / PRE-PROCEDURE SCREENING ORDER (NO ISOLATION) - Swab, Nasopharynx.  Procedure                               Abnormality         Status                     ---------                               -----------         ------                     COVID-19 and FLU A/B PCR...[[id number]]  Normal              Final result                 Please view results for these tests on the individual orders.    COVID-19 and FLU A/B PCR - Swab, Nasopharynx [[id number]]  (Normal) Collected: 03/11/22 0415    Lab Status: Final result Specimen: Swab from Nasopharynx Updated: 03/11/22 0501     COVID19 Not Detected     Influenza A PCR Not Detected     Influenza B PCR Not Detected    Narrative:      Fact sheet for providers: https://www.fda.gov/media/035917/download    Fact sheet for patients: https://www.fda.gov/media/885460/download    Test performed by PCR.          MRI Hip Right With & Without Contrast    Result Date: 3/13/2022  MRI HIP RIGHT W WO CONTRAST Date of Exam: 3/13/2022 3:18 EDT Indication: Hip pain, chronic, gout suspected.  Recent aspiration 3/11/2022. White blood cells. No growth at this time. Comparison Exams: Hip radiograph 12/7/2021, CT the abdomen pelvis 3/11/2022  Technique: Prior to and after uneventful administration of 17 mL MultiHance contrast, multiplanar multisequence images of the pelvis performed according to routine pelvis MRI protocol. FINDINGS: There is severe end-stage degenerative change of the right hip with a large joint effusion and osseous destruction and remodeling of the femoral head and remodeling of the acetabulum. There are large intra-articular ossified bodies. A large fragment seen  in the medial intra-articular space measures up to 2.4 cm. There is edema involving the femoral head and acetabulum on the left. There is a moderate to large joint effusion with peripheral enhancement of the synovium. There is severe progression of these changes since 2019. There is mild edema in the right adductor muscles and right vastus lateralis and intermedius muscles proximally. Mild edema in the gluteus medius muscle proximally on the right. There is severe joint space narrowing of the left hip with small osteophytes and small joint effusion and mild subchondral edema. There is an intra-articular body in the inferior medial joint space which is ossified and measures approximately 9 mm maximally. Degenerative labral tearing is present. The sacroiliac joints are normal in appearance. There is degenerative change of the pubic symphysis. There is partial-thickness tearing of the left hamstring muscle origin involving the sigmoid none number necrosis component which is age indeterminate. No free fluid is seen in the pelvis. There are no pathologically enlarged lymph nodes. There are no soft tissue masses.     Impression: 1.There is end-stage arthritis of the right hip with osseous remodeling of the femur and acetabulum and with large intra-articular loose bodies and large joint effusion with probable synovitis. End-stage avascular necrosis is favored over septic arthritis. Correlation with the aspiration is recommended. 2.There is severe arthritis of left hip with small  joint effusion and ossified intra-articular body. 3.There is degenerative change of the pubic symphysis. 4.There is mild edema in the right gluteus medius muscle and right adductor and quadriceps muscles. 5.There is age indeterminate partial thickness tearing of the left hamstring muscle origin. Electronically Signed: Chelle Hobbs MD 3/13/2022 10:25 EDT    MRI Lumbar Spine With & Without Contrast    Result Date: 3/13/2022  MRI LUMBAR SPINE W WO CONTRAST-  Date of Exam: 3/13/2022 1:50 AM  Indication: Low back pain, infection suspected.  Comparison Exams: None available.  Technique: Routine multiplanar multisequence MR imaging of the lumbar spine was performed before and after the administration of 17 cc MultiHance IV gadolinium contrast.  FINDINGS: There is normal height, alignment, signal intensity of the lumbar vertebral bodies.  No bone marrow edema.  The spinal cord terminates at theT12 level with normal signal seen within the conus.  There is fatty infiltration of the filum terminale near the L2 level.  The paraspinal soft tissues are within normal limits.  Postcontrast images demonstrate no pathologic contrast enhancement.   Axial images demonstrate:  T12/L1:No significant disc bulge.  There are mild degenerative facet changes bilaterally.  There is no spinal canal stenosis or neural foraminal narrowing.  L1/2:No significant disc bulge.  There are mild degenerative facet changes bilaterally.  There is no spinal canal stenosis or neural foraminal narrowing.  L2/3:No significant disc bulge.  There are mild degenerative facet changes bilaterally.  There is no spinal canal stenosis or neural foraminal narrowing.  L3/4:No significant disc bulge.  There are mild degenerative facet changes bilaterally.  There is no spinal canal stenosis or neural foraminal narrowing.  L4/5:Mild diffuse posterior disc bulge and mild degenerative facet change.  There is no spinal canal stenosis.  There is mild bilateral neural foraminal  narrowing.  There is a 6 mm cyst arising from the posterior aspect of the left facet joint.  L5/S1:Mild diffuse posterior disc bulge and mild degenerative facet change but no spinal canal stenosis or neural foraminal narrowing.       Impression:  1.  Degenerative disc disease and degenerative facet change at the L4/5 and L5/S1 levels with mild bilateral neural foraminal narrowing at the L4/5 level.  No spinal canal stenosis. 2.  No pathologic contrast enhancement. 3.  Incidental note is made of fatty infiltration of the filum terminale near the L2 level.  This report was finalized on 3/13/2022 10:03 AM by Rob Smith MD.        Results for orders placed during the hospital encounter of 07/31/21    Adult Transthoracic Echo Complete W/ Cont if Necessary Per Protocol    Interpretation Summary  · Left ventricular ejection fraction appears to be 61 - 65%. Left ventricular systolic function is normal.  · The cardiac valves are structurally and functionally within normal limits.      I have reviewed the medications:  Scheduled Meds:amLODIPine, 10 mg, Oral, Q24H  ceFEPime (MAXIPIME) in NS 2g/10ml IV PUSH syringe, 2 g, Intravenous, Q8H  DAPTOmycin, 6 mg/kg (Adjusted), Intravenous, Q24H  FLUoxetine, 20 mg, Oral, Daily  lactobacillus acidophilus, 1 capsule, Oral, Daily  lisinopril, 20 mg, Oral, Q24H  methadone, 60 mg, Oral, Daily  metroNIDAZOLE, 500 mg, Intravenous, Q6H  OLANZapine, 10 mg, Oral, Daily  senna-docusate sodium, 2 tablet, Oral, BID  sodium chloride, 10 mL, Intravenous, Q12H  traZODone, 150 mg, Oral, Nightly      Continuous Infusions:   PRN Meds:.•  acetaminophen  •  senna-docusate sodium **AND** polyethylene glycol **AND** bisacodyl **AND** bisacodyl  •  cyclobenzaprine  •  magnesium sulfate **OR** magnesium sulfate **OR** magnesium sulfate  •  melatonin  •  ondansetron  •  ondansetron  •  oxyCODONE-acetaminophen  •  potassium chloride **OR** potassium chloride **OR** potassium chloride  •  potassium  chloride  •  sodium chloride  •  sodium chloride    Assessment/Plan   Assessment & Plan     Active Hospital Problems    Diagnosis  POA   • **Right hip pain [M25.551]  Yes     Priority: High   • Septic right hip (CMS/HCC) [M00.9]  Yes     Priority: High   • Hypokalemia [E87.6]  Yes     Priority: Medium   • QT prolongation [R94.31]  Yes     Priority: Medium   • Polysubstance abuse (HCC) [F19.10]  Yes     Priority: Low   • Alcohol abuse [F10.10]  Yes     Priority: Low   • Tobacco abuse [Z72.0]  Yes      Resolved Hospital Problems   No resolved problems to display.        Brief Hospital Course to date:  Frieda Flores is a 41 y.o. female with a past medical history of polysubstance abuse, prior alcohol abuse, ADHD/Bipolar disorder, and chronic right hip pain with septic arthritis s/p washout by Dr. Gusman in 2021 who presented to the ED complaining of recurrent right hip and low back pain that has worsened over the last few months to the point where she can no longer bear weight. She was admitted to our service and Ortho and ID were consulted.    Plan:    Right Hip/Low Back pain  H/o right hip septic arthritis s/p washout 7/21  -- joint aspiration from L knee and right hip are not c/w septic joint.  Cultures NGTD  -- MRI hip and L spine unremarkable for acute issues  -- ID following.  Ortho has now signed off  -- continue Cefepime, Dapto and Flagyl for time being per Dr. Wilhelm  --PT/OT recommend inpatient rehab, will d/w CM    Hypokalemia  --replaced per protocol    Polysubstance abuse  -- UDS positive for Cocaine, Methamphetamine, Methadone and THC  -- On chronic Methadone. Continue- per pt, current dosage is not her home dose, but, according to MAR, it is  -- continue Percocet, stopped IV Morphine  -- Addiction Medicine following    Mood disorder  -- continue Olanzapine     QTc Prolongation  -- EKG shows QTc of 491.  Likely d/t Methadone.  Will monitor for now and avoid other QTc prolonging medications        DVT  prophylaxis:  Mechanical DVT prophylaxis orders are present.       AM-PAC 6 Clicks Score (PT): 19 (03/12/22 2016)    Disposition: I expect the patient to be discharged to rehab when bed available    CODE STATUS:   Code Status and Medical Interventions:   Ordered at: 03/11/22 0401     Level Of Support Discussed With:    Patient     Code Status (Patient has no pulse and is not breathing):    CPR (Attempt to Resuscitate)     Medical Interventions (Patient has pulse or is breathing):    Full Support       Julia Ambriz MD  03/14/22

## 2022-03-14 NOTE — PROGRESS NOTES
[facility] Progress Note        Antibiotics:  Anti-Infectives (From admission, onward)    Ordered     Dose/Rate Route Frequency Start Stop    03/14/22 0852  cefTRIAXone (ROCEPHIN) 1 g/100 mL 0.9% NS (MBP)        Ordering Provider: Broderick Wilhelm MD    1 g  over 30 Minutes Intravenous Every 24 Hours 03/14/22 0945 03/19/22 0944    03/11/22 0403  piperacillin-tazobactam (ZOSYN) 4.5 g in iso-osmotic dextrose 100 mL IVPB (premix)        Ordering Provider: Broderick Calvo RPH    4.5 g  over 30 Minutes Intravenous Once 03/11/22 0445 03/11/22 0614          CC: right hip/left knee/low back pain    HPI:      Patient is a 41 y.o.  Yr old female with history of polysubstance abuse with prior cocaine/heroin/methamphetamine/THC, chronic hepatitis C and PTSD/bipolar disease; treated for right septic hip with MSSA in September 2021, surgery at that time by Dr. Gusman and subsequently transitioned to [facility], abnormal LFTs with empiric change from Zosyn to cefazolin with improvement although unclear if related to penicillin class versus hep C or combination.  Noncompliant with follow-up.  Apparently plans to be seen at [facility] regarding possible right hip surgery in the future but specifics of that are unclear.  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  After injection had developed worsening right hip and left knee pain, worsening low back pain and ultimately presented to [facility] on March 11.     Aside from injection drug abuse, she denies any other specific exposures. No raw or undercooked food.  No unpasteurized milk or milk products.  No animal insect or arthropod exposure.  No tick bites.  No outdoor camping or hunting exposure.  No travel exposure.  No ill exposure.  No history of TB or TB exposure.   Denies a history of MRSA/VRE and no history of C. difficile or ESBL/KPC  Organisms.     3/14/22  "Seen early and sleepy;  Per nursing, right  Hip and left knee  pain constant, sharp, nonradiating, worse with movement and unable to bear weight.   pain 4/10 ; recent urinary frequency , now better and no new urainary symptoms      Acute on chronic lumbar back pain, constant, sharp, worse with movement, somewhat better with pain meds but not completely relieved and 5 out of 10 in severity at present.  She denies any new focal weakness or numbness.  No bowel or bladder incontinence.        Denies headache photophobia or neck stiffness.  No shortness of breath cough or hemoptysis.  No nausea vomiting diarrhea or abdominal pain.  No dysuria hematuria or pyuria.     HCG negative        ROS:     3/14/22  Per nursing; No f/c/s. No n/v/d. No rash. No new ADR to Abx.       Constitutional--subjective fevers and chills.  Appetite diminished with fatigue  Heent-- No new vision, hearing or throat complaints.  No epistaxis or oral sores.  Denies odynophagia or dysphagia.  No flashers, floaters or eye pain. No odynophagia or dysphagia. No headache, photophobia or neck stiffness.  CV-- No chest pain, palpitation or syncope  Resp-- No SOB/cough/Hemoptysis  GI- No nausea, vomiting, or diarrhea.  No hematochezia, melena, or hematemesis. Denies jaundice or chronic liver disease.  -- at present No dysuria, hematuria, or flank pain.  Denies hesitancy, urgency or flank pain.  Lymph- no swollen lymph nodes in neck/axilla or groin.   Heme- No active bruising or bleeding; no Hx of DVT or PE.  MS-- no swelling or pain in the bones or joints of arms/legs aside from above.     Neuro-- No acute focal weakness or numbness in the arms or legs.  No seizures.     Full 12 point review of systems reviewed and negative otherwise for acute complaints, except for above      PE: nursing/chaperone present  /72 (BP Location: Right arm, Patient Position: Lying)   Pulse 70   Temp 98.3 °F (36.8 °C) (Oral)   Resp 18   Ht 160 cm (63\")   Wt 83.8 kg " (184 lb 12.8 oz)   LMP 02/17/2022   SpO2 91%   Breastfeeding No   BMI 32.74 kg/m²       GENERAL: sleepy; NAD  HEENT: Normocephalic, atraumatic.  PERRL. EOMI. No conjunctival injection. No icterus. Oropharynx clear without evidence of thrush or exudate. No evidence of peridontal disease.    NECK: Supple without nuchal rigidity. No mass.  HEART: RRR; No murmur, rubs, gallops.   LUNGS: Clear to auscultation bilaterally without wheezing, rales, rhonchi. Normal respiratory effort. Nonlabored. No dullness.  ABDOMEN: Soft, nontender, nondistended. Positive bowel sounds. No rebound or guarding. NO mass or HSM.  EXT:  No cyanosis, clubbing . No cord.   MSK: FROM without joint effusions noted arms/legs.    SKIN: Warm and dry without cutaneous eruptions on Inspection/palpation.    NEURO: Oriented to PPT.  She will not cooperate with a detailed motor or sensory exam given above pain     She will not allow range of motion exam at the right hip.  No obvious warmth or redness there.  Prior incision noted with no dehiscence or drainage.  No discrete mass bulge or fluctuance.  No crepitus or bulla     Spine with no redness bulge fluctuance or point tenderness; diffusely tender in lumbar spine.  No thoracic or cervical spine tenderness.     Left knee edematous compared to right with possible  Effusion, no warmth ,  no erythema; no discrete fluctuance but exam limited with pain and patient not cooperative with a detailed exam     Track marks from drug use noted in upper extremities     IV without obvious redness or drainage    Laboratory Data    Results from last 7 days   Lab Units 03/14/22  0323 03/13/22  0456 03/12/22  0530   WBC 10*3/mm3 3.92 3.98 6.21   HEMOGLOBIN g/dL 11.0* 11.1* 11.4*   HEMATOCRIT % 34.6 34.9 34.4   PLATELETS 10*3/mm3 238 217 221     Results from last 7 days   Lab Units 03/14/22  0323   SODIUM mmol/L 140   POTASSIUM mmol/L 4.5   CHLORIDE mmol/L 107   CO2 mmol/L 27.0   BUN mg/dL 7   CREATININE mg/dL 0.52*    GLUCOSE mg/dL 106*   CALCIUM mg/dL 8.5*     Results from last 7 days   Lab Units 03/12/22  0334   ALK PHOS U/L 88   BILIRUBIN mg/dL 0.3   ALT (SGPT) U/L 17   AST (SGOT) U/L 15     Results from last 7 days   Lab Units 03/10/22  2357   SED RATE mm/hr 13     Results from last 7 days   Lab Units 03/10/22  2357   CRP mg/dL 4.44*       Estimated Creatinine Clearance: 147.6 mL/min (A) (by C-G formula based on SCr of 0.52 mg/dL (L)).      Microbiology:      Radiology:  Imaging Results (Last 72 Hours)     Procedure Component Value Units Date/Time    MRI Hip Right With & Without Contrast [[id number]] Collected: 03/13/22 1016     Updated: 03/13/22 1027    Narrative:      MRI HIP RIGHT W WO CONTRAST    Date of Exam: 3/13/2022 3:18 EDT    Indication: Hip pain, chronic, gout suspected.  Recent aspiration 3/11/2022. White blood cells. No growth at this time. Comparison Exams: Hip radiograph 12/7/2021, CT the abdomen pelvis 3/11/2022    Technique: Prior to and after uneventful administration of 17 mL MultiHance contrast, multiplanar multisequence images of the pelvis performed according to routine pelvis MRI protocol.    FINDINGS:  There is severe end-stage degenerative change of the right hip with a large joint effusion and osseous destruction and remodeling of the femoral head and remodeling of the acetabulum. There are large intra-articular ossified bodies. A large fragment seen   in the medial intra-articular space measures up to 2.4 cm. There is edema involving the femoral head and acetabulum on the left. There is a moderate to large joint effusion with peripheral enhancement of the synovium. There is severe progression of   these changes since 2019.    There is mild edema in the right adductor muscles and right vastus lateralis and intermedius muscles proximally. Mild edema in the gluteus medius muscle proximally on the right.    There is severe joint space narrowing of the left hip with small osteophytes and small joint  effusion and mild subchondral edema. There is an intra-articular body in the inferior medial joint space which is ossified and measures approximately 9 mm   maximally. Degenerative labral tearing is present.    The sacroiliac joints are normal in appearance. There is degenerative change of the pubic symphysis.    There is partial-thickness tearing of the left hamstring muscle origin involving the sigmoid none number necrosis component which is age indeterminate.    No free fluid is seen in the pelvis. There are no pathologically enlarged lymph nodes. There are no soft tissue masses.      Impression:      1.There is end-stage arthritis of the right hip with osseous remodeling of the femur and acetabulum and with large intra-articular loose bodies and large joint effusion with probable synovitis. End-stage avascular necrosis is favored over septic   arthritis. Correlation with the aspiration is recommended.  2.There is severe arthritis of left hip with small joint effusion and ossified intra-articular body.  3.There is degenerative change of the pubic symphysis.  4.There is mild edema in the right gluteus medius muscle and right adductor and quadriceps muscles.  5.There is age indeterminate partial thickness tearing of the left hamstring muscle origin.    Electronically Signed: Chelle Hobbs MD 3/13/2022 10:25 EDT    MRI Lumbar Spine With & Without Contrast [[id number]] Collected: 03/13/22 1000     Updated: 03/13/22 1006    Narrative:      MRI LUMBAR SPINE W WO CONTRAST-     Date of Exam: 3/13/2022 1:50 AM     Indication: Low back pain, infection suspected.     Comparison Exams: None available.     Technique: Routine multiplanar multisequence MR imaging of the lumbar  spine was performed before and after the administration of 17 cc  MultiHance IV gadolinium contrast.     FINDINGS:  There is normal height, alignment, signal intensity of the lumbar  vertebral bodies.  No bone marrow edema.     The spinal cord terminates at  theT12 level with normal signal seen  within the conus.  There is fatty infiltration of the filum terminale  near the L2 level.     The paraspinal soft tissues are within normal limits.     Postcontrast images demonstrate no pathologic contrast enhancement.        Axial images demonstrate:     T12/L1:No significant disc bulge.  There are mild degenerative facet  changes bilaterally.  There is no spinal canal stenosis or neural  foraminal narrowing.     L1/2:No significant disc bulge.  There are mild degenerative facet  changes bilaterally.  There is no spinal canal stenosis or neural  foraminal narrowing.     L2/3:No significant disc bulge.  There are mild degenerative facet  changes bilaterally.  There is no spinal canal stenosis or neural  foraminal narrowing.     L3/4:No significant disc bulge.  There are mild degenerative facet  changes bilaterally.  There is no spinal canal stenosis or neural  foraminal narrowing.     L4/5:Mild diffuse posterior disc bulge and mild degenerative facet  change.  There is no spinal canal stenosis.  There is mild bilateral  neural foraminal narrowing.  There is a 6 mm cyst arising from the  posterior aspect of the left facet joint.     L5/S1:Mild diffuse posterior disc bulge and mild degenerative facet  change but no spinal canal stenosis or neural foraminal narrowing.          Impression:         1.  Degenerative disc disease and degenerative facet change at the L4/5  and L5/S1 levels with mild bilateral neural foraminal narrowing at the  L4/5 level.  No spinal canal stenosis.  2.  No pathologic contrast enhancement.  3.  Incidental note is made of fatty infiltration of the filum terminale  near the L2 level.     This report was finalized on 3/13/2022 10:03 AM by Rob Smith MD.       FL Guided Aspiration Joint [[id number]] Collected: 03/11/22 1601     Updated: 03/11/22 1702    Narrative:      DATE OF EXAM: 3/11/2022 3:29 PM     PROCEDURE: FL GUIDED ASPIRATION JOINT-      INDICATIONS: possible septic joint; M25.551-Pain in right hip;  M16.11-Unilateral primary osteoarthritis, right hip; Z87.39-Personal  history of other diseases of the musculoskeletal system and connective  tissue; E87.6-Hypokalemia     COMPARISON: No comparisons available.     TECHNIQUE: 6 seconds of fluoroscopic time was used for this exam. 2  associated images were saved. Procedure, risks, complications, and side  effects of joint aspiration were discussed and reviewed in detail with  the patient including the possibility for bleeding, infection, needle  damage to surrounding structures, and the potential for a nondiagnostic  exam. The patient indicated understanding and consented to the  procedure.     The right hip was marked, prepped, and draped in a sterile fashion. 1%  lidocaine was used as a local anesthetic to the skin and subcutaneous  tissues. Under fluoroscopic guidance a 20-gauge needle was advanced to  the joint space. Approximately 8 cc of bloody synovial fluid was  aspirated, labeled, and sent to pathology for further analysis. The  needle was removed.     FINDINGS:   The patient tolerated the procedure well, and no immediate complications  occurred.        Impression:      Successful fluoroscopic guided right hip joint aspiration as above with  no immediate complications.     This report was finalized on 3/11/2022 4:59 PM by Dr. Erick Agrawal MD.       FL Guided Aspiration Joint [[id number]] Collected: 03/11/22 1601     Updated: 03/11/22 1702    Narrative:      DATE OF EXAM: 3/11/2022 3:30 PM     PROCEDURE: FL GUIDED ASPIRATION JOINT-     INDICATIONS: possible septic joint; M25.551-Pain in right hip;  M16.11-Unilateral primary osteoarthritis, right hip; Z87.39-Personal  history of other diseases of the musculoskeletal system and connective  tissue; E87.6-Hypokalemia     COMPARISON: No comparisons available.     TECHNIQUE: 1 associated  image was saved. Fluoroscopy was not  utilized for this  procedure. Procedure, risks, complications, and side  effects of joint aspiration were discussed and reviewed in detail with  the patient including the possibility for bleeding, infection, needle  damage to surrounding structures, and the potential for a nondiagnostic  exam. The patient indicated understanding and consented to the  procedure.     The left knee was marked, prepped, and draped in a sterile fashion. 1%  lidocaine was used as a local anesthetic to the skin and subcutaneous  tissues. A 20-gauge needle was advanced to the joint space, and  approximately 12 cc of synovial fluid was aspirated, labeled, and sent  to pathology for further analysis. The needle was removed.     FINDINGS:   The patient tolerated the procedure well, and no immediate complications  occurred.        Impression:      Successful left knee joint aspiration as above with no immediate  complications. Fluoroscopy was not utilized for this procedure.     This report was finalized on 3/11/2022 4:59 PM by Dr. Erick Agrawal MD.               Impression:         --Acute subjective constitutional symptoms with low-grade temp 99, active injection drug abuse , polysubstance with acute right hip/lumbar back pain out of proportion to baseline and acute left knee pain/swelling.  She is at risk for bloodstream infection and hematogenous spread with risk for metastatic foci of involvement including risk for septic joints/osteomyelitis, endovascular focus etc.   however,  blood cultures negative so far and CT/MRI's/joint aspirates NOT c/w infection so far;  she denies cough and respiratory exam nonfocal.  No acute inflammatory process on abdominal CT. Mild urinary changes and mixed urine culture with transient symptoms, now improved with empiric abx and urine culture may have been contaminated; no fever and tapered abx    **right hip and left knee aspirate NOT c/w septic joints by cell counts;  Cultures negative so far    --pyuria/bacteruria; empiric  treatment for UTI although urine difficult to interpret with epi's/contamination     --Acute/chronic right hip pain with abrupt worsening over last week, recent injection drug abuse and risk for new versus recurrent infection.      **aspirate NOT c/w septic joints by cell counts;  Cultures negative so far     --Acute/chronic lumbar back pain with worsening over 1 week and recent injection drug abuse with risk for metastatic seeding and spinal/paraspinal infection risk.  MRI lumbar spine no infectious change per radiology     --Acute left knee pain/swelling, recent injection drug abuse and risk for septic joint.     **aspirate NOT c/w septic joints by cell counts;  Cultures negative so far     --Right septic hip arthritis in August/September 2021 with surgery by Dr. Gusman     --Chronic hepatitis C.  I do not treat viral hepatitis as a part of my practice.  If you desire further input regarding this during this hospitalization you should give consideration to gastroenterology consultation.     --Polysubstance abuse as previously noted.  Strategy for minimizing risk of withdrawal and additional supportive measures at discretion of medicine team     --Possible Zosyn adverse drug reaction with abnormal LFTs in 2021.  Not clear-cut but trying to avoid.  Subsequently tolerated cephalosporin class antibiotics with improvements in liver tests     --Abnormal CPK prior to daptomycin.  Monitor.     --PTSD/bipolar disease     --Medical noncompliance           PLAN:       --IV rocephin/doxy until urine result confirmed     --Check/review labs cultures and scans     --Partial history per nursing staff     --Discussed with microbiology     --Discussed with Dr. Ambriz      --Highly complex set of issues with high risk for further serious morbidity and other serious sequela     --HCG negative          Broderick Wilhelm MD  3/14/2022

## 2022-03-14 NOTE — PROGRESS NOTES
"/67 (BP Location: Right arm, Patient Position: Lying)   Pulse 67   Temp 97.8 °F (36.6 °C) (Oral)   Resp 18   Ht 160 cm (63\")   Wt 83.8 kg (184 lb 12.8 oz)   LMP 02/17/2022   SpO2 91%   Breastfeeding No   BMI 32.74 kg/m²     Lab Results (last 24 hours)     Procedure Component Value Units Date/Time    Body Fluid Culture - Aspirate, Hip, Right [[id number]] Collected: 03/11/22 1535    Specimen: Aspirate from Hip, Right Updated: 03/13/22 0747     Body Fluid Culture No growth at 2 days     Gram Stain Rare (1+) WBCs per low power field      No organisms seen    Body Fluid Culture - Aspirate, Knee, Left [[id number]] Collected: 03/11/22 1535    Specimen: Aspirate from Knee, Left Updated: 03/13/22 0747     Body Fluid Culture No growth at 2 days     Gram Stain Rare (1+) WBCs per low power field      No organisms seen    CBC & Differential [[id number]]  (Abnormal) Collected: 03/13/22 0456    Specimen: Blood Updated: 03/13/22 0722    Narrative:      The following orders were created for panel order CBC & Differential.  Procedure                               Abnormality         Status                     ---------                               -----------         ------                     CBC Auto Differential[[id number]]        Abnormal            Final result                 Please view results for these tests on the individual orders.    CBC Auto Differential [[id number]]  (Abnormal) Collected: 03/13/22 0456    Specimen: Blood Updated: 03/13/22 0722     WBC 3.98 10*3/mm3      RBC 3.82 10*6/mm3      Hemoglobin 11.1 g/dL      Hematocrit 34.9 %      MCV 91.4 fL      MCH 29.1 pg      MCHC 31.8 g/dL      RDW 14.0 %      RDW-SD 46.4 fl      MPV 10.5 fL      Platelets 217 10*3/mm3      Neutrophil % 47.0 %      Lymphocyte % 42.2 %      Monocyte % 5.5 %      Eosinophil % 4.3 %      Basophil % 0.5 %      Immature Grans % 0.5 %      Neutrophils, Absolute 1.87 10*3/mm3      Lymphocytes, Absolute 1.68 10*3/mm3      Monocytes, " Absolute 0.22 10*3/mm3      Eosinophils, Absolute 0.17 10*3/mm3      Basophils, Absolute 0.02 10*3/mm3      Immature Grans, Absolute 0.02 10*3/mm3      nRBC 0.0 /100 WBC     Basic Metabolic Panel [[id number]]  (Abnormal) Collected: 03/13/22 0456    Specimen: Blood Updated: 03/13/22 0706     Glucose 104 mg/dL      BUN 8 mg/dL      Creatinine 0.56 mg/dL      Sodium 141 mmol/L      Potassium 4.4 mmol/L      Chloride 108 mmol/L      CO2 26.0 mmol/L      Calcium 8.6 mg/dL      BUN/Creatinine Ratio 14.3     Anion Gap 7.0 mmol/L      eGFR 118.5 mL/min/1.73      Comment: National Kidney Foundation and American Society of Nephrology (ASN) Task Force recommended calculation based on the Chronic Kidney Disease Epidemiology Collaboration (CKD-EPI) equation refit without adjustment for race.       Narrative:      GFR Normal >60  Chronic Kidney Disease <60  Kidney Failure <15      Magnesium [[id number]]  (Normal) Collected: 03/13/22 0456    Specimen: Blood Updated: 03/13/22 0704     Magnesium 1.7 mg/dL     Blood Culture - Blood, Arm, Left [[id number]]  (Normal) Collected: 03/11/22 0000    Specimen: Blood from Arm, Left Updated: 03/13/22 0030     Blood Culture No growth at 2 days    Blood Culture - Blood, Arm, Right [[id number]]  (Normal) Collected: 03/11/22 0000    Specimen: Blood from Arm, Right Updated: 03/13/22 0030     Blood Culture No growth at 2 days          Imaging Results (Last 24 Hours)     Procedure Component Value Units Date/Time    MRI Hip Right With & Without Contrast [[id number]] Collected: 03/13/22 1016     Updated: 03/13/22 1027    Narrative:      MRI HIP RIGHT W WO CONTRAST    Date of Exam: 3/13/2022 3:18 EDT    Indication: Hip pain, chronic, gout suspected.  Recent aspiration 3/11/2022. White blood cells. No growth at this time. Comparison Exams: Hip radiograph 12/7/2021, CT the abdomen pelvis 3/11/2022    Technique: Prior to and after uneventful administration of 17 mL MultiHance contrast, multiplanar multisequence  images of the pelvis performed according to routine pelvis MRI protocol.    FINDINGS:  There is severe end-stage degenerative change of the right hip with a large joint effusion and osseous destruction and remodeling of the femoral head and remodeling of the acetabulum. There are large intra-articular ossified bodies. A large fragment seen   in the medial intra-articular space measures up to 2.4 cm. There is edema involving the femoral head and acetabulum on the left. There is a moderate to large joint effusion with peripheral enhancement of the synovium. There is severe progression of   these changes since 2019.    There is mild edema in the right adductor muscles and right vastus lateralis and intermedius muscles proximally. Mild edema in the gluteus medius muscle proximally on the right.    There is severe joint space narrowing of the left hip with small osteophytes and small joint effusion and mild subchondral edema. There is an intra-articular body in the inferior medial joint space which is ossified and measures approximately 9 mm   maximally. Degenerative labral tearing is present.    The sacroiliac joints are normal in appearance. There is degenerative change of the pubic symphysis.    There is partial-thickness tearing of the left hamstring muscle origin involving the sigmoid none number necrosis component which is age indeterminate.    No free fluid is seen in the pelvis. There are no pathologically enlarged lymph nodes. There are no soft tissue masses.      Impression:      1.There is end-stage arthritis of the right hip with osseous remodeling of the femur and acetabulum and with large intra-articular loose bodies and large joint effusion with probable synovitis. End-stage avascular necrosis is favored over septic   arthritis. Correlation with the aspiration is recommended.  2.There is severe arthritis of left hip with small joint effusion and ossified intra-articular body.  3.There is degenerative  change of the pubic symphysis.  4.There is mild edema in the right gluteus medius muscle and right adductor and quadriceps muscles.  5.There is age indeterminate partial thickness tearing of the left hamstring muscle origin.    Electronically Signed: Chelle Hobbs MD 3/13/2022 10:25 EDT    MRI Lumbar Spine With & Without Contrast [[id number]] Collected: 03/13/22 1000     Updated: 03/13/22 1006    Narrative:      MRI LUMBAR SPINE W WO CONTRAST-     Date of Exam: 3/13/2022 1:50 AM     Indication: Low back pain, infection suspected.     Comparison Exams: None available.     Technique: Routine multiplanar multisequence MR imaging of the lumbar  spine was performed before and after the administration of 17 cc  MultiHance IV gadolinium contrast.     FINDINGS:  There is normal height, alignment, signal intensity of the lumbar  vertebral bodies.  No bone marrow edema.     The spinal cord terminates at theT12 level with normal signal seen  within the conus.  There is fatty infiltration of the filum terminale  near the L2 level.     The paraspinal soft tissues are within normal limits.     Postcontrast images demonstrate no pathologic contrast enhancement.        Axial images demonstrate:     T12/L1:No significant disc bulge.  There are mild degenerative facet  changes bilaterally.  There is no spinal canal stenosis or neural  foraminal narrowing.     L1/2:No significant disc bulge.  There are mild degenerative facet  changes bilaterally.  There is no spinal canal stenosis or neural  foraminal narrowing.     L2/3:No significant disc bulge.  There are mild degenerative facet  changes bilaterally.  There is no spinal canal stenosis or neural  foraminal narrowing.     L3/4:No significant disc bulge.  There are mild degenerative facet  changes bilaterally.  There is no spinal canal stenosis or neural  foraminal narrowing.     L4/5:Mild diffuse posterior disc bulge and mild degenerative facet  change.  There is no spinal canal  stenosis.  There is mild bilateral  neural foraminal narrowing.  There is a 6 mm cyst arising from the  posterior aspect of the left facet joint.     L5/S1:Mild diffuse posterior disc bulge and mild degenerative facet  change but no spinal canal stenosis or neural foraminal narrowing.          Impression:         1.  Degenerative disc disease and degenerative facet change at the L4/5  and L5/S1 levels with mild bilateral neural foraminal narrowing at the  L4/5 level.  No spinal canal stenosis.  2.  No pathologic contrast enhancement.  3.  Incidental note is made of fatty infiltration of the filum terminale  near the L2 level.     This report was finalized on 3/13/2022 10:03 AM by Rob Smith MD.             Patient Care Team:  System, Provider Not In as PCP - Dani Shirley MD as Obstetrician (Obstetrics and Gynecology)    SUBJECTIVE:  Continues to complain of right hip, low back and left knee pain.      PHYSICAL EXAM  Right posterior lateral hip scar healed well with no surrounding erythema  Significant pain with active and passive range of motion of her right hip joint.  Thigh and calf soft and nontender  Neurovascular intact distally bilaterally  Left knee with mild to moderate effusion and mild warmth but no erythema  Significant pain as well with active and passive range of motion of her left knee         Right hip pain    Alcohol abuse    Polysubstance abuse (HCC)    Septic right hip (CMS/HCC)    Tobacco abuse    Hypokalemia    QT prolongation      PLAN / DISPOSITION: 41-year-old female with history of polysubstance abuse and right hip septic joint requiring washout by Dr. Gusman July 2021 with recent IV drug abuse presents with 1 week of low back, right hip and left knee pain.    Right hip and left knee aspirations from 3-11 not consistent with septic joint.  -Joint aspiration cell count from R hip and L knee not concerning for septic joint and cultures remain negative.  -Continue  symptomatic treatment and antibiotics per ID.  -Ok w me to mobilize as tolerated.  -I will sign off for now, please call w any questions or concerns.     Noe Perdomo MD  03/13/22  21:20 EDT

## 2022-03-14 NOTE — CASE MANAGEMENT/SOCIAL WORK
"Continued Stay Note  [facility]     Patient Name: Frieda Flores  MRN: [id number]  Today's Date: 3/14/2022    Admit Date: 3/11/2022     Discharge Plan     Row Name 03/14/22 1158       Plan    Plan Ongoing    Plan Comments Met with Frieda today, she is well known to our services.  She states that she is still experiencing pain and that her Methadone is not at her home dose, which is 130 mg.  She has had some prolonged QT interval and I explained that to her in a base level, she verbalized understanding.  She has a severe lack of motivation to engage in physical activity (PT/OT)  I encouraged her to participate in her POC.  She verbalized agreement and understanding and said \"I gotta get up and move, right?\"  She is not eligible for rehab/LTAC due to current and persistent illicit drug use.    She is receiving Methadone at [facility], and I will check on her daily dosage.    She is concerned that her [facility]  doesn't know she's in the hospital-  Verenice Hill is her SW at [facility]- will contact  regarding this.    We continue to follow, encourage, and discuss plans for illicit drug cessation.    Row Name 03/14/22 1016       Plan    Plan TBD    Plan Comments Tried to speak with pt. at bedside. Needs strong encouragement to work with PT/OT. ID following cultures. On IV rocephin and Doxycycline currently. Pt. not very interactive.    Final Discharge Disposition Code 30 - still a patient               Discharge Codes    No documentation.                     Mary Pollock, RN ,BSN   Addiction Coordinator     "

## 2022-03-14 NOTE — CASE MANAGEMENT/SOCIAL WORK
Continued Stay Note  [facility]     Patient Name: Frieda Flores  MRN: [id number]  Today's Date: 3/14/2022    Admit Date: 3/11/2022     Discharge Plan     Row Name 03/14/22 1016       Plan    Plan TBD    Plan Comments Tried to speak with pt. at bedside. Needs strong encouragement to work with PT/OT. ID following cultures. On IV rocephin and Doxycycline currently. Pt. not very interactive. Unfortunately, because of past and present polysubstance abuse, inpatient rehab unlikely. Only option is to get pt. up and moving. States her sister can stay with her. Could arrange HH, SN/PT/OT.     Final Discharge Disposition Code 30 - still a patient               Discharge Codes    No documentation.                     La Carroll RN

## 2022-03-14 NOTE — CASE MANAGEMENT/SOCIAL WORK
Continued Stay Note  [facility]     Patient Name: Frieda Flores  MRN: [id number]  Today's Date: 3/14/2022    Admit Date: 3/11/2022     Discharge Plan     Row Name 03/14/22 1615       Plan    Plan Social work called the child protection worker Verenice Hill at the request of the patient and left a message at [phone] on Monday 3/14/2022.               Discharge Codes    No documentation.                     ABDOUL Stahl

## 2022-03-14 NOTE — PLAN OF CARE
Goal Outcome Evaluation:      SR. RA. Pain treated PRN. No further complaints at this time.

## 2022-03-15 LAB
BACTERIA SPEC AEROBE CULT: NO GROWTH
C TRACH RRNA SPEC QL NAA+PROBE: NEGATIVE
MAGNESIUM SERPL-MCNC: 2 MG/DL (ref 1.6–2.6)
N GONORRHOEA RRNA SPEC QL NAA+PROBE: NEGATIVE

## 2022-03-15 PROCEDURE — 99232 SBSQ HOSP IP/OBS MODERATE 35: CPT | Performed by: INTERNAL MEDICINE

## 2022-03-15 PROCEDURE — 25010000002 CEFTRIAXONE PER 250 MG

## 2022-03-15 PROCEDURE — 83735 ASSAY OF MAGNESIUM: CPT | Performed by: INTERNAL MEDICINE

## 2022-03-15 RX ADMIN — METHADONE HYDROCHLORIDE 60 MG: 10 TABLET ORAL at 14:22

## 2022-03-15 RX ADMIN — Medication 1 CAPSULE: at 10:03

## 2022-03-15 RX ADMIN — OXYCODONE HYDROCHLORIDE AND ACETAMINOPHEN 1 TABLET: 10; 325 TABLET ORAL at 20:34

## 2022-03-15 RX ADMIN — SODIUM CHLORIDE 1 G: 900 INJECTION INTRAVENOUS at 10:00

## 2022-03-15 RX ADMIN — OXYCODONE HYDROCHLORIDE AND ACETAMINOPHEN 1 TABLET: 10; 325 TABLET ORAL at 09:59

## 2022-03-15 RX ADMIN — OLANZAPINE 10 MG: 5 TABLET, FILM COATED ORAL at 10:03

## 2022-03-15 RX ADMIN — SENNOSIDES AND DOCUSATE SODIUM 2 TABLET: 50; 8.6 TABLET ORAL at 20:34

## 2022-03-15 RX ADMIN — Medication 10 ML: at 20:34

## 2022-03-15 RX ADMIN — SENNOSIDES AND DOCUSATE SODIUM 2 TABLET: 50; 8.6 TABLET ORAL at 10:03

## 2022-03-15 RX ADMIN — BISACODYL 5 MG: 5 TABLET, COATED ORAL at 10:03

## 2022-03-15 RX ADMIN — LISINOPRIL 20 MG: 20 TABLET ORAL at 10:03

## 2022-03-15 RX ADMIN — AMLODIPINE BESYLATE 10 MG: 10 TABLET ORAL at 10:03

## 2022-03-15 RX ADMIN — TRAZODONE HYDROCHLORIDE 150 MG: 100 TABLET ORAL at 20:34

## 2022-03-15 RX ADMIN — Medication 5 MG: at 20:34

## 2022-03-15 RX ADMIN — DOXYCYCLINE 100 MG: 100 CAPSULE ORAL at 10:03

## 2022-03-15 RX ADMIN — FLUOXETINE HYDROCHLORIDE 20 MG: 20 CAPSULE ORAL at 10:03

## 2022-03-15 RX ADMIN — DOXYCYCLINE 100 MG: 100 CAPSULE ORAL at 20:34

## 2022-03-15 RX ADMIN — OXYCODONE HYDROCHLORIDE AND ACETAMINOPHEN 1 TABLET: 10; 325 TABLET ORAL at 14:22

## 2022-03-15 NOTE — PROGRESS NOTES
[facility] Medicine Services  PROGRESS NOTE    Patient Name: Frieda Flores  : 1980  MRN: [id number]    Date of Admission: 3/11/2022  Primary Care Physician: System, Provider Not In    Subjective   Subjective     CC:  Right hip and low back pain    HPI:  Pt states that she is doing okay this am but didn't sleep much last night.     ROS:  Gen- No fevers, chills  CV- No chest pain, palpitations  Resp- No cough, dyspnea  GI- No N/V/D, abd pain            Objective   Objective     Vital Signs:   Temp:  [97.3 °F (36.3 °C)-98.1 °F (36.7 °C)] 97.3 °F (36.3 °C)  Heart Rate:  [60-74] 60  Resp:  [16] 16  BP: (109-124)/(68-79) 109/71  Flow (L/min):  [2] 2     Physical Exam:  Constitutional: No acute distress, lethargic  HENT: NCAT, mucous membranes moist  Respiratory: Clear to auscultation bilaterally, respiratory effort normal   Cardiovascular: RRR, no murmurs, rubs, or gallops  Gastrointestinal: Positive bowel sounds, soft, nontender, nondistended  Musculoskeletal: No bilateral ankle edema  Psychiatric: flat affect  Neurologic: MAEW, AOx3  Skin: No rashes    Results Reviewed:  LAB RESULTS:      Lab 22  0323 22  0456 22  0530 22  0334 22  0614 03/10/22  2357   WBC 3.92 3.98 6.21 6.13 6.59 8.00   HEMOGLOBIN 11.0* 11.1* 11.4* 11.3* 11.5* 13.1   HEMATOCRIT 34.6 34.9 34.4 34.5 33.6* 37.8   PLATELETS 238 217 221 215 239 287   NEUTROS ABS 1.84 1.87 4.21  --   --  4.94   IMMATURE GRANS (ABS) 0.02 0.02 0.02  --   --  0.03   LYMPHS ABS 1.57 1.68 1.49  --   --  2.09   MONOS ABS 0.24 0.22 0.32  --   --  0.53   EOS ABS 0.22 0.17 0.15  --   --  0.38   MCV 90.1 91.4 88.0 89.8 84.8 84.8   SED RATE  --   --   --   --   --  13   CRP  --   --   --   --   --  4.44*   PROCALCITONIN  --   --   --   --   --  0.04   LACTATE  --   --   --   --  1.2 1.6         Lab 03/15/22  0318 22  0323 22  0456 22  0334 22  0614 03/10/22  2357   SODIUM  --  140 141 141 144 139    POTASSIUM  --  4.5 4.4 3.1* 3.6 2.8*   CHLORIDE  --  107 108* 106 106 100   CO2  --  27.0 26.0 26.0 27.0 28.0   ANION GAP  --  6.0 7.0 9.0 11.0 11.0   BUN  --  7 8 7 6 7   CREATININE  --  0.52* 0.56* 0.57 0.53* 0.59   EGFR  --  120.6 118.5 118.0 120.1 117.0   GLUCOSE  --  106* 104* 92 112* 115*   CALCIUM  --  8.5* 8.6 8.3* 8.6 9.5   MAGNESIUM 2.0 1.9 1.7  --   --  1.9         Lab 03/12/22  0334 03/11/22  0614 03/10/22  2357   TOTAL PROTEIN 5.4* 6.0 7.1   ALBUMIN 2.90* 3.50 4.00   GLOBULIN 2.5 2.5 3.1   ALT (SGPT) 17 23 27   AST (SGOT) 15 24 31   BILIRUBIN 0.3 0.5 0.5   ALK PHOS 88 91 111                     Brief Urine Lab Results  (Last result in the past 365 days)      Color   Clarity   Blood   Leuk Est   Nitrite   Protein   CREAT   Urine HCG        03/14/22 1145 Yellow   Cloudy   Negative   Trace   Positive   Negative                 Microbiology Results Abnormal     Procedure Component Value - Date/Time    Blood Culture - Blood, Arm, Left [[id number]]  (Normal) Collected: 03/11/22 0000    Lab Status: Preliminary result Specimen: Blood from Arm, Left Updated: 03/15/22 0133     Blood Culture No growth at 4 days    Blood Culture - Blood, Arm, Right [[id number]]  (Normal) Collected: 03/11/22 0000    Lab Status: Preliminary result Specimen: Blood from Arm, Right Updated: 03/15/22 0133     Blood Culture No growth at 4 days    Body Fluid Culture - Aspirate, Hip, Right [[id number]] Collected: 03/11/22 1535    Lab Status: Preliminary result Specimen: Aspirate from Hip, Right Updated: 03/14/22 0924     Body Fluid Culture No growth at 3 days     Gram Stain Rare (1+) WBCs per low power field      No organisms seen    Body Fluid Culture - Aspirate, Knee, Left [[id number]] Collected: 03/11/22 1535    Lab Status: Preliminary result Specimen: Aspirate from Knee, Left Updated: 03/14/22 0924     Body Fluid Culture No growth at 3 days     Gram Stain Rare (1+) WBCs per low power field      No organisms seen    Anaerobic Culture -  Aspirate, Knee, Left [[id number]]  (Normal) Collected: 03/11/22 1535    Lab Status: Preliminary result Specimen: Aspirate from Knee, Left Updated: 03/14/22 0735     Anaerobic Culture No anaerobes isolated at 3 days    Anaerobic Culture - Aspirate, Hip, Right [[id number]]  (Normal) Collected: 03/11/22 1535    Lab Status: Preliminary result Specimen: Aspirate from Hip, Right Updated: 03/14/22 0735     Anaerobic Culture No anaerobes isolated at 3 days    Urine Culture - Urine, Urine, Clean Catch [[id number]] Collected: 03/11/22 0415    Lab Status: Final result Specimen: Urine, Clean Catch Updated: 03/12/22 1217     Urine Culture 50,000 CFU/mL Normal Urogenital Diane    AFB Culture - Aspirate, Knee, Left [[id number]] Collected: 03/11/22 1535    Lab Status: Preliminary result Specimen: Aspirate from Knee, Left Updated: 03/12/22 1113     AFB Stain No acid fast bacilli seen on concentrated smear    AFB Culture - Aspirate, Hip, Right [[id number]] Collected: 03/11/22 1535    Lab Status: Preliminary result Specimen: Aspirate from Hip, Right Updated: 03/12/22 1113     AFB Stain No acid fast bacilli seen on concentrated smear    COVID PRE-OP / PRE-PROCEDURE SCREENING ORDER (NO ISOLATION) - Swab, Nasopharynx [[id number]]  (Normal) Collected: 03/11/22 0415    Lab Status: Final result Specimen: Swab from Nasopharynx Updated: 03/11/22 0501    Narrative:      The following orders were created for panel order COVID PRE-OP / PRE-PROCEDURE SCREENING ORDER (NO ISOLATION) - Swab, Nasopharynx.  Procedure                               Abnormality         Status                     ---------                               -----------         ------                     COVID-19 and FLU A/B PCR...[[id number]]  Normal              Final result                 Please view results for these tests on the individual orders.    COVID-19 and FLU A/B PCR - Swab, Nasopharynx [[id number]]  (Normal) Collected: 03/11/22 0415    Lab Status: Final result Specimen:  Swab from Nasopharynx Updated: 03/11/22 0501     COVID19 Not Detected     Influenza A PCR Not Detected     Influenza B PCR Not Detected    Narrative:      Fact sheet for providers: https://www.fda.gov/media/721258/download    Fact sheet for patients: https://www.fda.gov/media/128010/download    Test performed by PCR.          MRI Hip Right With & Without Contrast    Result Date: 3/13/2022  MRI HIP RIGHT W WO CONTRAST Date of Exam: 3/13/2022 3:18 EDT Indication: Hip pain, chronic, gout suspected.  Recent aspiration 3/11/2022. White blood cells. No growth at this time. Comparison Exams: Hip radiograph 12/7/2021, CT the abdomen pelvis 3/11/2022 Technique: Prior to and after uneventful administration of 17 mL MultiHance contrast, multiplanar multisequence images of the pelvis performed according to routine pelvis MRI protocol. FINDINGS: There is severe end-stage degenerative change of the right hip with a large joint effusion and osseous destruction and remodeling of the femoral head and remodeling of the acetabulum. There are large intra-articular ossified bodies. A large fragment seen  in the medial intra-articular space measures up to 2.4 cm. There is edema involving the femoral head and acetabulum on the left. There is a moderate to large joint effusion with peripheral enhancement of the synovium. There is severe progression of these changes since 2019. There is mild edema in the right adductor muscles and right vastus lateralis and intermedius muscles proximally. Mild edema in the gluteus medius muscle proximally on the right. There is severe joint space narrowing of the left hip with small osteophytes and small joint effusion and mild subchondral edema. There is an intra-articular body in the inferior medial joint space which is ossified and measures approximately 9 mm maximally. Degenerative labral tearing is present. The sacroiliac joints are normal in appearance. There is degenerative change of the pubic  symphysis. There is partial-thickness tearing of the left hamstring muscle origin involving the sigmoid none number necrosis component which is age indeterminate. No free fluid is seen in the pelvis. There are no pathologically enlarged lymph nodes. There are no soft tissue masses.     Impression: 1.There is end-stage arthritis of the right hip with osseous remodeling of the femur and acetabulum and with large intra-articular loose bodies and large joint effusion with probable synovitis. End-stage avascular necrosis is favored over septic arthritis. Correlation with the aspiration is recommended. 2.There is severe arthritis of left hip with small joint effusion and ossified intra-articular body. 3.There is degenerative change of the pubic symphysis. 4.There is mild edema in the right gluteus medius muscle and right adductor and quadriceps muscles. 5.There is age indeterminate partial thickness tearing of the left hamstring muscle origin. Electronically Signed: Chelle Hobbs MD 3/13/2022 10:25 EDT      Results for orders placed during the hospital encounter of 07/31/21    Adult Transthoracic Echo Complete W/ Cont if Necessary Per Protocol    Interpretation Summary  · Left ventricular ejection fraction appears to be 61 - 65%. Left ventricular systolic function is normal.  · The cardiac valves are structurally and functionally within normal limits.      I have reviewed the medications:  Scheduled Meds:amLODIPine, 10 mg, Oral, Q24H  cefTRIAXone, 1 g, Intravenous, Q24H  doxycycline, 100 mg, Oral, Q12H  FLUoxetine, 20 mg, Oral, Daily  lactobacillus acidophilus, 1 capsule, Oral, Daily  lisinopril, 20 mg, Oral, Q24H  methadone, 60 mg, Oral, Daily  OLANZapine, 10 mg, Oral, Daily  senna-docusate sodium, 2 tablet, Oral, BID  sodium chloride, 10 mL, Intravenous, Q12H  traZODone, 150 mg, Oral, Nightly      Continuous Infusions:   PRN Meds:.•  acetaminophen  •  senna-docusate sodium **AND** polyethylene glycol **AND** bisacodyl  **AND** bisacodyl  •  cyclobenzaprine  •  magnesium sulfate **OR** magnesium sulfate **OR** magnesium sulfate  •  melatonin  •  ondansetron  •  ondansetron  •  oxyCODONE-acetaminophen  •  potassium chloride **OR** potassium chloride **OR** potassium chloride  •  potassium chloride  •  sodium chloride  •  sodium chloride    Assessment/Plan   Assessment & Plan     Active Hospital Problems    Diagnosis  POA   • **Right hip pain [M25.551]  Yes     Priority: High   • Septic right hip (CMS/HCC) [M00.9]  Yes     Priority: High   • Hypokalemia [E87.6]  Yes     Priority: Medium   • QT prolongation [R94.31]  Yes     Priority: Medium   • Polysubstance abuse (HCC) [F19.10]  Yes     Priority: Low   • Alcohol abuse [F10.10]  Yes     Priority: Low   • Tobacco abuse [Z72.0]  Yes      Resolved Hospital Problems   No resolved problems to display.        Brief Hospital Course to date:  Frieda Flores is a 41 y.o. female with a past medical history of polysubstance abuse, prior alcohol abuse, ADHD/Bipolar disorder, and chronic right hip pain with septic arthritis s/p washout by Dr. Gusman in 2021 who presented to the ED complaining of recurrent right hip and low back pain that has worsened over the last few months to the point where she can no longer bear weight. She was admitted to our service and Ortho and ID were consulted.    Plan:    Right Hip/Low Back pain  H/o right hip septic arthritis s/p washout 7/21  -- joint aspiration from L knee and right hip are not c/w septic joint.  Cultures NGTD  -- MRI hip and L spine unremarkable for acute issues  -- ID following.  Ortho has now signed off  -- continue Ceftriaxone and PO Doxycycline for time being per Dr. Wilhelm. Can transition to PO cephalosporin when/if going to rehab vs when she is d/c'd home  --PT/OT recommend inpatient rehab    Hypokalemia  --replaced per protocol    Polysubstance abuse  -- UDS positive for Cocaine, Methamphetamine, Methadone and THC  -- On chronic Methadone.  Continue- per pt, current dosage is not her home dose, but, according to MAR, it is  -- continue Percocet, stopped IV Morphine  -- Addiction Medicine following    Mood disorder  -- continue Olanzapine     QTc Prolongation  -- EKG shows QTc of 491.  Likely d/t Methadone.  Will monitor for now and avoid other QTc prolonging medications        DVT prophylaxis:  Mechanical DVT prophylaxis orders are present.       AM-PAC 6 Clicks Score (PT): 19 (03/12/22 2016)    Disposition: I expect the patient to be discharged to rehab when bed available    CODE STATUS:   Code Status and Medical Interventions:   Ordered at: 03/11/22 0401     Level Of Support Discussed With:    Patient     Code Status (Patient has no pulse and is not breathing):    CPR (Attempt to Resuscitate)     Medical Interventions (Patient has pulse or is breathing):    Full Support       Julia Ambriz MD  03/15/22

## 2022-03-15 NOTE — PROGRESS NOTES
[facility] Progress Note        Antibiotics:  Anti-Infectives (From admission, onward)    Ordered     Dose/Rate Route Frequency Start Stop    03/14/22 0852  cefTRIAXone (ROCEPHIN) 1 g/100 mL 0.9% NS (MBP)        Ordering Provider: Broderick Wilhelm MD    1 g  over 30 Minutes Intravenous Every 24 Hours 03/14/22 1000 03/19/22 0959    03/14/22 0900  doxycycline (MONODOX) capsule 100 mg        Ordering Provider: Broderick Wilhelm MD    100 mg Oral Every 12 Hours Scheduled 03/14/22 1000 03/21/22 0859    03/11/22 0403  piperacillin-tazobactam (ZOSYN) 4.5 g in iso-osmotic dextrose 100 mL IVPB (premix)        Ordering Provider: Broderick Calvo RPH    4.5 g  over 30 Minutes Intravenous Once 03/11/22 0445 03/11/22 0614          CC: right hip/left knee/low back pain    HPI:      Patient is a 41 y.o.  Yr old female with history of polysubstance abuse with prior cocaine/heroin/methamphetamine/THC, chronic hepatitis C and PTSD/bipolar disease; treated for right septic hip with MSSA in September 2021, surgery at that time by Dr. Gusman and subsequently transitioned to [facility], abnormal LFTs with empiric change from Zosyn to cefazolin with improvement although unclear if related to penicillin class versus hep C or combination.  Noncompliant with follow-up.  Apparently plans to be seen at [facility] regarding possible right hip surgery in the future but specifics of that are unclear.  She reports being in care home recently, lost her spot in methadone clinic and reverted to injection drug abuse approximately 1 week prior to admission.  After injection had developed worsening right hip and left knee pain, worsening low back pain and ultimately presented to [facility] on March 11.     Aside from injection drug abuse, she denies any other specific exposures. No raw or undercooked food.  No unpasteurized milk or milk products.  No animal insect or arthropod exposure.  No tick bites.  No outdoor camping or hunting exposure.   No travel exposure.  No ill exposure.  No history of TB or TB exposure.   Denies a history of MRSA/VRE and no history of C. difficile or ESBL/KPC  Organisms.     3/15/22  sleepy;  Per nursing, right  Hip and left knee  pain constant, sharp, nonradiating, worse with movement and unable to bear weight.   pain 4/10 ; recent urinary frequency , now better and no new urinary/vaginal symptoms      Acute on chronic lumbar back pain, constant, sharp, worse with movement, somewhat better with pain meds but not completely relieved and 5 out of 10 in severity at present.  She denies any new focal weakness or numbness.  No bowel or bladder incontinence.        Denies headache photophobia or neck stiffness.  No shortness of breath cough or hemoptysis.  No nausea vomiting diarrhea or abdominal pain.  No dysuria hematuria or pyuria.     HCG negative        ROS:     3/15/22  Per nursing; No f/c/s. No n/v/d. No rash. No new ADR to Abx.       Constitutional--subjective fevers and chills.  Appetite diminished with fatigue  Heent-- No new vision, hearing or throat complaints.  No epistaxis or oral sores.  Denies odynophagia or dysphagia.  No flashers, floaters or eye pain. No odynophagia or dysphagia. No headache, photophobia or neck stiffness.  CV-- No chest pain, palpitation or syncope  Resp-- No SOB/cough/Hemoptysis  GI- No nausea, vomiting, or diarrhea.  No hematochezia, melena, or hematemesis. Denies jaundice or chronic liver disease.  -- at present No dysuria, hematuria, or flank pain.  Denies hesitancy, urgency or flank pain.  Lymph- no swollen lymph nodes in neck/axilla or groin.   Heme- No active bruising or bleeding; no Hx of DVT or PE.  MS-- no swelling or pain in the bones or joints of arms/legs aside from above.     Neuro-- No acute focal weakness or numbness in the arms or legs.  No seizures.     Full 12 point review of systems reviewed and negative otherwise for acute complaints, except for above      PE:  "nursing/chaperone present  /71 (BP Location: Right arm, Patient Position: Lying)   Pulse 60   Temp 97.3 °F (36.3 °C) (Oral)   Resp 16   Ht 160 cm (63\")   Wt 83.8 kg (184 lb 12.8 oz)   LMP 02/17/2022   SpO2 92%   Breastfeeding No   BMI 32.74 kg/m²       GENERAL: sleepy; NAD  HEENT: Normocephalic, atraumatic.  No conjunctival injection. No icterus. Oropharynx clear without evidence of thrush or exudate. No evidence of peridontal disease.    NECK: Supple without nuchal rigidity. No mass.  HEART: RRR; No murmur, rubs, gallops.   LUNGS: Clear to auscultation bilaterally without wheezing, rales, rhonchi. Normal respiratory effort. Nonlabored. No dullness.  ABDOMEN: Soft, nontender, nondistended. Positive bowel sounds. No rebound or guarding. NO mass or HSM.  EXT:  No cyanosis, clubbing . No cord.   MSK: FROM without joint effusions noted arms/legs.    SKIN: Warm and dry without cutaneous eruptions on Inspection/palpation.    NEURO: Oriented to PPT.  She will not cooperate with a detailed motor or sensory exam given above pain     She will not allow range of motion exam at the right hip.  No obvious warmth or redness there.  Prior incision noted with no dehiscence or drainage.  No discrete mass bulge or fluctuance.  No crepitus or bulla     Spine with no redness bulge fluctuance or point tenderness; diffusely tender in lumbar spine.  No thoracic or cervical spine tenderness.     Left knee edematous compared to right with possible  Effusion, no warmth ,  no erythema; no discrete fluctuance but exam limited with pain and patient not cooperative with a detailed exam     Track marks from drug use noted in upper extremities     IV without obvious redness or drainage    Laboratory Data    Results from last 7 days   Lab Units 03/14/22  0323 03/13/22  0456 03/12/22  0530   WBC 10*3/mm3 3.92 3.98 6.21   HEMOGLOBIN g/dL 11.0* 11.1* 11.4*   HEMATOCRIT % 34.6 34.9 34.4   PLATELETS 10*3/mm3 238 217 221     Results from " last 7 days   Lab Units 03/14/22  0323   SODIUM mmol/L 140   POTASSIUM mmol/L 4.5   CHLORIDE mmol/L 107   CO2 mmol/L 27.0   BUN mg/dL 7   CREATININE mg/dL 0.52*   GLUCOSE mg/dL 106*   CALCIUM mg/dL 8.5*     Results from last 7 days   Lab Units 03/12/22  0334   ALK PHOS U/L 88   BILIRUBIN mg/dL 0.3   ALT (SGPT) U/L 17   AST (SGOT) U/L 15     Results from last 7 days   Lab Units 03/10/22  2357   SED RATE mm/hr 13     Results from last 7 days   Lab Units 03/10/22  2357   CRP mg/dL 4.44*       Estimated Creatinine Clearance: 147.6 mL/min (A) (by C-G formula based on SCr of 0.52 mg/dL (L)).      Microbiology:      Radiology:  Imaging Results (Last 72 Hours)     Procedure Component Value Units Date/Time    MRI Hip Right With & Without Contrast [[id number]] Collected: 03/13/22 1016     Updated: 03/13/22 1027    Narrative:      MRI HIP RIGHT W WO CONTRAST    Date of Exam: 3/13/2022 3:18 EDT    Indication: Hip pain, chronic, gout suspected.  Recent aspiration 3/11/2022. White blood cells. No growth at this time. Comparison Exams: Hip radiograph 12/7/2021, CT the abdomen pelvis 3/11/2022    Technique: Prior to and after uneventful administration of 17 mL MultiHance contrast, multiplanar multisequence images of the pelvis performed according to routine pelvis MRI protocol.    FINDINGS:  There is severe end-stage degenerative change of the right hip with a large joint effusion and osseous destruction and remodeling of the femoral head and remodeling of the acetabulum. There are large intra-articular ossified bodies. A large fragment seen   in the medial intra-articular space measures up to 2.4 cm. There is edema involving the femoral head and acetabulum on the left. There is a moderate to large joint effusion with peripheral enhancement of the synovium. There is severe progression of   these changes since 2019.    There is mild edema in the right adductor muscles and right vastus lateralis and intermedius muscles proximally.  Mild edema in the gluteus medius muscle proximally on the right.    There is severe joint space narrowing of the left hip with small osteophytes and small joint effusion and mild subchondral edema. There is an intra-articular body in the inferior medial joint space which is ossified and measures approximately 9 mm   maximally. Degenerative labral tearing is present.    The sacroiliac joints are normal in appearance. There is degenerative change of the pubic symphysis.    There is partial-thickness tearing of the left hamstring muscle origin involving the sigmoid none number necrosis component which is age indeterminate.    No free fluid is seen in the pelvis. There are no pathologically enlarged lymph nodes. There are no soft tissue masses.      Impression:      1.There is end-stage arthritis of the right hip with osseous remodeling of the femur and acetabulum and with large intra-articular loose bodies and large joint effusion with probable synovitis. End-stage avascular necrosis is favored over septic   arthritis. Correlation with the aspiration is recommended.  2.There is severe arthritis of left hip with small joint effusion and ossified intra-articular body.  3.There is degenerative change of the pubic symphysis.  4.There is mild edema in the right gluteus medius muscle and right adductor and quadriceps muscles.  5.There is age indeterminate partial thickness tearing of the left hamstring muscle origin.    Electronically Signed: Chelle Hobbs MD 3/13/2022 10:25 EDT    MRI Lumbar Spine With & Without Contrast [[id number]] Collected: 03/13/22 1000     Updated: 03/13/22 1006    Narrative:      MRI LUMBAR SPINE W WO CONTRAST-     Date of Exam: 3/13/2022 1:50 AM     Indication: Low back pain, infection suspected.     Comparison Exams: None available.     Technique: Routine multiplanar multisequence MR imaging of the lumbar  spine was performed before and after the administration of 17 cc  MultiHance IV gadolinium  contrast.     FINDINGS:  There is normal height, alignment, signal intensity of the lumbar  vertebral bodies.  No bone marrow edema.     The spinal cord terminates at theT12 level with normal signal seen  within the conus.  There is fatty infiltration of the filum terminale  near the L2 level.     The paraspinal soft tissues are within normal limits.     Postcontrast images demonstrate no pathologic contrast enhancement.        Axial images demonstrate:     T12/L1:No significant disc bulge.  There are mild degenerative facet  changes bilaterally.  There is no spinal canal stenosis or neural  foraminal narrowing.     L1/2:No significant disc bulge.  There are mild degenerative facet  changes bilaterally.  There is no spinal canal stenosis or neural  foraminal narrowing.     L2/3:No significant disc bulge.  There are mild degenerative facet  changes bilaterally.  There is no spinal canal stenosis or neural  foraminal narrowing.     L3/4:No significant disc bulge.  There are mild degenerative facet  changes bilaterally.  There is no spinal canal stenosis or neural  foraminal narrowing.     L4/5:Mild diffuse posterior disc bulge and mild degenerative facet  change.  There is no spinal canal stenosis.  There is mild bilateral  neural foraminal narrowing.  There is a 6 mm cyst arising from the  posterior aspect of the left facet joint.     L5/S1:Mild diffuse posterior disc bulge and mild degenerative facet  change but no spinal canal stenosis or neural foraminal narrowing.          Impression:         1.  Degenerative disc disease and degenerative facet change at the L4/5  and L5/S1 levels with mild bilateral neural foraminal narrowing at the  L4/5 level.  No spinal canal stenosis.  2.  No pathologic contrast enhancement.  3.  Incidental note is made of fatty infiltration of the filum terminale  near the L2 level.     This report was finalized on 3/13/2022 10:03 AM by Rob Smith MD.               Impression:          --Acute subjective constitutional symptoms with low-grade temp 99, active injection drug abuse , polysubstance with acute right hip/lumbar back pain out of proportion to baseline and acute left knee pain/swelling.  She is at risk for bloodstream infection and hematogenous spread with risk for metastatic foci of involvement including risk for septic joints/osteomyelitis, endovascular focus etc.   however,  blood cultures negative so far and CT/MRI's/joint aspirates NOT c/w infection so far;  she denies cough and respiratory exam nonfocal.  No acute inflammatory process on abdominal CT. Mild urinary changes and mixed urine culture with transient symptoms, now improved with empiric abx and urine culture may have been contaminated; no fever and tapered abx    **right hip and left knee aspirate NOT c/w septic joints by cell counts;  Cultures negative so far    --pyuria/bacteruria; empiric treatment for UTI although urine difficult to interpret with epi's/contamination; repeat urine with pyuria/nitrite pos, also possible contamination with epi's,  and pending otherwise     --Acute/chronic right hip pain with abrupt worsening over last week, recent injection drug abuse and risk for new versus recurrent infection.      **aspirate NOT c/w septic joints by cell counts;  Cultures negative so far     --Acute/chronic lumbar back pain with worsening over 1 week and recent injection drug abuse with risk for metastatic seeding and spinal/paraspinal infection risk.  MRI lumbar spine no infectious change per radiology     --Acute left knee pain/swelling, recent injection drug abuse and risk for septic joint.     **aspirate NOT c/w septic joints by cell counts;  Cultures negative so far     --Right septic hip arthritis in August/September 2021 with surgery by Dr. Gusman     --Chronic hepatitis C.  I do not treat viral hepatitis as a part of my practice.  If you desire further input regarding this during this hospitalization  you should give consideration to gastroenterology consultation.     --Polysubstance abuse as previously noted.  Strategy for minimizing risk of withdrawal and additional supportive measures at discretion of medicine team     --Possible Zosyn adverse drug reaction with abnormal LFTs in 2021.  Not clear-cut but trying to avoid.  Subsequently tolerated cephalosporin class antibiotics with improvements in liver tests     --Abnormal CPK prior to daptomycin.  Monitor.     --PTSD/bipolar disease     --Medical noncompliance           PLAN:       --IV rocephin/doxy       --Check/review labs cultures and scans     --Partial history per nursing staff     --Discussed with microbiology     --Discussed with Dr. Ambriz      --Highly complex set of issues with high risk for further serious morbidity and other serious sequela     --HCG negative          Broderick Wilhelm MD  3/15/2022

## 2022-03-15 NOTE — CASE MANAGEMENT/SOCIAL WORK
Continued Stay Note  [facility]     Patient Name: Frieda Flores  MRN: [id number]  Today's Date: 3/15/2022    Admit Date: 3/11/2022     Discharge Plan     Row Name 03/15/22 1300       Plan    Plan Ongoing    Plan Comments Met with Frieda today, she is in better spirits at this time.  Continues to c/o pain to leg, knee, and hip- she was tearful at times.     She states that she was in the process of pursuing RAFA[facility] treatment at the [facility]- she knows the protocol for this disposition- she has to call every Wednesday to ensure a place on the list.      Current dispo plan is pending- likely home.  Frieda is well known to our service line and she has our addiction outreach number.  Will continue to follow.    I supplied her with some clothing yesterday as well.    Appreciate SW help with Frieda's CPS SW.               Discharge Codes    No documentation.                     Mary Pollock, RN ,BSN   Addiction Coordinator

## 2022-03-15 NOTE — CASE MANAGEMENT/SOCIAL WORK
Continued Stay Note  [facility]     Patient Name: Frieda Flores  MRN: [id number]  Today's Date: 3/15/2022    Admit Date: 3/11/2022     Discharge Plan     Row Name 03/15/22 1328       Plan    Plan Home with     Patient/Family in Agreement with Plan yes    Plan Comments Spoke with pt. at length about dc plans. Due to her current and past use of street drugs, she will not be able to go to a IRF for PT/OT because they are not secured units. Can set her up with HH for SN/PT/OT. She states her sister can stay with her to help. Strongly encouraged her to mobilize. Her goal is to go to the [facility].States she understands and will try.    Final Discharge Disposition Code 06 - home with home health care    Row Name 03/15/22 1300       Plan    Plan Ongoing    Plan Comments Met with Frieda today, she is in better spirits at this time.  Continues to c/o pain to leg, knee, and hip- she was tearful at times. She states that she was in the process of pursuing RAFA[facility] treatment at the [facility]- she knows the protocol for this dispositon- she has to call every Wednesday to ensure a place on the list.  Current dispo plan is pending- likely home.  Frieda is well known to our service line and she has our addiciton outreach number.  Will continue to follow.  I suppiled her with some clothing yesterday as well.               Discharge Codes    No documentation.                     La Carroll RN

## 2022-03-16 LAB
BACTERIA FLD CULT: NORMAL
BACTERIA FLD CULT: NORMAL
BACTERIA SPEC AEROBE CULT: NORMAL
BACTERIA SPEC AEROBE CULT: NORMAL
BACTERIA SPEC ANAEROBE CULT: NORMAL
BACTERIA SPEC ANAEROBE CULT: NORMAL
GRAM STN SPEC: NORMAL

## 2022-03-16 PROCEDURE — 97530 THERAPEUTIC ACTIVITIES: CPT

## 2022-03-16 PROCEDURE — 97535 SELF CARE MNGMENT TRAINING: CPT

## 2022-03-16 PROCEDURE — 99232 SBSQ HOSP IP/OBS MODERATE 35: CPT | Performed by: HOSPITALIST

## 2022-03-16 PROCEDURE — 25010000002 CEFTRIAXONE PER 250 MG: Performed by: INTERNAL MEDICINE

## 2022-03-16 RX ADMIN — DOXYCYCLINE 100 MG: 100 CAPSULE ORAL at 09:30

## 2022-03-16 RX ADMIN — OXYCODONE HYDROCHLORIDE AND ACETAMINOPHEN 1 TABLET: 10; 325 TABLET ORAL at 09:31

## 2022-03-16 RX ADMIN — LISINOPRIL 20 MG: 20 TABLET ORAL at 09:31

## 2022-03-16 RX ADMIN — SENNOSIDES AND DOCUSATE SODIUM 2 TABLET: 50; 8.6 TABLET ORAL at 21:25

## 2022-03-16 RX ADMIN — SODIUM CHLORIDE 1 G: 900 INJECTION INTRAVENOUS at 09:30

## 2022-03-16 RX ADMIN — OLANZAPINE 10 MG: 5 TABLET, FILM COATED ORAL at 09:30

## 2022-03-16 RX ADMIN — Medication 1 CAPSULE: at 09:30

## 2022-03-16 RX ADMIN — DOXYCYCLINE 100 MG: 100 CAPSULE ORAL at 21:25

## 2022-03-16 RX ADMIN — Medication 10 ML: at 09:29

## 2022-03-16 RX ADMIN — SENNOSIDES AND DOCUSATE SODIUM 2 TABLET: 50; 8.6 TABLET ORAL at 09:31

## 2022-03-16 RX ADMIN — FLUOXETINE HYDROCHLORIDE 20 MG: 20 CAPSULE ORAL at 09:31

## 2022-03-16 RX ADMIN — AMLODIPINE BESYLATE 10 MG: 10 TABLET ORAL at 09:31

## 2022-03-16 RX ADMIN — OXYCODONE HYDROCHLORIDE AND ACETAMINOPHEN 1 TABLET: 10; 325 TABLET ORAL at 04:28

## 2022-03-16 RX ADMIN — METHADONE HYDROCHLORIDE 60 MG: 10 TABLET ORAL at 09:29

## 2022-03-16 RX ADMIN — OXYCODONE HYDROCHLORIDE AND ACETAMINOPHEN 1 TABLET: 10; 325 TABLET ORAL at 21:25

## 2022-03-16 RX ADMIN — POLYETHYLENE GLYCOL 3350 17 G: 17 POWDER, FOR SOLUTION ORAL at 09:29

## 2022-03-16 RX ADMIN — Medication 10 ML: at 21:26

## 2022-03-16 RX ADMIN — TRAZODONE HYDROCHLORIDE 150 MG: 100 TABLET ORAL at 21:25

## 2022-03-16 RX ADMIN — OXYCODONE HYDROCHLORIDE AND ACETAMINOPHEN 1 TABLET: 10; 325 TABLET ORAL at 13:51

## 2022-03-16 NOTE — PROGRESS NOTES
[facility] Progress Note        Antibiotics:  Anti-Infectives (From admission, onward)    Ordered     Dose/Rate Route Frequency Start Stop    03/14/22 0852  cefTRIAXone (ROCEPHIN) 1 g/100 mL 0.9% NS (MBP)        Ordering Provider: Broderick Wilhelm MD    1 g  over 30 Minutes Intravenous Every 24 Hours 03/14/22 1000 03/19/22 0959    03/14/22 0900  doxycycline (MONODOX) capsule 100 mg        Ordering Provider: Broderick Wilhelm MD    100 mg Oral Every 12 Hours Scheduled 03/14/22 1000 03/21/22 0859    03/11/22 0403  piperacillin-tazobactam (ZOSYN) 4.5 g in iso-osmotic dextrose 100 mL IVPB (premix)        Ordering Provider: Broderick Calvo RPH    4.5 g  over 30 Minutes Intravenous Once 03/11/22 0445 03/11/22 0614          CC: right hip/left knee/low back pain    HPI:      Patient is a 41 y.o.  Yr old female with history of polysubstance abuse with prior cocaine/heroin/methamphetamine/THC, chronic hepatitis C and PTSD/bipolar disease; treated for right septic hip with MSSA in September 2021, surgery at that time by Dr. Gusman and subsequently transitioned to [facility], abnormal LFTs with empiric change from Zosyn to cefazolin with improvement although unclear if related to penicillin class versus hep C or combination.  Noncompliant with follow-up.  Apparently plans to be seen at [facility] regarding possible right hip surgery in the future but specifics of that are unclear.  She reports being in shelter recently, lost her spot in methadone clinic and reverted to injection drug abuse approximately 1 week prior to admission.  After injection had developed worsening right hip and left knee pain, worsening low back pain and ultimately presented to [facility] on March 11.     Aside from injection drug abuse, she denies any other specific exposures. No raw or undercooked food.  No unpasteurized milk or milk products.  No animal insect or arthropod exposure.  No tick bites.  No outdoor camping or hunting exposure.   No travel exposure.  No ill exposure.  No history of TB or TB exposure.   Denies a history of MRSA/VRE and no history of C. difficile or ESBL/KPC  Organisms.     3/16/22  sleepy;  Per nursing, right  Hip and left knee  pain ongoing, nonradiating, worse with movement ;  pain 4/10 but intensity fluctuates; recent urinary frequency , now better and no new urinary/vaginal symptoms      Acute on chronic lumbar back pain, constant, sharp, worse with movement, somewhat better with pain meds but not completely relieved and 5 out of 10 in severity at present.  She denies any new focal weakness or numbness.  No bowel or bladder incontinence.        Denies headache photophobia or neck stiffness.  No shortness of breath cough or hemoptysis.  No nausea vomiting diarrhea or abdominal pain.  No dysuria hematuria or pyuria.     HCG negative        ROS:     3/16/22  Per nursing; No f/c/s. No n/v/d. No rash. No new ADR to Abx.       Constitutional--subjective fevers and chills.  Appetite diminished with fatigue  Heent-- No new vision, hearing or throat complaints.  No epistaxis or oral sores.  Denies odynophagia or dysphagia.  No flashers, floaters or eye pain. No odynophagia or dysphagia. No headache, photophobia or neck stiffness.  CV-- No chest pain, palpitation or syncope  Resp-- No SOB/cough/Hemoptysis  GI- No nausea, vomiting, or diarrhea.  No hematochezia, melena, or hematemesis. Denies jaundice or chronic liver disease.  -- at present No dysuria, hematuria, or flank pain.  Denies hesitancy, urgency or flank pain.  Lymph- no swollen lymph nodes in neck/axilla or groin.   Heme- No active bruising or bleeding; no Hx of DVT or PE.  MS-- no swelling or pain in the bones or joints of arms/legs aside from above.     Neuro-- No acute focal weakness or numbness in the arms or legs.  No seizures.     Full 12 point review of systems reviewed and negative otherwise for acute complaints, except for above      PE: nursing/chaperone  "present  /72 (BP Location: Right arm, Patient Position: Lying)   Pulse 79   Temp 98.3 °F (36.8 °C) (Oral)   Resp 18   Ht 160 cm (63\")   Wt 83.8 kg (184 lb 12.8 oz)   LMP 02/17/2022   SpO2 91%   Breastfeeding No   BMI 32.74 kg/m²       GENERAL: sleepy; NAD  HEENT: Normocephalic, atraumatic.  No conjunctival injection. No icterus. Oropharynx clear without evidence of thrush or exudate. No evidence of peridontal disease.    NECK: Supple without nuchal rigidity. No mass.  HEART: RRR; No murmur, rubs, gallops.   LUNGS: Clear to auscultation bilaterally without wheezing, rales, rhonchi. Normal respiratory effort. Nonlabored. No dullness.  ABDOMEN: Soft, nontender, nondistended. Positive bowel sounds. No rebound or guarding. NO mass or HSM.  EXT:  No cyanosis, clubbing . No cord.   MSK: FROM without joint effusions noted arms/legs.    SKIN: Warm and dry without cutaneous eruptions on Inspection/palpation.    NEURO: Oriented to PPT.  She will not cooperate with a detailed motor or sensory exam given above pain     She will not allow range of motion exam at the right hip.  No obvious warmth or redness there.  Prior incision noted with no dehiscence or drainage.  No discrete mass bulge or fluctuance.  No crepitus or bulla     Spine with no redness bulge fluctuance or point tenderness; diffusely tender in lumbar spine.  No thoracic or cervical spine tenderness.     Left knee edematous compared to right with possible  Effusion, no warmth ,  no erythema; no discrete fluctuance but exam limited with pain and patient not cooperative with a detailed exam     Track marks from drug use noted in upper extremities     IV without obvious redness or drainage    Laboratory Data    Results from last 7 days   Lab Units 03/14/22  0323 03/13/22  0456 03/12/22  0530   WBC 10*3/mm3 3.92 3.98 6.21   HEMOGLOBIN g/dL 11.0* 11.1* 11.4*   HEMATOCRIT % 34.6 34.9 34.4   PLATELETS 10*3/mm3 238 217 221     Results from last 7 days   Lab " Units 03/14/22  0323   SODIUM mmol/L 140   POTASSIUM mmol/L 4.5   CHLORIDE mmol/L 107   CO2 mmol/L 27.0   BUN mg/dL 7   CREATININE mg/dL 0.52*   GLUCOSE mg/dL 106*   CALCIUM mg/dL 8.5*     Results from last 7 days   Lab Units 03/12/22  0334   ALK PHOS U/L 88   BILIRUBIN mg/dL 0.3   ALT (SGPT) U/L 17   AST (SGOT) U/L 15     Results from last 7 days   Lab Units 03/10/22  2357   SED RATE mm/hr 13     Results from last 7 days   Lab Units 03/10/22  2357   CRP mg/dL 4.44*       Estimated Creatinine Clearance: 147.6 mL/min (A) (by C-G formula based on SCr of 0.52 mg/dL (L)).      Microbiology:      Radiology:  Imaging Results (Last 72 Hours)     Procedure Component Value Units Date/Time    MRI Hip Right With & Without Contrast [[id number]] Collected: 03/13/22 1016     Updated: 03/13/22 1027    Narrative:      MRI HIP RIGHT W WO CONTRAST    Date of Exam: 3/13/2022 3:18 EDT    Indication: Hip pain, chronic, gout suspected.  Recent aspiration 3/11/2022. White blood cells. No growth at this time. Comparison Exams: Hip radiograph 12/7/2021, CT the abdomen pelvis 3/11/2022    Technique: Prior to and after uneventful administration of 17 mL MultiHance contrast, multiplanar multisequence images of the pelvis performed according to routine pelvis MRI protocol.    FINDINGS:  There is severe end-stage degenerative change of the right hip with a large joint effusion and osseous destruction and remodeling of the femoral head and remodeling of the acetabulum. There are large intra-articular ossified bodies. A large fragment seen   in the medial intra-articular space measures up to 2.4 cm. There is edema involving the femoral head and acetabulum on the left. There is a moderate to large joint effusion with peripheral enhancement of the synovium. There is severe progression of   these changes since 2019.    There is mild edema in the right adductor muscles and right vastus lateralis and intermedius muscles proximally. Mild edema in the  gluteus medius muscle proximally on the right.    There is severe joint space narrowing of the left hip with small osteophytes and small joint effusion and mild subchondral edema. There is an intra-articular body in the inferior medial joint space which is ossified and measures approximately 9 mm   maximally. Degenerative labral tearing is present.    The sacroiliac joints are normal in appearance. There is degenerative change of the pubic symphysis.    There is partial-thickness tearing of the left hamstring muscle origin involving the sigmoid none number necrosis component which is age indeterminate.    No free fluid is seen in the pelvis. There are no pathologically enlarged lymph nodes. There are no soft tissue masses.      Impression:      1.There is end-stage arthritis of the right hip with osseous remodeling of the femur and acetabulum and with large intra-articular loose bodies and large joint effusion with probable synovitis. End-stage avascular necrosis is favored over septic   arthritis. Correlation with the aspiration is recommended.  2.There is severe arthritis of left hip with small joint effusion and ossified intra-articular body.  3.There is degenerative change of the pubic symphysis.  4.There is mild edema in the right gluteus medius muscle and right adductor and quadriceps muscles.  5.There is age indeterminate partial thickness tearing of the left hamstring muscle origin.    Electronically Signed: Chelle Hobbs MD 3/13/2022 10:25 EDT    MRI Lumbar Spine With & Without Contrast [[id number]] Collected: 03/13/22 1000     Updated: 03/13/22 1006    Narrative:      MRI LUMBAR SPINE W WO CONTRAST-     Date of Exam: 3/13/2022 1:50 AM     Indication: Low back pain, infection suspected.     Comparison Exams: None available.     Technique: Routine multiplanar multisequence MR imaging of the lumbar  spine was performed before and after the administration of 17 cc  MultiHance IV gadolinium contrast.      FINDINGS:  There is normal height, alignment, signal intensity of the lumbar  vertebral bodies.  No bone marrow edema.     The spinal cord terminates at theT12 level with normal signal seen  within the conus.  There is fatty infiltration of the filum terminale  near the L2 level.     The paraspinal soft tissues are within normal limits.     Postcontrast images demonstrate no pathologic contrast enhancement.        Axial images demonstrate:     T12/L1:No significant disc bulge.  There are mild degenerative facet  changes bilaterally.  There is no spinal canal stenosis or neural  foraminal narrowing.     L1/2:No significant disc bulge.  There are mild degenerative facet  changes bilaterally.  There is no spinal canal stenosis or neural  foraminal narrowing.     L2/3:No significant disc bulge.  There are mild degenerative facet  changes bilaterally.  There is no spinal canal stenosis or neural  foraminal narrowing.     L3/4:No significant disc bulge.  There are mild degenerative facet  changes bilaterally.  There is no spinal canal stenosis or neural  foraminal narrowing.     L4/5:Mild diffuse posterior disc bulge and mild degenerative facet  change.  There is no spinal canal stenosis.  There is mild bilateral  neural foraminal narrowing.  There is a 6 mm cyst arising from the  posterior aspect of the left facet joint.     L5/S1:Mild diffuse posterior disc bulge and mild degenerative facet  change but no spinal canal stenosis or neural foraminal narrowing.          Impression:         1.  Degenerative disc disease and degenerative facet change at the L4/5  and L5/S1 levels with mild bilateral neural foraminal narrowing at the  L4/5 level.  No spinal canal stenosis.  2.  No pathologic contrast enhancement.  3.  Incidental note is made of fatty infiltration of the filum terminale  near the L2 level.     This report was finalized on 3/13/2022 10:03 AM by Rob Smith MD.               Impression:         --Acute  subjective constitutional symptoms with low-grade temp 99, active injection drug abuse , polysubstance with acute right hip/lumbar back pain out of proportion to baseline and acute left knee pain/swelling.  She is at risk for bloodstream infection and hematogenous spread with risk for metastatic foci of involvement including risk for septic joints/osteomyelitis, endovascular focus etc.   however,  blood cultures negative so far and CT/MRI's/joint aspirates NOT c/w infection so far;  she denies cough and respiratory exam nonfocal.  No acute inflammatory process on abdominal CT. Mild urinary changes and mixed urine culture with transient symptoms, now improved with empiric abx and urine culture may have been contaminated; no fever and tapered abx    **right hip and left knee aspirate NOT c/w septic joints by cell counts;  Cultures negative so far    --pyuria/bacteruria; empiric treatment for UTI although urine difficult to interpret with epi's/contamination; repeat urine with pyuria/nitrite pos, also possible contamination with epi's,  and pending otherwise     --Acute/chronic right hip pain with abrupt worsening over last week, recent injection drug abuse and risk for new versus recurrent infection.      **aspirate NOT c/w septic joints by cell counts;  Cultures negative so far     --Acute/chronic lumbar back pain with worsening over 1 week and recent injection drug abuse with risk for metastatic seeding and spinal/paraspinal infection risk.  MRI lumbar spine no infectious change per radiology     --Acute left knee pain/swelling, recent injection drug abuse and risk for septic joint.     **aspirate NOT c/w septic joints by cell counts;  Cultures negative so far     --Right septic hip arthritis in August/September 2021 with surgery by Dr. Gusman     --Chronic hepatitis C.  I do not treat viral hepatitis as a part of my practice.  If you desire further input regarding this during this hospitalization you should give  consideration to gastroenterology consultation.     --Polysubstance abuse as previously noted.  Strategy for minimizing risk of withdrawal and additional supportive measures at discretion of medicine team     --Possible Zosyn adverse drug reaction with abnormal LFTs in 2021.  Not clear-cut but trying to avoid.  Subsequently tolerated cephalosporin class antibiotics with improvements in liver tests     --Abnormal CPK prior to daptomycin.  Monitor.     --PTSD/bipolar disease     --Medical noncompliance           PLAN:       --IV rocephin/doxy       --Check/review labs cultures and scans     --Partial history per nursing staff     --Discussed with microbiology     --Discussed with Dr. Ambriz      --Highly complex set of issues with high risk for further serious morbidity and other serious sequela     --HCG negative       **I do not anticipate IV antibiotics  At discharge so far       Broderick Wilhelm MD  3/16/2022

## 2022-03-16 NOTE — PLAN OF CARE
Goal Outcome Evaluation:  Plan of Care Reviewed With: patient        Progress: improving  Outcome Evaluation: Pt participated in ADL retraining this date including incorporation of AE for LBD d/t increased pain. Pt min A for STS w/ RW. Will continue to progress pt as tolerated per OT POC. Rec IRF at d/c.

## 2022-03-16 NOTE — PLAN OF CARE
Goal Outcome Evaluation:  Plan of Care Reviewed With: patient        Progress: improving  Outcome Evaluation: Pt showing improvement as she increased ambulation distance to 10ft with min A and RWx demonstrating antalgic gait. Pt cont to be limited by pain and is slef-limiting. Cont to progress as able. d/c rec for IRF.

## 2022-03-16 NOTE — THERAPY TREATMENT NOTE
Patient Name: Frieda Flores  : 1980    MRN: [id number]                              Today's Date: 3/16/2022       Admit Date: 3/11/2022    Visit Dx:     ICD-10-CM ICD-9-CM   1. Right hip pain  M25.551 719.45   2. Arthritis of right hip  M16.11 716.95   3. History of septic arthritis  Z87.39 V13.4   4. Hypokalemia  E87.6 276.8     Patient Active Problem List   Diagnosis   • Alcohol abuse   • Polysubstance abuse (HCC)   • Abnormal liver function tests   • Septic right hip (CMS/HCC)   • Therapeutic opioid-induced constipation (OIC)   • Postpartum care following LTCS (1 layer) 21   • Cigarette smoker   • Tobacco abuse   • Hypokalemia   • Right hip pain   • QT prolongation     Past Medical History:   Diagnosis Date   • Abdominal wall cellulitis - After  2019    Treated with IV antibiotics   • ADHD    • Alcoholism (HCC)    • Bipolar disorder (HCC)    • Chlamydia    • Chlamydia 2021   • Hepatitis C carrier ([facility])     Negative viral load   • Migraine    • Osteoarthritis    • PTSD (post-traumatic stress disorder)    • Septic right hip (CMS/HCC) 2021   • Substance abuse (HCC)    • Therapeutic opioid-induced constipation (OIC)    • Urogenital trichomoniasis      Past Surgical History:   Procedure Laterality Date   • BREAST LUMPECTOMY Right    •  SECTION Bilateral 3/9/2019    Procedure:  SECTION PRIMARY;  Surgeon: Shelley Hughes MD;  Location:  [facility];  Service: Obstetrics/Gynecology   •  SECTION N/A 2021    Procedure: Repeat C/S;  Surgeon: Fernando Pedersen MD;  Location:  [facility] OR;  Service: Obstetrics/Gynecology;  Laterality: N/A;   • INCISION AND DRAINAGE HIP Right 2021    Procedure: HIP INCISION AND DRAINAGE RIGHT;  Surgeon: Martin Gusman MD;  Location:  [facility] OR;  Service: Orthopedics;  Laterality: Right;      General Information     Row Name 22 1028          OT Time and Intention    Document Type  therapy note (daily note)  -MA     Mode of Treatment occupational therapy  -MA     Row Name 03/16/22 1028          General Information    Patient Profile Reviewed yes  -MA     Existing Precautions/Restrictions fall  -MA     Row Name 03/16/22 1028          Cognition    Orientation Status (Cognition) oriented x 3  -MA     Row Name 03/16/22 1028          Safety Issues, Functional Mobility    Safety Issues Affecting Function (Mobility) awareness of need for assistance;insight into deficits/self-awareness;safety precaution awareness;safety precautions follow-through/compliance;sequencing abilities  -MA     Impairments Affecting Function (Mobility) balance;endurance/activity tolerance;grasp;pain;strength  -MA           User Key  (r) = Recorded By, (t) = Taken By, (c) = Cosigned By    Initials Name Provider Type    Hallie Conner OT Occupational Therapist                 Mobility/ADL's     Row Name 03/16/22 1028          Transfers    Transfers sit-stand transfer  -MA     Sit-Stand [place] (Transfers) minimum assist (75% patient effort);verbal cues  -MA     Row Name 03/16/22 1028          Sit-Stand Transfer    Assistive Device (Sit-Stand Transfers) walker, front-wheeled  -MA     Row Name 03/16/22 1028          Activities of Daily Living    BADL Assessment/Intervention lower body dressing  -MA     Row Name 03/16/22 1028          Lower Body Dressing Assessment/Training    [place] Level (Lower Body Dressing) don;socks;moderate assist (50% patient effort);verbal cues  -MA     Assistive Devices (Lower Body Dressing) sock-aid  -MA     Position (Lower Body Dressing) unsupported sitting  -MA           User Key  (r) = Recorded By, (t) = Taken By, (c) = Cosigned By    Initials Name Provider Type    Hallie Conner OT Occupational Therapist               Obj/Interventions     Row Name 03/16/22 1029          Balance    Balance Assessment sitting static balance;sitting dynamic balance;standing static balance  -MA      Static Sitting Balance supervision  -MA     Dynamic Sitting Balance contact guard  -MA     Position, Sitting Balance unsupported  -MA     Static Standing Balance minimal assist;non-verbal cues (demo/gesture)  -MA     Position/Device Used, Standing Balance walker, front-wheeled  -MA     Balance Interventions sit to stand;occupation based/functional task;sitting  -MA           User Key  (r) = Recorded By, (t) = Taken By, (c) = Cosigned By    Initials Name Provider Type    Hallie Conner, MARISOL Occupational Therapist               Goals/Plan    No documentation.                Clinical Impression     Row Name 03/16/22 1030          Pain Assessment    Pain Intervention(s) Repositioned;Ambulation/increased activity  -MA     Additional Documentation Pain Scale: FACES Pre/Post-Treatment (Group)  -MA     Row Name 03/16/22 1030          Pain Scale: FACES Pre/Post-Treatment    Pain: FACES Scale, Pretreatment 6-->hurts even more  -MA     Posttreatment Pain Rating 6-->hurts even more  -MA     Pain Location lower  -MA     Pain Location - extremity  -MA     Pre/Posttreatment Pain Comment Pt c/o pain in BLEs  -MA     Row Name 03/16/22 1030          Plan of Care Review    Plan of Care Reviewed With patient  -MA     Progress improving  -MA     Outcome Evaluation Pt participated in ADL retraining this date including incorporation of AE for LBD d/t increased pain. Pt min A for STS w/ RW. Will continue to progress pt as tolerated per OT POC. Rec IRF at d/c.  -[facility] 03/16/22 1030          Therapy Assessment/Plan (OT)    Rehab Potential (OT) fair, will monitor progress closely  -MA     Criteria for Skilled Therapeutic Interventions Met (OT) yes;meets criteria;skilled treatment is necessary  -MA     Therapy Frequency (OT) daily  -MA     Row Name 03/16/22 1030          Therapy Plan Review/Discharge Plan (OT)    Anticipated Discharge Disposition (OT) inpatient rehabilitation facility  -[facility] 03/16/22 1030          Vital  Signs    Pre Systolic BP Rehab --  VSS - RN cleared OT  -MA     O2 Delivery Pre Treatment room air  -MA     O2 Delivery Intra Treatment room air  -MA     O2 Delivery Post Treatment room air  -MA     Pre Patient Position Sitting  -MA     Intra Patient Position Standing  -MA     Post Patient Position Sitting  -MA     Row Name 03/16/22 1030          Positioning and Restraints    Pre-Treatment Position sitting in chair/recliner  -MA     Post Treatment Position chair  -MA     In Chair reclined;call light within reach;encouraged to call for assist;exit alarm on;with nsg;legs elevated;heels elevated  -MA           User Key  (r) = Recorded By, (t) = Taken By, (c) = Cosigned By    Initials Name Provider Type    Hallie Conner OT Occupational Therapist               Outcome Measures     Row Name 03/16/22 1033          How much help from another is currently needed...    Putting on and taking off regular lower body clothing? 2  -MA     Bathing (including washing, rinsing, and drying) 2  -MA     Toileting (which includes using toilet bed pan or urinal) 2  -MA     Putting on and taking off regular upper body clothing 3  -MA     Taking care of personal grooming (such as brushing teeth) 3  -MA     Eating meals 3  -MA     AM-PAC 6 Clicks Score (OT) 15  -MA     Row Name 03/16/22 1033          Functional Assessment    Outcome Measure Options AM-PAC 6 Clicks Daily Activity (OT)  -MA           User Key  (r) = Recorded By, (t) = Taken By, (c) = Cosigned By    Initials Name Provider Type    Hallie Conner OT Occupational Therapist                Occupational Therapy Education                 Title: PT OT SLP Therapies (In Progress)     Topic: Occupational Therapy (In Progress)     Point: ADL training (In Progress)     Description:   Instruct learner(s) on proper safety adaptation and remediation techniques during self care or transfers.   Instruct in proper use of assistive devices.              Learning Progress Summary            Patient Acceptance, E, NR by MA at 3/16/2022 1034    Acceptance, E, NR by  at 3/12/2022 1330    Comment: Educated pt regarding role of therapy                   Point: Home exercise program (In Progress)     Description:   Instruct learner(s) on appropriate technique for monitoring, assisting and/or progressing therapeutic exercises/activities.              Learning Progress Summary           Patient Acceptance, E, NR by  at 3/12/2022 1330    Comment: Educated pt regarding role of therapy                   Point: Precautions (In Progress)     Description:   Instruct learner(s) on prescribed precautions during self-care and functional transfers.              Learning Progress Summary           Patient Acceptance, E, NR by MA at 3/16/2022 1034                   Point: Body mechanics (In Progress)     Description:   Instruct learner(s) on proper positioning and spine alignment during self-care, functional mobility activities and/or exercises.              Learning Progress Summary           Patient Acceptance, E, NR by MA at 3/16/2022 1034                               User Key     Initials Effective Dates Name Provider Type Discipline     06/16/21 -  Felipa Medina, OT Occupational Therapist OT    MA 11/19/20 -  Hallie Barrow OT Occupational Therapist OT              OT Recommendation and Plan  Therapy Frequency (OT): daily  Plan of Care Review  Plan of Care Reviewed With: patient  Progress: improving  Outcome Evaluation: Pt participated in ADL retraining this date including incorporation of AE for LBD d/t increased pain. Pt min A for STS w/ RW. Will continue to progress pt as tolerated per OT POC. Rec IRF at d/c.     Time Calculation:    Time Calculation- OT     Row Name 03/16/22 1035             Time Calculation- OT    OT Start Time 1015  -MA      OT Received On 03/16/22  -MA      OT Goal Re-Cert Due Date 03/22/22  -MA              Timed Charges    56681 - OT Therapeutic Activity Minutes 2  -MA       49190 - OT Self Care/Mgmt Minutes 6  -MA              Total Minutes    Timed Charges Total Minutes 8  -MA       Total Minutes 8  -MA            User Key  (r) = Recorded By, (t) = Taken By, (c) = Cosigned By    Initials Name Provider Type    Hallie Conner OT Occupational Therapist              Therapy Charges for Today     Code Description Service Date Service Provider Modifiers Qty    76269586749  OT SELF CARE/MGMT/TRAIN EA 15 MIN 3/16/2022 Hallie Barrow OT GO 1               Hallie Barrow OT  3/16/2022

## 2022-03-16 NOTE — CASE MANAGEMENT/SOCIAL WORK
Continued Stay Note  [facility]     Patient Name: Frieda Flores  MRN: [id number]  Today's Date: 3/16/2022    Admit Date: 3/11/2022     Discharge Plan     Row Name 03/16/22 1215       Plan    Plan Home with HH    Patient/Family in Agreement with Plan yes    Plan Comments Needs strong encouragement to work with PT/OT. On IV rocephin and Doxycycline currently. Plan is home with sister and HH. Explained to pt due to present and past drug abuse cannot place in IRF.    Final Discharge Disposition Code 06 - home with home health care               Discharge Codes    No documentation.                     [place] RN

## 2022-03-16 NOTE — THERAPY TREATMENT NOTE
Patient Name: Frieda Flores  : 1980    MRN: [id number]                              Today's Date: 3/16/2022       Admit Date: 3/11/2022    Visit Dx:     ICD-10-CM ICD-9-CM   1. Right hip pain  M25.551 719.45   2. Arthritis of right hip  M16.11 716.95   3. History of septic arthritis  Z87.39 V13.4   4. Hypokalemia  E87.6 276.8     Patient Active Problem List   Diagnosis   • Alcohol abuse   • Polysubstance abuse (HCC)   • Abnormal liver function tests   • Septic right hip (CMS/HCC)   • Therapeutic opioid-induced constipation (OIC)   • Postpartum care following LTCS (1 layer) 21   • Cigarette smoker   • Tobacco abuse   • Hypokalemia   • Right hip pain   • QT prolongation     Past Medical History:   Diagnosis Date   • Abdominal wall cellulitis - After  2019    Treated with IV antibiotics   • ADHD    • Alcoholism (HCC)    • Bipolar disorder (HCC)    • Chlamydia    • Chlamydia 2021   • Hepatitis C carrier ([facility])     Negative viral load   • Migraine    • Osteoarthritis    • PTSD (post-traumatic stress disorder)    • Septic right hip (CMS/HCC) 2021   • Substance abuse (HCC)    • Therapeutic opioid-induced constipation (OIC)    • Urogenital trichomoniasis      Past Surgical History:   Procedure Laterality Date   • BREAST LUMPECTOMY Right    •  SECTION Bilateral 3/9/2019    Procedure:  SECTION PRIMARY;  Surgeon: Shelley Hughes MD;  Location: [facility];  Service: Obstetrics/Gynecology   •  SECTION N/A 2021    Procedure: Repeat C/S;  Surgeon: Fernando Pedersen MD;  Location:  [facility] OR;  Service: Obstetrics/Gynecology;  Laterality: N/A;   • INCISION AND DRAINAGE HIP Right 2021    Procedure: HIP INCISION AND DRAINAGE RIGHT;  Surgeon: Martin Gusman MD;  Location:  [facility] OR;  Service: Orthopedics;  Laterality: Right;      General Information     Row Name 22 1133          Physical Therapy Time and Intention     Document Type therapy note (daily note)  -AY     Mode of Treatment physical therapy  -AY     Row Name 03/16/22 1133          General Information    Patient Profile Reviewed yes  -AY     Existing Precautions/Restrictions fall  -AY     Barriers to Rehab medically complex  -AY     Row Name 03/16/22 1133          Cognition    Orientation Status (Cognition) oriented x 3  -AY     Row Name 03/16/22 1133          Safety Issues, Functional Mobility    Safety Issues Affecting Function (Mobility) insight into deficits/self-awareness;awareness of need for assistance;safety precaution awareness  -AY     Impairments Affecting Function (Mobility) balance;endurance/activity tolerance;grasp;pain;strength  -AY           User Key  (r) = Recorded By, (t) = Taken By, (c) = Cosigned By    Initials Name Provider Type    AY Stacie Nathan, PT Physical Therapist               Mobility     Row Name 03/16/22 1133          Bed Mobility    Bed Mobility supine-sit  -AY     Supine-Sit [place] (Bed Mobility) minimum assist (75% patient effort);1 person assist  -AY     Assistive Device (Bed Mobility) head of bed elevated;bed rails  -AY     Row Name 03/16/22 1133          Sit-Stand Transfer    Sit-Stand [place] (Transfers) minimum assist (75% patient effort);verbal cues  -AY     Assistive Device (Sit-Stand Transfers) walker, front-wheeled  -AY     Row Name 03/16/22 1133          Gait/Stairs (Locomotion)    [place] Level (Gait) minimum assist (75% patient effort);verbal cues;1 person assist  -AY     Assistive Device (Gait) walker, front-wheeled  -AY     Distance in Feet (Gait) 10  -AY     Deviations/Abnormal Patterns (Gait) antalgic;vickey decreased;gait speed decreased;stride length decreased  -AY     Bilateral Gait Deviations foot drop/toe drag;forward flexed posture  -AY     Comment, (Gait/Stairs) pt demonstrated step to gait pattern with decreased vickey and flexed posture. Decreased stance on RLE. Verbal cueing for posture,  increased stride length, and heel strike. Gait distance limited by fatigue and pain.  -AY           User Key  (r) = Recorded By, (t) = Taken By, (c) = Cosigned By    Initials Name Provider Type    Stacie Sarkar PT Physical Therapist               Obj/Interventions     Row Name 03/16/22 1135          Balance    Balance Assessment sitting static balance;sitting dynamic balance;sit to stand dynamic balance;standing static balance  -AY     Dynamic Sitting Balance supervision  -AY     Position, Sitting Balance sitting edge of bed;supported  -AY     Static Standing Balance minimal assist  -AY     Dynamic Standing Balance minimal assist  -AY     Position/Device Used, Standing Balance supported;walker, front-wheeled  -AY           User Key  (r) = Recorded By, (t) = Taken By, (c) = Cosigned By    Initials Name Provider Type    Stacie Sarkar PT Physical Therapist               Goals/Plan    No documentation.                Clinical Impression     Row Name 03/16/22 1135          Pain    Pretreatment Pain Rating 5/10  -AY     Posttreatment Pain Rating 5/10  -AY     Pain Location - Side/Orientation Right  -AY     Pain Location lower  -AY     Pain Location - extremity  -AY     Pain Intervention(s) Ambulation/increased activity;Repositioned;Nursing Notified;Elevated  -AY     Additional Documentation Pain Scale: Numbers Pre/Post-Treatment (Group)  -AY     Row Name 03/16/22 1136          Plan of Care Review    Plan of Care Reviewed With patient  -AY     Progress improving  -AY     Outcome Evaluation Pt showing improvement as she increased ambulation distance to 10ft with min A and RWx demonstrating antalgic gait. Pt cont to be limited by pain and is slef-limiting. Cont to progress as able. d/c rec for IRF.  -AY     Row Name 03/16/22 3095          Vital Signs    Pre Systolic BP Rehab --  VSS  -AY     O2 Delivery Pre Treatment room air  -AY     O2 Delivery Intra Treatment room air  -AY     O2 Delivery Post Treatment room air   -AY     Pre Patient Position Supine  -AY     Intra Patient Position Standing  -AY     Post Patient Position Sitting  -AY     Row Name 03/16/22 1135          Positioning and Restraints    Pre-Treatment Position in bed  -AY     Post Treatment Position chair  -AY     In Chair notified nsg;reclined;sitting;call light within reach;encouraged to call for assist;exit alarm on;waffle cushion;legs elevated;LUE elevated;RUE elevated  -AY           User Key  (r) = Recorded By, (t) = Taken By, (c) = Cosigned By    Initials Name Provider Type    Stacie Sarkar, PT Physical Therapist               Outcome Measures     Row Name 03/16/22 1141 03/16/22 0800       How much help from another person do you currently need...    Turning from your back to your side while in flat bed without using bedrails? 4  -AY 4  -KB    Moving from lying on back to sitting on the side of a flat bed without bedrails? 4  -AY 4  -KB    Moving to and from a bed to a chair (including a wheelchair)? 3  -AY 3  -KB    Standing up from a chair using your arms (e.g., wheelchair, bedside chair)? 3  -AY 3  -KB    Climbing 3-5 steps with a railing? 2  -AY 2  -KB    To walk in hospital room? 3  -AY 3  -KB    AM-PAC 6 Clicks Score (PT) 19  -AY 19  -KB    Row Name 03/16/22 1141 03/16/22 1033       Functional Assessment    Outcome Measure Options AM-PAC 6 Clicks Basic Mobility (PT)  -AY AM-PAC 6 Clicks Daily Activity (OT)  -MA          User Key  (r) = Recorded By, (t) = Taken By, (c) = Cosigned By    Initials Name Provider Type    Migue Pal, RN Registered Nurse    Stacie Sarkar, PT Physical Therapist    Hallie Conner OT Occupational Therapist                             Physical Therapy Education                 Title: PT OT SLP Therapies (In Progress)     Topic: Physical Therapy (Done)     Point: Mobility training (Done)     Learning Progress Summary           Patient Acceptance, E,TB, VU,NR by JAMES at 3/16/2022 1141    Acceptance, E,D, NR by MB at  3/12/2022 1538                   Point: Home exercise program (Done)     Learning Progress Summary           Patient Acceptance, E,TB, VU,NR by AY at 3/16/2022 1141    Acceptance, E,D, NR by MB at 3/12/2022 1538                   Point: Body mechanics (Done)     Learning Progress Summary           Patient Acceptance, E,TB, VU,NR by AY at 3/16/2022 1141    Acceptance, E,D, NR by MB at 3/12/2022 1538                   Point: Precautions (Done)     Learning Progress Summary           Patient Acceptance, E,TB, VU,NR by AY at 3/16/2022 1141    Acceptance, E,D, NR by MB at 3/12/2022 1538                               User Key     Initials Effective Dates Name Provider Type Discipline    MB 06/16/21 -  Cherise Duval, PT Physical Therapist PT    JAMES 11/10/20 -  Stacie Nathan PT Physical Therapist PT              PT Recommendation and Plan     Plan of Care Reviewed With: patient  Progress: improving  Outcome Evaluation: Pt showing improvement as she increased ambulation distance to 10ft with min A and RWx demonstrating antalgic gait. Pt cont to be limited by pain and is slef-limiting. Cont to progress as able. d/c rec for IRF.     Time Calculation:    PT Charges     Row Name 03/16/22 1142             Time Calculation    Start Time 0959  -AY      PT Received On 03/16/22  -AY              Timed Charges    02161 - PT Therapeutic Activity Minutes 10  -AY              Total Minutes    Timed Charges Total Minutes 10  -AY       Total Minutes 10  -AY            User Key  (r) = Recorded By, (t) = Taken By, (c) = Cosigned By    Initials Name Provider Type     Stacie Nathan PT Physical Therapist              Therapy Charges for Today     Code Description Service Date Service Provider Modifiers Qty    38431684947  PT THERAPEUTIC ACT EA 15 MIN 3/16/2022 Stacie Nathan, PT GP 1          PT G-Codes  Outcome Measure Options: AM-PAC 6 Clicks Basic Mobility (PT)  AM-PAC 6 Clicks Score (PT): 19  AM-PAC 6 Clicks Score (OT):  15    Stacie Nathan, PT  3/16/2022

## 2022-03-16 NOTE — PROGRESS NOTES
[facility] Medicine Services  PROGRESS NOTE    Patient Name: Frieda Flores  : 1980  MRN: [id number]    Date of Admission: 3/11/2022  Primary Care Physician: System, Provider Not In    Subjective   Subjective     CC:  F/U right hip and low back pain    HPI:  Patient seen this morning. Complains of pain and limited mobility. Complains of no BM since admission.    ROS:  Gen-no fevers, no chills  CV-no chest pain, no palpitations  Resp-no cough, no dyspnea  GI-no N/V/D, no abd pain, +constipation    All other systems reviewed and negative except any additional pertinent positives and negatives as discussed in HPI.      Objective   Objective     Vital Signs:   Temp:  [97.7 °F (36.5 °C)-98.4 °F (36.9 °C)] 98.4 °F (36.9 °C)  Heart Rate:  [68-88] 75  Resp:  [18] 18  BP: ()/(56-83) 96/61  Flow (L/min):  [2] 2     Physical Exam:  Gen-no acute distress  HENT-NCAT, mucous membranes moist  CV-RRR, S1 S2 normal, no m/r/g  Resp-CTAB, no wheezes or rales  Abd-soft, NT, ND, +BS  Ext-no edema  Neuro-slightly lethargic, but oriented x 3, no focal deficits  Skin-no rashes  Psych-flat affect      Results Reviewed:  LAB RESULTS:      Lab 22  0323 22  0456 22  0530 22  0334 22  0614 03/10/22  2357   WBC 3.92 3.98 6.21 6.13 6.59 8.00   HEMOGLOBIN 11.0* 11.1* 11.4* 11.3* 11.5* 13.1   HEMATOCRIT 34.6 34.9 34.4 34.5 33.6* 37.8   PLATELETS 238 217 221 215 239 287   NEUTROS ABS 1.84 1.87 4.21  --   --  4.94   IMMATURE GRANS (ABS) 0.02 0.02 0.02  --   --  0.03   LYMPHS ABS 1.57 1.68 1.49  --   --  2.09   MONOS ABS 0.24 0.22 0.32  --   --  0.53   EOS ABS 0.22 0.17 0.15  --   --  0.38   MCV 90.1 91.4 88.0 89.8 84.8 84.8   SED RATE  --   --   --   --   --  13   CRP  --   --   --   --   --  4.44*   PROCALCITONIN  --   --   --   --   --  0.04   LACTATE  --   --   --   --  1.2 1.6         Lab 03/15/22  0318 22  0323 22  0456 22  0334 22  0614 03/10/22  3916    SODIUM  --  140 141 141 144 139   POTASSIUM  --  4.5 4.4 3.1* 3.6 2.8*   CHLORIDE  --  107 108* 106 106 100   CO2  --  27.0 26.0 26.0 27.0 28.0   ANION GAP  --  6.0 7.0 9.0 11.0 11.0   BUN  --  7 8 7 6 7   CREATININE  --  0.52* 0.56* 0.57 0.53* 0.59   EGFR  --  120.6 118.5 118.0 120.1 117.0   GLUCOSE  --  106* 104* 92 112* 115*   CALCIUM  --  8.5* 8.6 8.3* 8.6 9.5   MAGNESIUM 2.0 1.9 1.7  --   --  1.9         Lab 03/12/22  0334 03/11/22  0614 03/10/22  2357   TOTAL PROTEIN 5.4* 6.0 7.1   ALBUMIN 2.90* 3.50 4.00   GLOBULIN 2.5 2.5 3.1   ALT (SGPT) 17 23 27   AST (SGOT) 15 24 31   BILIRUBIN 0.3 0.5 0.5   ALK PHOS 88 91 111                     Brief Urine Lab Results  (Last result in the past 365 days)      Color   Clarity   Blood   Leuk Est   Nitrite   Protein   CREAT   Urine HCG        03/14/22 1145 Yellow   Cloudy   Negative   Trace   Positive   Negative                 Microbiology Results Abnormal     Procedure Component Value - Date/Time    Body Fluid Culture - Aspirate, Hip, Right [[id number]] Collected: 03/11/22 1535    Lab Status: Final result Specimen: Aspirate from Hip, Right Updated: 03/16/22 0952     Body Fluid Culture No growth at 5 days     Gram Stain Rare (1+) WBCs per low power field      No organisms seen    Body Fluid Culture - Aspirate, Knee, Left [[id number]] Collected: 03/11/22 1535    Lab Status: Final result Specimen: Aspirate from Knee, Left Updated: 03/16/22 0952     Body Fluid Culture No growth at 5 days     Gram Stain Rare (1+) WBCs per low power field      No organisms seen    Anaerobic Culture - Aspirate, Hip, Right [[id number]] Collected: 03/11/22 1535    Lab Status: Final result Specimen: Aspirate from Hip, Right Updated: 03/16/22 0643     Anaerobic Culture No anaerobes isolated at 5 days    Anaerobic Culture - Aspirate, Knee, Left [[id number]] Collected: 03/11/22 1535    Lab Status: Final result Specimen: Aspirate from Knee, Left Updated: 03/16/22 0643     Anaerobic Culture No anaerobes  isolated at 5 days    Blood Culture - Blood, Arm, Left [[id number]]  (Normal) Collected: 03/11/22 0000    Lab Status: Final result Specimen: Blood from Arm, Left Updated: 03/16/22 0132     Blood Culture No growth at 5 days    Blood Culture - Blood, Arm, Right [[id number]]  (Normal) Collected: 03/11/22 0000    Lab Status: Final result Specimen: Blood from Arm, Right Updated: 03/16/22 0132     Blood Culture No growth at 5 days    Chlamydia trachomatis, Neisseria gonorrhoeae, PCR - Urine, Urine, Clean Catch [[id number]] Collected: 03/14/22 1145    Lab Status: Final result Specimen: Urine, Clean Catch Updated: 03/15/22 2207     Chlamydia trachomatis, ASAEL Negative     Neisseria gonorrhoeae, ASAEL Negative    Narrative:      Performed at:  [address] [place] [address]  [id number]  : Tahmina Moya MD, Phone:  [phone]    Urine Culture - Urine, Urine, Clean Catch [[id number]]  (Normal) Collected: 03/14/22 1145    Lab Status: Final result Specimen: Urine, Clean Catch Updated: 03/15/22 1440     Urine Culture No growth    Urine Culture - Urine, Urine, Clean Catch [[id number]] Collected: 03/11/22 0415    Lab Status: Final result Specimen: Urine, Clean Catch Updated: 03/12/22 1217     Urine Culture 50,000 CFU/mL Normal Urogenital Diane    AFB Culture - Aspirate, Knee, Left [[id number]] Collected: 03/11/22 1535    Lab Status: Preliminary result Specimen: Aspirate from Knee, Left Updated: 03/12/22 1113     AFB Stain No acid fast bacilli seen on concentrated smear    AFB Culture - Aspirate, Hip, Right [[id number]] Collected: 03/11/22 1535    Lab Status: Preliminary result Specimen: Aspirate from Hip, Right Updated: 03/12/22 1113     AFB Stain No acid fast bacilli seen on concentrated smear    COVID PRE-OP / PRE-PROCEDURE SCREENING ORDER (NO ISOLATION) - Swab, Nasopharynx [[id number]]  (Normal) Collected: 03/11/22 0415    Lab Status: Final result Specimen: Swab from Nasopharynx Updated: 03/11/22 0501     Narrative:      The following orders were created for panel order COVID PRE-OP / PRE-PROCEDURE SCREENING ORDER (NO ISOLATION) - Swab, Nasopharynx.  Procedure                               Abnormality         Status                     ---------                               -----------         ------                     COVID-19 and FLU A/B PCR...[[id number]]  Normal              Final result                 Please view results for these tests on the individual orders.    COVID-19 and FLU A/B PCR - Swab, Nasopharynx [[id number]]  (Normal) Collected: 03/11/22 0415    Lab Status: Final result Specimen: Swab from Nasopharynx Updated: 03/11/22 0501     COVID19 Not Detected     Influenza A PCR Not Detected     Influenza B PCR Not Detected    Narrative:      Fact sheet for providers: https://www.fda.gov/media/604875/download    Fact sheet for patients: https://www.fda.gov/media/912921/download    Test performed by PCR.          No radiology results from the last 24 hrs    Results for orders placed during the hospital encounter of 07/31/21    Adult Transthoracic Echo Complete W/ Cont if Necessary Per Protocol    Interpretation Summary  · Left ventricular ejection fraction appears to be 61 - 65%. Left ventricular systolic function is normal.  · The cardiac valves are structurally and functionally within normal limits.      I have reviewed the medications:  Scheduled Meds:amLODIPine, 10 mg, Oral, Q24H  cefTRIAXone, 1 g, Intravenous, Q24H  doxycycline, 100 mg, Oral, Q12H  FLUoxetine, 20 mg, Oral, Daily  lactobacillus acidophilus, 1 capsule, Oral, Daily  lisinopril, 20 mg, Oral, Q24H  methadone, 60 mg, Oral, Daily  OLANZapine, 10 mg, Oral, Daily  senna-docusate sodium, 2 tablet, Oral, BID  sodium chloride, 10 mL, Intravenous, Q12H  traZODone, 150 mg, Oral, Nightly      Continuous Infusions:   PRN Meds:.•  acetaminophen  •  senna-docusate sodium **AND** polyethylene glycol **AND** bisacodyl **AND** bisacodyl  •   cyclobenzaprine  •  magnesium sulfate **OR** magnesium sulfate **OR** magnesium sulfate  •  melatonin  •  ondansetron  •  ondansetron  •  oxyCODONE-acetaminophen  •  potassium chloride **OR** potassium chloride **OR** potassium chloride  •  potassium chloride  •  sodium chloride  •  sodium chloride    Assessment/Plan   Assessment & Plan     Active Hospital Problems    Diagnosis  POA   • **Right hip pain [M25.551]  Yes   • Tobacco abuse [Z72.0]  Yes   • Hypokalemia [E87.6]  Yes   • QT prolongation [R94.31]  Yes   • Septic right hip (CMS/HCC) [M00.9]  Yes   • Polysubstance abuse (HCC) [F19.10]  Yes   • Alcohol abuse [F10.10]  Yes      Resolved Hospital Problems   No resolved problems to display.        Brief Hospital Course to date:  Frieda Flores is a 41 y.o. female with a past medical history of polysubstance abuse, prior alcohol abuse, ADHD/Bipolar disorder, and chronic right hip pain with septic arthritis s/p washout by Dr. Gusman in July 2021 who presented to the ED complaining of recurrent right hip and low back pain that has worsened over the last few months to the point where she can no longer bear weight. She was admitted to the hospitalist service and Orthopedic Surgery and ID were consulted.     This patient's problems and plans were partially entered by my partner and updated as appropriate by me 03/16/22.    *All problems are new to me today.     Right Hip/Low Back pain  Hx of right hip septic arthritis s/p washout 7/31/21  --Joint aspiration from left knee and right hip are not consistent wtih septic joint. Cultures NGTD.  --MRI hip and L spine unremarkable for acute issues.  --ID following. Ortho has now signed off.  --Continue Ceftriaxone and PO Doxycycline for time being per Dr. Wilhelm. Does not plan for ongoing IV ATBx at discharge, so likely will switch to PO regimen soon.  --PT/OT recommend inpatient rehab, however due to patient's active and past drug use, she will not be able to go to inpatient  rehab facility as they are not secured units. Current plan is to progress as much as possible with PT/OT here, then d/c home with HHPT and with sister to help her.     Hypokalemia  --Replaced per protocol with improvement.     Polysubstance abuse  --UDS positive for Cocaine, Methamphetamine, Methadone, and THC.  --On chronic Methadone, continue here.   --Continue Percocet, stopped IV morphine.  --Addiction Medicine following.     Mood disorder  --Continue Olanzapine.      QTc Prolongation  --EKG shows QTc of 491. Likely due to Methadone. Will monitor for now and avoid other QTc prolonging medications.    Constipation  --Likely due to opiates.   --Continue bowel regimen.       DVT prophylaxis:  Mechanical DVT prophylaxis orders are present.       AM-PAC 6 Clicks Score (PT): 19 (03/16/22 1141)    Disposition: I expect the patient to be discharged TBD, possibly by end of the week if she can progress more with PT for safe discharge home    CODE STATUS:   Code Status and Medical Interventions:   Ordered at: 03/11/22 0401     Level Of Support Discussed With:    Patient     Code Status (Patient has no pulse and is not breathing):    CPR (Attempt to Resuscitate)     Medical Interventions (Patient has pulse or is breathing):    Full Support       Henrietta Morales MD  03/16/22

## 2022-03-17 PROCEDURE — 93005 ELECTROCARDIOGRAM TRACING: CPT | Performed by: HOSPITALIST

## 2022-03-17 PROCEDURE — 99233 SBSQ HOSP IP/OBS HIGH 50: CPT | Performed by: HOSPITALIST

## 2022-03-17 PROCEDURE — 93010 ELECTROCARDIOGRAM REPORT: CPT | Performed by: INTERNAL MEDICINE

## 2022-03-17 PROCEDURE — 25010000002 CEFTRIAXONE PER 250 MG: Performed by: INTERNAL MEDICINE

## 2022-03-17 RX ORDER — CEFDINIR 300 MG/1
300 CAPSULE ORAL EVERY 12 HOURS SCHEDULED
Status: DISCONTINUED | OUTPATIENT
Start: 2022-03-17 | End: 2022-03-21 | Stop reason: HOSPADM

## 2022-03-17 RX ADMIN — TRAZODONE HYDROCHLORIDE 150 MG: 100 TABLET ORAL at 20:04

## 2022-03-17 RX ADMIN — DOXYCYCLINE 100 MG: 100 CAPSULE ORAL at 09:41

## 2022-03-17 RX ADMIN — POLYETHYLENE GLYCOL 3350 17 G: 17 POWDER, FOR SOLUTION ORAL at 09:35

## 2022-03-17 RX ADMIN — AMLODIPINE BESYLATE 10 MG: 10 TABLET ORAL at 09:38

## 2022-03-17 RX ADMIN — SODIUM CHLORIDE 1 G: 900 INJECTION INTRAVENOUS at 09:35

## 2022-03-17 RX ADMIN — CEFDINIR 300 MG: 300 CAPSULE ORAL at 12:15

## 2022-03-17 RX ADMIN — OXYCODONE HYDROCHLORIDE AND ACETAMINOPHEN 1 TABLET: 10; 325 TABLET ORAL at 09:36

## 2022-03-17 RX ADMIN — CEFDINIR 300 MG: 300 CAPSULE ORAL at 20:04

## 2022-03-17 RX ADMIN — Medication 1 CAPSULE: at 09:36

## 2022-03-17 RX ADMIN — BISACODYL 5 MG: 5 TABLET, COATED ORAL at 09:37

## 2022-03-17 RX ADMIN — OLANZAPINE 10 MG: 5 TABLET, FILM COATED ORAL at 09:36

## 2022-03-17 RX ADMIN — LISINOPRIL 20 MG: 20 TABLET ORAL at 09:40

## 2022-03-17 RX ADMIN — FLUOXETINE HYDROCHLORIDE 20 MG: 20 CAPSULE ORAL at 09:37

## 2022-03-17 RX ADMIN — OXYCODONE HYDROCHLORIDE AND ACETAMINOPHEN 1 TABLET: 10; 325 TABLET ORAL at 04:56

## 2022-03-17 RX ADMIN — SENNOSIDES AND DOCUSATE SODIUM 2 TABLET: 50; 8.6 TABLET ORAL at 20:04

## 2022-03-17 RX ADMIN — Medication 10 ML: at 20:04

## 2022-03-17 RX ADMIN — Medication 10 ML: at 09:35

## 2022-03-17 RX ADMIN — OXYCODONE HYDROCHLORIDE AND ACETAMINOPHEN 1 TABLET: 10; 325 TABLET ORAL at 20:04

## 2022-03-17 RX ADMIN — DOXYCYCLINE 100 MG: 100 CAPSULE ORAL at 20:04

## 2022-03-17 RX ADMIN — METHADONE HYDROCHLORIDE 60 MG: 10 TABLET ORAL at 09:36

## 2022-03-17 RX ADMIN — SENNOSIDES AND DOCUSATE SODIUM 2 TABLET: 50; 8.6 TABLET ORAL at 09:38

## 2022-03-17 NOTE — CASE MANAGEMENT/SOCIAL WORK
Continued Stay Note  [facility]     Patient Name: Frieda Flores  MRN: [id number]  Today's Date: 3/17/2022    Admit Date: 3/11/2022     Discharge Plan     Row Name 03/17/22 1426       Plan    Plan Home with HH    Patient/Family in Agreement with Plan yes    Plan Comments Spoke with pt. at bedside. Will dc on po abx per ID. Plan is home with sister and HH. Have sent referral to [facility].    Final Discharge Disposition Code 06 - home with home health care               Discharge Codes    No documentation.                     La Carroll RN

## 2022-03-17 NOTE — NURSING NOTE
Patient was encouraged to get up to chair on three occasions today. I urged the patient prior to lunch and then again in the mid afternoon. [facility] further encouraged the patient to move to the chair to have dinner there. The patient declined to move to the chair on all occasions.

## 2022-03-17 NOTE — PROGRESS NOTES
[facility] Medicine Services  PROGRESS NOTE    Patient Name: Frieda Flores  : 1980  MRN: [id number]    Date of Admission: 3/11/2022  Primary Care Physician: System, Provider Not In    Subjective   Subjective     CC:  F/U right hip and low back pain    HPI:  Patient seen this morning. Continues to complain of pain, said it was really bad when she worked with PT yesterday. Asking if we are going to increase her back to her 130 mg dose of methadone. Remains constipated.    ROS:  Gen-no fevers, no chills  CV-no chest pain, no palpitations  Resp-no cough, no dyspnea  GI-no N/V/D, no abd pain    All other systems reviewed and negative except any additional pertinent positives and negatives as discussed in HPI.      Objective   Objective     Vital Signs:   Temp:  [97.6 °F (36.4 °C)-98.3 °F (36.8 °C)] 97.6 °F (36.4 °C)  Heart Rate:  [76-86] 76  Resp:  [18] 18  BP: (100-124)/(61-74) 120/70  Flow (L/min):  [2] 2     Physical Exam:  Gen-no acute distress  HENT-NCAT, mucous membranes moist  CV-RRR, S1 S2 normal, no m/r/g  Resp-CTAB, no wheezes or rales  Abd-soft, NT, ND, +BS  Ext-no edema  Neuro-A&Ox3, no focal deficits  Skin-no rashes  Psych-flat affect      Results Reviewed:  LAB RESULTS:      Lab 22  0323 22  0456 22  0530 22  0334 22  0614 03/10/22  2357   WBC 3.92 3.98 6.21 6.13 6.59 8.00   HEMOGLOBIN 11.0* 11.1* 11.4* 11.3* 11.5* 13.1   HEMATOCRIT 34.6 34.9 34.4 34.5 33.6* 37.8   PLATELETS 238 217 221 215 239 287   NEUTROS ABS 1.84 1.87 4.21  --   --  4.94   IMMATURE GRANS (ABS) 0.02 0.02 0.02  --   --  0.03   LYMPHS ABS 1.57 1.68 1.49  --   --  2.09   MONOS ABS 0.24 0.22 0.32  --   --  0.53   EOS ABS 0.22 0.17 0.15  --   --  0.38   MCV 90.1 91.4 88.0 89.8 84.8 84.8   SED RATE  --   --   --   --   --  13   CRP  --   --   --   --   --  4.44*   PROCALCITONIN  --   --   --   --   --  0.04   LACTATE  --   --   --   --  1.2 1.6         Lab 03/15/22  0318  03/14/22  0323 03/13/22  0456 03/12/22  0334 03/11/22  0614 03/10/22  2357   SODIUM  --  140 141 141 144 139   POTASSIUM  --  4.5 4.4 3.1* 3.6 2.8*   CHLORIDE  --  107 108* 106 106 100   CO2  --  27.0 26.0 26.0 27.0 28.0   ANION GAP  --  6.0 7.0 9.0 11.0 11.0   BUN  --  7 8 7 6 7   CREATININE  --  0.52* 0.56* 0.57 0.53* 0.59   EGFR  --  120.6 118.5 118.0 120.1 117.0   GLUCOSE  --  106* 104* 92 112* 115*   CALCIUM  --  8.5* 8.6 8.3* 8.6 9.5   MAGNESIUM 2.0 1.9 1.7  --   --  1.9         Lab 03/12/22  0334 03/11/22  0614 03/10/22  2357   TOTAL PROTEIN 5.4* 6.0 7.1   ALBUMIN 2.90* 3.50 4.00   GLOBULIN 2.5 2.5 3.1   ALT (SGPT) 17 23 27   AST (SGOT) 15 24 31   BILIRUBIN 0.3 0.5 0.5   ALK PHOS 88 91 111                     Brief Urine Lab Results  (Last result in the past 365 days)      Color   Clarity   Blood   Leuk Est   Nitrite   Protein   CREAT   Urine HCG        03/14/22 1145 Yellow   Cloudy   Negative   Trace   Positive   Negative                 Microbiology Results Abnormal     Procedure Component Value - Date/Time    Fungus Culture - Aspirate, Knee, Left [[id number]] Collected: 03/11/22 1535    Lab Status: Preliminary result Specimen: Aspirate from Knee, Left Updated: 03/16/22 1718     Fungus Culture No fungus isolated at less than 1 week    AFB Culture - Aspirate, Knee, Left [[id number]] Collected: 03/11/22 1535    Lab Status: Preliminary result Specimen: Aspirate from Knee, Left Updated: 03/16/22 1718     AFB Culture No AFB isolated at less than 1 week     AFB Stain No acid fast bacilli seen on concentrated smear    Fungus Culture - Aspirate, Hip, Right [[id number]] Collected: 03/11/22 1535    Lab Status: Preliminary result Specimen: Aspirate from Hip, Right Updated: 03/16/22 1718     Fungus Culture No fungus isolated at less than 1 week    AFB Culture - Aspirate, Hip, Right [[id number]] Collected: 03/11/22 1535    Lab Status: Preliminary result Specimen: Aspirate from Hip, Right Updated: 03/16/22 1718     AFB  Culture No AFB isolated at less than 1 week     AFB Stain No acid fast bacilli seen on concentrated smear    Body Fluid Culture - Aspirate, Hip, Right [[id number]] Collected: 03/11/22 1535    Lab Status: Final result Specimen: Aspirate from Hip, Right Updated: 03/16/22 0952     Body Fluid Culture No growth at 5 days     Gram Stain Rare (1+) WBCs per low power field      No organisms seen    Body Fluid Culture - Aspirate, Knee, Left [[id number]] Collected: 03/11/22 1535    Lab Status: Final result Specimen: Aspirate from Knee, Left Updated: 03/16/22 0952     Body Fluid Culture No growth at 5 days     Gram Stain Rare (1+) WBCs per low power field      No organisms seen    Anaerobic Culture - Aspirate, Hip, Right [[id number]] Collected: 03/11/22 1535    Lab Status: Final result Specimen: Aspirate from Hip, Right Updated: 03/16/22 0643     Anaerobic Culture No anaerobes isolated at 5 days    Anaerobic Culture - Aspirate, Knee, Left [[id number]] Collected: 03/11/22 1535    Lab Status: Final result Specimen: Aspirate from Knee, Left Updated: 03/16/22 0643     Anaerobic Culture No anaerobes isolated at 5 days    Blood Culture - Blood, Arm, Left [[id number]]  (Normal) Collected: 03/11/22 0000    Lab Status: Final result Specimen: Blood from Arm, Left Updated: 03/16/22 0132     Blood Culture No growth at 5 days    Blood Culture - Blood, Arm, Right [[id number]]  (Normal) Collected: 03/11/22 0000    Lab Status: Final result Specimen: Blood from Arm, Right Updated: 03/16/22 0132     Blood Culture No growth at 5 days    Chlamydia trachomatis, Neisseria gonorrhoeae, PCR - Urine, Urine, Clean Catch [[id number]] Collected: 03/14/22 1145    Lab Status: Final result Specimen: Urine, Clean Catch Updated: 03/15/22 2207     Chlamydia trachomatis, ASAEL Negative     Neisseria gonorrhoeae, ASAEL Negative    Narrative:      Performed at:  [address]  [id number]  : Tahmina Moya MD, Phone:   [phone]    Urine Culture - Urine, Urine, Clean Catch [[id number]]  (Normal) Collected: 03/14/22 1145    Lab Status: Final result Specimen: Urine, Clean Catch Updated: 03/15/22 1440     Urine Culture No growth    Urine Culture - Urine, Urine, Clean Catch [[id number]] Collected: 03/11/22 0415    Lab Status: Final result Specimen: Urine, Clean Catch Updated: 03/12/22 1217     Urine Culture 50,000 CFU/mL Normal Urogenital Diane    COVID PRE-OP / PRE-PROCEDURE SCREENING ORDER (NO ISOLATION) - Swab, Nasopharynx [[id number]]  (Normal) Collected: 03/11/22 0415    Lab Status: Final result Specimen: Swab from Nasopharynx Updated: 03/11/22 0501    Narrative:      The following orders were created for panel order COVID PRE-OP / PRE-PROCEDURE SCREENING ORDER (NO ISOLATION) - Swab, Nasopharynx.  Procedure                               Abnormality         Status                     ---------                               -----------         ------                     COVID-19 and FLU A/B PCR...[[id number]]  Normal              Final result                 Please view results for these tests on the individual orders.    COVID-19 and FLU A/B PCR - Swab, Nasopharynx [[id number]]  (Normal) Collected: 03/11/22 0415    Lab Status: Final result Specimen: Swab from Nasopharynx Updated: 03/11/22 0501     COVID19 Not Detected     Influenza A PCR Not Detected     Influenza B PCR Not Detected    Narrative:      Fact sheet for providers: https://www.fda.gov/media/358395/download    Fact sheet for patients: https://www.fda.gov/media/116285/download    Test performed by PCR.          No radiology results from the last 24 hrs    Results for orders placed during the hospital encounter of 07/31/21    Adult Transthoracic Echo Complete W/ Cont if Necessary Per Protocol    Interpretation Summary  · Left ventricular ejection fraction appears to be 61 - 65%. Left ventricular systolic function is normal.  · The cardiac valves are structurally and  functionally within normal limits.      I have reviewed the medications:  Scheduled Meds:amLODIPine, 10 mg, Oral, Q24H  cefdinir, 300 mg, Oral, Q12H  doxycycline, 100 mg, Oral, Q12H  FLUoxetine, 20 mg, Oral, Daily  lactobacillus acidophilus, 1 capsule, Oral, Daily  lisinopril, 20 mg, Oral, Q24H  methadone, 60 mg, Oral, Daily  OLANZapine, 10 mg, Oral, Daily  senna-docusate sodium, 2 tablet, Oral, BID  sodium chloride, 10 mL, Intravenous, Q12H  traZODone, 150 mg, Oral, Nightly      Continuous Infusions:   PRN Meds:.•  acetaminophen  •  senna-docusate sodium **AND** polyethylene glycol **AND** bisacodyl **AND** bisacodyl  •  cyclobenzaprine  •  magnesium sulfate **OR** magnesium sulfate **OR** magnesium sulfate  •  melatonin  •  ondansetron  •  ondansetron  •  oxyCODONE-acetaminophen  •  potassium chloride **OR** potassium chloride **OR** potassium chloride  •  potassium chloride  •  sodium chloride  •  sodium chloride    Assessment/Plan   Assessment & Plan     Active Hospital Problems    Diagnosis  POA   • **Right hip pain [M25.551]  Yes   • Tobacco abuse [Z72.0]  Yes   • Hypokalemia [E87.6]  Yes   • QT prolongation [R94.31]  Yes   • Septic right hip (CMS/HCC) [M00.9]  Yes   • Polysubstance abuse (HCC) [F19.10]  Yes   • Alcohol abuse [F10.10]  Yes      Resolved Hospital Problems   No resolved problems to display.        Brief Hospital Course to date:  Frieda Flores is a 41 y.o. female with a past medical history of polysubstance abuse, prior alcohol abuse, ADHD/Bipolar disorder, and chronic right hip pain with septic arthritis s/p washout by Dr. Gusman in July 2021 who presented to the ED complaining of recurrent right hip and low back pain that has worsened over the last few months to the point where she can no longer bear weight. She was admitted to the hospitalist service and Orthopedic Surgery and ID were consulted.     This patient's problems and plans were partially entered by my partner and updated as appropriate  by me 03/17/22.    Right Hip/Low Back pain  Hx of right hip septic arthritis s/p washout 7/31/21  --Joint aspiration from left knee and right hip are not consistent wtih septic joint. Cultures NGTD.  --MRI hip and L spine unremarkable for acute issues.  --ID following. Ortho has now signed off.  --Has been on Ceftriaxone and PO Doxycycline. Discussed with Dr. Wilhelm today, okay to switch to Omnicef and continue Doxycycline. Needs total 10 days treatment from admission.  --Continue Percocet, stopped IV morphine.  --PT/OT recommend inpatient rehab, however due to patient's active and past drug use, she will not be able to go to inpatient rehab facility as they are not secured units. Current plan is to progress as much as possible with PT/OT here, then d/c home with HHPT and with sister to help her.     Hypokalemia  --Replaced per protocol with improvement.     Polysubstance abuse  --UDS positive for Cocaine, Methamphetamine, Methadone, and THC.  --On chronic Methadone, continued here but at lower dose due to prolonged QTc.  --Addiction Medicine following. Discussed with Mary Pollock today, she talked to [facility] (patient's methadone clinic) who states she was discharged from their clinic on 2/8/22 due to noncompliance and positive UDS. They had decreased her to 100 mg daily prior to that (not 130 mg as patient tells us). They may accept her back but require that she complete inpatient rehab at the [facility]. If patient unwilling to do this, then a wean from her Methadone will be warranted. As such, will not plan to increase current Methadone dose of 60 mg daily.      Mood disorder  --Continue Olanzapine.      QTc Prolongation  --EKG showed QTc of 491. Likely due to Methadone. Will monitor for now and avoid other QTc prolonging medications.  --Repeat EKG today.    Constipation  --Likely due to opiates.   --Continue bowel regimen.       DVT prophylaxis:  Mechanical DVT prophylaxis orders are present.       AM-PAC  6 Clicks Score (PT): 19 (03/17/22 8936)    Disposition: I expect the patient to be discharged TBD, needs to progress more with PT for safe discharge home    CODE STATUS:   Code Status and Medical Interventions:   Ordered at: 03/11/22 0401     Level Of Support Discussed With:    Patient     Code Status (Patient has no pulse and is not breathing):    CPR (Attempt to Resuscitate)     Medical Interventions (Patient has pulse or is breathing):    Full Support       Henrietta Morales MD  03/17/22

## 2022-03-17 NOTE — PLAN OF CARE
Goal Outcome Evaluation:  Plan of Care Reviewed With: patient        Progress: improving   VSS. NSR on monitor. PRN percocet given for knee and hip pain with relief. No further complaints.

## 2022-03-17 NOTE — PROGRESS NOTES
[facility] Progress Note        Antibiotics:  Anti-Infectives (From admission, onward)    Ordered     Dose/Rate Route Frequency Start Stop    03/14/22 0852  cefTRIAXone (ROCEPHIN) 1 g/100 mL 0.9% NS (MBP)        Ordering Provider: Broderick Wilhelm MD    1 g  over 30 Minutes Intravenous Every 24 Hours 03/14/22 1000 03/19/22 0959    03/14/22 0900  doxycycline (MONODOX) capsule 100 mg        Ordering Provider: Broderick Wilhelm MD    100 mg Oral Every 12 Hours Scheduled 03/14/22 1000 03/21/22 0859    03/11/22 0403  piperacillin-tazobactam (ZOSYN) 4.5 g in iso-osmotic dextrose 100 mL IVPB (premix)        Ordering Provider: Broderick Calvo RPH    4.5 g  over 30 Minutes Intravenous Once 03/11/22 0445 03/11/22 0614          CC: right hip/left knee/low back pain    HPI:      Patient is a 41 y.o.  Yr old female with history of polysubstance abuse with prior cocaine/heroin/methamphetamine/THC, chronic hepatitis C and PTSD/bipolar disease; treated for right septic hip with MSSA in September 2021, surgery at that time by Dr. Gusman and subsequently transitioned to [facility], abnormal LFTs with empiric change from Zosyn to cefazolin with improvement although unclear if related to penicillin class versus hep C or combination.  Noncompliant with follow-up.  Apparently plans to be seen at [facility] regarding possible right hip surgery in the future but specifics of that are unclear.  She reports being in care home recently, lost her spot in methadone clinic and reverted to injection drug abuse approximately 1 week prior to admission.  After injection had developed worsening right hip and left knee pain, worsening low back pain and ultimately presented to [facility] on March 11.     Aside from injection drug abuse, she denies any other specific exposures. No raw or undercooked food.  No unpasteurized milk or milk products.  No animal insect or arthropod exposure.  No tick bites.  No outdoor camping or hunting exposure.   No travel exposure.  No ill exposure.  No history of TB or TB exposure.   Denies a history of MRSA/VRE and no history of C. difficile or ESBL/KPC  Organisms.      HCG negative      3/17/22  Seen early and sleepy;  Per nursing, right  Hip and left knee  pain ongoing, nonradiating, worse with movement ;  pain 4/10 but intensity fluctuates; recent urinary frequency , now better and no new urinary/vaginal symptoms      Acute on chronic lumbar back pain, constant, sharp, worse with movement, somewhat better with pain meds but not completely relieved and 5 out of 10 in severity at present.  She denies any new focal weakness or numbness.  No bowel or bladder incontinence.        Denies headache photophobia or neck stiffness.  No shortness of breath cough or hemoptysis.  No nausea vomiting diarrhea or abdominal pain.  No dysuria hematuria or pyuria.          ROS:     3/17/22  Per nursing; No f/c/s. No n/v/d. No rash. No new ADR to Abx.       Constitutional--subjective fevers and chills.  Appetite diminished with fatigue  Heent-- No new vision, hearing or throat complaints.  No epistaxis or oral sores.  Denies odynophagia or dysphagia.  No flashers, floaters or eye pain. No odynophagia or dysphagia. No headache, photophobia or neck stiffness.  CV-- No chest pain, palpitation or syncope  Resp-- No SOB/cough/Hemoptysis  GI- No nausea, vomiting, or diarrhea.  No hematochezia, melena, or hematemesis. Denies jaundice or chronic liver disease.  -- at present No dysuria, hematuria, or flank pain.  Denies hesitancy, urgency or flank pain.  Lymph- no swollen lymph nodes in neck/axilla or groin.   Heme- No active bruising or bleeding; no Hx of DVT or PE.  MS-- no swelling or pain in the bones or joints of arms/legs aside from above.     Neuro-- No acute focal weakness or numbness in the arms or legs.  No seizures.     Full 12 point review of systems reviewed and negative otherwise for acute complaints, except for above      PE:  "nursing/chaperone present  /72 (BP Location: Right arm, Patient Position: Lying)   Pulse 86   Temp 97.6 °F (36.4 °C) (Oral)   Resp 18   Ht 160 cm (63\")   Wt 83.8 kg (184 lb 12.8 oz)   LMP 02/17/2022   SpO2 92%   Breastfeeding No   BMI 32.74 kg/m²       GENERAL: sleepy; NAD  HEENT: Normocephalic, atraumatic.  No conjunctival injection. No icterus. Oropharynx clear without evidence of thrush or exudate. No evidence of peridontal disease.    NECK: Supple without nuchal rigidity. No mass.  HEART: RRR; No murmur, rubs, gallops.   LUNGS: Clear to auscultation bilaterally without wheezing, rales, rhonchi. Normal respiratory effort. Nonlabored. No dullness.  ABDOMEN: Soft, nontender, nondistended. Positive bowel sounds. No rebound or guarding. NO mass or HSM.  EXT:  No cyanosis, clubbing . No cord.   MSK: FROM without joint effusions noted arms/legs.    SKIN: Warm and dry without cutaneous eruptions on Inspection/palpation.    NEURO: Oriented to PPT.  She will not cooperate with a detailed motor or sensory exam given above pain     She will not allow range of motion exam at the right hip.  No obvious warmth or redness there.  Prior incision noted with no dehiscence or drainage.  No discrete mass bulge or fluctuance.  No crepitus or bulla     Spine with no redness bulge fluctuance or point tenderness; diffusely tender in lumbar spine.  No thoracic or cervical spine tenderness.     Left knee edematous compared to right with possible  Effusion, no warmth ,  no erythema; no discrete fluctuance but exam limited with pain and patient not cooperative with a detailed exam     Track marks from drug use noted in upper extremities     IV without obvious redness or drainage    Laboratory Data    Results from last 7 days   Lab Units 03/14/22  0323 03/13/22  0456 03/12/22  0530   WBC 10*3/mm3 3.92 3.98 6.21   HEMOGLOBIN g/dL 11.0* 11.1* 11.4*   HEMATOCRIT % 34.6 34.9 34.4   PLATELETS 10*3/mm3 238 217 221     Results from " last 7 days   Lab Units 03/14/22  0323   SODIUM mmol/L 140   POTASSIUM mmol/L 4.5   CHLORIDE mmol/L 107   CO2 mmol/L 27.0   BUN mg/dL 7   CREATININE mg/dL 0.52*   GLUCOSE mg/dL 106*   CALCIUM mg/dL 8.5*     Results from last 7 days   Lab Units 03/12/22  0334   ALK PHOS U/L 88   BILIRUBIN mg/dL 0.3   ALT (SGPT) U/L 17   AST (SGOT) U/L 15     Results from last 7 days   Lab Units 03/10/22  2357   SED RATE mm/hr 13     Results from last 7 days   Lab Units 03/10/22  2357   CRP mg/dL 4.44*       Estimated Creatinine Clearance: 147.6 mL/min (A) (by C-G formula based on SCr of 0.52 mg/dL (L)).      Microbiology:      Radiology:  Imaging Results (Last 72 Hours)     ** No results found for the last 72 hours. **            Impression:         --Acute subjective constitutional symptoms with low-grade temp 99, active injection drug abuse , polysubstance with acute right hip/lumbar back pain out of proportion to baseline and acute left knee pain/swelling.  She is at risk for bloodstream infection and hematogenous spread with risk for metastatic foci of involvement including risk for septic joints/osteomyelitis, endovascular focus etc.   however,  blood cultures negative so far and CT/MRI's/joint aspirates NOT c/w infection so far;  she denies cough and respiratory exam nonfocal.  No acute inflammatory process on abdominal CT. Mild urinary changes and mixed urine culture with transient symptoms, now improved with empiric abx and urine culture may have been contaminated; no fever and tapered abx    **right hip and left knee aspirate NOT c/w septic joints by cell counts;  Cultures negative so far    --pyuria/bacteruria; empiric treatment for UTI although urine difficult to interpret with epi's/contamination; repeat urine with pyuria/nitrite pos, also possible contamination with epi's,  and pending otherwise     --Acute/chronic right hip pain with abrupt worsening over last week, recent injection drug abuse and risk for new versus  recurrent infection.      **aspirate NOT c/w septic joints by cell counts;  Cultures negative so far     --Acute/chronic lumbar back pain with worsening over 1 week and recent injection drug abuse with risk for metastatic seeding and spinal/paraspinal infection risk.  MRI lumbar spine no infectious change per radiology     --Acute left knee pain/swelling, recent injection drug abuse and risk for septic joint.     **aspirate NOT c/w septic joints by cell counts;  Cultures negative so far     --Right septic hip arthritis in August/September 2021 with surgery by Dr. Gusman     --Chronic hepatitis C.  I do not treat viral hepatitis as a part of my practice.  If you desire further input regarding this during this hospitalization you should give consideration to gastroenterology consultation.     --Polysubstance abuse as previously noted.  Strategy for minimizing risk of withdrawal and additional supportive measures at discretion of medicine team     --Possible Zosyn adverse drug reaction with abnormal LFTs in 2021.  Not clear-cut but trying to avoid.  Subsequently tolerated cephalosporin class antibiotics with improvements in liver tests     --Abnormal CPK prior to daptomycin.  Monitor.     --PTSD/bipolar disease     --Medical noncompliance           PLAN:       --IV rocephin/doxy       --Check/review labs cultures and scans     --Partial history per nursing staff     --Discussed with microbiology     --Discussed with Dr. Ambriz      --Highly complex set of issues with high risk for further serious morbidity and other serious sequela     --HCG negative       **I do not anticipate IV antibiotics  At discharge so far       Broderick Wilhelm MD  3/17/2022    [facility] Progress Note        Antibiotics:  Anti-Infectives (From admission, onward)    Ordered     Dose/Rate Route Frequency Start Stop    03/14/22 0852  cefTRIAXone (ROCEPHIN) 1 g/100 mL 0.9% NS (MBP)        Ordering Provider: Broderick Wilhelm MD    1 g  over 30  Minutes Intravenous Every 24 Hours 03/14/22 1000 03/19/22 0959    03/14/22 0900  doxycycline (MONODOX) capsule 100 mg        Ordering Provider: Broderick Wilhelm MD    100 mg Oral Every 12 Hours Scheduled 03/14/22 1000 03/21/22 0859    03/11/22 0403  piperacillin-tazobactam (ZOSYN) 4.5 g in iso-osmotic dextrose 100 mL IVPB (premix)        Ordering Provider: Broderick Calvo RPH    4.5 g  over 30 Minutes Intravenous Once 03/11/22 0445 03/11/22 0614          CC: right hip/left knee/low back pain    HPI:      Patient is a 41 y.o.  Yr old female with history of polysubstance abuse with prior cocaine/heroin/methamphetamine/THC, chronic hepatitis C and PTSD/bipolar disease; treated for right septic hip with MSSA in September 2021, surgery at that time by Dr. Gusman and subsequently transitioned to [facility], abnormal LFTs with empiric change from Zosyn to cefazolin with improvement although unclear if related to penicillin class versus hep C or combination.  Noncompliant with follow-up.  Apparently plans to be seen at [facility] regarding possible right hip surgery in the future but specifics of that are unclear.  She reports being in [facility] recently, lost her spot in methadone clinic and reverted to injection drug abuse approximately 1 week prior to admission.  After injection had developed worsening right hip and left knee pain, worsening low back pain and ultimately presented to [facility] on March 11.     Aside from injection drug abuse, she denies any other specific exposures. No raw or undercooked food.  No unpasteurized milk or milk products.  No animal insect or arthropod exposure.  No tick bites.  No outdoor camping or hunting exposure.  No travel exposure.  No ill exposure.  No history of TB or TB exposure.   Denies a history of MRSA/VRE and no history of C. difficile or ESBL/KPC  Organisms.     3/16/22  sleepy;  Per nursing, right  Hip and left knee  pain ongoing, nonradiating, worse with  "movement ;  pain 4/10 but intensity fluctuates; recent urinary frequency , now better and no new urinary/vaginal symptoms      Acute on chronic lumbar back pain, constant, sharp, worse with movement, somewhat better with pain meds but not completely relieved and 5 out of 10 in severity at present.  She denies any new focal weakness or numbness.  No bowel or bladder incontinence.        Denies headache photophobia or neck stiffness.  No shortness of breath cough or hemoptysis.  No nausea vomiting diarrhea or abdominal pain.  No dysuria hematuria or pyuria.     HCG negative        ROS:     3/16/22  Per nursing; No f/c/s. No n/v/d. No rash. No new ADR to Abx.       Constitutional--subjective fevers and chills.  Appetite diminished with fatigue  Heent-- No new vision, hearing or throat complaints.  No epistaxis or oral sores.  Denies odynophagia or dysphagia.  No flashers, floaters or eye pain. No odynophagia or dysphagia. No headache, photophobia or neck stiffness.  CV-- No chest pain, palpitation or syncope  Resp-- No SOB/cough/Hemoptysis  GI- No nausea, vomiting, or diarrhea.  No hematochezia, melena, or hematemesis. Denies jaundice or chronic liver disease.  -- at present No dysuria, hematuria, or flank pain.  Denies hesitancy, urgency or flank pain.  Lymph- no swollen lymph nodes in neck/axilla or groin.   Heme- No active bruising or bleeding; no Hx of DVT or PE.  MS-- no swelling or pain in the bones or joints of arms/legs aside from above.     Neuro-- No acute focal weakness or numbness in the arms or legs.  No seizures.     Full 12 point review of systems reviewed and negative otherwise for acute complaints, except for above      PE: nursing/chaperone present  /72 (BP Location: Right arm, Patient Position: Lying)   Pulse 86   Temp 97.6 °F (36.4 °C) (Oral)   Resp 18   Ht 160 cm (63\")   Wt 83.8 kg (184 lb 12.8 oz)   LMP 02/17/2022   SpO2 92%   Breastfeeding No   BMI 32.74 kg/m²       GENERAL: " sleepy; NAD  HEENT: Normocephalic, atraumatic.  No conjunctival injection. No icterus. Oropharynx clear without evidence of thrush or exudate. No evidence of peridontal disease.    NECK: Supple without nuchal rigidity. No mass.  HEART: RRR; No murmur, rubs, gallops.   LUNGS: Clear to auscultation bilaterally without wheezing, rales, rhonchi. Normal respiratory effort. Nonlabored. No dullness.  ABDOMEN: Soft, nontender, nondistended. Positive bowel sounds. No rebound or guarding. NO mass or HSM.  EXT:  No cyanosis, clubbing . No cord.   MSK: FROM without joint effusions noted arms/legs.    SKIN: Warm and dry without cutaneous eruptions on Inspection/palpation.    NEURO: Oriented to PPT.  She will not cooperate with a detailed motor or sensory exam given above pain     She will not allow range of motion exam at the right hip.  No obvious warmth or redness there.  Prior incision noted with no dehiscence or drainage.  No discrete mass bulge or fluctuance.  No crepitus or bulla     Spine with no redness bulge fluctuance or point tenderness; diffusely tender in lumbar spine.  No thoracic or cervical spine tenderness.     Left knee edematous compared to right with possible  Effusion, no warmth ,  no erythema; no discrete fluctuance but exam limited with pain and patient not cooperative with a detailed exam     Track marks from drug use noted in upper extremities     IV without obvious redness or drainage    Laboratory Data    Results from last 7 days   Lab Units 03/14/22  0323 03/13/22  0456 03/12/22  0530   WBC 10*3/mm3 3.92 3.98 6.21   HEMOGLOBIN g/dL 11.0* 11.1* 11.4*   HEMATOCRIT % 34.6 34.9 34.4   PLATELETS 10*3/mm3 238 217 221     Results from last 7 days   Lab Units 03/14/22  0323   SODIUM mmol/L 140   POTASSIUM mmol/L 4.5   CHLORIDE mmol/L 107   CO2 mmol/L 27.0   BUN mg/dL 7   CREATININE mg/dL 0.52*   GLUCOSE mg/dL 106*   CALCIUM mg/dL 8.5*     Results from last 7 days   Lab Units 03/12/22  0334   ALK PHOS U/L 88    BILIRUBIN mg/dL 0.3   ALT (SGPT) U/L 17   AST (SGOT) U/L 15     Results from last 7 days   Lab Units 03/10/22  2357   SED RATE mm/hr 13     Results from last 7 days   Lab Units 03/10/22  2357   CRP mg/dL 4.44*       Estimated Creatinine Clearance: 147.6 mL/min (A) (by C-G formula based on SCr of 0.52 mg/dL (L)).      Microbiology:      Radiology:  Imaging Results (Last 72 Hours)     ** No results found for the last 72 hours. **            Impression:         --Acute subjective constitutional symptoms with low-grade temp 99, active injection drug abuse , polysubstance with acute right hip/lumbar back pain out of proportion to baseline and acute left knee pain/swelling.  She is at risk for bloodstream infection and hematogenous spread with risk for metastatic foci of involvement including risk for septic joints/osteomyelitis, endovascular focus etc.   however,  blood cultures negative so far and CT/MRI's/joint aspirates NOT c/w infection so far;  she denies cough and respiratory exam nonfocal.  No acute inflammatory process on abdominal CT. Mild urinary changes and mixed urine culture with transient symptoms, now improved with empiric abx and urine culture may have been contaminated; no fever and tapered abx    **right hip and left knee aspirate NOT c/w septic joints by cell counts;  Cultures negative so far    --pyuria/bacteruria; empiric treatment for UTI although urine difficult to interpret with epi's/contamination; repeat urine with pyuria/nitrite pos, also possible contamination with epi's and abx modified,  and pending otherwise     --Acute/chronic right hip pain with abrupt worsening over last week, recent injection drug abuse and risk for new versus recurrent infection.      **aspirate NOT c/w septic joints by cell counts;  Cultures negative so far     --Acute/chronic lumbar back pain with worsening over 1 week and recent injection drug abuse with risk for metastatic seeding and spinal/paraspinal infection  risk.  MRI lumbar spine no infectious change per radiology     --Acute left knee pain/swelling, recent injection drug abuse and risk for septic joint.     **aspirate NOT c/w septic joints by cell counts;  Cultures negative so far     --Right septic hip arthritis in August/September 2021 with surgery by Dr. Gusman     --Chronic hepatitis C.  I do not treat viral hepatitis as a part of my practice.  If you desire further input regarding this during this hospitalization you should give consideration to gastroenterology consultation.     --Polysubstance abuse as previously noted.  Strategy for minimizing risk of withdrawal and additional supportive measures at discretion of medicine team     --Possible Zosyn adverse drug reaction with abnormal LFTs in 2021.  Not clear-cut but trying to avoid.  Subsequently tolerated cephalosporin class antibiotics with improvements in liver tests     --Abnormal CPK prior to daptomycin.  Monitor.     --PTSD/bipolar disease     --Medical noncompliance           PLAN:       --IV rocephin/doxy       --Check/review labs cultures and scans     --Partial history per nursing staff     --Discussed with microbiology     --Discussed with Dr. Ambriz      --Highly complex set of issues with high risk for further serious morbidity and other serious sequela     --HCG negative     **I do not anticipate IV antibiotics  at discharge so far       Broderick Wilhelm MD  3/17/2022

## 2022-03-17 NOTE — DISCHARGE PLACEMENT REQUEST
"Eileen Freeman (41 y.o. Female)             Date of Birth   1980    Social Security Number       Address   [address] [place] [id number]    Home Phone   [phone]    MRN   [id number]       Muslim   Anabaptist    Marital Status   Legally                             Admission Date   3/11/22    Admission Type   Emergency    Admitting Provider   Henrietta Morales MD    Attending Provider   Henrietta Morales MD    Department, Room/Bed   [address], [address]/1       Discharge Date       Discharge Disposition       Discharge Destination                               Attending Provider: Henrietta Morales MD    Allergies: Aleve [Naproxen Sodium]    Isolation: None   Infection: None   Code Status: CPR   Advance Care Planning Activity    Ht: 160 cm (63\")   Wt: 83.8 kg (184 lb 12.8 oz)    Admission Cmt: None   Principal Problem: Right hip pain [M25.551]                 Active Insurance as of 3/11/2022     Primary Coverage     Payor Plan Insurance Group Employer/Plan Group    [employer] [place] [employer] [place]ICA[place]      Payor Plan Address Payor Plan Phone Number Payor Plan Fax Number Effective Dates    [address] [address] [phone]  2019 - None Entered    [facility] [id number]       Subscriber Name Subscriber Birth Date Member ID       EILEEN FREEMAN 1980 [id number]                 Emergency Contacts      (Rel.) Home Phone Work Phone Mobile Phone    KOKO FREEMAN (Father) [phone] -- [phone]            [address]  [address] [facility] [address]  Phone:  [phone]  Fax:  [phone] Date: Mar 17, 2022      Ambulatory Referral to Home Health     Patient:  Eileen Freeman MRN:  [id number]   [address]  [address]  [facility] [address] :  1980  SSN:    Phone: [phone] Sex:  F      INSURANCE PAYOR PLAN GROUP # SUBSCRIBER ID   Primary:    [facility] [id number]   [id number]      Referring Provider " Information:  HENRIETTA PINEDA Phone: [phone] Fax: [phone]      Referral Information:   # Visits:  999 Referral Type: Home Health [42]   Urgency:  Routine Referral Reason: Continuity of Care   Start Date: Mar 17, 2022 End Date:  To be determined by Insurer   Diagnosis: Right hip pain (M25.551 [ICD-10-CM] 719.45 [ICD-9-CM])  Arthritis of right hip (M16.11 [ICD-10-CM] 716.95 [ICD-9-CM])  History of septic arthritis (Z87.39 [ICD-10-CM] V13.4 [ICD-9-CM])  Hypokalemia (E87.6 [ICD-10-CM] 276.8 [ICD-9-CM])      Refer to Dept:   Refer to Provider:   Refer to Provider Phone:   Refer to Facility:       Face to Face Visit Date: 3/17/2022  Follow-up provider for Plan of Care? I treated the patient in an acute care facility and will not continue treatment after discharge.  Follow-up provider: DONALD MELARA [[id number]]  Reason/Clinical Findings: Right hip pain, Hypokalemia  Describe mobility limitations that make leaving home difficult: Impaired functional mobility, gait, balance and endurance  Nursing/Therapeutic Services Requested: Physical Therapy  Nursing/Therapeutic Services Requested: Occupational Therapy  PT orders: Therapeutic exercise  PT orders: Strengthening  PT orders: Home safety assessment  Occupational orders: Activities of daily living  Occupational orders: Energy conservation  Occupational orders: Strengthening  Frequency: 1 Week 1     This document serves as a request of services and does not constitute Insurance authorization or approval of services.  To determine eligibility, please contact the members Insurance carrier to verify and review coverage.     If you have medical questions regarding this request for services. Please contact [address] at [phone] during normal business hours.        Authorizing Provider:Henrietta Pineda MD  Authorizing Provider's NPI: [id number]  Order Entered By: La Carroll RN 3/17/2022  2:22 PM     Electronically signed by: Henrietta Pineda MD  3/17/2022  2:22 PM          History & Physical      JoseBrianna DO at 22 0402              [facility] Medicine Services  HISTORY AND PHYSICAL    Patient Name: Frieda Flores  : 1980  MRN: [id number]  Primary Care Physician: System, Provider Not In  Date of admission: 3/11/2022    Subjective   Subjective     Chief Complaint:  Right hip pain    HPI:  Frieda Flores is a 41 y.o. female with a past medical history significant for ADHD/Bipolar disorder, prior alcohol abuse, and polysubstance abuse. She is postpartum as of 22. Present today with complaints of recurrent right hip pain going on for several months. Worse with weight bearing and movement. There is swelling and redness. Patient notes a history of right hip dysfunction with severe arthritic changes due to septic arthritis. Has required washout in the past. Previously monitored by Dr. Gusman per orthopedic surgery. Patient is scheduled to follow up with Dr. Stanton with orthopedic surgery at  in May concerning possible right hip replacement.   Patient also volunteers recurrent falls over the past week. States she cannot remember what she hurt or what she fell on. Reports severe pain and swelling in left knee. Exquisitely tender to palpation and worse with weight bearing. Notes associated subjective fever and chills. Was concerned and presented to ED for further evaluation and treatment.  Currently there are no complaints of cough, congestion, SOB, or chest pain. No abdominal pain or N/v/D. No headache or focal weakness/parathesias. Will admit to inpatient.      COVID Details:    Symptoms:    [] NONE [] Fever [x]  Cough [] Shortness of breath [] Change in taste/smell      Review of Systems   Constitutional: Positive for chills, fatigue and fever.   HENT: Negative for congestion and trouble swallowing.    Eyes: Negative for photophobia and visual disturbance.   Respiratory: Negative for cough and shortness of breath.     Cardiovascular: Negative for chest pain and leg swelling.   Gastrointestinal: Negative for abdominal pain, diarrhea, nausea and vomiting.   Endocrine: Negative for cold intolerance and heat intolerance.   Genitourinary: Negative for dysuria and flank pain.   Musculoskeletal: Positive for back pain, gait problem and joint swelling.   Skin: Positive for color change and wound.   Allergic/Immunologic: Positive for immunocompromised state.   Neurological: Negative for dizziness, weakness and headaches.   Hematological: Negative for adenopathy.   Psychiatric/Behavioral: Negative for agitation and confusion.        All other systems reviewed and are negative.     Personal History     Past Medical History:   Diagnosis Date   • Abdominal wall cellulitis - After  2019    Treated with IV antibiotics   • ADHD    • Alcoholism (HCC)    • Bipolar disorder ([facility])    • Chlamydia    • Chlamydia 2021   • Hepatitis C carrier ([facility])     Negative viral load   • Migraine    • Osteoarthritis    • PTSD (post-traumatic stress disorder)    • Septic right hip (CMS/HCC) 2021   • Substance abuse ([facility])    • Therapeutic opioid-induced constipation ([facility])    • Urogenital trichomoniasis        Past Surgical History:   Procedure Laterality Date   • BREAST LUMPECTOMY Right    •  SECTION Bilateral 3/9/2019    Procedure:  SECTION PRIMARY;  Surgeon: Shelley Hughes MD;  Location: [facility] LABOR DELIVERY;  Service: Obstetrics/Gynecology   •  SECTION N/A 2021    Procedure: Repeat C/S;  Surgeon: Fernando Pedersen MD;  Location: [facility] OR;  Service: Obstetrics/Gynecology;  Laterality: N/A;   • INCISION AND DRAINAGE HIP Right 2021    Procedure: HIP INCISION AND DRAINAGE RIGHT;  Surgeon: Martin Gusman MD;  Location: [facility] OR;  Service: Orthopedics;  Laterality: Right;       Family History: family history includes Diabetes in her mother; Heart disease in her father  "and mother; Hypertension in her mother. Otherwise pertinent FHx was reviewed and unremarkable.     Social History:  reports that she has been smoking cigarettes. She has been smoking about 0.00 packs per day. She has never used smokeless tobacco. She reports previous drug use. Drugs: IV, \"Crack\" cocaine, Benzodiazepines, Heroin, and Marijuana. She reports that she does not drink alcohol.  Social History     Social History Narrative   • Not on file       Medications:  FLUoxetine, OLANZapine, amLODIPine, busPIRone, cyclobenzaprine, hydrOXYzine pamoate, lidocaine, lisinopril, methadone, prenatal vitamin 27-0.8, and traZODone    Allergies   Allergen Reactions   • Aleve [Naproxen Sodium] Swelling     Pt had swelling in her face and in her neck. Pt also states that she lost her voice.        Objective   Objective     Vital Signs:   Temp:  [99 °F (37.2 °C)] 99 °F (37.2 °C)  Heart Rate:  [95] 95  Resp:  [22] 22  BP: (103-136)/(58-99) 131/58    Physical Exam   Constitutional: Awake, alert  Eyes: PERRLA, sclerae anicteric, no conjunctival injection  HENT: NCAT, mucous membranes moist  Neck: Supple, no thyromegaly, no lymphadenopathy, trachea midline  Respiratory: Clear to auscultation bilaterally, nonlabored respirations   Cardiovascular: RRR, no murmurs, rubs, or gallops, palpable pedal pulses bilaterally  Gastrointestinal: Positive bowel sounds, soft, nontender, nondistended  Musculoskeletal: No bilateral ankle edema, no clubbing or cyanosis to extremities  Left knee swelling. TTP  Psychiatric: Appropriate affect, cooperative  Neurologic: Oriented x 3, strength symmetric in all extremities, Cranial Nerves grossly intact to confrontation, speech clear  Skin: track marks, right hip incision scar      Results Reviewed:  I have personally reviewed most recent indicated data and agree with findings including:  [x]  Laboratory  [x]  Radiology  [x]  EKG/Telemetry  []  Pathology  []  Cardiac/Vascular Studies  []  Old records  []  " Other:      LAB RESULTS:      Lab 03/10/22  2357   WBC 8.00   HEMOGLOBIN 13.1   HEMATOCRIT 37.8   PLATELETS 287   NEUTROS ABS 4.94   IMMATURE GRANS (ABS) 0.03   LYMPHS ABS 2.09   MONOS ABS 0.53   EOS ABS 0.38   MCV 84.8   SED RATE 13   CRP 4.44*   PROCALCITONIN 0.04   LACTATE 1.6         Lab 03/10/22  2357   SODIUM 139   POTASSIUM 2.8*   CHLORIDE 100   CO2 28.0   ANION GAP 11.0   BUN 7   CREATININE 0.59   EGFR 117.0   GLUCOSE 115*   CALCIUM 9.5   MAGNESIUM 1.9         Lab 03/10/22  2357   TOTAL PROTEIN 7.1   ALBUMIN 4.00   GLOBULIN 3.1   ALT (SGPT) 27   AST (SGOT) 31   BILIRUBIN 0.5   ALK PHOS 111                     Brief Urine Lab Results     None        Microbiology Results (last 10 days)     ** No results found for the last 240 hours. **          CT Abdomen Pelvis With Contrast    Result Date: 3/11/2022  EXAMINATION: CT ABDOMEN AND PELVIS WITH IV CONTRAST   DATE OF EXAMINATION: 3/11/2022. COMPARISON: 3/13/2019. INDICATION: Joint pain with history of hip septic arthritis. Back and bilateral hip pain. PROCEDURE:  Axial CT of the abdomen and pelvis was performed with contrast and sagittal and coronal reformatted images were performed.  100 mL of Isovue-300 was given intravenously. CT dose lowering techniques were used, to include: automated exposure control, adjustment for patient size, and/or use of iterative reconstruction. FINDINGS: LOWER CHEST :  The visualized lung bases are clear.  There are no pleural or pericardial effusions. ABDOMEN: Liver and Biliary system:  Normal. Adrenal glands:  Normal. Kidneys and ureters: Normal. Spleen:  Normal. Pancreas:  Normal. Gallbladder:  Normal. Lymph nodes, Peritoneum and mesentery:  There is no mesenteric or retroperitoneal lymphadenopathy. Gastrointestinal tract:  There are no dilated loops of bowel or free intraperitoneal air.    The appendix is normal. Aorta/IVC:   No aortic aneurysm.  IVC normal. Abdominal wall:  Normal. PELVIS: Fluid: There is no free fluid in the  pelvis. Lymph Nodes:  There is no pelvic or inguinal lymphadenopathy.. Urinary bladder:  Normal. BONES:  There is severe osteoarthritis involving the right hip heterotopic ossification seen along the inferior aspect of the hip joint space. There is some moderate superolateral subluxation of the femur with respect to the acetabulum. There is significant subchondral sclerosis and cystic changes. There is some soft tissue thickening of the joint capsule and a small effusion around the right hip joint as this could relate to the patient's history of septic arthritis. Active infection would be difficult to exclude. Mild degenerative changes in the left hip joint spaces otherwise noted. ADDITIONAL  SIGNIFICANT FINDINGS:  None.     Impression: 1. There is severe osteoarthritis involving the right hip heterotopic ossification seen along the inferior aspect of the hip joint space. There is some moderate superolateral subluxation of the femur with respect to the acetabulum. There is significant subchondral sclerosis and cystic changes. There is some soft tissue thickening of the joint capsule and a small effusion around the right hip joint as this could relate to the patient's history of septic arthritis. Active infection would be difficult to exclude. 2. No acute process otherwise seen within the abdomen or pelvis. Electronically signed by:  Rob Rincon D.O.  3/11/2022 1:13 AM Mountain Time      Results for orders placed during the hospital encounter of 07/31/21    Adult Transthoracic Echo Complete W/ Cont if Necessary Per Protocol    Interpretation Summary  · Left ventricular ejection fraction appears to be 61 - 65%. Left ventricular systolic function is normal.  · The cardiac valves are structurally and functionally within normal limits.      Assessment/Plan   Assessment & Plan       Septic right hip (CMS/HCC)    Hypokalemia    Alcohol abuse    Polysubstance abuse (HCC)    Tobacco abuse      1. Septic Right Hip:       Left knee swelling  - febrile to 99.0. WBC favorable  - obtain blood cultures  - started on vancomycin and zosyn  - consult to orthopedic surgery  - consult to ID  - obtain x ray imaging of left knee  - CT imaging shows severe arthritic changes in right hip  - MRI right hip  - PT/OT  - pain control as needed  - am labs    2. Hypokalemia:  - check magnesium  - obtain EKG  - replace as needed per protocol    3. Polysubstance abuse:  - check UDS  - on methadone. Continue  - add PO oxycodone as needed  - consult to addition medicine, case management    DVT prophylaxis: mechanical    CODE STATUS:  Full code  Level Of Support Discussed With: Patient  Code Status (Patient has no pulse and is not breathing): CPR (Attempt to Resuscitate)  Medical Interventions (Patient has pulse or is breathing): Full Support      This note has been completed as part of a split-shared workflow.     Electronically signed by Leon Lopez PA-C, 03/11/22, 4:53 AM EST.        Attending   Admission Attestation       I have seen and examined the patient, performing an independent face-to-face diagnostic evaluation with plan of care reviewed and developed with the advanced practice clinician (APC).      Brief Summary Statement:   Frieda Flores is a 41 y.o. female with a past medical history of polysubstance abuse, prior alcohol abuse, ADHD/Bipolar disorder, and chronic right hip pain with septic arthritis s/p washout by Dr. Gusman in 2021. The patient presented to the ED complaining of recurrent right hip pain that has worsened over the last few months to the point now that she cannot bear weight on it and is walking with crutches. Patient is extremely lethargic at the bedside and continues to fall asleep making history taking difficult. Patient was to follow up with Dr. Stanton at  regarding possible hip replacement but the appointment is not until May. Patient states she has fallen at home and complains of left knee pain along with her right  hip pain. Her left knee is swollen and tender to even light touch. She has no ROM in the left knee. Due to patient's immobility and difficulty with range of motion of her hip and knee, she will be admitted to the hospitalist service for further evaluation.    Remainder of detailed HPI is as noted by APC and has been reviewed and/or edited by me for completeness.    Attending Physical Exam:  Constitutional: lethargic, difficult to keep awake  Eyes: PERRLA, sclerae anicteric, no conjunctival injection  HENT: NCAT, mucous membranes moist  Neck: Supple, no thyromegaly, no lymphadenopathy, trachea midline  Respiratory: Clear to auscultation bilaterally, nonlabored respirations   Cardiovascular: RRR, no murmurs, rubs, or gallops, palpable pedal pulses bilaterally  Gastrointestinal: Positive bowel sounds, soft, nontender, nondistended  Musculoskeletal: No bilateral ankle edema, no clubbing or cyanosis to extremities, decreased ROM with flexion and extension of left knee, tenderness to light palpation of left knee, significant swelling of left knee present, tenderness to palpation of right hip and unable to flex or extend the right leg due to right hip pain.  Psychiatric: Appropriate affect, cooperative  Neurologic: Oriented x 3, strength symmetric in all extremities, Cranial Nerves grossly intact to confrontation, speech clear  Skin: No rashes, tracks marks present on b/l arms, right hip incision scar stable    Brief Assessment/Plan :  See detailed assessment and plan developed with APC which I have reviewed and/or edited for completeness.        Admission Status: I believe that this patient meets OBSERVATION status, however if further evaluation or treatment plans warrant, status may change.  Based upon current information, I predict patient's care encounter to be less than or equal to 2 midnights.        Brianna Garcia DO  03/11/22                          Electronically signed by Brianna Garcia DO at 03/11/22 0560           Physician Progress Notes (most recent note)      Henrietta Morales MD at 22 1227              [facility] Medicine Services  PROGRESS NOTE    Patient Name: Frieda Flores  : 1980  MRN: [id number]    Date of Admission: 3/11/2022  Primary Care Physician: System, Provider Not In    Subjective   Subjective     CC:  F/U right hip and low back pain    HPI:  Patient seen this morning. Continues to complain of pain, said it was really bad when she worked with PT yesterday. Asking if we are going to increase her back to her 130 mg dose of methadone. Remains constipated.    ROS:  Gen-no fevers, no chills  CV-no chest pain, no palpitations  Resp-no cough, no dyspnea  GI-no N/V/D, no abd pain    All other systems reviewed and negative except any additional pertinent positives and negatives as discussed in HPI.      Objective   Objective     Vital Signs:   Temp:  [97.6 °F (36.4 °C)-98.3 °F (36.8 °C)] 97.6 °F (36.4 °C)  Heart Rate:  [76-86] 76  Resp:  [18] 18  BP: (100-124)/(61-74) 120/70  Flow (L/min):  [2] 2     Physical Exam:  Gen-no acute distress  HENT-NCAT, mucous membranes moist  CV-RRR, S1 S2 normal, no m/r/g  Resp-CTAB, no wheezes or rales  Abd-soft, NT, ND, +BS  Ext-no edema  Neuro-A&Ox3, no focal deficits  Skin-no rashes  Psych-flat affect      Results Reviewed:  LAB RESULTS:      Lab 22  0323 22  0456 22  0530 22  0334 22  0614 03/10/22  2357   WBC 3.92 3.98 6.21 6.13 6.59 8.00   HEMOGLOBIN 11.0* 11.1* 11.4* 11.3* 11.5* 13.1   HEMATOCRIT 34.6 34.9 34.4 34.5 33.6* 37.8   PLATELETS 238 217 221 215 239 287   NEUTROS ABS 1.84 1.87 4.21  --   --  4.94   IMMATURE GRANS (ABS) 0.02 0.02 0.02  --   --  0.03   LYMPHS ABS 1.57 1.68 1.49  --   --  2.09   MONOS ABS 0.24 0.22 0.32  --   --  0.53   EOS ABS 0.22 0.17 0.15  --   --  0.38   MCV 90.1 91.4 88.0 89.8 84.8 84.8   SED RATE  --   --   --   --   --  13   CRP  --   --   --   --   --  4.44*   PROCALCITONIN  --    --   --   --   --  0.04   LACTATE  --   --   --   --  1.2 1.6         Lab 03/15/22  0318 03/14/22  0323 03/13/22  0456 03/12/22  0334 03/11/22  0614 03/10/22  2357   SODIUM  --  140 141 141 144 139   POTASSIUM  --  4.5 4.4 3.1* 3.6 2.8*   CHLORIDE  --  107 108* 106 106 100   CO2  --  27.0 26.0 26.0 27.0 28.0   ANION GAP  --  6.0 7.0 9.0 11.0 11.0   BUN  --  7 8 7 6 7   CREATININE  --  0.52* 0.56* 0.57 0.53* 0.59   EGFR  --  120.6 118.5 118.0 120.1 117.0   GLUCOSE  --  106* 104* 92 112* 115*   CALCIUM  --  8.5* 8.6 8.3* 8.6 9.5   MAGNESIUM 2.0 1.9 1.7  --   --  1.9         Lab 03/12/22  0334 03/11/22  0614 03/10/22  2357   TOTAL PROTEIN 5.4* 6.0 7.1   ALBUMIN 2.90* 3.50 4.00   GLOBULIN 2.5 2.5 3.1   ALT (SGPT) 17 23 27   AST (SGOT) 15 24 31   BILIRUBIN 0.3 0.5 0.5   ALK PHOS 88 91 111                     Brief Urine Lab Results  (Last result in the past 365 days)      Color   Clarity   Blood   Leuk Est   Nitrite   Protein   CREAT   Urine HCG        03/14/22 1145 Yellow   Cloudy   Negative   Trace   Positive   Negative                 Microbiology Results Abnormal     Procedure Component Value - Date/Time    Fungus Culture - Aspirate, Knee, Left [[id number]] Collected: 03/11/22 1535    Lab Status: Preliminary result Specimen: Aspirate from Knee, Left Updated: 03/16/22 1718     Fungus Culture No fungus isolated at less than 1 week    AFB Culture - Aspirate, Knee, Left [[id number]] Collected: 03/11/22 1535    Lab Status: Preliminary result Specimen: Aspirate from Knee, Left Updated: 03/16/22 1718     AFB Culture No AFB isolated at less than 1 week     AFB Stain No acid fast bacilli seen on concentrated smear    Fungus Culture - Aspirate, Hip, Right [[id number]] Collected: 03/11/22 1535    Lab Status: Preliminary result Specimen: Aspirate from Hip, Right Updated: 03/16/22 1718     Fungus Culture No fungus isolated at less than 1 week    AFB Culture - Aspirate, Hip, Right [[id number]] Collected: 03/11/22 1535    Lab Status:  Preliminary result Specimen: Aspirate from Hip, Right Updated: 03/16/22 1718     AFB Culture No AFB isolated at less than 1 week     AFB Stain No acid fast bacilli seen on concentrated smear    Body Fluid Culture - Aspirate, Hip, Right [[id number]] Collected: 03/11/22 1535    Lab Status: Final result Specimen: Aspirate from Hip, Right Updated: 03/16/22 0952     Body Fluid Culture No growth at 5 days     Gram Stain Rare (1+) WBCs per low power field      No organisms seen    Body Fluid Culture - Aspirate, Knee, Left [[id number]] Collected: 03/11/22 1535    Lab Status: Final result Specimen: Aspirate from Knee, Left Updated: 03/16/22 0952     Body Fluid Culture No growth at 5 days     Gram Stain Rare (1+) WBCs per low power field      No organisms seen    Anaerobic Culture - Aspirate, Hip, Right [[id number]] Collected: 03/11/22 1535    Lab Status: Final result Specimen: Aspirate from Hip, Right Updated: 03/16/22 0643     Anaerobic Culture No anaerobes isolated at 5 days    Anaerobic Culture - Aspirate, Knee, Left [[id number]] Collected: 03/11/22 1535    Lab Status: Final result Specimen: Aspirate from Knee, Left Updated: 03/16/22 0643     Anaerobic Culture No anaerobes isolated at 5 days    Blood Culture - Blood, Arm, Left [[id number]]  (Normal) Collected: 03/11/22 0000    Lab Status: Final result Specimen: Blood from Arm, Left Updated: 03/16/22 0132     Blood Culture No growth at 5 days    Blood Culture - Blood, Arm, Right [[id number]]  (Normal) Collected: 03/11/22 0000    Lab Status: Final result Specimen: Blood from Arm, Right Updated: 03/16/22 0132     Blood Culture No growth at 5 days    Chlamydia trachomatis, Neisseria gonorrhoeae, PCR - Urine, Urine, Clean Catch [[id number]] Collected: 03/14/22 1145    Lab Status: Final result Specimen: Urine, Clean Catch Updated: 03/15/22 2207     Chlamydia trachomatis, ASAEL Negative     Neisseria gonorrhoeae, ASAEL Negative    Narrative:      Performed at:  [address] [place] [address]  Jerrod Alejandro W  [id number]  : Tahmina Myoa MD, Phone:  [phone]    Urine Culture - Urine, Urine, Clean Catch [[id number]]  (Normal) Collected: 03/14/22 1145    Lab Status: Final result Specimen: Urine, Clean Catch Updated: 03/15/22 1440     Urine Culture No growth    Urine Culture - Urine, Urine, Clean Catch [[id number]] Collected: 03/11/22 0415    Lab Status: Final result Specimen: Urine, Clean Catch Updated: 03/12/22 1217     Urine Culture 50,000 CFU/mL Normal Urogenital Diane    COVID PRE-OP / PRE-PROCEDURE SCREENING ORDER (NO ISOLATION) - Swab, Nasopharynx [[id number]]  (Normal) Collected: 03/11/22 0415    Lab Status: Final result Specimen: Swab from Nasopharynx Updated: 03/11/22 0501    Narrative:      The following orders were created for panel order COVID PRE-OP / PRE-PROCEDURE SCREENING ORDER (NO ISOLATION) - Swab, Nasopharynx.  Procedure                               Abnormality         Status                     ---------                               -----------         ------                     COVID-19 and FLU A/B PCR...[[id number]]  Normal              Final result                 Please view results for these tests on the individual orders.    COVID-19 and FLU A/B PCR - Swab, Nasopharynx [[id number]]  (Normal) Collected: 03/11/22 0415    Lab Status: Final result Specimen: Swab from Nasopharynx Updated: 03/11/22 0501     COVID19 Not Detected     Influenza A PCR Not Detected     Influenza B PCR Not Detected    Narrative:      Fact sheet for providers: https://www.fda.gov/media/885458/download    Fact sheet for patients: https://www.fda.gov/media/790582/download    Test performed by PCR.          No radiology results from the last 24 hrs    Results for orders placed during the hospital encounter of 07/31/21    Adult Transthoracic Echo Complete W/ Cont if Necessary Per Protocol    Interpretation Summary  · Left ventricular ejection fraction appears to be 61 - 65%. Left ventricular  systolic function is normal.  · The cardiac valves are structurally and functionally within normal limits.      I have reviewed the medications:  Scheduled Meds:amLODIPine, 10 mg, Oral, Q24H  cefdinir, 300 mg, Oral, Q12H  doxycycline, 100 mg, Oral, Q12H  FLUoxetine, 20 mg, Oral, Daily  lactobacillus acidophilus, 1 capsule, Oral, Daily  lisinopril, 20 mg, Oral, Q24H  methadone, 60 mg, Oral, Daily  OLANZapine, 10 mg, Oral, Daily  senna-docusate sodium, 2 tablet, Oral, BID  sodium chloride, 10 mL, Intravenous, Q12H  traZODone, 150 mg, Oral, Nightly      Continuous Infusions:   PRN Meds:.•  acetaminophen  •  senna-docusate sodium **AND** polyethylene glycol **AND** bisacodyl **AND** bisacodyl  •  cyclobenzaprine  •  magnesium sulfate **OR** magnesium sulfate **OR** magnesium sulfate  •  melatonin  •  ondansetron  •  ondansetron  •  oxyCODONE-acetaminophen  •  potassium chloride **OR** potassium chloride **OR** potassium chloride  •  potassium chloride  •  sodium chloride  •  sodium chloride    Assessment/Plan   Assessment & Plan     Active Hospital Problems    Diagnosis  POA   • **Right hip pain [M25.551]  Yes   • Tobacco abuse [Z72.0]  Yes   • Hypokalemia [E87.6]  Yes   • QT prolongation [R94.31]  Yes   • Septic right hip (CMS/HCC) [M00.9]  Yes   • Polysubstance abuse (HCC) [F19.10]  Yes   • Alcohol abuse [F10.10]  Yes      Resolved Hospital Problems   No resolved problems to display.        Brief Hospital Course to date:  Frieda Flores is a 41 y.o. female with a past medical history of polysubstance abuse, prior alcohol abuse, ADHD/Bipolar disorder, and chronic right hip pain with septic arthritis s/p washout by Dr. Gusman in July 2021 who presented to the ED complaining of recurrent right hip and low back pain that has worsened over the last few months to the point where she can no longer bear weight. She was admitted to the hospitalist service and Orthopedic Surgery and ID were consulted.     This patient's problems  and plans were partially entered by my partner and updated as appropriate by me 03/17/22.    Right Hip/Low Back pain  Hx of right hip septic arthritis s/p washout 7/31/21  --Joint aspiration from left knee and right hip are not consistent wtih septic joint. Cultures NGTD.  --MRI hip and L spine unremarkable for acute issues.  --ID following. Ortho has now signed off.  --Has been on Ceftriaxone and PO Doxycycline. Discussed with Dr. Wilhelm today, okay to switch to Omnicef and continue Doxycycline. Needs total 10 days treatment from admission.  --Continue Percocet, stopped IV morphine.  --PT/OT recommend inpatient rehab, however due to patient's active and past drug use, she will not be able to go to inpatient rehab facility as they are not secured units. Current plan is to progress as much as possible with PT/OT here, then d/c home with HHPT and with sister to help her.     Hypokalemia  --Replaced per protocol with improvement.     Polysubstance abuse  --UDS positive for Cocaine, Methamphetamine, Methadone, and THC.  --On chronic Methadone, continued here but at lower dose due to prolonged QTc.  --Addiction Medicine following. Discussed with Mary Pollock today, she talked to [facility] (patient's methadone clinic) who states she was discharged from their clinic on 2/8/22 due to noncompliance and positive UDS. They had decreased her to 100 mg daily prior to that (not 130 mg as patient tells us). They may accept her back but require that she complete inpatient rehab at the [facility]. If patient unwilling to do this, then a wean from her Methadone will be warranted. As such, will not plan to increase current Methadone dose of 60 mg daily.      Mood disorder  --Continue Olanzapine.      QTc Prolongation  --EKG showed QTc of 491. Likely due to Methadone. Will monitor for now and avoid other QTc prolonging medications.  --Repeat EKG today.    Constipation  --Likely due to opiates.   --Continue bowel regimen.       DVT  prophylaxis:  Mechanical DVT prophylaxis orders are present.       AM-PAC 6 Clicks Score (PT): 19 (22 8159)    Disposition: I expect the patient to be discharged TBD, needs to progress more with PT for safe discharge home    CODE STATUS:   Code Status and Medical Interventions:   Ordered at: 22 0401     Level Of Support Discussed With:    Patient     Code Status (Patient has no pulse and is not breathing):    CPR (Attempt to Resuscitate)     Medical Interventions (Patient has pulse or is breathing):    Full Support       Henrietta Morales MD  22                Electronically signed by Henrietta Morales MD at 22 7433          Physical Therapy Notes (most recent note)      Stacie Nathan, PT at 22 0959  Version 1 of 1         Patient Name: Frieda Flores  : 1980    MRN: [id number]                              Today's Date: 3/16/2022       Admit Date: 3/11/2022    Visit Dx:     ICD-10-CM ICD-9-CM   1. Right hip pain  M25.551 719.45   2. Arthritis of right hip  M16.11 716.95   3. History of septic arthritis  Z87.39 V13.4   4. Hypokalemia  E87.6 276.8     Patient Active Problem List   Diagnosis   • Alcohol abuse   • Polysubstance abuse (HCC)   • Abnormal liver function tests   • Septic right hip (CMS/HCC)   • Therapeutic opioid-induced constipation (OIC)   • Postpartum care following LTCS (1 layer) 21   • Cigarette smoker   • Tobacco abuse   • Hypokalemia   • Right hip pain   • QT prolongation     Past Medical History:   Diagnosis Date   • Abdominal wall cellulitis - After  2019    Treated with IV antibiotics   • ADHD    • Alcoholism (HCC)    • Bipolar disorder (HCC)    • Chlamydia    • Chlamydia 2021   • Hepatitis C carrier (HCC)     Negative viral load   • Migraine    • Osteoarthritis    • PTSD (post-traumatic stress disorder)    • Septic right hip (CMS/HCC) 2021   • Substance abuse (HCC)    • Therapeutic opioid-induced constipation (OIC)  2018   • Urogenital trichomoniasis      Past Surgical History:   Procedure Laterality Date   • BREAST LUMPECTOMY Right    •  SECTION Bilateral 3/9/2019    Procedure:  SECTION PRIMARY;  Surgeon: Shelley Hughes MD;  Location:  [facility];  Service: Obstetrics/Gynecology   •  SECTION N/A 2021    Procedure: Repeat C/S;  Surgeon: Fernando Pedersen MD;  Location:  [facility];  Service: Obstetrics/Gynecology;  Laterality: N/A;   • INCISION AND DRAINAGE HIP Right 2021    Procedure: HIP INCISION AND DRAINAGE RIGHT;  Surgeon: Martin Gusman MD;  Location:  [facility];  Service: Orthopedics;  Laterality: Right;      General Information     Row Name 22 1133          Physical Therapy Time and Intention    Document Type therapy note (daily note)  -AY     Mode of Treatment physical therapy  -AY     Row Name 22 1133          General Information    Patient Profile Reviewed yes  -AY     Existing Precautions/Restrictions fall  -AY     Barriers to Rehab medically complex  -AY     Row Name 22 1133          Cognition    Orientation Status (Cognition) oriented x 3  -AY     Row Name 22 1133          Safety Issues, Functional Mobility    Safety Issues Affecting Function (Mobility) insight into deficits/self-awareness;awareness of need for assistance;safety precaution awareness  -AY     Impairments Affecting Function (Mobility) balance;endurance/activity tolerance;grasp;pain;strength  -AY           User Key  (r) = Recorded By, (t) = Taken By, (c) = Cosigned By    Initials Name Provider Type    AY Stacie Nathan PT Physical Therapist               Mobility     Row Name 22 1133          Bed Mobility    Bed Mobility supine-sit  -AY     Supine-Sit [place] (Bed Mobility) minimum assist (75% patient effort);1 person assist  -AY     Assistive Device (Bed Mobility) head of bed elevated;bed rails  -AY     Row Name 22 1133          Sit-Stand Transfer     Sit-Stand [place] (Transfers) minimum assist (75% patient effort);verbal cues  -AY     Assistive Device (Sit-Stand Transfers) walker, front-wheeled  -AY     Row Name 03/16/22 1133          Gait/Stairs (Locomotion)    [place] Level (Gait) minimum assist (75% patient effort);verbal cues;1 person assist  -AY     Assistive Device (Gait) walker, front-wheeled  -AY     Distance in Feet (Gait) 10  -AY     Deviations/Abnormal Patterns (Gait) antalgic;vickey decreased;gait speed decreased;stride length decreased  -AY     Bilateral Gait Deviations foot drop/toe drag;forward flexed posture  -AY     Comment, (Gait/Stairs) pt demonstrated step to gait pattern with decreased vickey and flexed posture. Decreased stance on RLE. Verbal cueing for posture, increased stride length, and heel strike. Gait distance limited by fatigue and pain.  -AY           User Key  (r) = Recorded By, (t) = Taken By, (c) = Cosigned By    Initials Name Provider Type    Stacie Sarkar PT Physical Therapist               Obj/Interventions     Row Name 03/16/22 1135          Balance    Balance Assessment sitting static balance;sitting dynamic balance;sit to stand dynamic balance;standing static balance  -AY     Dynamic Sitting Balance supervision  -AY     Position, Sitting Balance sitting edge of bed;supported  -AY     Static Standing Balance minimal assist  -AY     Dynamic Standing Balance minimal assist  -AY     Position/Device Used, Standing Balance supported;walker, front-wheeled  -AY           User Key  (r) = Recorded By, (t) = Taken By, (c) = Cosigned By    Initials Name Provider Type    Stacie Sarkar PT Physical Therapist               Goals/Plan    No documentation.                Clinical Impression     Row Name 03/16/22 1135          Pain    Pretreatment Pain Rating 5/10  -AY     Posttreatment Pain Rating 5/10  -AY     Pain Location - Side/Orientation Right  -AY     Pain Location lower  -AY     Pain Location - extremity  -AY      Pain Intervention(s) Ambulation/increased activity;Repositioned;Nursing Notified;Elevated  -AY     Additional Documentation Pain Scale: Numbers Pre/Post-Treatment (Group)  -AY     Row Name 03/16/22 1135          Plan of Care Review    Plan of Care Reviewed With patient  -AY     Progress improving  -AY     Outcome Evaluation Pt showing improvement as she increased ambulation distance to 10ft with min A and RWx demonstrating antalgic gait. Pt cont to be limited by pain and is slef-limiting. Cont to progress as able. d/c rec for IRF.  -AY     Row Name 03/16/22 1135          Vital Signs    Pre Systolic BP Rehab --  VSS  -AY     O2 Delivery Pre Treatment room air  -AY     O2 Delivery Intra Treatment room air  -AY     O2 Delivery Post Treatment room air  -AY     Pre Patient Position Supine  -AY     Intra Patient Position Standing  -AY     Post Patient Position Sitting  -AY     Row Name 03/16/22 1135          Positioning and Restraints    Pre-Treatment Position in bed  -AY     Post Treatment Position chair  -AY     In Chair notified nsg;reclined;sitting;call light within reach;encouraged to call for assist;exit alarm on;waffle cushion;legs elevated;LUE elevated;RUE elevated  -AY           User Key  (r) = Recorded By, (t) = Taken By, (c) = Cosigned By    Initials Name Provider Type    AY Stacie Nathan, PT Physical Therapist               Outcome Measures     Row Name 03/16/22 1141 03/16/22 0800       How much help from another person do you currently need...    Turning from your back to your side while in flat bed without using bedrails? 4  -AY 4  -KB    Moving from lying on back to sitting on the side of a flat bed without bedrails? 4  -AY 4  -KB    Moving to and from a bed to a chair (including a wheelchair)? 3  -AY 3  -KB    Standing up from a chair using your arms (e.g., wheelchair, bedside chair)? 3  -AY 3  -KB    Climbing 3-5 steps with a railing? 2  -AY 2  -KB    To walk in hospital room? 3  -AY 3  -KB    AM-PAC 6  Clicks Score (PT) 19  -AY 19  -KB    Row Name 03/16/22 1141 03/16/22 1033       Functional Assessment    Outcome Measure Options AM-PAC 6 Clicks Basic Mobility (PT)  -AY AM-PAC 6 Clicks Daily Activity (OT)  -MA          User Key  (r) = Recorded By, (t) = Taken By, (c) = Cosigned By    Initials Name Provider Type    KB Migue Hayes, RN Registered Nurse    Stacie Sarkar, PT Physical Therapist    Hallie Conner, OT Occupational Therapist                             Physical Therapy Education                 Title: PT OT SLP Therapies (In Progress)     Topic: Physical Therapy (Done)     Point: Mobility training (Done)     Learning Progress Summary           Patient Acceptance, E,TB, VU,NR by  at 3/16/2022 1141    Acceptance, E,D, NR by MB at 3/12/2022 1538                   Point: Home exercise program (Done)     Learning Progress Summary           Patient Acceptance, E,TB, VU,NR by  at 3/16/2022 1141    Acceptance, E,D, NR by MB at 3/12/2022 1538                   Point: Body mechanics (Done)     Learning Progress Summary           Patient Acceptance, E,TB, VU,NR by  at 3/16/2022 1141    Acceptance, E,D, NR by MB at 3/12/2022 1538                   Point: Precautions (Done)     Learning Progress Summary           Patient Acceptance, E,TB, VU,NR by  at 3/16/2022 1141    Acceptance, E,D, NR by MB at 3/12/2022 1538                               User Key     Initials Effective Dates Name Provider Type Discipline    MB 06/16/21 -  Cherise Duval, PT Physical Therapist PT     11/10/20 -  Stacie Nathan, ELDA Physical Therapist PT              PT Recommendation and Plan     Plan of Care Reviewed With: patient  Progress: improving  Outcome Evaluation: Pt showing improvement as she increased ambulation distance to 10ft with min A and RWx demonstrating antalgic gait. Pt cont to be limited by pain and is slef-limiting. Cont to progress as able. d/c rec for IRF.     Time Calculation:    PT Charges     Row  Name 22 1142             Time Calculation    Start Time 0959  -AY      PT Received On 22  -AY              Timed Charges    42257 - PT Therapeutic Activity Minutes 10  -AY              Total Minutes    Timed Charges Total Minutes 10  -AY       Total Minutes 10  -AY            User Key  (r) = Recorded By, (t) = Taken By, (c) = Cosigned By    Initials Name Provider Type    AY Stacie Nathan, PT Physical Therapist              Therapy Charges for Today     Code Description Service Date Service Provider Modifiers Qty    [id number] HC PT THERAPEUTIC ACT EA 15 MIN 3/16/2022 Stacie Nathan, PT GP 1          PT G-Codes  Outcome Measure Options: AM-PAC 6 Clicks Basic Mobility (PT)  AM-PAC 6 Clicks Score (PT): 19  AM-PAC 6 Clicks Score (OT): 15    Stacie Nathan PT  3/16/2022      Electronically signed by Stacie Nathan PT at 22 1143          Occupational Therapy Notes (most recent note)      Hallie Barrow, OT at 22 1015          Patient Name: Frieda Flores  : 1980    MRN: [id number]                              Today's Date: 3/16/2022       Admit Date: 3/11/2022    Visit Dx:     ICD-10-CM ICD-9-CM   1. Right hip pain  M25.551 719.45   2. Arthritis of right hip  M16.11 716.95   3. History of septic arthritis  Z87.39 V13.4   4. Hypokalemia  E87.6 276.8     Patient Active Problem List   Diagnosis   • Alcohol abuse   • Polysubstance abuse (HCC)   • Abnormal liver function tests   • Septic right hip (CMS/HCC)   • Therapeutic opioid-induced constipation (OIC)   • Postpartum care following LTCS (1 layer) 21   • Cigarette smoker   • Tobacco abuse   • Hypokalemia   • Right hip pain   • QT prolongation     Past Medical History:   Diagnosis Date   • Abdominal wall cellulitis - After  2019    Treated with IV antibiotics   • ADHD    • Alcoholism (HCC)    • Bipolar disorder (HCC)    • Chlamydia    • Chlamydia 2021   • Hepatitis C carrier (HCC)     Negative viral load   •  Migraine    • Osteoarthritis    • PTSD (post-traumatic stress disorder)    • Septic right hip (CMS/HCC) 2021   • Substance abuse (HCC)    • Therapeutic opioid-induced constipation (OIC)    • Urogenital trichomoniasis      Past Surgical History:   Procedure Laterality Date   • BREAST LUMPECTOMY Right    •  SECTION Bilateral 3/9/2019    Procedure:  SECTION PRIMARY;  Surgeon: Shelley Hughes MD;  Location: [facility];  Service: Obstetrics/Gynecology   •  SECTION N/A 2021    Procedure: Repeat C/S;  Surgeon: Fernando Pedersen MD;  Location:  [facility];  Service: Obstetrics/Gynecology;  Laterality: N/A;   • INCISION AND DRAINAGE HIP Right 2021    Procedure: HIP INCISION AND DRAINAGE RIGHT;  Surgeon: Martin Gusman MD;  Location:  [facility];  Service: Orthopedics;  Laterality: Right;      General Information     Row Name 22 1028          OT Time and Intention    Document Type therapy note (daily note)  -MA     Mode of Treatment occupational therapy  -MA     Row Name 22 1028          General Information    Patient Profile Reviewed yes  -MA     Existing Precautions/Restrictions fall  -MA     Row Name 22 1028          Cognition    Orientation Status (Cognition) oriented x 3  -MA     Row Name 22 1028          Safety Issues, Functional Mobility    Safety Issues Affecting Function (Mobility) awareness of need for assistance;insight into deficits/self-awareness;safety precaution awareness;safety precautions follow-through/compliance;sequencing abilities  -MA     Impairments Affecting Function (Mobility) balance;endurance/activity tolerance;grasp;pain;strength  -MA           User Key  (r) = Recorded By, (t) = Taken By, (c) = Cosigned By    Initials Name Provider Type    Hallie Conner OT Occupational Therapist                 Mobility/ADL's     Row Name 22 1028          Transfers    Transfers sit-stand transfer  -MA      Sit-Stand [place] (Transfers) minimum assist (75% patient effort);verbal cues  -MA     Row Name 03/16/22 1028          Sit-Stand Transfer    Assistive Device (Sit-Stand Transfers) walker, front-wheeled  -MA     Row Name 03/16/22 1028          Activities of Daily Living    BADL Assessment/Intervention lower body dressing  -MA     Row Name 03/16/22 1028          Lower Body Dressing Assessment/Training    [place] Level (Lower Body Dressing) don;socks;moderate assist (50% patient effort);verbal cues  -MA     Assistive Devices (Lower Body Dressing) sock-aid  -MA     Position (Lower Body Dressing) unsupported sitting  -MA           User Key  (r) = Recorded By, (t) = Taken By, (c) = Cosigned By    Initials Name Provider Type    Hallie Conner OT Occupational Therapist               Obj/Interventions     Row Name 03/16/22 1029          Balance    Balance Assessment sitting static balance;sitting dynamic balance;standing static balance  -MA     Static Sitting Balance supervision  -MA     Dynamic Sitting Balance contact guard  -MA     Position, Sitting Balance unsupported  -MA     Static Standing Balance minimal assist;non-verbal cues (demo/gesture)  -MA     Position/Device Used, Standing Balance walker, front-wheeled  -MA     Balance Interventions sit to stand;occupation based/functional task;sitting  -MA           User Key  (r) = Recorded By, (t) = Taken By, (c) = Cosigned By    Initials Name Provider Type    Hallie Conner OT Occupational Therapist               Goals/Plan    No documentation.                Clinical Impression     Row Name 03/16/22 1030          Pain Assessment    Pain Intervention(s) Repositioned;Ambulation/increased activity  -MA     Additional Documentation Pain Scale: FACES Pre/Post-Treatment (Group)  -MA     Row Name 03/16/22 1030          Pain Scale: FACES Pre/Post-Treatment    Pain: FACES Scale, Pretreatment 6-->hurts even more  -MA     Posttreatment Pain Rating 6-->hurts even  more  -MA     Pain Location lower  -MA     Pain Location - extremity  -MA     Pre/Posttreatment Pain Comment Pt c/o pain in BLEs  -MA     Row Name 03/16/22 1030          Plan of Care Review    Plan of Care Reviewed With patient  -MA     Progress improving  -MA     Outcome Evaluation Pt participated in ADL retraining this date including incorporation of AE for LBD d/t increased pain. Pt min A for STS w/ RW. Will continue to progress pt as tolerated per OT POC. Rec IRF at d/c.  -MA     Row Name 03/16/22 1030          Therapy Assessment/Plan (OT)    Rehab Potential (OT) fair, will monitor progress closely  -MA     Criteria for Skilled Therapeutic Interventions Met (OT) yes;meets criteria;skilled treatment is necessary  -MA     Therapy Frequency (OT) daily  -MA     Row Name 03/16/22 1030          Therapy Plan Review/Discharge Plan (OT)    Anticipated Discharge Disposition (OT) inpatient rehabilitation facility  -MA     Row Name 03/16/22 1030          Vital Signs    Pre Systolic BP Rehab --  VSS - RN cleared OT  -MA     O2 Delivery Pre Treatment room air  -MA     O2 Delivery Intra Treatment room air  -MA     O2 Delivery Post Treatment room air  -MA     Pre Patient Position Sitting  -MA     Intra Patient Position Standing  -MA     Post Patient Position Sitting  -MA     Row Name 03/16/22 1030          Positioning and Restraints    Pre-Treatment Position sitting in chair/recliner  -MA     Post Treatment Position chair  -MA     In Chair reclined;call light within reach;encouraged to call for assist;exit alarm on;with nsg;legs elevated;heels elevated  -MA           User Key  (r) = Recorded By, (t) = Taken By, (c) = Cosigned By    Initials Name Provider Type    Hallie Conner, MARISOL Occupational Therapist               Outcome Measures     Row Name 03/16/22 1033          How much help from another is currently needed...    Putting on and taking off regular lower body clothing? 2  -MA     Bathing (including washing, rinsing,  and drying) 2  -MA     Toileting (which includes using toilet bed pan or urinal) 2  -MA     Putting on and taking off regular upper body clothing 3  -MA     Taking care of personal grooming (such as brushing teeth) 3  -MA     Eating meals 3  -MA     AM-PAC 6 Clicks Score (OT) 15  -MA     Row Name 03/16/22 1033          Functional Assessment    Outcome Measure Options AM-PAC 6 Clicks Daily Activity (OT)  -MA           User Key  (r) = Recorded By, (t) = Taken By, (c) = Cosigned By    Initials Name Provider Type    Hallie Conner OT Occupational Therapist                Occupational Therapy Education                 Title: PT OT SLP Therapies (In Progress)     Topic: Occupational Therapy (In Progress)     Point: ADL training (In Progress)     Description:   Instruct learner(s) on proper safety adaptation and remediation techniques during self care or transfers.   Instruct in proper use of assistive devices.              Learning Progress Summary           Patient Acceptance, E, NR by MA at 3/16/2022 1034    Acceptance, E, NR by  at 3/12/2022 1330    Comment: Educated pt regarding role of therapy                   Point: Home exercise program (In Progress)     Description:   Instruct learner(s) on appropriate technique for monitoring, assisting and/or progressing therapeutic exercises/activities.              Learning Progress Summary           Patient Acceptance, E, NR by  at 3/12/2022 1330    Comment: Educated pt regarding role of therapy                   Point: Precautions (In Progress)     Description:   Instruct learner(s) on prescribed precautions during self-care and functional transfers.              Learning Progress Summary           Patient Acceptance, E, NR by MA at 3/16/2022 1034                   Point: Body mechanics (In Progress)     Description:   Instruct learner(s) on proper positioning and spine alignment during self-care, functional mobility activities and/or exercises.               Learning Progress Summary           Patient Acceptance, E, NR by MA at 3/16/2022 1034                               User Key     Initials Effective Dates Name Provider Type Discipline     06/16/21 -  Felipa Medina, OT Occupational Therapist OT    MA 11/19/20 -  Hallie Barrow OT Occupational Therapist OT              OT Recommendation and Plan  Therapy Frequency (OT): daily  Plan of Care Review  Plan of Care Reviewed With: patient  Progress: improving  Outcome Evaluation: Pt participated in ADL retraining this date including incorporation of AE for LBD d/t increased pain. Pt min A for STS w/ RW. Will continue to progress pt as tolerated per OT POC. Rec IRF at d/c.     Time Calculation:    Time Calculation- OT     Row Name 03/16/22 1035             Time Calculation- OT    OT Start Time 1015  -MA      OT Received On 03/16/22  -MA      OT Goal Re-Cert Due Date 03/22/22  -MA              Timed Charges    45757 - OT Therapeutic Activity Minutes 2  -MA      94440 - OT Self Care/Mgmt Minutes 6  -MA              Total Minutes    Timed Charges Total Minutes 8  -MA       Total Minutes 8  -MA            User Key  (r) = Recorded By, (t) = Taken By, (c) = Cosigned By    Initials Name Provider Type    MA Hallie Barrow OT Occupational Therapist              Therapy Charges for Today     Code Description Service Date Service Provider Modifiers Qty    [id number] HC OT SELF CARE/MGMT/TRAIN EA 15 MIN 3/16/2022 Hallie Barrow OT GO 1               Hallie Barrow OT  3/16/2022    Electronically signed by Hallie Barrow OT at 03/16/22 1035

## 2022-03-17 NOTE — CASE MANAGEMENT/SOCIAL WORK
"Continued Stay Note  [facility]     Patient Name: Frieda Flores  MRN: [id number]  Today's Date: 3/17/2022    Admit Date: 3/11/2022     Discharge Plan     Row Name 03/17/22 1717       Plan    Plan Ongoing- difficult disposition regarding RAFAEL/Methadone    Plan Comments I spoke with Ingrid at [facility] today, with Frieda's permission.  She was discharged from [facility] on 2/8/22 (Frieda did not disclose this information, however, when I asked her about it, she said, \"yeah, I know\")  She was discharged from [facility] Methadone Clinic due to noncompliance- + UDS and missing several dosing days in a row.  She reported on admission that she was still receiving Methadone maintenance at 130 mg per day.  Per [facility] her last dosing prior to d/c was 100 mg/d.    She is currently receiving 60 mg Methadone daily at this time.    She was recently arrested and reports that the  has ordered that she go to the [facility]- she is working with her [facility] , Verenice Hill, who is helping her into treatment.  She said that the [facility] called her and told her that she had an intake date and when that date came, they called and said they couldn't take her that day, she then said that they called her back with another intake date and that she couldn't go in that day as she \"had too many appointments, I mean, one of my appointments was with [facility] and I needed to go to that\"  She is NOT on a waiting list currently at the [facility] to her knowledge- she says that Verenice is \"taking care of all that.\"  She states that she can be on Methadone at the [facility].    This situation is complicated for many reasons- she is on Methadone, and it looks like she may be tentatively discharged this weekend.  She will not have access to Methadone as she was discharged from [facility] and did not attempt to follow-up with getting into the other clinic, [facility].  She asked me about [facility] today, and how to get into that clinic. "  There is an issue with this however, there is no guarantee that she can get into [facility], she will need to call the clinic herself (their policy) and they only accept new patients on Tuesday and Friday.      Regarding the Methadone- if we begin a rapid wean, she will likely go into acute withdrawal, and even if she stays over the weekend, she will likely not be fully weaned from Methadone by Monday.      Her noncompliance with the Methadone Clinic and her relapses does not prisca well.    She seems to have a general lack of motivation.    So, as it stands now, if discharged either over the weekend or on Monday, we still have the same situation at hand.    Unfortunately, we do not have many options here.  If she is discharged over the weekend, the best thing I can do is provide her with the necessary resources for follow-up and treatment, but it must be up to her to engage in pursuing RAFAEL treatment.      Row Name 03/17/22 1426       Plan    Plan Home with     Patient/Family in Agreement with Plan yes    Plan Comments Spoke with pt. at bedside. Will dc on po abx per ID. Plan is home with sister and HH. [facility]    Final Discharge Disposition Code 06 - home with home health care               Discharge Codes    No documentation.                     Mary Pollock RN ,BSN   Addiction Coordinator

## 2022-03-18 PROCEDURE — 25010000002 ONDANSETRON PER 1 MG: Performed by: PHYSICIAN ASSISTANT

## 2022-03-18 PROCEDURE — 99232 SBSQ HOSP IP/OBS MODERATE 35: CPT | Performed by: HOSPITALIST

## 2022-03-18 RX ORDER — OXYCODONE HYDROCHLORIDE AND ACETAMINOPHEN 5; 325 MG/1; MG/1
1 TABLET ORAL EVERY 4 HOURS PRN
Status: DISCONTINUED | OUTPATIENT
Start: 2022-03-18 | End: 2022-03-21 | Stop reason: HOSPADM

## 2022-03-18 RX ADMIN — Medication 10 ML: at 20:31

## 2022-03-18 RX ADMIN — DOXYCYCLINE 100 MG: 100 CAPSULE ORAL at 09:52

## 2022-03-18 RX ADMIN — OXYCODONE HYDROCHLORIDE AND ACETAMINOPHEN 1 TABLET: 5; 325 TABLET ORAL at 18:37

## 2022-03-18 RX ADMIN — LISINOPRIL 20 MG: 20 TABLET ORAL at 09:52

## 2022-03-18 RX ADMIN — ONDANSETRON 4 MG: 2 INJECTION INTRAMUSCULAR; INTRAVENOUS at 02:03

## 2022-03-18 RX ADMIN — Medication 10 ML: at 09:55

## 2022-03-18 RX ADMIN — METHADONE HYDROCHLORIDE 60 MG: 10 TABLET ORAL at 10:30

## 2022-03-18 RX ADMIN — AMLODIPINE BESYLATE 10 MG: 10 TABLET ORAL at 09:50

## 2022-03-18 RX ADMIN — OXYCODONE HYDROCHLORIDE AND ACETAMINOPHEN 1 TABLET: 10; 325 TABLET ORAL at 06:21

## 2022-03-18 RX ADMIN — CEFDINIR 300 MG: 300 CAPSULE ORAL at 10:34

## 2022-03-18 RX ADMIN — OXYCODONE HYDROCHLORIDE AND ACETAMINOPHEN 1 TABLET: 5; 325 TABLET ORAL at 10:10

## 2022-03-18 RX ADMIN — OLANZAPINE 10 MG: 5 TABLET, FILM COATED ORAL at 09:53

## 2022-03-18 RX ADMIN — CEFDINIR 300 MG: 300 CAPSULE ORAL at 20:31

## 2022-03-18 RX ADMIN — OXYCODONE HYDROCHLORIDE AND ACETAMINOPHEN 1 TABLET: 10; 325 TABLET ORAL at 02:03

## 2022-03-18 RX ADMIN — SENNOSIDES AND DOCUSATE SODIUM 2 TABLET: 50; 8.6 TABLET ORAL at 09:50

## 2022-03-18 RX ADMIN — SENNOSIDES AND DOCUSATE SODIUM 2 TABLET: 50; 8.6 TABLET ORAL at 20:31

## 2022-03-18 RX ADMIN — Medication 1 CAPSULE: at 09:52

## 2022-03-18 RX ADMIN — Medication 5 MG: at 20:31

## 2022-03-18 RX ADMIN — DOXYCYCLINE 100 MG: 100 CAPSULE ORAL at 20:31

## 2022-03-18 RX ADMIN — FLUOXETINE HYDROCHLORIDE 20 MG: 20 CAPSULE ORAL at 09:52

## 2022-03-18 RX ADMIN — TRAZODONE HYDROCHLORIDE 150 MG: 100 TABLET ORAL at 20:31

## 2022-03-18 NOTE — PROGRESS NOTES
[facility] Medicine Services  PROGRESS NOTE    Patient Name: Frieda Flores  : 1980  MRN: [id number]    Date of Admission: 3/11/2022  Primary Care Physician: Karan Douglas DO    Subjective   Subjective     CC:  F/U right hip and low back pain    HPI:  Patient seen this morning. Discussed that she has severe arthritis based on her MRI imaging done earlier this admission and this is the cause for her ongoing pain, not septic arthritis. She is tearful about all the social barriers in her life including financial burden.     ROS:  Gen-no fevers, no chills  CV-no chest pain, no palpitations  Resp-no cough, no dyspnea  GI-no N/V/D, no abd pain    All other systems reviewed and negative except any additional pertinent positives and negatives as discussed in HPI.      Objective   Objective     Vital Signs:   Temp:  [98 °F (36.7 °C)-98.4 °F (36.9 °C)] 98 °F (36.7 °C)  Heart Rate:  [76-93] 93  Resp:  [18] 18  BP: (121-125)/(63-73) 125/63     Physical Exam:  Gen-no acute distress  HENT-NCAT, mucous membranes moist  CV-RRR, S1 S2 normal, no m/r/g  Resp-CTAB, no wheezes or rales  Abd-soft, NT, ND, +BS  Ext-no edema  Neuro-A&Ox3, no focal deficits  Skin-no rashes  Psych-tearful      Results Reviewed:  LAB RESULTS:      Lab 22  0323 22  0456 22  0530 22  0334   WBC 3.92 3.98 6.21 6.13   HEMOGLOBIN 11.0* 11.1* 11.4* 11.3*   HEMATOCRIT 34.6 34.9 34.4 34.5   PLATELETS 238 217 221 215   NEUTROS ABS 1.84 1.87 4.21  --    IMMATURE GRANS (ABS) 0.02 0.02 0.02  --    LYMPHS ABS 1.57 1.68 1.49  --    MONOS ABS 0.24 0.22 0.32  --    EOS ABS 0.22 0.17 0.15  --    MCV 90.1 91.4 88.0 89.8         Lab 03/15/22  0318 22  0323 22  0456 22  0334   SODIUM  --  140 141 141   POTASSIUM  --  4.5 4.4 3.1*   CHLORIDE  --  107 108* 106   CO2  --  27.0 26.0 26.0   ANION GAP  --  6.0 7.0 9.0   BUN  --  7 8 7   CREATININE  --  0.52* 0.56* 0.57   EGFR  --  120.6 118.5 118.0   GLUCOSE   --  106* 104* 92   CALCIUM  --  8.5* 8.6 8.3*   MAGNESIUM 2.0 1.9 1.7  --          Lab 03/12/22  0334   TOTAL PROTEIN 5.4*   ALBUMIN 2.90*   GLOBULIN 2.5   ALT (SGPT) 17   AST (SGOT) 15   BILIRUBIN 0.3   ALK PHOS 88                     Brief Urine Lab Results  (Last result in the past 365 days)      Color   Clarity   Blood   Leuk Est   Nitrite   Protein   CREAT   Urine HCG        03/14/22 1145 Yellow   Cloudy   Negative   Trace   Positive   Negative                 Microbiology Results Abnormal     Procedure Component Value - Date/Time    Fungus Culture - Aspirate, Knee, Left [[id number]] Collected: 03/11/22 1535    Lab Status: Preliminary result Specimen: Aspirate from Knee, Left Updated: 03/16/22 1718     Fungus Culture No fungus isolated at less than 1 week    AFB Culture - Aspirate, Knee, Left [[id number]] Collected: 03/11/22 1535    Lab Status: Preliminary result Specimen: Aspirate from Knee, Left Updated: 03/16/22 1718     AFB Culture No AFB isolated at less than 1 week     AFB Stain No acid fast bacilli seen on concentrated smear    Fungus Culture - Aspirate, Hip, Right [[id number]] Collected: 03/11/22 1535    Lab Status: Preliminary result Specimen: Aspirate from Hip, Right Updated: 03/16/22 1718     Fungus Culture No fungus isolated at less than 1 week    AFB Culture - Aspirate, Hip, Right [[id number]] Collected: 03/11/22 1535    Lab Status: Preliminary result Specimen: Aspirate from Hip, Right Updated: 03/16/22 1718     AFB Culture No AFB isolated at less than 1 week     AFB Stain No acid fast bacilli seen on concentrated smear    Body Fluid Culture - Aspirate, Hip, Right [[id number]] Collected: 03/11/22 1535    Lab Status: Final result Specimen: Aspirate from Hip, Right Updated: 03/16/22 0952     Body Fluid Culture No growth at 5 days     Gram Stain Rare (1+) WBCs per low power field      No organisms seen    Body Fluid Culture - Aspirate, Knee, Left [[id number]] Collected: 03/11/22 1535    Lab Status: Final  result Specimen: Aspirate from Knee, Left Updated: 03/16/22 0952     Body Fluid Culture No growth at 5 days     Gram Stain Rare (1+) WBCs per low power field      No organisms seen    Anaerobic Culture - Aspirate, Hip, Right [[id number]] Collected: 03/11/22 1535    Lab Status: Final result Specimen: Aspirate from Hip, Right Updated: 03/16/22 0643     Anaerobic Culture No anaerobes isolated at 5 days    Anaerobic Culture - Aspirate, Knee, Left [[id number]] Collected: 03/11/22 1535    Lab Status: Final result Specimen: Aspirate from Knee, Left Updated: 03/16/22 0643     Anaerobic Culture No anaerobes isolated at 5 days    Blood Culture - Blood, Arm, Left [[id number]]  (Normal) Collected: 03/11/22 0000    Lab Status: Final result Specimen: Blood from Arm, Left Updated: 03/16/22 0132     Blood Culture No growth at 5 days    Blood Culture - Blood, Arm, Right [[id number]]  (Normal) Collected: 03/11/22 0000    Lab Status: Final result Specimen: Blood from Arm, Right Updated: 03/16/22 0132     Blood Culture No growth at 5 days    Chlamydia trachomatis, Neisseria gonorrhoeae, PCR - Urine, Urine, Clean Catch [[id number]] Collected: 03/14/22 1145    Lab Status: Final result Specimen: Urine, Clean Catch Updated: 03/15/22 2207     Chlamydia trachomatis, ASAEL Negative     Neisseria gonorrhoeae, ASAEL Negative    Narrative:      Performed at:  [address] [place] [address]  [id number]  : Tahmina Moya MD, Phone:  [phone]    Urine Culture - Urine, Urine, Clean Catch [[id number]]  (Normal) Collected: 03/14/22 1145    Lab Status: Final result Specimen: Urine, Clean Catch Updated: 03/15/22 1440     Urine Culture No growth    Urine Culture - Urine, Urine, Clean Catch [[id number]] Collected: 03/11/22 0415    Lab Status: Final result Specimen: Urine, Clean Catch Updated: 03/12/22 1217     Urine Culture 50,000 CFU/mL Normal Urogenital Diane    COVID PRE-OP / PRE-PROCEDURE SCREENING ORDER (NO ISOLATION) -  Swab, Nasopharynx [[id number]]  (Normal) Collected: 03/11/22 0415    Lab Status: Final result Specimen: Swab from Nasopharynx Updated: 03/11/22 0501    Narrative:      The following orders were created for panel order COVID PRE-OP / PRE-PROCEDURE SCREENING ORDER (NO ISOLATION) - Swab, Nasopharynx.  Procedure                               Abnormality         Status                     ---------                               -----------         ------                     COVID-19 and FLU A/B PCR...[[id number]]  Normal              Final result                 Please view results for these tests on the individual orders.    COVID-19 and FLU A/B PCR - Swab, Nasopharynx [[id number]]  (Normal) Collected: 03/11/22 0415    Lab Status: Final result Specimen: Swab from Nasopharynx Updated: 03/11/22 0501     COVID19 Not Detected     Influenza A PCR Not Detected     Influenza B PCR Not Detected    Narrative:      Fact sheet for providers: https://www.fda.gov/media/287448/download    Fact sheet for patients: https://www.fda.gov/media/152755/download    Test performed by PCR.          No radiology results from the last 24 hrs    Results for orders placed during the hospital encounter of 07/31/21    Adult Transthoracic Echo Complete W/ Cont if Necessary Per Protocol    Interpretation Summary  · Left ventricular ejection fraction appears to be 61 - 65%. Left ventricular systolic function is normal.  · The cardiac valves are structurally and functionally within normal limits.      I have reviewed the medications:  Scheduled Meds:amLODIPine, 10 mg, Oral, Q24H  cefdinir, 300 mg, Oral, Q12H  doxycycline, 100 mg, Oral, Q12H  FLUoxetine, 20 mg, Oral, Daily  lactobacillus acidophilus, 1 capsule, Oral, Daily  lisinopril, 20 mg, Oral, Q24H  methadone, 60 mg, Oral, Daily  OLANZapine, 10 mg, Oral, Daily  senna-docusate sodium, 2 tablet, Oral, BID  sodium chloride, 10 mL, Intravenous, Q12H  traZODone, 150 mg, Oral, Nightly      Continuous  Infusions:   PRN Meds:.•  acetaminophen  •  senna-docusate sodium **AND** polyethylene glycol **AND** bisacodyl **AND** bisacodyl  •  cyclobenzaprine  •  magnesium sulfate **OR** magnesium sulfate **OR** magnesium sulfate  •  melatonin  •  ondansetron  •  ondansetron  •  oxyCODONE-acetaminophen  •  potassium chloride **OR** potassium chloride **OR** potassium chloride  •  potassium chloride  •  sodium chloride  •  sodium chloride    Assessment/Plan   Assessment & Plan     Active Hospital Problems    Diagnosis  POA   • **Right hip pain [M25.551]  Yes   • Tobacco abuse [Z72.0]  Yes   • Hypokalemia [E87.6]  Yes   • QT prolongation [R94.31]  Yes   • Septic right hip (CMS/HCC) [M00.9]  Yes   • Polysubstance abuse (HCC) [F19.10]  Yes   • Alcohol abuse [F10.10]  Yes      Resolved Hospital Problems   No resolved problems to display.        Brief Hospital Course to date:  Frieda Flores is a 41 y.o. female with a past medical history of polysubstance abuse, prior alcohol abuse, ADHD/Bipolar disorder, and chronic right hip pain with septic arthritis s/p washout by Dr. Gusman in July 2021 who presented to the ED complaining of recurrent right hip, left knee, and low back pain that has worsened over the last few months to the point where she can no longer bear weight. She was admitted to the hospitalist service and Orthopedic Surgery and ID were consulted. Her workup was negative for septic arthritis. She was empirically treated with antibiotics per Infectious Disease due to constitutional symptoms of low grade fevers and hx of recent IVDA.      This patient's problems and plans were partially entered by my partner and updated as appropriate by me 03/18/22.    Right Hip/Low Back pain  Severe osteoarthritis/avascular necrosis  Hx of right hip septic arthritis s/p washout 7/31/21  --Joint aspiration from left knee and right hip on 3/11/22 are not consistent wtih septic joint. Cultures negative.  --MRI hip and L spine unremarkable  for acute issues, shows end-stage arthritis/avascular necrosis of right hip and severe arthritis of left hip; DDD at L4/5 and L5/S1 levels, no spinal canal stenosis.  --ID following. Ortho has now signed off. She has an upcoming appointment 5/12/22 with  Orthopedic Surgery; patient states she was told she needs a hip replacement.  --Has been on Ceftriaxone and PO Doxycycline. Discussed with Dr. Wilhelm 3/17/22, okay to switch to Omnicef and continue Doxycycline. Plan to complete total 10 days treatment from admission (through 3/20/22).  --Continue Percocet, stopped IV morphine. Start weaning Percocet today.  --PT/OT recommend inpatient rehab, however due to patient's active and past drug use, she will not be able to go to inpatient rehab facility as they are not secured units. Current plan is to progress as much as possible with PT/OT here, then d/c home with HHPT and with sister to help her.     Hypokalemia  --Replaced per protocol with improvement.     Polysubstance abuse  --UDS positive for Cocaine, Methamphetamine, Methadone, and THC.  --On chronic Methadone, continued here but at lower dose due to prolonged QTc.  --Addiction Medicine following. Discussed with Mary Pollock, she talked to [facility] (patient's methadone clinic) who states she was discharged from their clinic on 2/8/22 due to noncompliance and positive UDS. They had decreased her to 100 mg daily prior to that (not 130 mg as patient tells us). Mary has helped arrange appointment with a different methadone clinic ([facility]) on Tuesday 3/22/22 at 0530 to establish care. Current plan is to discharge patient on Monday after her methadone dose.     Mood disorder  --Continue Olanzapine.      QTc Prolongation  --EKG 3/11/22 showed QTc of 491. Likely due to Methadone. Will monitor for now and avoid other QTc prolonging medications.  --Repeat EKG 3/17/22 with improvement to 418.    Constipation  --Likely due to opiates.   --Continue bowel regimen.       DVT  prophylaxis:  Mechanical DVT prophylaxis orders are present.       AM-PAC 6 Clicks Score (PT): (P) 19 (03/18/22 0800)    Disposition: I expect the patient to be discharged home with HHPT on Monday 3/21/22 after dose of methadone, will continue to wean Percocet through the weekend as this will not be prescribed to her at discharge    CODE STATUS:   Code Status and Medical Interventions:   Ordered at: 03/11/22 0401     Level Of Support Discussed With:    Patient     Code Status (Patient has no pulse and is not breathing):    CPR (Attempt to Resuscitate)     Medical Interventions (Patient has pulse or is breathing):    Full Support       Henrietta Morales MD  03/18/22

## 2022-03-18 NOTE — CASE MANAGEMENT/SOCIAL WORK
Continued Stay Note  [facility]     Patient Name: Frieda Flores  MRN: [id number]  Today's Date: 3/18/2022    Admit Date: 3/11/2022     Discharge Plan     Row Name 03/18/22 0850       Plan    Plan [facility]- Methadone Clinic Tuesday    Plan Comments Met with Frieda this morning- I informed her that she needs to call [facility] to set up an appointment with them- as that is their policy.  She called [facility] while I was at the bedside.    [facility] does intake for new patients on Tuesdays and Fridays.    Due to her HX of opioid addiction and relapse, a Naloxone kit -nasal (per STEPHIE Conner) provided.  Instructions for administration provided- verbalized understanding.      Appointment made at [facility] for Tuesday at [facility] [place] @ [address]  Address: [address] [place] [address] Phone number: [phone]    So, her safest disposition is to now be discharged Monday morning after her Methadone dose.                     Discharge Codes    No documentation.                     Mary Pollock RN ,BSN   Addiction Coordinator

## 2022-03-18 NOTE — CASE MANAGEMENT/SOCIAL WORK
Continued Stay Note  [facility]     Patient Name: Frieda Flores  MRN: [id number]  Today's Date: 3/18/2022    Admit Date: 3/11/2022     Discharge Plan     Row Name 03/18/22 1012       Plan    Plan Home with family.    Patient/Family in Agreement with Plan yes    Plan Comments Spoke with pt. at bedside. Plan is home with sister. Methadone clinic arranged by Opioid abuse CM. Have arranged for pt to follow up with OP PT with [facility]. States she will have a ride Monday.    Final Discharge Disposition Code 01 - home or self-care    Row Name 03/18/22 0850       Plan    Plan Ongoing    Plan Comments Met with Frieda this morning- I informed her that she needs to call [facility] Methadone clinic to set up an appointment with then- as that is their policy.  She called [facility] while I was at the bedside.               Discharge Codes    No documentation.                     La Carroll RN

## 2022-03-18 NOTE — PROGRESS NOTES
[facility] Progress Note        Antibiotics:  Anti-Infectives (From admission, onward)    Ordered     Dose/Rate Route Frequency Start Stop    03/17/22 0937  cefdinir (OMNICEF) capsule 300 mg        Ordering Provider: Henrietta Morales MD    300 mg Oral Every 12 Hours Scheduled 03/17/22 1030 03/21/22 0859    03/14/22 0900  doxycycline (MONODOX) capsule 100 mg        Ordering Provider: Henrietta Morales MD    100 mg Oral Every 12 Hours Scheduled 03/14/22 1000 03/21/22 0859    03/11/22 0403  piperacillin-tazobactam (ZOSYN) 4.5 g in iso-osmotic dextrose 100 mL IVPB (premix)        Ordering Provider: Broderick Calvo RPH    4.5 g  over 30 Minutes Intravenous Once 03/11/22 0445 03/11/22 0614          CC: right hip/left knee/low back pain    HPI:      Patient is a 41 y.o.  Yr old female with history of polysubstance abuse with prior cocaine/heroin/methamphetamine/THC, chronic hepatitis C and PTSD/bipolar disease; treated for right septic hip with MSSA in September 2021, surgery at that time by Dr. Gusman and subsequently transitioned to [facility], abnormal LFTs with empiric change from Zosyn to cefazolin with improvement although unclear if related to penicillin class versus hep C or combination.  Noncompliant with follow-up.  Apparently plans to be seen at [facility] regarding possible right hip surgery in the future but specifics of that are unclear.  She reports being in assisted recently, lost her spot in methadone clinic and reverted to injection drug abuse approximately 1 week prior to admission.  After injection had developed worsening right hip and left knee pain, worsening low back pain and ultimately presented to [facility] on March 11.     Aside from injection drug abuse, she denies any other specific exposures. No raw or undercooked food.  No unpasteurized milk or milk products.  No animal insect or arthropod exposure.  No tick bites.  No outdoor camping or hunting exposure.  No travel exposure.  No ill  exposure.  No history of TB or TB exposure.   Denies a history of MRSA/VRE and no history of C. difficile or ESBL/KPC  Organisms.      HCG negative      3/18/22  Seen early and sleepy;  Per nursing, right  Hip and left knee  pain ongoing, nonradiating, worse with movement ;  pain 4/10 but intensity fluctuates; recent urinary frequency , now better and no new urinary/vaginal symptoms       lumbar back pain, varies in intensity and not progressive, sharp at times, worse with movement, somewhat better with pain meds but not completely relieved and 3-4 out of 10 in severity at present.  She denies any new focal weakness or numbness.  No bowel or bladder incontinence.        Denies headache photophobia or neck stiffness.  No shortness of breath cough or hemoptysis.  No nausea vomiting diarrhea or abdominal pain.  No dysuria hematuria or pyuria.          ROS:     3/18/22  Per nursing; No f/c/s. No n/v/d. No rash. No new ADR to Abx.       Constitutional--subjective fevers and chills.  Appetite diminished with fatigue  Heent-- No new vision, hearing or throat complaints.  No epistaxis or oral sores.  Denies odynophagia or dysphagia.  No flashers, floaters or eye pain. No odynophagia or dysphagia. No headache, photophobia or neck stiffness.  CV-- No chest pain, palpitation or syncope  Resp-- No SOB/cough/Hemoptysis  GI- No nausea, vomiting, or diarrhea.  No hematochezia, melena, or hematemesis. Denies jaundice or chronic liver disease.  -- at present No dysuria, hematuria, or flank pain.  Denies hesitancy, urgency or flank pain.  Lymph- no swollen lymph nodes in neck/axilla or groin.   Heme- No active bruising or bleeding; no Hx of DVT or PE.  MS-- no swelling or pain in the bones or joints of arms/legs aside from above.     Neuro-- No acute focal weakness or numbness in the arms or legs.  No seizures.     Full 12 point review of systems reviewed and negative otherwise for acute complaints, except for above      PE:  "nursing/chaperone present  /63 (BP Location: Right arm, Patient Position: Lying)   Pulse 76   Temp 98 °F (36.7 °C) (Oral)   Resp 18   Ht 160 cm (63\")   Wt 83.8 kg (184 lb 12.8 oz)   LMP 02/17/2022   SpO2 92%   Breastfeeding No   BMI 32.74 kg/m²       GENERAL: sleepy; NAD  HEENT: Normocephalic, atraumatic.  No conjunctival injection. No icterus. Oropharynx clear without evidence of thrush or exudate. No evidence of peridontal disease.    NECK: Supple without nuchal rigidity. No mass.  HEART: RRR; No murmur, rubs, gallops.   LUNGS: Clear to auscultation bilaterally without wheezing, rales, rhonchi. Normal respiratory effort. Nonlabored. No dullness.  ABDOMEN: Soft, nontender, nondistended. Positive bowel sounds. No rebound or guarding. NO mass or HSM.  EXT:  No cyanosis, clubbing . No cord.   MSK: FROM without joint effusions noted arms/legs.    SKIN: Warm and dry without cutaneous eruptions on Inspection/palpation.    NEURO: sleepy     Pain with ROM at the right hip.  No obvious warmth or redness there.  Prior incision noted with no dehiscence or drainage.  No discrete mass bulge or fluctuance.  No crepitus or bulla     Spine with no redness bulge fluctuance or point tenderness; diffusely tender in lumbar spine.  No thoracic or cervical spine tenderness.     Left knee less edematous  , no warmth ,  no erythema; no discrete fluctuance but exam limited with pain and patient not cooperative with a detailed exam     Track marks from drug use noted in upper extremities     IV without obvious redness or drainage    Laboratory Data    Results from last 7 days   Lab Units 03/14/22  0323 03/13/22  0456 03/12/22  0530   WBC 10*3/mm3 3.92 3.98 6.21   HEMOGLOBIN g/dL 11.0* 11.1* 11.4*   HEMATOCRIT % 34.6 34.9 34.4   PLATELETS 10*3/mm3 238 217 221     Results from last 7 days   Lab Units 03/14/22  0323   SODIUM mmol/L 140   POTASSIUM mmol/L 4.5   CHLORIDE mmol/L 107   CO2 mmol/L 27.0   BUN mg/dL 7   CREATININE " mg/dL 0.52*   GLUCOSE mg/dL 106*   CALCIUM mg/dL 8.5*     Results from last 7 days   Lab Units 03/12/22  0334   ALK PHOS U/L 88   BILIRUBIN mg/dL 0.3   ALT (SGPT) U/L 17   AST (SGOT) U/L 15               Estimated Creatinine Clearance: 147.6 mL/min (A) (by C-G formula based on SCr of 0.52 mg/dL (L)).      Microbiology:      Radiology:  Imaging Results (Last 72 Hours)     ** No results found for the last 72 hours. **            Impression:         --Acute subjective constitutional symptoms with low-grade temp 99, active injection drug abuse , polysubstance with acute right hip/lumbar back pain out of proportion to baseline and acute left knee pain/swelling.  She is at risk for bloodstream infection and hematogenous spread with risk for metastatic foci of involvement including risk for septic joints/osteomyelitis, endovascular focus etc.   however,  blood cultures negative so far and CT/MRI's/joint aspirates NOT c/w infection so far;  she denies cough and respiratory exam nonfocal.  No acute inflammatory process on abdominal CT. Mild urinary changes and mixed urine culture with transient symptoms, now improved with empiric abx and urine culture may have been contaminated; no fever and tapered abx    **right hip and left knee aspirate NOT c/w septic joints by cell counts;  Cultures negative so far    --pyuria/bacteruria; empiric treatment for UTI although urine difficult to interpret with epi's/contamination; repeat urine with pyuria/nitrite pos, also possible contamination with epi's,  and pending otherwise     --Acute/chronic right hip pain with abrupt worsening over last week, recent injection drug abuse and risk for new versus recurrent infection.      **aspirate NOT c/w septic joints by cell counts;  Cultures negative so far     --Acute/chronic lumbar back pain with worsening over 1 week and recent injection drug abuse with risk for metastatic seeding and spinal/paraspinal infection risk.  MRI lumbar spine no  infectious change per radiology     --Acute left knee pain/swelling, recent injection drug abuse and risk for septic joint.     **aspirate NOT c/w septic joints by cell counts;  Cultures negative so far     --Right septic hip arthritis in August/September 2021 with surgery by Dr. Gusman     --Chronic hepatitis C.  I do not treat viral hepatitis as a part of my practice.  If you desire further input regarding this during this hospitalization you should give consideration to gastroenterology consultation.     --Polysubstance abuse as previously noted.  Strategy for minimizing risk of withdrawal and additional supportive measures at discretion of medicine team     --Possible Zosyn adverse drug reaction with abnormal LFTs in 2021.  Not clear-cut but trying to avoid.  Subsequently tolerated cephalosporin class antibiotics with improvements in liver tests     --Abnormal CPK prior to daptomycin.  Monitor.     --PTSD/bipolar disease     --Medical noncompliance           PLAN:       --omnicef/doxy       --Check/review labs cultures and scans     --Partial history per nursing staff     --Discussed with microbiology     --Discussed with Dr. Morales     --Highly complex set of issues with high risk for further serious morbidity and other serious sequela     --HCG negative         Broderick Wilhelm MD  3/18/2022

## 2022-03-19 PROCEDURE — 97535 SELF CARE MNGMENT TRAINING: CPT

## 2022-03-19 PROCEDURE — 97530 THERAPEUTIC ACTIVITIES: CPT

## 2022-03-19 PROCEDURE — 99232 SBSQ HOSP IP/OBS MODERATE 35: CPT | Performed by: INTERNAL MEDICINE

## 2022-03-19 RX ADMIN — CEFDINIR 300 MG: 300 CAPSULE ORAL at 19:58

## 2022-03-19 RX ADMIN — OXYCODONE HYDROCHLORIDE AND ACETAMINOPHEN 1 TABLET: 5; 325 TABLET ORAL at 00:23

## 2022-03-19 RX ADMIN — METHADONE HYDROCHLORIDE 60 MG: 10 TABLET ORAL at 08:42

## 2022-03-19 RX ADMIN — DOXYCYCLINE 100 MG: 100 CAPSULE ORAL at 19:56

## 2022-03-19 RX ADMIN — Medication 10 ML: at 19:56

## 2022-03-19 RX ADMIN — Medication 1 CAPSULE: at 08:42

## 2022-03-19 RX ADMIN — OXYCODONE HYDROCHLORIDE AND ACETAMINOPHEN 1 TABLET: 5; 325 TABLET ORAL at 19:55

## 2022-03-19 RX ADMIN — TRAZODONE HYDROCHLORIDE 150 MG: 100 TABLET ORAL at 19:54

## 2022-03-19 RX ADMIN — CEFDINIR 300 MG: 300 CAPSULE ORAL at 08:46

## 2022-03-19 RX ADMIN — Medication 5 MG: at 19:54

## 2022-03-19 RX ADMIN — SENNOSIDES AND DOCUSATE SODIUM 2 TABLET: 50; 8.6 TABLET ORAL at 08:42

## 2022-03-19 RX ADMIN — SENNOSIDES AND DOCUSATE SODIUM 2 TABLET: 50; 8.6 TABLET ORAL at 19:56

## 2022-03-19 RX ADMIN — OLANZAPINE 10 MG: 5 TABLET, FILM COATED ORAL at 08:42

## 2022-03-19 RX ADMIN — DOXYCYCLINE 100 MG: 100 CAPSULE ORAL at 08:42

## 2022-03-19 RX ADMIN — LISINOPRIL 20 MG: 20 TABLET ORAL at 08:42

## 2022-03-19 RX ADMIN — FLUOXETINE HYDROCHLORIDE 20 MG: 20 CAPSULE ORAL at 08:42

## 2022-03-19 RX ADMIN — AMLODIPINE BESYLATE 10 MG: 10 TABLET ORAL at 08:42

## 2022-03-19 RX ADMIN — OXYCODONE HYDROCHLORIDE AND ACETAMINOPHEN 1 TABLET: 5; 325 TABLET ORAL at 06:04

## 2022-03-19 RX ADMIN — Medication 10 ML: at 08:46

## 2022-03-19 RX ADMIN — OXYCODONE HYDROCHLORIDE AND ACETAMINOPHEN 1 TABLET: 5; 325 TABLET ORAL at 10:59

## 2022-03-19 NOTE — PROGRESS NOTES
[facility] Progress Note        Antibiotics:  Anti-Infectives (From admission, onward)    Ordered     Dose/Rate Route Frequency Start Stop    03/17/22 0937  cefdinir (OMNICEF) capsule 300 mg        Ordering Provider: Broderick Wilhelm MD    300 mg Oral Every 12 Hours Scheduled 03/17/22 1030 03/23/22 0859    03/14/22 0900  doxycycline (MONODOX) capsule 100 mg        Ordering Provider: Broderick Wilhelm MD    100 mg Oral Every 12 Hours Scheduled 03/14/22 1000 03/23/22 0859    03/11/22 0403  piperacillin-tazobactam (ZOSYN) 4.5 g in iso-osmotic dextrose 100 mL IVPB (premix)        Ordering Provider: Broderick Calvo RPH    4.5 g  over 30 Minutes Intravenous Once 03/11/22 0445 03/11/22 0614          CC: right hip/left knee/low back pain    HPI:      Patient is a 41 y.o.  Yr old female with history of polysubstance abuse with prior cocaine/heroin/methamphetamine/THC, chronic hepatitis C and PTSD/bipolar disease; treated for right septic hip with MSSA in September 2021, surgery at that time by Dr. Gusman and subsequently transitioned to [facility], abnormal LFTs with empiric change from Zosyn to cefazolin with improvement although unclear if related to penicillin class versus hep C or combination.  Noncompliant with follow-up.  Apparently plans to be seen at [facility] regarding possible right hip surgery in the future but specifics of that are unclear.  She reports being in [facility] recently, lost her spot in methadone clinic and reverted to injection drug abuse approximately 1 week prior to admission.  After injection had developed worsening right hip and left knee pain, worsening low back pain and ultimately presented to [facility] on March 11.     Aside from injection drug abuse, she denies any other specific exposures. No raw or undercooked food.  No unpasteurized milk or milk products.  No animal insect or arthropod exposure.  No tick bites.  No outdoor camping or hunting exposure.  No travel exposure.  No  ill exposure.  No history of TB or TB exposure.   Denies a history of MRSA/VRE and no history of C. difficile or ESBL/KPC  Organisms.      HCG negative      3/19/22  Seen early and sleepy;  Medicine weaning pain meds; Per nursing, right  Hip and left knee  pain ongoing, nonradiating, worse with movement ;  pain 2-4/10 but intensity fluctuates; recent urinary frequency , now better and no new urinary/vaginal symptoms       lumbar back pain, varies in intensity and not progressive, sharp at times, worse with movement, somewhat better with pain meds but not completely relieved and 3-4 out of 10 in severity at present.  She denies any new focal weakness or numbness.  No bowel or bladder incontinence.        Denies headache photophobia or neck stiffness.  No shortness of breath cough or hemoptysis.  No nausea vomiting diarrhea or abdominal pain.  No dysuria hematuria or pyuria.          ROS:     3/19/22  Per nursing; No f/c/s. No n/v/d. No rash. No new ADR to Abx.       Constitutional--subjective fevers and chills.  Appetite diminished with fatigue  Heent-- No new vision, hearing or throat complaints.  No epistaxis or oral sores.  Denies odynophagia or dysphagia.  No flashers, floaters or eye pain. No odynophagia or dysphagia. No headache, photophobia or neck stiffness.  CV-- No chest pain, palpitation or syncope  Resp-- No SOB/cough/Hemoptysis  GI- No nausea, vomiting, or diarrhea.  No hematochezia, melena, or hematemesis. Denies jaundice or chronic liver disease.  -- at present No dysuria, hematuria, or flank pain.  Denies hesitancy, urgency or flank pain.  Lymph- no swollen lymph nodes in neck/axilla or groin.   Heme- No active bruising or bleeding; no Hx of DVT or PE.  MS-- no swelling or pain in the bones or joints of arms/legs aside from above.     Neuro-- No acute focal weakness or numbness in the arms or legs.  No seizures.     Full 12 point review of systems reviewed and negative otherwise for acute  "complaints, except for above      PE: nursing/chaperone present  /62 (BP Location: Left arm, Patient Position: Lying)   Pulse 78   Temp 97.9 °F (36.6 °C) (Oral)   Resp 18   Ht 160 cm (63\")   Wt 83.8 kg (184 lb 12.8 oz)   LMP 02/17/2022   SpO2 92%   Breastfeeding No   BMI 32.74 kg/m²       GENERAL: sleepy; NAD  HEENT: Normocephalic, atraumatic.  No conjunctival injection. No icterus. Oropharynx clear without evidence of thrush or exudate. No evidence of peridontal disease.     HEART: RRR; No murmur, rubs, gallops.   LUNGS: Clear to auscultation bilaterally without wheezing, rales, rhonchi. Normal respiratory effort. Nonlabored. No dullness.  ABDOMEN: Soft, nontender, nondistended. Positive bowel sounds. No rebound or guarding. NO mass or HSM.  EXT:  No cyanosis, clubbing . No cord.   MSK: FROM without joint effusions noted arms/legs.    SKIN: Warm and dry without cutaneous eruptions on Inspection/palpation.    NEURO: sleepy     Pain with ROM at the right hip.  No obvious warmth or redness there.  Prior incision noted with no dehiscence or drainage.  No discrete mass bulge or fluctuance.  No crepitus or bulla     Spine with no redness bulge fluctuance or point tenderness; diffusely tender in lumbar spine.  No thoracic or cervical spine tenderness.     Left knee less edematous  , no warmth ,  no erythema; no discrete fluctuance but exam limited with pain and patient not cooperative with a detailed exam     Track marks from drug use noted in upper extremities     IV without obvious redness or drainage    Laboratory Data    Results from last 7 days   Lab Units 03/14/22  0323 03/13/22  0456   WBC 10*3/mm3 3.92 3.98   HEMOGLOBIN g/dL 11.0* 11.1*   HEMATOCRIT % 34.6 34.9   PLATELETS 10*3/mm3 238 217     Results from last 7 days   Lab Units 03/14/22  0323   SODIUM mmol/L 140   POTASSIUM mmol/L 4.5   CHLORIDE mmol/L 107   CO2 mmol/L 27.0   BUN mg/dL 7   CREATININE mg/dL 0.52*   GLUCOSE mg/dL 106*   CALCIUM " mg/dL 8.5*                   Estimated Creatinine Clearance: 147.6 mL/min (A) (by C-G formula based on SCr of 0.52 mg/dL (L)).      Microbiology:      Radiology:  Imaging Results (Last 72 Hours)     ** No results found for the last 72 hours. **            Impression:         --Acute subjective constitutional symptoms with low-grade temp 99, active injection drug abuse , polysubstance with acute right hip/lumbar back pain out of proportion to baseline and acute left knee pain/swelling.  She is at risk for bloodstream infection and hematogenous spread with risk for metastatic foci of involvement including risk for septic joints/osteomyelitis, endovascular focus etc.   however,  blood cultures negative so far and CT/MRI's/joint aspirates NOT c/w infection so far;  she denies cough and respiratory exam nonfocal.  No acute inflammatory process on abdominal CT. Mild urinary changes and mixed urine culture with transient symptoms, now improved with empiric abx and urine culture may have been contaminated; no fever and tapered abx    **right hip and left knee aspirate NOT c/w septic joints by cell counts;  Cultures negative so far    --pyuria/bacteruria; empiric treatment for UTI although urine difficult to interpret with epi's/contamination; repeat urine with pyuria/nitrite pos, also possible contamination with epi's,  and pending otherwise     --Acute/chronic right hip pain with abrupt worsening over last week, recent injection drug abuse and risk for new versus recurrent infection.      **aspirate NOT c/w septic joints by cell counts;  Cultures negative so far     --Acute/chronic lumbar back pain with worsening over 1 week and recent injection drug abuse with risk for metastatic seeding and spinal/paraspinal infection risk.  MRI lumbar spine no infectious change per radiology     --Acute left knee pain/swelling, recent injection drug abuse and risk for septic joint.     **aspirate NOT c/w septic joints by cell counts;   Cultures negative so far     --Right septic hip arthritis in August/September 2021 with surgery by Dr. Gusman     --Chronic hepatitis C.  I do not treat viral hepatitis as a part of my practice.  If you desire further input regarding this during this hospitalization you should give consideration to gastroenterology consultation.     --Polysubstance abuse as previously noted.  Strategy for minimizing risk of withdrawal and additional supportive measures at discretion of medicine team     --Possible Zosyn adverse drug reaction with abnormal LFTs in 2021.  Not clear-cut but trying to avoid.  Subsequently tolerated cephalosporin class antibiotics with improvements in liver tests     --Abnormal CPK prior to daptomycin.  Monitor.     --PTSD/bipolar disease     --Medical noncompliance           PLAN:       --omnicef/doxy       --Check/review labs cultures and scans     --Partial history per nursing staff     --Discussed with microbiology     --Discussed with Dr. Morales     --Highly complex set of issues with high risk for further serious morbidity and other serious sequela     --HCG negative         Broderick Wilhelm MD  3/19/2022

## 2022-03-19 NOTE — PROGRESS NOTES
[facility] Medicine Services  PROGRESS NOTE    Patient Name: Frieda Flores  : 1980  MRN: [id number]    Date of Admission: 3/11/2022  Primary Care Physician: Karan Douglas DO    Subjective   Subjective     CC:  F/U right hip and low back pain    HPI:  Still w/ stable chronic diffuse pain. No fever. No dyspnea. No n/v/d     ROS:  Gen-no fevers, no chills  CV-no chest pain, no palpitations  Resp-no cough, no dyspnea  GI-no N/V/D, no abd pain    All other systems reviewed and negative except any additional pertinent positives and negatives as discussed in HPI.      Objective   Objective     Vital Signs:   Temp:  [97.6 °F (36.4 °C)-98 °F (36.7 °C)] 97.9 °F (36.6 °C)  Heart Rate:  [70-89] 89  Resp:  [18] 18  BP: (110-127)/(52-78) 127/78  Flow (L/min):  [2] 2     Physical Exam:  Gen-no acute distress, nontoxic appearing sitting up  HENT-NCAT, mucous membranes moist  CV-RRR, S1 S2 normal, no m/r/g  Resp-CTAB, no wheezes or rales  Abd-soft, NT, ND, +BS  Ext-no edema  Neuro-A&Ox3, no focal deficits  Skin-no rashes        Results Reviewed:  LAB RESULTS:      Lab 22   WBC 3.92 3.98   HEMOGLOBIN 11.0* 11.1*   HEMATOCRIT 34.6 34.9   PLATELETS 238 217   NEUTROS ABS 1.84 1.87   IMMATURE GRANS (ABS) 0.02 0.02   LYMPHS ABS 1.57 1.68   MONOS ABS 0.24 0.22   EOS ABS 0.22 0.17   MCV 90.1 91.4         Lab 03/15/22  0318 03/14/22  0323 03/13/22  0456   SODIUM  --  140 141   POTASSIUM  --  4.5 4.4   CHLORIDE  --  107 108*   CO2  --  27.0 26.0   ANION GAP  --  6.0 7.0   BUN  --  7 8   CREATININE  --  0.52* 0.56*   EGFR  --  120.6 118.5   GLUCOSE  --  106* 104*   CALCIUM  --  8.5* 8.6   MAGNESIUM 2.0 1.9 1.7                         Brief Urine Lab Results  (Last result in the past 365 days)      Color   Clarity   Blood   Leuk Est   Nitrite   Protein   CREAT   Urine HCG        22 1145 Yellow   Cloudy   Negative   Trace   Positive   Negative                 Microbiology Results  Abnormal     Procedure Component Value - Date/Time    Fungus Culture - Aspirate, Knee, Left [[id number]] Collected: 03/11/22 1535    Lab Status: Preliminary result Specimen: Aspirate from Knee, Left Updated: 03/18/22 1718     Fungus Culture No fungus isolated at 1 week    AFB Culture - Aspirate, Knee, Left [[id number]] Collected: 03/11/22 1535    Lab Status: Preliminary result Specimen: Aspirate from Knee, Left Updated: 03/18/22 1718     AFB Culture No AFB isolated at 1 week     AFB Stain No acid fast bacilli seen on concentrated smear    Fungus Culture - Aspirate, Hip, Right [[id number]] Collected: 03/11/22 1535    Lab Status: Preliminary result Specimen: Aspirate from Hip, Right Updated: 03/18/22 1718     Fungus Culture No fungus isolated at 1 week    AFB Culture - Aspirate, Hip, Right [[id number]] Collected: 03/11/22 1535    Lab Status: Preliminary result Specimen: Aspirate from Hip, Right Updated: 03/18/22 1718     AFB Culture No AFB isolated at 1 week     AFB Stain No acid fast bacilli seen on concentrated smear    Body Fluid Culture - Aspirate, Hip, Right [[id number]] Collected: 03/11/22 1535    Lab Status: Final result Specimen: Aspirate from Hip, Right Updated: 03/16/22 0952     Body Fluid Culture No growth at 5 days     Gram Stain Rare (1+) WBCs per low power field      No organisms seen    Body Fluid Culture - Aspirate, Knee, Left [[id number]] Collected: 03/11/22 1535    Lab Status: Final result Specimen: Aspirate from Knee, Left Updated: 03/16/22 0952     Body Fluid Culture No growth at 5 days     Gram Stain Rare (1+) WBCs per low power field      No organisms seen    Anaerobic Culture - Aspirate, Hip, Right [[id number]] Collected: 03/11/22 1535    Lab Status: Final result Specimen: Aspirate from Hip, Right Updated: 03/16/22 0643     Anaerobic Culture No anaerobes isolated at 5 days    Anaerobic Culture - Aspirate, Knee, Left [[id number]] Collected: 03/11/22 1535    Lab Status: Final result Specimen: Aspirate  from Knee, Left Updated: 03/16/22 0643     Anaerobic Culture No anaerobes isolated at 5 days    Blood Culture - Blood, Arm, Left [[id number]]  (Normal) Collected: 03/11/22 0000    Lab Status: Final result Specimen: Blood from Arm, Left Updated: 03/16/22 0132     Blood Culture No growth at 5 days    Blood Culture - Blood, Arm, Right [[id number]]  (Normal) Collected: 03/11/22 0000    Lab Status: Final result Specimen: Blood from Arm, Right Updated: 03/16/22 0132     Blood Culture No growth at 5 days    Chlamydia trachomatis, Neisseria gonorrhoeae, PCR - Urine, Urine, Clean Catch [[id number]] Collected: 03/14/22 1145    Lab Status: Final result Specimen: Urine, Clean Catch Updated: 03/15/22 2207     Chlamydia trachomatis, ASAEL Negative     Neisseria gonorrhoeae, ASAEL Negative    Narrative:      Performed at:  [address] [place] [address]  [id number]  : Tahmina Moya MD, Phone:  [phone]    Urine Culture - Urine, Urine, Clean Catch [[id number]]  (Normal) Collected: 03/14/22 1145    Lab Status: Final result Specimen: Urine, Clean Catch Updated: 03/15/22 1440     Urine Culture No growth    Urine Culture - Urine, Urine, Clean Catch [[id number]] Collected: 03/11/22 0415    Lab Status: Final result Specimen: Urine, Clean Catch Updated: 03/12/22 1217     Urine Culture 50,000 CFU/mL Normal Urogenital Diane    COVID PRE-OP / PRE-PROCEDURE SCREENING ORDER (NO ISOLATION) - Swab, Nasopharynx [[id number]]  (Normal) Collected: 03/11/22 0415    Lab Status: Final result Specimen: Swab from Nasopharynx Updated: 03/11/22 0501    Narrative:      The following orders were created for panel order COVID PRE-OP / PRE-PROCEDURE SCREENING ORDER (NO ISOLATION) - Swab, Nasopharynx.  Procedure                               Abnormality         Status                     ---------                               -----------         ------                     COVID-19 and FLU A/B PCR...[[id number]]  Normal               Final result                 Please view results for these tests on the individual orders.    COVID-19 and FLU A/B PCR - Swab, Nasopharynx [[id number]]  (Normal) Collected: 03/11/22 0415    Lab Status: Final result Specimen: Swab from Nasopharynx Updated: 03/11/22 0501     COVID19 Not Detected     Influenza A PCR Not Detected     Influenza B PCR Not Detected    Narrative:      Fact sheet for providers: https://www.fda.gov/media/612341/download    Fact sheet for patients: https://www.fda.gov/media/589451/download    Test performed by PCR.          No radiology results from the last 24 hrs    Results for orders placed during the hospital encounter of 07/31/21    Adult Transthoracic Echo Complete W/ Cont if Necessary Per Protocol    Interpretation Summary  · Left ventricular ejection fraction appears to be 61 - 65%. Left ventricular systolic function is normal.  · The cardiac valves are structurally and functionally within normal limits.      I have reviewed the medications:  Scheduled Meds:amLODIPine, 10 mg, Oral, Q24H  cefdinir, 300 mg, Oral, Q12H  doxycycline, 100 mg, Oral, Q12H  FLUoxetine, 20 mg, Oral, Daily  lactobacillus acidophilus, 1 capsule, Oral, Daily  lisinopril, 20 mg, Oral, Q24H  methadone, 60 mg, Oral, Daily  OLANZapine, 10 mg, Oral, Daily  senna-docusate sodium, 2 tablet, Oral, BID  sodium chloride, 10 mL, Intravenous, Q12H  traZODone, 150 mg, Oral, Nightly      Continuous Infusions:   PRN Meds:.•  acetaminophen  •  senna-docusate sodium **AND** polyethylene glycol **AND** bisacodyl **AND** bisacodyl  •  cyclobenzaprine  •  magnesium sulfate **OR** magnesium sulfate **OR** magnesium sulfate  •  melatonin  •  ondansetron  •  ondansetron  •  oxyCODONE-acetaminophen  •  potassium chloride **OR** potassium chloride **OR** potassium chloride  •  potassium chloride  •  sodium chloride  •  sodium chloride    Assessment/Plan   Assessment & Plan     Active Hospital Problems    Diagnosis  POA   • **Right hip pain  [M25.551]  Yes   • Tobacco abuse [Z72.0]  Yes   • Hypokalemia [E87.6]  Yes   • QT prolongation [R94.31]  Yes   • Septic right hip (CMS/HCC) [M00.9]  Yes   • Polysubstance abuse (HCC) [F19.10]  Yes   • Alcohol abuse [F10.10]  Yes      Resolved Hospital Problems   No resolved problems to display.        Brief Hospital Course to date:  Frieda Flores is a 41 y.o. female with a past medical history of polysubstance abuse, prior alcohol abuse, ADHD/Bipolar disorder, and chronic right hip pain with septic arthritis s/p washout by Dr. Gusman in July 2021 who presented to the ED complaining of recurrent right hip, left knee, and low back pain that has worsened over the last few months to the point where she can no longer bear weight. She was admitted to the hospitalist service and Orthopedic Surgery and ID were consulted. Her workup was negative for septic arthritis. She was empirically treated with antibiotics per Infectious Disease due to constitutional symptoms of low grade fevers and hx of recent IVDA.      This patient's problems and plans were partially entered by my partner and updated as appropriate by me 03/19/22. Today is my first day evaluating this patient's active medical problems. I Personally reviewed chart and adjusted note to reflect daily changes in management/clinical condition      Right Hip/Low Back pain  Severe osteoarthritis/avascular necrosis  Hx of right hip septic arthritis s/p washout 7/31/21  --Joint aspiration from left knee and right hip on 3/11/22 are not consistent wtih septic joint. Cultures negative.  --MRI hip and L spine unremarkable for acute issues, shows end-stage arthritis/avascular necrosis of right hip and severe arthritis of left hip; DDD at L4/5 and L5/S1 levels, no spinal canal stenosis.  --ID following. Ortho has now signed off. She has an upcoming appointment 5/12/22 with [facility]; patient states she was told she needs a hip replacement.  --Has been on Ceftriaxone and  PO Doxycycline. My partner discussed with Dr. Wilhelm 3/17/22, okay to switch to Omnicef and continue Doxycycline. Plan to complete total 10 days treatment from admission (through 3/20/22).  --Continue Percocet, stopped IV morphine; weaning percocet (patient aware will not get prescriptions for controlled substances at discharge)  --PT/OT recommend inpatient rehab, however due to patient's active and past drug use, she will not be able to go to inpatient rehab facility as they are not secured units. Current plan is to progress as much as possible with PT/OT here, then d/c home with HHPT and with sister to help her.     Hypokalemia  --Replaced per protocol with improvement.     Polysubstance abuse  --UDS positive for Cocaine, Methamphetamine, Methadone, and THC.  --On chronic Methadone, continued here but at lower dose due to prolonged QTc.  --Addiction Medicine following. Discussed with Mary Pollock, she talked to [facility] (patient's methadone clinic) who states she was discharged from their clinic on 2/8/22 due to noncompliance and positive UDS. They had decreased her to 100 mg daily prior to that (not 130 mg as patient tells us). Mary has helped arrange appointment with a different methadone clinic ([facility]) on Tuesday 3/22/22 at 0530 to establish care. Current plan is to discharge patient on Monday after her methadone dose.     Mood disorder  --Continue Olanzapine.      QTc Prolongation  --EKG 3/11/22 showed QTc of 491. Likely due to Methadone. Will monitor for now and avoid other QTc prolonging medications.  --Repeat EKG 3/17/22 with improvement to 418.    Constipation  --Likely due to opiates.   --Continue bowel regimen.    Am labs: cbc,bmp       DVT prophylaxis:  Mechanical DVT prophylaxis orders are present.       AM-PAC 6 Clicks Score (PT): 19 (03/19/22 0800)    Disposition: I expect the patient to be discharged home with HHPT on Monday 3/21/22 after dose of methadone, will continue to wean Percocet through the  weekend as this will not be prescribed to her at discharge    CODE STATUS:   Code Status and Medical Interventions:   Ordered at: 03/11/22 0401     Level Of Support Discussed With:    Patient     Code Status (Patient has no pulse and is not breathing):    CPR (Attempt to Resuscitate)     Medical Interventions (Patient has pulse or is breathing):    Full Support       Christiano Quispe MD  03/19/22

## 2022-03-19 NOTE — THERAPY TREATMENT NOTE
Patient Name: Frieda Flores  : 1980    MRN: [id number]                              Today's Date: 3/19/2022       Admit Date: 3/11/2022    Visit Dx:     ICD-10-CM ICD-9-CM   1. Right hip pain  M25.551 719.45   2. Arthritis of right hip  M16.11 716.95   3. History of septic arthritis  Z87.39 V13.4   4. Hypokalemia  E87.6 276.8   5. Septic right hip (CMS/HCC)  M00.9 711.05     Patient Active Problem List   Diagnosis   • Alcohol abuse   • Polysubstance abuse (HCC)   • Abnormal liver function tests   • Septic right hip (CMS/HCC)   • Therapeutic opioid-induced constipation (OIC)   • Postpartum care following LTCS (1 layer) 21   • Cigarette smoker   • Tobacco abuse   • Hypokalemia   • Right hip pain   • QT prolongation     Past Medical History:   Diagnosis Date   • Abdominal wall cellulitis - After  2019    Treated with IV antibiotics   • ADHD    • Alcoholism ([facility])    • Bipolar disorder ([facility])    • Chlamydia    • Chlamydia 2021   • Hepatitis C carrier ([facility])     Negative viral load   • Migraine    • Osteoarthritis    • PTSD (post-traumatic stress disorder)    • Septic right hip (CMS/HCC) 2021   • Substance abuse (HCC)    • Therapeutic opioid-induced constipation (OIC)    • Urogenital trichomoniasis      Past Surgical History:   Procedure Laterality Date   • BREAST LUMPECTOMY Right    •  SECTION Bilateral 3/9/2019    Procedure:  SECTION PRIMARY;  Surgeon: Shelley Hughes MD;  Location:  [facility];  Service: Obstetrics/Gynecology   •  SECTION N/A 2021    Procedure: Repeat C/S;  Surgeon: Fernando Pedersen MD;  Location:  [facility];  Service: Obstetrics/Gynecology;  Laterality: N/A;   • INCISION AND DRAINAGE HIP Right 2021    Procedure: HIP INCISION AND DRAINAGE RIGHT;  Surgeon: Martin Gusman MD;  Location:  [facility];  Service: Orthopedics;  Laterality: Right;      General Information     Row Name 22 1106           OT Time and Intention    Document Type therapy note (daily note)  -MA     Mode of Treatment occupational therapy  -MA     Row Name 03/19/22 1106          General Information    Patient Profile Reviewed yes  -MA     Existing Precautions/Restrictions fall  -MA     Barriers to Rehab medically complex  -MA     Row Name 03/19/22 1106          Cognition    Orientation Status (Cognition) oriented x 3  -MA     Row Name 03/19/22 1106          Safety Issues, Functional Mobility    Safety Issues Affecting Function (Mobility) awareness of need for assistance;insight into deficits/self-awareness;safety precaution awareness;safety precautions follow-through/compliance  -MA     Impairments Affecting Function (Mobility) balance;endurance/activity tolerance;grasp;pain;strength  -MA           User Key  (r) = Recorded By, (t) = Taken By, (c) = Cosigned By    Initials Name Provider Type    Hallie Conner, MARISOL Occupational Therapist                 Mobility/ADL's     Row Name 03/19/22 1106          Bed Mobility    Bed Mobility supine-sit  -MA     Supine-Sit [place] (Bed Mobility) supervision  -MA     Assistive Device (Bed Mobility) head of bed elevated;bed rails  -MA     Row Name 03/19/22 1106          Transfers    Transfers sit-stand transfer;bed-chair transfer  -MA     Bed-Chair [place] (Transfers) contact guard;verbal cues  -MA     Assistive Device (Bed-Chair Transfers) walker, front-wheeled  -MA     Sit-Stand [place] (Transfers) contact guard;verbal cues  -MA     Row Name 03/19/22 1106          Sit-Stand Transfer    Assistive Device (Sit-Stand Transfers) walker, front-wheeled  -MA     Row Name 03/19/22 1106          Functional Mobility    Functional Mobility- Ind. Level contact guard assist;verbal cues required  -MA     Functional Mobility- Device rolling walker  -MA     Functional Mobility-Distance (Feet) 12  -MA     Functional Mobility- Safety Issues balance decreased during turns;step length  decreased;weight-shifting ability decreased  -MA     Row Name 03/19/22 1106          Activities of Daily Living    BADL Assessment/Intervention upper body dressing;lower body dressing  -MA     Row Name 03/19/22 1106          Lower Body Dressing Assessment/Training    [place] Level (Lower Body Dressing) don;pants/bottoms;moderate assist (50% patient effort);socks;maximum assist (25% patient effort);verbal cues  -MA     Position (Lower Body Dressing) edge of bed sitting  -MA     Row Name 03/19/22 1106          Upper Body Dressing Assessment/Training    [place] Level (Upper Body Dressing) doff;don;pull-over garment;supervision  -MA     Position (Upper Body Dressing) edge of bed sitting  -MA           User Key  (r) = Recorded By, (t) = Taken By, (c) = Cosigned By    Initials Name Provider Type    Hallie Conner OT Occupational Therapist               Obj/Interventions     Row Name 03/19/22 1107          Balance    Balance Assessment sitting static balance;sitting dynamic balance;standing static balance;standing dynamic balance  -MA     Static Sitting Balance supervision  -MA     Dynamic Sitting Balance supervision  -MA     Position, Sitting Balance unsupported;sitting in chair;sitting edge of bed  -MA     Static Standing Balance contact guard;verbal cues  -MA     Dynamic Standing Balance contact guard;verbal cues  -MA     Position/Device Used, Standing Balance walker, front-wheeled  -MA     Balance Interventions sitting;sit to stand;occupation based/functional task  -MA           User Key  (r) = Recorded By, (t) = Taken By, (c) = Cosigned By    Initials Name Provider Type    Hallie Conner OT Occupational Therapist               Goals/Plan    No documentation.                Clinical Impression     Row Name 03/19/22 1108          Pain Assessment    Pain Intervention(s) Repositioned;Ambulation/increased activity;Nursing Notified  -MA     Additional Documentation Pain Scale: FACES Pre/Post-Treatment  (Group)  -MA     Row Name 03/19/22 1108          Pain Scale: FACES Pre/Post-Treatment    Pain: FACES Scale, Pretreatment 6-->hurts even more  -MA     Posttreatment Pain Rating 6-->hurts even more  -MA     Pain Location - Side/Orientation Bilateral  -MA     Pain Location lower  -MA     Pain Location - extremity  -MA     Row Name 03/19/22 1108          Plan of Care Review    Plan of Care Reviewed With patient  -MA     Progress improving  -MA     Outcome Evaluation Pt demo'd increased independence w/ bed mobility, STS, and functional mobility this date w/ RW. Pt req VC's for sequencing and safety throughout, extended time and effort req for all tasks d/t pain. Will continue to progress pt as tolerated per OT POC. Rec IRF at d/c.  -MA     Row Name 03/19/22 1108          Therapy Assessment/Plan (OT)    Rehab Potential (OT) fair, will monitor progress closely  -MA     Criteria for Skilled Therapeutic Interventions Met (OT) yes;meets criteria;skilled treatment is necessary  -MA     Therapy Frequency (OT) daily  -MA     Row Name 03/19/22 1108          Therapy Plan Review/Discharge Plan (OT)    Anticipated Discharge Disposition (OT) inpatient rehabilitation facility  -MA     Row Name 03/19/22 1108          Vital Signs    Pre Systolic BP Rehab --  VSS - RN cleared OT  -MA     O2 Delivery Pre Treatment room air  -MA     O2 Delivery Intra Treatment room air  -MA     O2 Delivery Post Treatment room air  -MA     Pre Patient Position Supine  -MA     Intra Patient Position Standing  -MA     Post Patient Position Sitting  -MA     Row Name 03/19/22 1108          Positioning and Restraints    Pre-Treatment Position in bed  -MA     Post Treatment Position chair  -MA     In Chair notified nsg;reclined;call light within reach;encouraged to call for assist;waffle cushion;legs elevated;heels elevated  Pt refused chair alarm, RN notified.  -MA           User Key  (r) = Recorded By, (t) = Taken By, (c) = Cosigned By    Initials Name Provider  Type    Hallie Conner OT Occupational Therapist               Outcome Measures     Row Name 03/19/22 1110          How much help from another is currently needed...    Putting on and taking off regular lower body clothing? 2  -MA     Bathing (including washing, rinsing, and drying) 2  -MA     Toileting (which includes using toilet bed pan or urinal) 3  -MA     Putting on and taking off regular upper body clothing 4  -MA     Taking care of personal grooming (such as brushing teeth) 4  -MA     Eating meals 4  -MA     AM-PAC 6 Clicks Score (OT) 19  -MA     Row Name 03/19/22 0800          How much help from another person do you currently need...    Turning from your back to your side while in flat bed without using bedrails? 4  -JG (r) MF (t) JG (c)     Moving from lying on back to sitting on the side of a flat bed without bedrails? 4  -JG (r) MF (t) JG (c)     Moving to and from a bed to a chair (including a wheelchair)? 3  -JG (r) MF (t) JG (c)     Standing up from a chair using your arms (e.g., wheelchair, bedside chair)? 3  -JG (r) MF (t) JG (c)     Climbing 3-5 steps with a railing? 2  -JG (r) MF (t) JG (c)     To walk in hospital room? 3  -JG (r) MF (t) JG (c)     AM-PAC 6 Clicks Score (PT) 19  -JG (r) MF (t)     Row Name 03/19/22 1110          Functional Assessment    Outcome Measure Options AM-PAC 6 Clicks Daily Activity (OT)  -MA           User Key  (r) = Recorded By, (t) = Taken By, (c) = Cosigned By    Initials Name Provider Type    Karthik Krishnamurthy RN Registered Nurse    Tamie Alvarez RN Extern Registered Nurse    Hallie Conner OT Occupational Therapist                Occupational Therapy Education                 Title: PT OT SLP Therapies (In Progress)     Topic: Occupational Therapy (In Progress)     Point: ADL training (In Progress)     Description:   Instruct learner(s) on proper safety adaptation and remediation techniques during self care or transfers.   Instruct in proper use of  assistive devices.              Learning Progress Summary           Patient Acceptance, E, NR by MA at 3/19/2022 1111    Acceptance, E, NR by MA at 3/16/2022 1034    Acceptance, E, NR by  at 3/12/2022 1330    Comment: Educated pt regarding role of therapy                   Point: Home exercise program (In Progress)     Description:   Instruct learner(s) on appropriate technique for monitoring, assisting and/or progressing therapeutic exercises/activities.              Learning Progress Summary           Patient Acceptance, E, NR by  at 3/12/2022 1330    Comment: Educated pt regarding role of therapy                   Point: Precautions (In Progress)     Description:   Instruct learner(s) on prescribed precautions during self-care and functional transfers.              Learning Progress Summary           Patient Acceptance, E, NR by MA at 3/19/2022 1111    Acceptance, E, NR by MA at 3/16/2022 1034                   Point: Body mechanics (In Progress)     Description:   Instruct learner(s) on proper positioning and spine alignment during self-care, functional mobility activities and/or exercises.              Learning Progress Summary           Patient Acceptance, E, NR by MA at 3/19/2022 1111    Acceptance, E, NR by MA at 3/16/2022 1034                               User Key     Initials Effective Dates Name Provider Type Discipline     06/16/21 -  Felipa Medina, OT Occupational Therapist OT    MA 11/19/20 -  Hallie Barrow OT Occupational Therapist OT              OT Recommendation and Plan  Therapy Frequency (OT): daily  Plan of Care Review  Plan of Care Reviewed With: patient  Progress: improving  Outcome Evaluation: Pt demo'd increased independence w/ bed mobility, STS, and functional mobility this date w/ RW. Pt req VC's for sequencing and safety throughout, extended time and effort req for all tasks d/t pain. Will continue to progress pt as tolerated per OT POC. Rec IRF at d/c.     Time Calculation:     Time Calculation- OT     Row Name 03/19/22 1111             Time Calculation- OT    OT Start Time 1028  -MA      OT Received On 03/19/22  -MA      OT Goal Re-Cert Due Date 03/29/22  -MA              Timed Charges    29944 - OT Therapeutic Activity Minutes 12  -MA      03216 - OT Self Care/Mgmt Minutes 11  -MA              Total Minutes    Timed Charges Total Minutes 23  -MA       Total Minutes 23  -MA            User Key  (r) = Recorded By, (t) = Taken By, (c) = Cosigned By    Initials Name Provider Type    Hallie Conner OT Occupational Therapist              Therapy Charges for Today     Code Description Service Date Service Provider Modifiers Qty    99717268081 HC OT THERAPEUTIC ACT EA 15 MIN 3/19/2022 Hallie Barrow OT GO 1    79379522590 HC OT SELF CARE/MGMT/TRAIN EA 15 MIN 3/19/2022 Hallie Barrow OT GO 1               Hallie Barrow OT  3/19/2022

## 2022-03-19 NOTE — PLAN OF CARE
Goal Outcome Evaluation:  Plan of Care Reviewed With: patient        Progress: improving  Outcome Evaluation: Pt demo'd increased independence w/ bed mobility, STS, and functional mobility this date w/ RW. Pt req VC's for sequencing and safety throughout, extended time and effort req for all tasks d/t pain. Will continue to progress pt as tolerated per OT POC. Rec IRF at d/c.

## 2022-03-20 LAB
ANION GAP SERPL CALCULATED.3IONS-SCNC: 10 MMOL/L (ref 5–15)
BUN SERPL-MCNC: 22 MG/DL (ref 6–20)
BUN/CREAT SERPL: 35.5 (ref 7–25)
CALCIUM SPEC-SCNC: 9.3 MG/DL (ref 8.6–10.5)
CHLORIDE SERPL-SCNC: 101 MMOL/L (ref 98–107)
CO2 SERPL-SCNC: 25 MMOL/L (ref 22–29)
CREAT SERPL-MCNC: 0.62 MG/DL (ref 0.57–1)
DEPRECATED RDW RBC AUTO: 42.1 FL (ref 37–54)
EGFRCR SERPLBLD CKD-EPI 2021: 115.6 ML/MIN/1.73
ERYTHROCYTE [DISTWIDTH] IN BLOOD BY AUTOMATED COUNT: 13.1 % (ref 12.3–15.4)
GLUCOSE SERPL-MCNC: 89 MG/DL (ref 65–99)
HCT VFR BLD AUTO: 37.6 % (ref 34–46.6)
HGB BLD-MCNC: 12.2 G/DL (ref 12–15.9)
MCH RBC QN AUTO: 28.7 PG (ref 26.6–33)
MCHC RBC AUTO-ENTMCNC: 32.4 G/DL (ref 31.5–35.7)
MCV RBC AUTO: 88.5 FL (ref 79–97)
PLATELET # BLD AUTO: 285 10*3/MM3 (ref 140–450)
PMV BLD AUTO: 10 FL (ref 6–12)
POTASSIUM SERPL-SCNC: 4.3 MMOL/L (ref 3.5–5.2)
RBC # BLD AUTO: 4.25 10*6/MM3 (ref 3.77–5.28)
SODIUM SERPL-SCNC: 136 MMOL/L (ref 136–145)
WBC NRBC COR # BLD: 5.2 10*3/MM3 (ref 3.4–10.8)

## 2022-03-20 PROCEDURE — 85027 COMPLETE CBC AUTOMATED: CPT | Performed by: INTERNAL MEDICINE

## 2022-03-20 PROCEDURE — 99232 SBSQ HOSP IP/OBS MODERATE 35: CPT | Performed by: INTERNAL MEDICINE

## 2022-03-20 PROCEDURE — 25010000002 METHYLNALTREXONE 12 MG/0.6ML SOLUTION: Performed by: INTERNAL MEDICINE

## 2022-03-20 PROCEDURE — 80048 BASIC METABOLIC PNL TOTAL CA: CPT | Performed by: INTERNAL MEDICINE

## 2022-03-20 RX ADMIN — POLYETHYLENE GLYCOL 3350 17 G: 17 POWDER, FOR SOLUTION ORAL at 16:10

## 2022-03-20 RX ADMIN — OXYCODONE HYDROCHLORIDE AND ACETAMINOPHEN 1 TABLET: 5; 325 TABLET ORAL at 16:05

## 2022-03-20 RX ADMIN — DOXYCYCLINE 100 MG: 100 CAPSULE ORAL at 08:53

## 2022-03-20 RX ADMIN — OXYCODONE HYDROCHLORIDE AND ACETAMINOPHEN 1 TABLET: 5; 325 TABLET ORAL at 04:35

## 2022-03-20 RX ADMIN — SENNOSIDES AND DOCUSATE SODIUM 2 TABLET: 50; 8.6 TABLET ORAL at 08:53

## 2022-03-20 RX ADMIN — Medication 10 ML: at 21:49

## 2022-03-20 RX ADMIN — CEFDINIR 300 MG: 300 CAPSULE ORAL at 08:58

## 2022-03-20 RX ADMIN — Medication 1 CAPSULE: at 08:53

## 2022-03-20 RX ADMIN — DOXYCYCLINE 100 MG: 100 CAPSULE ORAL at 21:49

## 2022-03-20 RX ADMIN — OLANZAPINE 10 MG: 5 TABLET, FILM COATED ORAL at 08:52

## 2022-03-20 RX ADMIN — Medication 10 ML: at 08:53

## 2022-03-20 RX ADMIN — OXYCODONE HYDROCHLORIDE AND ACETAMINOPHEN 1 TABLET: 5; 325 TABLET ORAL at 21:49

## 2022-03-20 RX ADMIN — METHYLNALTREXONE BROMIDE 8 MG: 12 INJECTION, SOLUTION SUBCUTANEOUS at 18:17

## 2022-03-20 RX ADMIN — CEFDINIR 300 MG: 300 CAPSULE ORAL at 21:51

## 2022-03-20 RX ADMIN — LISINOPRIL 20 MG: 20 TABLET ORAL at 08:53

## 2022-03-20 RX ADMIN — AMLODIPINE BESYLATE 10 MG: 10 TABLET ORAL at 08:53

## 2022-03-20 RX ADMIN — METHADONE HYDROCHLORIDE 60 MG: 10 TABLET ORAL at 08:53

## 2022-03-20 RX ADMIN — TRAZODONE HYDROCHLORIDE 150 MG: 100 TABLET ORAL at 21:49

## 2022-03-20 RX ADMIN — OXYCODONE HYDROCHLORIDE AND ACETAMINOPHEN 1 TABLET: 5; 325 TABLET ORAL at 08:58

## 2022-03-20 RX ADMIN — OXYCODONE HYDROCHLORIDE AND ACETAMINOPHEN 1 TABLET: 5; 325 TABLET ORAL at 00:51

## 2022-03-20 RX ADMIN — SENNOSIDES AND DOCUSATE SODIUM 2 TABLET: 50; 8.6 TABLET ORAL at 21:49

## 2022-03-20 RX ADMIN — FLUOXETINE HYDROCHLORIDE 20 MG: 20 CAPSULE ORAL at 08:55

## 2022-03-20 NOTE — PROGRESS NOTES
[facility] Progress Note        Antibiotics:  Anti-Infectives (From admission, onward)    Ordered     Dose/Rate Route Frequency Start Stop    03/17/22 0937  cefdinir (OMNICEF) capsule 300 mg        Ordering Provider: Broderick Wilhelm MD    300 mg Oral Every 12 Hours Scheduled 03/17/22 1030 03/23/22 0859    03/14/22 0900  doxycycline (MONODOX) capsule 100 mg        Ordering Provider: Broderick Wilhelm MD    100 mg Oral Every 12 Hours Scheduled 03/14/22 1000 03/23/22 0859    03/11/22 0403  piperacillin-tazobactam (ZOSYN) 4.5 g in iso-osmotic dextrose 100 mL IVPB (premix)        Ordering Provider: Broderick Calvo RPH    4.5 g  over 30 Minutes Intravenous Once 03/11/22 0445 03/11/22 0614          CC: right hip/left knee/low back pain    HPI:      Patient is a 41 y.o.  Yr old female with history of polysubstance abuse with prior cocaine/heroin/methamphetamine/THC, chronic hepatitis C and PTSD/bipolar disease; treated for right septic hip with MSSA in September 2021, surgery at that time by Dr. Gusman and subsequently transitioned to [facility], abnormal LFTs with empiric change from Zosyn to cefazolin with improvement although unclear if related to penicillin class versus hep C or combination.  Noncompliant with follow-up.  Apparently plans to be seen at [facility] regarding possible right hip surgery in the future but specifics of that are unclear.  She reports being in [facility] recently, lost her spot in methadone clinic and reverted to injection drug abuse approximately 1 week prior to admission.  After injection had developed worsening right hip and left knee pain, worsening low back pain and ultimately presented to [facility] on March 11.     Aside from injection drug abuse, she denies any other specific exposures. No raw or undercooked food.  No unpasteurized milk or milk products.  No animal insect or arthropod exposure.  No tick bites.  No outdoor camping or hunting exposure.  No travel exposure.  No  ill exposure.  No history of TB or TB exposure.   Denies a history of MRSA/VRE and no history of C. difficile or ESBL/KPC  Organisms.      HCG negative      3/20/22  Seen early and sleepy;  Medicine weaning pain meds; Per nursing, right  Hip and left knee  pain ongoing, nonradiating, worse with movement ;  pain 2-4/10 but intensity fluctuates; recent urinary frequency , now better and no new urinary/vaginal symptoms       lumbar back pain, varies in intensity and not progressive, sharp at times, worse with movement, somewhat better with pain meds but not completely relieved and 3-4 out of 10 in severity at present.  She denies any new focal weakness or numbness.  No bowel or bladder incontinence.        Denies headache photophobia or neck stiffness.  No shortness of breath cough or hemoptysis.  No nausea vomiting diarrhea or abdominal pain.  No dysuria hematuria or pyuria.          ROS:     3/20/22  Per nursing; No f/c/s. No n/v/d. No rash. No new ADR to Abx.       Constitutional--subjective fevers and chills.  Appetite diminished with fatigue  Heent-- No new vision, hearing or throat complaints.  No epistaxis or oral sores.  Denies odynophagia or dysphagia.  No flashers, floaters or eye pain. No odynophagia or dysphagia. No headache, photophobia or neck stiffness.  CV-- No chest pain, palpitation or syncope  Resp-- No SOB/cough/Hemoptysis  GI- No nausea, vomiting, or diarrhea.  No hematochezia, melena, or hematemesis. Denies jaundice or chronic liver disease.  -- at present No dysuria, hematuria, or flank pain.  Denies hesitancy, urgency or flank pain.  Lymph- no swollen lymph nodes in neck/axilla or groin.   Heme- No active bruising or bleeding; no Hx of DVT or PE.  MS-- no swelling or pain in the bones or joints of arms/legs aside from above.     Neuro-- No acute focal weakness or numbness in the arms or legs.  No seizures.     Full 12 point review of systems reviewed and negative otherwise for acute  "complaints, except for above      PE: nursing/chaperone present  /78 (BP Location: Right arm, Patient Position: Sitting)   Pulse 86   Temp 97.8 °F (36.6 °C) (Oral)   Resp 18   Ht 160 cm (63\")   Wt 83.8 kg (184 lb 12.8 oz)   LMP 02/17/2022   SpO2 96%   Breastfeeding No   BMI 32.74 kg/m²       GENERAL: sleepy; NAD  HEENT: Normocephalic, atraumatic.  No conjunctival injection. No icterus. Oropharynx clear without evidence of thrush or exudate. No evidence of peridontal disease.     HEART: RRR; No murmur, rubs, gallops.   LUNGS: Clear to auscultation bilaterally without wheezing, rales, rhonchi. Normal respiratory effort. Nonlabored. No dullness.  ABDOMEN: Soft, nontender, nondistended. Positive bowel sounds. No rebound or guarding. NO mass or HSM.  EXT:  No cyanosis, clubbing . No cord.   MSK: FROM without joint effusions noted arms/legs.    SKIN: Warm and dry without cutaneous eruptions on Inspection/palpation.    NEURO: sleepy     Pain with ROM at the right hip.  No obvious warmth or redness there.  Prior incision noted with no dehiscence or drainage.  No discrete mass bulge or fluctuance.  No crepitus or bulla     Spine with no redness bulge fluctuance or point tenderness; diffusely tender in lumbar spine.  No thoracic or cervical spine tenderness.     Left knee less edematous  , no warmth ,  no erythema; no discrete fluctuance but exam limited with pain and patient not cooperative with a detailed exam     Track marks from drug use noted in upper extremities     IV without obvious redness or drainage    Laboratory Data    Results from last 7 days   Lab Units 03/20/22  0415 03/14/22  0323   WBC 10*3/mm3 5.20 3.92   HEMOGLOBIN g/dL 12.2 11.0*   HEMATOCRIT % 37.6 34.6   PLATELETS 10*3/mm3 285 238     Results from last 7 days   Lab Units 03/20/22  0415   SODIUM mmol/L 136   POTASSIUM mmol/L 4.3   CHLORIDE mmol/L 101   CO2 mmol/L 25.0   BUN mg/dL 22*   CREATININE mg/dL 0.62   GLUCOSE mg/dL 89   CALCIUM " mg/dL 9.3                   Estimated Creatinine Clearance: 123.8 mL/min (by C-G formula based on SCr of 0.62 mg/dL).      Microbiology:      Radiology:  Imaging Results (Last 72 Hours)     ** No results found for the last 72 hours. **            Impression:         --Acute subjective constitutional symptoms with low-grade temp 99, active injection drug abuse , polysubstance with acute right hip/lumbar back pain out of proportion to baseline and acute left knee pain/swelling.  She is at risk for bloodstream infection and hematogenous spread with risk for metastatic foci of involvement including risk for septic joints/osteomyelitis, endovascular focus etc.   however,  blood cultures negative so far and CT/MRI's/joint aspirates NOT c/w infection so far;  she denies cough and respiratory exam nonfocal.  No acute inflammatory process on abdominal CT. Mild urinary changes and mixed urine culture with transient symptoms, now improved with empiric abx and urine culture may have been contaminated; no fever and tapered abx    **right hip and left knee aspirate NOT c/w septic joints by cell counts;  Cultures negative so far    --pyuria/bacteruria; empiric treatment for UTI although urine difficult to interpret with epi's/contamination; repeat urine with pyuria/nitrite pos (abx modified), also possible contamination with epi'sl empiric abx given constitutional symptoms at admit with vague urinary symptoms , now better     --Acute/chronic right hip pain with abrupt worsening over last week, recent injection drug abuse and risk for new versus recurrent infection.      **aspirate NOT c/w septic joints by cell counts;  Cultures negative so far     --Acute/chronic lumbar back pain with worsening over 1 week and recent injection drug abuse with risk for metastatic seeding and spinal/paraspinal infection risk.  MRI lumbar spine no infectious change per radiology     --Acute left knee pain/swelling, recent injection drug abuse and  risk for septic joint.     **aspirate NOT c/w septic joints by cell counts;  Cultures negative so far     --Right septic hip arthritis in August/September 2021 with surgery by Dr. Gusman     --Chronic hepatitis C.  I do not treat viral hepatitis as a part of my practice.  If you desire further input regarding this during this hospitalization you should give consideration to gastroenterology consultation.     --Polysubstance abuse as previously noted.  Strategy for minimizing risk of withdrawal and additional supportive measures at discretion of medicine team     --Possible Zosyn adverse drug reaction with abnormal LFTs in 2021.  Not clear-cut but trying to avoid.  Subsequently tolerated cephalosporin class antibiotics with improvements in liver tests     --Abnormal CPK prior to daptomycin.  Monitor.     --PTSD/bipolar disease     --Medical noncompliance           PLAN:       --omnicef/doxy       --Check/review labs cultures and scans     --Partial history per nursing staff     --Discussed with microbiology      --Highly complex set of issues with high risk for further serious morbidity and other serious sequela     --HCG negative         Broderick Wilhelm MD  3/20/2022

## 2022-03-20 NOTE — PROGRESS NOTES
[facility] Medicine Services  PROGRESS NOTE    Patient Name: Frieda Flores  : 1980  MRN: [id number]    Date of Admission: 3/11/2022  Primary Care Physician: Karan Douglas DO    Subjective   Subjective     CC:  F/U right hip and low back pain    HPI:  Tolerating po, stable chronic pains. Constipated. No n/v/d. No fever. No dyspnea     ROS:  Gen-no fevers, no chills  CV-no chest pain, no palpitations  Resp-no cough, no dyspnea  GI-no N/V/D, no abd pain    All other systems reviewed and negative except any additional pertinent positives and negatives as discussed in HPI.      Objective   Objective     Vital Signs:   Temp:  [97.5 °F (36.4 °C)-98.4 °F (36.9 °C)] 98.4 °F (36.9 °C)  Heart Rate:  [68-88] 78  Resp:  [16-18] 18  BP: (104-127)/(50-78) 125/61     Physical Exam:  Unchanged from 3/19/22  Gen-no acute distress, nontoxic appearing sitting up  HENT-NCAT, mucous membranes moist  CV-RRR, S1 S2 normal, no m/r/g  Resp-CTAB, no wheezes or rales  Abd-soft, NT, ND, +BS  Ext-no edema  Neuro-A&Ox3, no focal deficits  Skin-no rashes        Results Reviewed:  LAB RESULTS:      Lab 225 22   WBC 5.20 3.92   HEMOGLOBIN 12.2 11.0*   HEMATOCRIT 37.6 34.6   PLATELETS 285 238   NEUTROS ABS  --  1.84   IMMATURE GRANS (ABS)  --  0.02   LYMPHS ABS  --  1.57   MONOS ABS  --  0.24   EOS ABS  --  0.22   MCV 88.5 90.1         Lab 22  0415 03/15/22  0318 22  0323   SODIUM 136  --  140   POTASSIUM 4.3  --  4.5   CHLORIDE 101  --  107   CO2 25.0  --  27.0   ANION GAP 10.0  --  6.0   BUN 22*  --  7   CREATININE 0.62  --  0.52*   EGFR 115.6  --  120.6   GLUCOSE 89  --  106*   CALCIUM 9.3  --  8.5*   MAGNESIUM  --  2.0 1.9                         Brief Urine Lab Results  (Last result in the past 365 days)      Color   Clarity   Blood   Leuk Est   Nitrite   Protein   CREAT   Urine HCG        22 1145 Yellow   Cloudy   Negative   Trace   Positive   Negative                  Microbiology Results Abnormal     Procedure Component Value - Date/Time    Fungus Culture - Aspirate, Knee, Left [[id number]] Collected: 03/11/22 1535    Lab Status: Preliminary result Specimen: Aspirate from Knee, Left Updated: 03/18/22 1718     Fungus Culture No fungus isolated at 1 week    AFB Culture - Aspirate, Knee, Left [[id number]] Collected: 03/11/22 1535    Lab Status: Preliminary result Specimen: Aspirate from Knee, Left Updated: 03/18/22 1718     AFB Culture No AFB isolated at 1 week     AFB Stain No acid fast bacilli seen on concentrated smear    Fungus Culture - Aspirate, Hip, Right [[id number]] Collected: 03/11/22 1535    Lab Status: Preliminary result Specimen: Aspirate from Hip, Right Updated: 03/18/22 1718     Fungus Culture No fungus isolated at 1 week    AFB Culture - Aspirate, Hip, Right [[id number]] Collected: 03/11/22 1535    Lab Status: Preliminary result Specimen: Aspirate from Hip, Right Updated: 03/18/22 1718     AFB Culture No AFB isolated at 1 week     AFB Stain No acid fast bacilli seen on concentrated smear    Body Fluid Culture - Aspirate, Hip, Right [[id number]] Collected: 03/11/22 1535    Lab Status: Final result Specimen: Aspirate from Hip, Right Updated: 03/16/22 0952     Body Fluid Culture No growth at 5 days     Gram Stain Rare (1+) WBCs per low power field      No organisms seen    Body Fluid Culture - Aspirate, Knee, Left [[id number]] Collected: 03/11/22 1535    Lab Status: Final result Specimen: Aspirate from Knee, Left Updated: 03/16/22 0952     Body Fluid Culture No growth at 5 days     Gram Stain Rare (1+) WBCs per low power field      No organisms seen    Anaerobic Culture - Aspirate, Hip, Right [[id number]] Collected: 03/11/22 1535    Lab Status: Final result Specimen: Aspirate from Hip, Right Updated: 03/16/22 0643     Anaerobic Culture No anaerobes isolated at 5 days    Anaerobic Culture - Aspirate, Knee, Left [[id number]] Collected: 03/11/22 1535    Lab Status: Final  result Specimen: Aspirate from Knee, Left Updated: 03/16/22 0643     Anaerobic Culture No anaerobes isolated at 5 days    Blood Culture - Blood, Arm, Left [[id number]]  (Normal) Collected: 03/11/22 0000    Lab Status: Final result Specimen: Blood from Arm, Left Updated: 03/16/22 0132     Blood Culture No growth at 5 days    Blood Culture - Blood, Arm, Right [[id number]]  (Normal) Collected: 03/11/22 0000    Lab Status: Final result Specimen: Blood from Arm, Right Updated: 03/16/22 0132     Blood Culture No growth at 5 days    Chlamydia trachomatis, Neisseria gonorrhoeae, PCR - Urine, Urine, Clean Catch [[id number]] Collected: 03/14/22 1145    Lab Status: Final result Specimen: Urine, Clean Catch Updated: 03/15/22 2207     Chlamydia trachomatis, ASAEL Negative     Neisseria gonorrhoeae, ASAEL Negative    Narrative:      Performed at:  [address] [place] [address]  [id number]  : Tahmina Moya MD, Phone:  [phone]    Urine Culture - Urine, Urine, Clean Catch [[id number]]  (Normal) Collected: 03/14/22 1145    Lab Status: Final result Specimen: Urine, Clean Catch Updated: 03/15/22 1440     Urine Culture No growth    Urine Culture - Urine, Urine, Clean Catch [[id number]] Collected: 03/11/22 0415    Lab Status: Final result Specimen: Urine, Clean Catch Updated: 03/12/22 1217     Urine Culture 50,000 CFU/mL Normal Urogenital Diane    COVID PRE-OP / PRE-PROCEDURE SCREENING ORDER (NO ISOLATION) - Swab, Nasopharynx [[id number]]  (Normal) Collected: 03/11/22 0415    Lab Status: Final result Specimen: Swab from Nasopharynx Updated: 03/11/22 0501    Narrative:      The following orders were created for panel order COVID PRE-OP / PRE-PROCEDURE SCREENING ORDER (NO ISOLATION) - Swab, Nasopharynx.  Procedure                               Abnormality         Status                     ---------                               -----------         ------                     COVID-19 and FLU A/B  PCR...[[id number]]  Normal              Final result                 Please view results for these tests on the individual orders.    COVID-19 and FLU A/B PCR - Swab, Nasopharynx [[id number]]  (Normal) Collected: 03/11/22 0415    Lab Status: Final result Specimen: Swab from Nasopharynx Updated: 03/11/22 0501     COVID19 Not Detected     Influenza A PCR Not Detected     Influenza B PCR Not Detected    Narrative:      Fact sheet for providers: https://www.fda.gov/media/172893/download    Fact sheet for patients: https://www.fda.gov/media/001998/download    Test performed by PCR.          No radiology results from the last 24 hrs    Results for orders placed during the hospital encounter of 07/31/21    Adult Transthoracic Echo Complete W/ Cont if Necessary Per Protocol    Interpretation Summary  · Left ventricular ejection fraction appears to be 61 - 65%. Left ventricular systolic function is normal.  · The cardiac valves are structurally and functionally within normal limits.      I have reviewed the medications:  Scheduled Meds:amLODIPine, 10 mg, Oral, Q24H  cefdinir, 300 mg, Oral, Q12H  doxycycline, 100 mg, Oral, Q12H  FLUoxetine, 20 mg, Oral, Daily  lactobacillus acidophilus, 1 capsule, Oral, Daily  lisinopril, 20 mg, Oral, Q24H  methadone, 60 mg, Oral, Daily  methylnaltrexone, 8 mg, Subcutaneous, Once  OLANZapine, 10 mg, Oral, Daily  senna-docusate sodium, 2 tablet, Oral, BID  sodium chloride, 10 mL, Intravenous, Q12H  traZODone, 150 mg, Oral, Nightly      Continuous Infusions:   PRN Meds:.•  acetaminophen  •  senna-docusate sodium **AND** polyethylene glycol **AND** bisacodyl **AND** bisacodyl  •  cyclobenzaprine  •  magnesium sulfate **OR** magnesium sulfate **OR** magnesium sulfate  •  melatonin  •  ondansetron  •  ondansetron  •  oxyCODONE-acetaminophen  •  potassium chloride **OR** potassium chloride **OR** potassium chloride  •  potassium chloride  •  sodium chloride  •  sodium chloride    Assessment/Plan    Assessment & Plan     Active Hospital Problems    Diagnosis  POA   • **Right hip pain [M25.551]  Yes   • Tobacco abuse [Z72.0]  Yes   • Hypokalemia [E87.6]  Yes   • QT prolongation [R94.31]  Yes   • Septic right hip (CMS/HCC) [M00.9]  Yes   • Polysubstance abuse (HCC) [F19.10]  Yes   • Alcohol abuse [F10.10]  Yes      Resolved Hospital Problems   No resolved problems to display.        Brief Hospital Course to date:  Frieda Flores is a 41 y.o. female with a past medical history of polysubstance abuse, prior alcohol abuse, ADHD/Bipolar disorder, and chronic right hip pain with septic arthritis s/p washout by Dr. Gusman in July 2021 who presented to the ED complaining of recurrent right hip, left knee, and low back pain that has worsened over the last few months to the point where she can no longer bear weight. She was admitted to the hospitalist service and Orthopedic Surgery and ID were consulted. Her workup was negative for septic arthritis. She was empirically treated with antibiotics per Infectious Disease due to constitutional symptoms of low grade fevers and hx of recent IVDA.      This patient's problems and plans were partially entered by my partner and updated as appropriate by me 03/20/22. Today is my first day evaluating this patient's active medical problems. I Personally reviewed chart and adjusted note to reflect daily changes in management/clinical condition      Right Hip/Low Back pain  Severe osteoarthritis/avascular necrosis  Hx of right hip septic arthritis s/p washout 7/31/21  --Joint aspiration from left knee and right hip on 3/11/22 are not consistent wtih septic joint. Cultures negative.  --MRI hip and L spine unremarkable for acute issues, shows end-stage arthritis/avascular necrosis of right hip and severe arthritis of left hip; DDD at L4/5 and L5/S1 levels, no spinal canal stenosis.  --ID following. Ortho has now signed off. She has an upcoming appointment 5/12/22 with  Orthopedic Surgery;  patient states she was told she needs a hip replacement.  --Has been on Ceftriaxone and PO Doxycycline. My partner discussed with Dr. Wilhelm 3/17/22, okay to switch to Omnicef and continue Doxycycline. Plan to complete total 10 days treatment from admission (through 3/20/22), likely stop abx on discharge  --Continue Percocet, stopped IV morphine; weaning percocet (patient aware will not get prescriptions for controlled substances at discharge)  --PT/OT recommend inpatient rehab, however due to patient's active and past drug use, she will not be able to go to inpatient rehab facility as they are not secured units. Current plan is to progress as much as possible with PT/OT here, then d/c home with HHPT and with sister to help her.     Hypokalemia  --Replaced per protocol with improvement.     Polysubstance abuse  --UDS positive for Cocaine, Methamphetamine, Methadone, and THC.  --On chronic Methadone, continued here but at lower dose due to prolonged QTc.  --Addiction Medicine following. Discussed with Mary Pollock, she talked to [facility] (patient's methadone clinic) who states she was discharged from their clinic on 2/8/22 due to noncompliance and positive UDS. They had decreased her to 100 mg daily prior to that (not 130 mg as patient tells us). Mary has helped arrange appointment with a different methadone clinic ([facility]) on Tuesday 3/22/22 at 0530 to establish care. Current plan is to discharge patient on Monday after her oral methadone dose     Mood disorder  --Continue Olanzapine.      QTc Prolongation  --EKG 3/11/22 showed QTc of 491. Likely due to Methadone. Will monitor for now and avoid other QTc prolonging medications.  --Repeat EKG 3/17/22 with improvement to 418.    Constipation  --Likely due to opiates.   -3/20/22: relistor 8mg sq x 1, give miralax, bowel regimen    Am labs: cbc,bmp       DVT prophylaxis:  Mechanical DVT prophylaxis orders are present.       AM-PAC 6 Clicks Score (PT): 19 (03/19/22  2045)    Disposition: I expect the patient to be discharged home with HHPT on Monday 3/21/22 after dose of methadone, will continue to wean Percocet through the weekend as this will not be prescribed to her at discharge    CODE STATUS:   Code Status and Medical Interventions:   Ordered at: 03/11/22 0401     Level Of Support Discussed With:    Patient     Code Status (Patient has no pulse and is not breathing):    CPR (Attempt to Resuscitate)     Medical Interventions (Patient has pulse or is breathing):    Full Support       Christiano Quispe MD  03/20/22

## 2022-03-20 NOTE — PLAN OF CARE
Inpatient RN   Plan of Care Update  ________________________________________________    Patient Name:  Frieda Flores  YOB: 1980  MRN: [id number]  Admit Date:  3/11/2022      Assessment Date:  3/20/2022    Vital Signs stable, On Telemetry, Pt is Normal Sinus Rhythm, Pt currently on room air Pt has been up all night with a Pain status of well controlled and finding no nausea and no vomiting.    Pt orientated to person, place, time and situation with LOC of alert.    UOP adequate.        Pt has no requests at this time.         Electronically signed by:  CASS BOSCH RN  03/20/22 04:42 EDT

## 2022-03-21 ENCOUNTER — READMISSION MANAGEMENT (OUTPATIENT)
Dept: CALL CENTER | Facility: HOSPITAL | Age: 42
End: 2022-03-21

## 2022-03-21 VITALS
BODY MASS INDEX: 32.74 KG/M2 | HEART RATE: 81 BPM | WEIGHT: 184.8 LBS | SYSTOLIC BLOOD PRESSURE: 109 MMHG | HEIGHT: 63 IN | RESPIRATION RATE: 18 BRPM | TEMPERATURE: 98 F | DIASTOLIC BLOOD PRESSURE: 80 MMHG | OXYGEN SATURATION: 96 %

## 2022-03-21 LAB
QT INTERVAL: 370 MS
QT INTERVAL: 426 MS
QTC INTERVAL: 418 MS
QTC INTERVAL: 491 MS

## 2022-03-21 PROCEDURE — 99239 HOSP IP/OBS DSCHRG MGMT >30: CPT | Performed by: NURSE PRACTITIONER

## 2022-03-21 RX ORDER — ACETAMINOPHEN 325 MG/1
650 TABLET ORAL EVERY 4 HOURS PRN
Start: 2022-03-21

## 2022-03-21 RX ORDER — AMLODIPINE BESYLATE 10 MG/1
10 TABLET ORAL
Start: 2022-03-21

## 2022-03-21 RX ADMIN — OLANZAPINE 10 MG: 5 TABLET, FILM COATED ORAL at 08:28

## 2022-03-21 RX ADMIN — OXYCODONE HYDROCHLORIDE AND ACETAMINOPHEN 1 TABLET: 5; 325 TABLET ORAL at 08:28

## 2022-03-21 RX ADMIN — AMLODIPINE BESYLATE 10 MG: 10 TABLET ORAL at 08:28

## 2022-03-21 RX ADMIN — OXYCODONE HYDROCHLORIDE AND ACETAMINOPHEN 1 TABLET: 5; 325 TABLET ORAL at 12:57

## 2022-03-21 RX ADMIN — DOXYCYCLINE 100 MG: 100 CAPSULE ORAL at 08:28

## 2022-03-21 RX ADMIN — METHADONE HYDROCHLORIDE 60 MG: 10 TABLET ORAL at 08:28

## 2022-03-21 RX ADMIN — FLUOXETINE HYDROCHLORIDE 20 MG: 20 CAPSULE ORAL at 08:29

## 2022-03-21 RX ADMIN — Medication 1 CAPSULE: at 08:28

## 2022-03-21 RX ADMIN — OXYCODONE HYDROCHLORIDE AND ACETAMINOPHEN 1 TABLET: 5; 325 TABLET ORAL at 02:52

## 2022-03-21 RX ADMIN — SENNOSIDES AND DOCUSATE SODIUM 2 TABLET: 50; 8.6 TABLET ORAL at 08:28

## 2022-03-21 RX ADMIN — CEFDINIR 300 MG: 300 CAPSULE ORAL at 08:28

## 2022-03-21 RX ADMIN — LISINOPRIL 20 MG: 20 TABLET ORAL at 08:28

## 2022-03-21 NOTE — DISCHARGE SUMMARY
[facility] Medicine Services  DISCHARGE SUMMARY    Patient Name: Frieda Flores  : 1980  MRN: [id number]    Date of Admission: 3/11/2022  1:10 AM  Date of Discharge:  3/21/2022  Primary Care Physician: Karan Douglas DO    Consults     Date and Time Order Name Status Description    3/11/2022  7:59 AM Inpatient Orthopedic Surgery Consult Completed     3/11/2022  5:11 AM Inpatient Infectious Diseases Consult Completed           Hospital Course     Presenting Problem:   Hip pain, acute, right [M25.551]  Right hip pain [M25.551]    Active Hospital Problems    Diagnosis  POA   • **Right hip pain [M25.551]  Yes   • Tobacco abuse [Z72.0]  Yes   • Hypokalemia [E87.6]  Yes   • QT prolongation [R94.31]  Yes   • Septic right hip (CMS/HCC) [M00.9]  Yes   • Polysubstance abuse (HCC) [F19.10]  Yes   • Alcohol abuse [F10.10]  Yes      Resolved Hospital Problems   No resolved problems to display.          Hospital Course:  Frieda Flores is a 41 y.o. female with a past medical history of polysubstance abuse, prior alcohol abuse, ADHD/Bipolar disorder, and chronic right hip pain with septic arthritis s/p washout by Dr. Gusman in 2021 who presented to the ED complaining of recurrent right hip, left knee, and low back pain that has worsened over the last few months to the point where she can no longer bear weight. She was admitted to the hospitalist service and Orthopedic Surgery and ID were consulted. Her workup was negative for septic arthritis. She was empirically treated with antibiotics per Infectious Disease due to constitutional symptoms of low grade fevers and hx of recent IVDA.      Right Hip/Low Back pain  Severe osteoarthritis/avascular necrosis  Hx of right hip septic arthritis s/p washout 21  --Joint aspiration from left knee and right hip on 3/11/22 are not consistent wtih septic joint. Cultures negative.  --MRI hip and L spine unremarkable for acute issues, shows end-stage  arthritis/avascular necrosis of right hip and severe arthritis of left hip; DDD at L4/5 and L5/S1 levels, no spinal canal stenosis.  --ID following have since signed off.    --Ortho has now signed off. She has an upcoming appointment 5/12/22 with  Orthopedic Surgery; patient states she was told she needs a hip replacement.  --Has been on Ceftriaxone and PO Doxycycline. My partner discussed with Dr. Wilhelm 3/17/22, okay to switch to Omnicef and continue Doxycycline. Plan to complete total 10 days treatment from admission (through 3/20/22), DC abx on discharge today.  --Continue Percocet, stopped IV morphine; weaned percocet (patient aware will not get prescriptions for controlled substances at discharge)  --PT/OT recommend inpatient rehab, however due to patient's active and past drug use, she will not be able to go to inpatient rehab facility as they are not secured units.  PT follow.  Plan today for discharge home and patient to have outpatient physical therapy with [facility].      Hypokalemia  --Replaced per protocol with improvement.     Polysubstance abuse  --UDS positive for Cocaine, Methamphetamine, Methadone, and THC.  --On chronic Methadone, continued here but at lower dose due to prolonged QTc.  --Addiction Medicine following. Discussed with Mary Pollock, she talked to [place] (patient's methadone clinic) who states she was discharged from their clinic on 2/8/22 due to noncompliance and positive UDS. They had decreased her to 100 mg daily prior to that (not 130 mg as patient tells us). Mary has helped arrange appointment with a different methadone clinic ([facility]) for tomorrow Tuesday 3/22/22 at 0530 to establish care. Current plan is to discharge patient on today after she receives her dose of methadone.    Mood disorder  --Continue Olanzapine  --Stable     QTc Prolongation  --EKG 3/11/22 showed QTc of 491. Likely due to Methadone. Will monitor for now and avoid other QTc prolonging  medications.  --Repeat EKG 3/17/22 with improvement to 418.     Constipation  --Likely due to opiates.   -3/20/22: relistor 8mg sq x 1, give miralax, bowel regimen.  Improved.    Was seen today resting up in the chair awake and alert.  No acute distress.  Conversant.  States she feels much better.  Feels ready for discharge.  Dates her  will pick her up around 4 PM today.  She agrees to follow-up with her PCP as scheduled.  Orthopedics and her new methadone clinic appointments as noted.  Currently hemodynamically stable and afebrile.  Discharge and follow-up as below.      Discharge Follow Up Recommendations for outpatient labs/diagnostics:  Patient is cleared for discharge home today by all services.  Going home with no new narcotic/controlled substance meds.    --Follow-up PCP 1 week discharge  --Follow-up [facility] as needed  --Keep her scheduled appointment with  orthopedic surgery on 5/12/2022  --Keep new patient appointment at methadone clinic beginning tomorrow 5:30 AM on 3/22    Day of Discharge     HPI:   Patient was seen resting up in the chair awake and alert.  No acute distress.  States she feels pretty good and ready to go today.  Tolerating diet with no nausea or vomiting.  Eager to get to her new methadone clinic appointment tomorrow morning.  No new complaints.    Review of Systems  Gen- No fevers, chills  CV- No chest pain, palpitations  Resp- No cough, dyspnea  GI- No N/V/D, abd pain        Vital Signs:   Temp:  [97.9 °F (36.6 °C)-98.4 °F (36.9 °C)] 98 °F (36.7 °C)  Heart Rate:  [67-88] 81  Resp:  [16-18] 18  BP: (109-131)/(56-80) 109/80      Physical Exam:  Constitutional: No acute distress, awake, alert.  Resting up in chair.  HENT: NCAT, mucous membranes moist  Respiratory: Clear to auscultation bilaterally, respiratory effort normal on room air.  Cardiovascular: RRR, no murmurs, rubs, or gallops  Gastrointestinal: Positive bowel sounds, soft, nontender, nondistended.   Obese.  Musculoskeletal: Trace bilateral ankle edema  Psychiatric: Appropriate affect, cooperative  Neurologic: Oriented x 3, strength symmetric in all extremities, Cranial Nerves grossly intact to confrontation, speech clear and appropriate.  Follows commands.  Skin: Warm and dry.  Intact.      Pertinent  and/or Most Recent Results     LAB RESULTS:      Lab 03/20/22 0415   WBC 5.20   HEMOGLOBIN 12.2   HEMATOCRIT 37.6   PLATELETS 285   MCV 88.5         Lab 03/20/22  0415 03/15/22  0318   SODIUM 136  --    POTASSIUM 4.3  --    CHLORIDE 101  --    CO2 25.0  --    ANION GAP 10.0  --    BUN 22*  --    CREATININE 0.62  --    EGFR 115.6  --    GLUCOSE 89  --    CALCIUM 9.3  --    MAGNESIUM  --  2.0                         Brief Urine Lab Results  (Last result in the past 365 days)      Color   Clarity   Blood   Leuk Est   Nitrite   Protein   CREAT   Urine HCG        03/14/22 1145 Yellow   Cloudy   Negative   Trace   Positive   Negative               Microbiology Results (last 10 days)     Procedure Component Value - Date/Time    Chlamydia trachomatis, Neisseria gonorrhoeae, PCR - Urine, Urine, Clean Catch [[id number]] Collected: 03/14/22 1145    Lab Status: Final result Specimen: Urine, Clean Catch Updated: 03/15/22 2207     Chlamydia trachomatis, ASAEL Negative     Neisseria gonorrhoeae, ASAEL Negative    Narrative:      Performed at:  [address] [place] [address]  [id number]  : Tahmina Moya MD, Phone:  [phone]    Urine Culture - Urine, Urine, Clean Catch [[id number]]  (Normal) Collected: 03/14/22 1145    Lab Status: Final result Specimen: Urine, Clean Catch Updated: 03/15/22 1440     Urine Culture No growth    Body Fluid Culture - Aspirate, Knee, Left [[id number]] Collected: 03/11/22 1536    Lab Status: Final result Specimen: Aspirate from Knee, Left Updated: 03/16/22 0952     Body Fluid Culture No growth at 5 days     Gram Stain Rare (1+) WBCs per low power field      No organisms  seen    Fungus Culture - Aspirate, Knee, Left [[id number]] Collected: 03/11/22 1535    Lab Status: Preliminary result Specimen: Aspirate from Knee, Left Updated: 03/18/22 1718     Fungus Culture No fungus isolated at 1 week    AFB Culture - Aspirate, Knee, Left [[id number]] Collected: 03/11/22 1535    Lab Status: Preliminary result Specimen: Aspirate from Knee, Left Updated: 03/18/22 1718     AFB Culture No AFB isolated at 1 week     AFB Stain No acid fast bacilli seen on concentrated smear    Anaerobic Culture - Aspirate, Knee, Left [[id number]] Collected: 03/11/22 1535    Lab Status: Final result Specimen: Aspirate from Knee, Left Updated: 03/16/22 0643     Anaerobic Culture No anaerobes isolated at 5 days    Body Fluid Culture - Aspirate, Hip, Right [[id number]] Collected: 03/11/22 1535    Lab Status: Final result Specimen: Aspirate from Hip, Right Updated: 03/16/22 0952     Body Fluid Culture No growth at 5 days     Gram Stain Rare (1+) WBCs per low power field      No organisms seen    Fungus Culture - Aspirate, Hip, Right [[id number]] Collected: 03/11/22 1535    Lab Status: Preliminary result Specimen: Aspirate from Hip, Right Updated: 03/18/22 1718     Fungus Culture No fungus isolated at 1 week    AFB Culture - Aspirate, Hip, Right [[id number]] Collected: 03/11/22 1535    Lab Status: Preliminary result Specimen: Aspirate from Hip, Right Updated: 03/18/22 1718     AFB Culture No AFB isolated at 1 week     AFB Stain No acid fast bacilli seen on concentrated smear    Anaerobic Culture - Aspirate, Hip, Right [[id number]] Collected: 03/11/22 1535    Lab Status: Final result Specimen: Aspirate from Hip, Right Updated: 03/16/22 0643     Anaerobic Culture No anaerobes isolated at 5 days          XR Knee 1 or 2 View Left    Result Date: 3/11/2022  Two-view left knee. DATE: 3/11/2022. COMPARISON: None available. CLINICAL HISTORY: Fall with swelling. FINDINGS: There is a moderate knee joint effusion. There is no fracture,  subluxation or dislocation otherwise seen. The joint spaces appear preserved.     Moderate effusion with no acute osseous abnormality seen radiographically. Electronically signed by:  Rob Rincon D.O.  3/11/2022 4:20 AM Mountain Time    MRI Hip Right With & Without Contrast    Result Date: 3/13/2022  MRI HIP RIGHT W WO CONTRAST Date of Exam: 3/13/2022 3:18 EDT Indication: Hip pain, chronic, gout suspected.  Recent aspiration 3/11/2022. White blood cells. No growth at this time. Comparison Exams: Hip radiograph 12/7/2021, CT the abdomen pelvis 3/11/2022 Technique: Prior to and after uneventful administration of 17 mL MultiHance contrast, multiplanar multisequence images of the pelvis performed according to routine pelvis MRI protocol. FINDINGS: There is severe end-stage degenerative change of the right hip with a large joint effusion and osseous destruction and remodeling of the femoral head and remodeling of the acetabulum. There are large intra-articular ossified bodies. A large fragment seen  in the medial intra-articular space measures up to 2.4 cm. There is edema involving the femoral head and acetabulum on the left. There is a moderate to large joint effusion with peripheral enhancement of the synovium. There is severe progression of these changes since 2019. There is mild edema in the right adductor muscles and right vastus lateralis and intermedius muscles proximally. Mild edema in the gluteus medius muscle proximally on the right. There is severe joint space narrowing of the left hip with small osteophytes and small joint effusion and mild subchondral edema. There is an intra-articular body in the inferior medial joint space which is ossified and measures approximately 9 mm maximally. Degenerative labral tearing is present. The sacroiliac joints are normal in appearance. There is degenerative change of the pubic symphysis. There is partial-thickness tearing of the left hamstring muscle origin involving  the sigmoid none number necrosis component which is age indeterminate. No free fluid is seen in the pelvis. There are no pathologically enlarged lymph nodes. There are no soft tissue masses.     1.There is end-stage arthritis of the right hip with osseous remodeling of the femur and acetabulum and with large intra-articular loose bodies and large joint effusion with probable synovitis. End-stage avascular necrosis is favored over septic arthritis. Correlation with the aspiration is recommended. 2.There is severe arthritis of left hip with small joint effusion and ossified intra-articular body. 3.There is degenerative change of the pubic symphysis. 4.There is mild edema in the right gluteus medius muscle and right adductor and quadriceps muscles. 5.There is age indeterminate partial thickness tearing of the left hamstring muscle origin. Electronically Signed: Chelle Hobbs MD 3/13/2022 10:25 EDT    CT Abdomen Pelvis With Contrast    Result Date: 3/11/2022  EXAMINATION: CT ABDOMEN AND PELVIS WITH IV CONTRAST   DATE OF EXAMINATION: 3/11/2022. COMPARISON: 3/13/2019. INDICATION: Joint pain with history of hip septic arthritis. Back and bilateral hip pain. PROCEDURE:  Axial CT of the abdomen and pelvis was performed with contrast and sagittal and coronal reformatted images were performed.  100 mL of Isovue-300 was given intravenously. CT dose lowering techniques were used, to include: automated exposure control, adjustment for patient size, and/or use of iterative reconstruction. FINDINGS: LOWER CHEST :  The visualized lung bases are clear.  There are no pleural or pericardial effusions. ABDOMEN: Liver and Biliary system:  Normal. Adrenal glands:  Normal. Kidneys and ureters: Normal. Spleen:  Normal. Pancreas:  Normal. Gallbladder:  Normal. Lymph nodes, Peritoneum and mesentery:  There is no mesenteric or retroperitoneal lymphadenopathy. Gastrointestinal tract:  There are no dilated loops of bowel or free intraperitoneal  air.    The appendix is normal. Aorta/IVC:   No aortic aneurysm.  IVC normal. Abdominal wall:  Normal. PELVIS: Fluid: There is no free fluid in the pelvis. Lymph Nodes:  There is no pelvic or inguinal lymphadenopathy.. Urinary bladder:  Normal. BONES:  There is severe osteoarthritis involving the right hip heterotopic ossification seen along the inferior aspect of the hip joint space. There is some moderate superolateral subluxation of the femur with respect to the acetabulum. There is significant subchondral sclerosis and cystic changes. There is some soft tissue thickening of the joint capsule and a small effusion around the right hip joint as this could relate to the patient's history of septic arthritis. Active infection would be difficult to exclude. Mild degenerative changes in the left hip joint spaces otherwise noted. ADDITIONAL  SIGNIFICANT FINDINGS:  None.     1. There is severe osteoarthritis involving the right hip heterotopic ossification seen along the inferior aspect of the hip joint space. There is some moderate superolateral subluxation of the femur with respect to the acetabulum. There is significant subchondral sclerosis and cystic changes. There is some soft tissue thickening of the joint capsule and a small effusion around the right hip joint as this could relate to the patient's history of septic arthritis. Active infection would be difficult to exclude. 2. No acute process otherwise seen within the abdomen or pelvis. Electronically signed by:  Rob Rincon D.O.  3/11/2022 1:13 AM Mountain Time    MRI Lumbar Spine With & Without Contrast    Result Date: 3/13/2022  MRI LUMBAR SPINE W WO CONTRAST-  Date of Exam: 3/13/2022 1:50 AM  Indication: Low back pain, infection suspected.  Comparison Exams: None available.  Technique: Routine multiplanar multisequence MR imaging of the lumbar spine was performed before and after the administration of 17 cc MultiHance IV gadolinium contrast.  FINDINGS:  There is normal height, alignment, signal intensity of the lumbar vertebral bodies.  No bone marrow edema.  The spinal cord terminates at theT12 level with normal signal seen within the conus.  There is fatty infiltration of the filum terminale near the L2 level.  The paraspinal soft tissues are within normal limits.  Postcontrast images demonstrate no pathologic contrast enhancement.   Axial images demonstrate:  T12/L1:No significant disc bulge.  There are mild degenerative facet changes bilaterally.  There is no spinal canal stenosis or neural foraminal narrowing.  L1/2:No significant disc bulge.  There are mild degenerative facet changes bilaterally.  There is no spinal canal stenosis or neural foraminal narrowing.  L2/3:No significant disc bulge.  There are mild degenerative facet changes bilaterally.  There is no spinal canal stenosis or neural foraminal narrowing.  L3/4:No significant disc bulge.  There are mild degenerative facet changes bilaterally.  There is no spinal canal stenosis or neural foraminal narrowing.  L4/5:Mild diffuse posterior disc bulge and mild degenerative facet change.  There is no spinal canal stenosis.  There is mild bilateral neural foraminal narrowing.  There is a 6 mm cyst arising from the posterior aspect of the left facet joint.  L5/S1:Mild diffuse posterior disc bulge and mild degenerative facet change but no spinal canal stenosis or neural foraminal narrowing.        1.  Degenerative disc disease and degenerative facet change at the L4/5 and L5/S1 levels with mild bilateral neural foraminal narrowing at the L4/5 level.  No spinal canal stenosis. 2.  No pathologic contrast enhancement. 3.  Incidental note is made of fatty infiltration of the filum terminale near the L2 level.  This report was finalized on 3/13/2022 10:03 AM by Rob Smith MD.      FL Guided Aspiration Joint    Result Date: 3/11/2022  DATE OF EXAM: 3/11/2022 3:29 PM  PROCEDURE: FL GUIDED ASPIRATION JOINT-   INDICATIONS: possible septic joint; M25.551-Pain in right hip; M16.11-Unilateral primary osteoarthritis, right hip; Z87.39-Personal history of other diseases of the musculoskeletal system and connective tissue; E87.6-Hypokalemia  COMPARISON: No comparisons available.  TECHNIQUE: 6 seconds of fluoroscopic time was used for this exam. 2 associated images were saved. Procedure, risks, complications, and side effects of joint aspiration were discussed and reviewed in detail with the patient including the possibility for bleeding, infection, needle damage to surrounding structures, and the potential for a nondiagnostic exam. The patient indicated understanding and consented to the procedure.  The right hip was marked, prepped, and draped in a sterile fashion. 1% lidocaine was used as a local anesthetic to the skin and subcutaneous tissues. Under fluoroscopic guidance a 20-gauge needle was advanced to the joint space. Approximately 8 cc of bloody synovial fluid was aspirated, labeled, and sent to pathology for further analysis. The needle was removed.  FINDINGS: The patient tolerated the procedure well, and no immediate complications occurred.      Successful fluoroscopic guided right hip joint aspiration as above with no immediate complications.  This report was finalized on 3/11/2022 4:59 PM by Dr. Erick Agrawal MD.      FL Guided Aspiration Joint    Result Date: 3/11/2022  DATE OF EXAM: 3/11/2022 3:30 PM  PROCEDURE: FL GUIDED ASPIRATION JOINT-  INDICATIONS: possible septic joint; M25.551-Pain in right hip; M16.11-Unilateral primary osteoarthritis, right hip; Z87.39-Personal history of other diseases of the musculoskeletal system and connective tissue; E87.6-Hypokalemia  COMPARISON: No comparisons available.  TECHNIQUE: 1 associated  image was saved. Fluoroscopy was not utilized for this procedure. Procedure, risks, complications, and side effects of joint aspiration were discussed and reviewed in detail with the  patient including the possibility for bleeding, infection, needle damage to surrounding structures, and the potential for a nondiagnostic exam. The patient indicated understanding and consented to the procedure.  The left knee was marked, prepped, and draped in a sterile fashion. 1% lidocaine was used as a local anesthetic to the skin and subcutaneous tissues. A 20-gauge needle was advanced to the joint space, and approximately 12 cc of synovial fluid was aspirated, labeled, and sent to pathology for further analysis. The needle was removed.  FINDINGS: The patient tolerated the procedure well, and no immediate complications occurred.      Successful left knee joint aspiration as above with no immediate complications. Fluoroscopy was not utilized for this procedure.  This report was finalized on 3/11/2022 4:59 PM by Dr. Erick Agrawal MD.        Results for orders placed during the hospital encounter of 07/31/21    Duplex Venous Lower Extremity - Right CAR    Interpretation Summary  · The right lower extremity venous duplex scan is negative for evidence of a DVT and SVT.      Results for orders placed during the hospital encounter of 07/31/21    Duplex Venous Lower Extremity - Right CAR    Interpretation Summary  · The right lower extremity venous duplex scan is negative for evidence of a DVT and SVT.      Results for orders placed during the hospital encounter of 07/31/21    Adult Transthoracic Echo Complete W/ Cont if Necessary Per Protocol    Interpretation Summary  · Left ventricular ejection fraction appears to be 61 - 65%. Left ventricular systolic function is normal.  · The cardiac valves are structurally and functionally within normal limits.      Plan for Follow-up of Pending Labs/Results:   Pending Labs     Order Current Status    AFB Culture - Aspirate, Hip, Right Preliminary result    AFB Culture - Aspirate, Knee, Left Preliminary result    Fungus Culture - Aspirate, Hip, Right Preliminary result    Fungus  Culture - Aspirate, Knee, Left Preliminary result        Discharge Details        Discharge Medications      New Medications      Instructions Start Date   acetaminophen 325 MG tablet  Commonly known as: TYLENOL   650 mg, Oral, Every 4 Hours PRN         Continue These Medications      Instructions Start Date   amLODIPine 10 MG tablet  Commonly known as: NORVASC   10 mg, Oral, Every 24 Hours Scheduled      busPIRone 10 MG tablet  Commonly known as: BUSPAR   No dose, route, or frequency recorded.      cyclobenzaprine 5 MG tablet  Commonly known as: FLEXERIL   No dose, route, or frequency recorded.      FLUoxetine 20 MG capsule  Commonly known as: PROzac   No dose, route, or frequency recorded.      hydrOXYzine pamoate 25 MG capsule  Commonly known as: VISTARIL   No dose, route, or frequency recorded.      Lidoderm 5 %  Generic drug: lidocaine   No dose, route, or frequency recorded.      lisinopril 20 MG tablet  Commonly known as: PRINIVIL,ZESTRIL   20 mg, Oral, Every 24 Hours Scheduled      methadone 10 MG tablet  Commonly known as: DOLOPHINE   60 mg, Oral, Daily      OLANZapine 10 MG tablet  Commonly known as: zyPREXA   10 mg, Oral, Daily      prenatal vitamin 27-0.8 27-0.8 MG tablet tablet   1 tablet, Oral, Daily      traZODone 150 MG tablet  Commonly known as: DESYREL   150 mg, Oral, Nightly             Allergies   Allergen Reactions   • Aleve [Naproxen Sodium] Swelling     Pt had swelling in her face and in her neck. Pt also states that she lost her voice.          Discharge Disposition:  Home or Self Care    Diet:  Hospital:  Diet Order   Procedures   • Diet Regular       Activity:  Activity Instructions     Activity as Tolerated            Restrictions or Other Recommendations:         CODE STATUS:    Code Status and Medical Interventions:   Ordered at: 03/11/22 0401     Level Of Support Discussed With:    Patient     Code Status (Patient has no pulse and is not breathing):    CPR (Attempt to Resuscitate)      Medical Interventions (Patient has pulse or is breathing):    Full Support       Future Appointments   Date Time Provider Department Center   3/29/2022  9:45 AM Daniel Mcfarland, PT [facility]       Additional Instructions for the Follow-ups that You Need to Schedule     Ambulatory Referral to Home Health   As directed      Face to Face Visit Date: 3/17/2022    Follow-up provider for Plan of Care?: I treated the patient in an acute care facility and will not continue treatment after discharge.    Follow-up provider: KARAN MELARA [[id number]]    Reason/Clinical Findings: Right hip pain, Hypokalemia    Describe mobility limitations that make leaving home difficult: Impaired functional mobility, gait, balance and endurance    Nursing/Therapeutic Services Requested: Physical Therapy Occupational Therapy    PT orders: Therapeutic exercise Strengthening Home safety assessment    Occupational orders: Activities of daily living Energy conservation Strengthening    Frequency: 1 Week 1         Ambulatory Referral to Physical Therapy Evaluate and treat   As directed      Specialty needed: Evaluate and treat         Discharge Follow-up with PCP   As directed       Currently Documented PCP:    Karan Melara DO    PCP Phone Number:    [phone]     Follow Up Details: f/u PCP 1 week of dc (please oriana appt prior to dc)         Discharge Follow-up with Specialty: f/u with Dr Wilhelm with [facility] as needed   As directed      Specialty: f/u with Dr Wilhelm with [facility] as needed         Discharge Follow-up with Specified Provider: Follow-up [facility] as scheduled per CM.   As directed      To: Follow-up outpatient [facility] as scheduled per CM.         Discharge Follow-up with Specified Provider: Keep new Pain clinic appt scheduled for tomorrow 3/22/22 at 0530 am   As directed      To: Keep new Pain clinic appt scheduled for tomorrow 3/22/22 at 0530 am         Discharge Follow-up  with Specified Provider: Keep prior scheduled appt with  orthopedics on 5/12/2022   As directed      To: Keep prior scheduled appt with [facility] on 5/12/2022               Linda Waters, APRN  03/21/22      Time Spent on Discharge:  I spent 35 minutes on this discharge activity which included: face-to-face encounter with the patient, reviewing the data in the system, coordination of the care with the nursing staff as well as consultants, documentation, and entering orders.

## 2022-03-21 NOTE — CASE MANAGEMENT/SOCIAL WORK
Continued Stay Note  [facility]     Patient Name: Frieda Flores  MRN: [id number]  Today's Date: 3/21/2022    Admit Date: 3/11/2022     Discharge Plan     Row Name 03/21/22 1130       Plan    Plan [facility] tomorrow morning    Plan Comments Frieda is aware of her appointment at [facility] tomorrow morning for Methadone.     Ok for d/c from an addiction standpoint.    Appointment made at [facility] for Tuesday at [facility] @ 9246  Address: [address] [place] [address] Phone number: [phone]      Thank you very much for involving our team in her care.               Discharge Codes    No documentation.                     Mary Pollock RN ,BSN   Addiction Coordinator

## 2022-03-21 NOTE — CASE MANAGEMENT/SOCIAL WORK
Case Management Discharge Note      Final Note: Plan is for pt. to dc to home with her sister. Have arranged OP PT with [facility]. Have ordered RW through [facility] with [facility]. No needs voiced at present. Family to provide transport.    Provided Post Acute Provider List?: N/A    Selected Continued Care - Discharged on 3/21/2022 Admission date: 3/11/2022 - Discharge disposition: Home or Self Care    Destination    No services have been selected for the patient.              Durable Medical Equipment Coordination complete.    Service Provider Selected Services Address Phone Fax Patient Preferred    [facility] [place]  Durable Medical Equipment [address] [facility] [address] [phone] --          Dialysis/Infusion    No services have been selected for the patient.              Home Medical Care    No services have been selected for the patient.              Therapy Coordination complete.    Service Provider Selected Services Address Phone Fax Patient Preferred    [facility] PHYSICAL THERAPY [place]  Outpatient Rehabilitation [address] [facility] [address] [phone] --          Community Resources    No services have been selected for the patient.              Community & DME    No services have been selected for the patient.                       Final Discharge Disposition Code: 01 - home or self-care

## 2022-03-22 NOTE — OUTREACH NOTE
Prep Survey    Flowsheet Row Responses   [facility] facility patient discharged from? [facility]   Is LACE score < 7 ? No   Emergency Room discharge w/ pulse ox? No   Eligibility Readm Mgmt   Discharge diagnosis Right hip pain   Does the patient have one of the following disease processes/diagnoses(primary or secondary)? Other   Does the patient have Home health ordered? No   Is there a DME ordered? Yes   What DME was ordered? RW via [facility]   Prep survey completed? Yes          JEFF DE LA GARZA - Registered Nurse

## 2022-03-24 ENCOUNTER — READMISSION MANAGEMENT (OUTPATIENT)
Dept: CALL CENTER | Facility: HOSPITAL | Age: 42
End: 2022-03-24

## 2022-03-24 NOTE — OUTREACH NOTE
Medical Week 1 Survey    Flowsheet Row Responses   [facility] patient discharged from? [place]   Does the patient have one of the following disease processes/diagnoses(primary or secondary)? Other   Week 1 attempt successful? No   Unsuccessful attempts Attempt 1          DENNISE SALOMON - Registered Nurse

## 2022-03-28 ENCOUNTER — READMISSION MANAGEMENT (OUTPATIENT)
Dept: CALL CENTER | Facility: HOSPITAL | Age: 42
End: 2022-03-28

## 2022-03-28 NOTE — OUTREACH NOTE
Medical Week 1 Survey    Flowsheet Row Responses   [facility] patient discharged from? [place]   Does the patient have one of the following disease processes/diagnoses(primary or secondary)? Other   Week 1 attempt successful? No   Unsuccessful attempts Attempt 2          JEWELL RUSHING - Registered Nurse

## 2022-03-29 ENCOUNTER — HOME HEALTH ADMISSION (OUTPATIENT)
Dept: HOME HEALTH SERVICES | Facility: HOME HEALTHCARE | Age: 42
End: 2022-03-29

## 2022-04-01 ENCOUNTER — READMISSION MANAGEMENT (OUTPATIENT)
Dept: CALL CENTER | Facility: HOSPITAL | Age: 42
End: 2022-04-01

## 2022-04-01 NOTE — OUTREACH NOTE
Medical Week 2 Survey    Flowsheet Row Responses   [facility] patient discharged from? [place]   Does the patient have one of the following disease processes/diagnoses(primary or secondary)? Other   Week 2 attempt successful? No   Unsuccessful attempts Attempt 1          DANE MOHAN - Registered Nurse

## 2022-04-05 ENCOUNTER — READMISSION MANAGEMENT (OUTPATIENT)
Dept: CALL CENTER | Facility: HOSPITAL | Age: 42
End: 2022-04-05

## 2022-04-05 NOTE — OUTREACH NOTE
Medical Week 2 Survey    Flowsheet Row Responses   [facility] patient discharged from? [place]   Does the patient have one of the following disease processes/diagnoses(primary or secondary)? Other   Week 2 attempt successful? Yes   Call start time 1436   Discharge diagnosis Right hip pain   Rescheduled Rescheduled-pt requested  [She is with her daughter, this is not a good time. ]   Call end time 1438          PONCE HAWK - Registered Nurse

## 2022-04-11 ENCOUNTER — READMISSION MANAGEMENT (OUTPATIENT)
Dept: CALL CENTER | Facility: HOSPITAL | Age: 42
End: 2022-04-11

## 2022-04-11 NOTE — OUTREACH NOTE
Medical Week 2 Survey    Flowsheet Row Responses   [facility] patient discharged from? [place]   Does the patient have one of the following disease processes/diagnoses(primary or secondary)? Other   Week 2 attempt successful? No   Rescheduled Revoked          JEWELL RUSHING - Registered Nurse

## 2022-04-22 LAB
FUNGUS WND CULT: NORMAL
FUNGUS WND CULT: NORMAL
MYCOBACTERIUM SPEC CULT: NORMAL
MYCOBACTERIUM SPEC CULT: NORMAL
NIGHT BLUE STAIN TISS: NORMAL
NIGHT BLUE STAIN TISS: NORMAL

## 2022-08-03 NOTE — PROGRESS NOTES
[facility] Progress Note        Antibiotics:  Anti-Infectives (From admission, onward)    Ordered     Dose/Rate Route Frequency Start Stop    03/17/22 0937  cefdinir (OMNICEF) capsule 300 mg        Ordering Provider: Broderick Wilhelm MD    300 mg Oral Every 12 Hours Scheduled 03/17/22 1030 03/23/22 0859    03/14/22 0900  doxycycline (MONODOX) capsule 100 mg        Ordering Provider: Broderick Wilhelm MD    100 mg Oral Every 12 Hours Scheduled 03/14/22 1000 03/23/22 0859    03/11/22 0403  piperacillin-tazobactam (ZOSYN) 4.5 g in iso-osmotic dextrose 100 mL IVPB (premix)        Ordering Provider: Broderick Calvo RPH    4.5 g  over 30 Minutes Intravenous Once 03/11/22 0445 03/11/22 0614          CC: right hip/left knee/low back pain    HPI:      Patient is a 41 y.o.  Yr old female with history of polysubstance abuse with prior cocaine/heroin/methamphetamine/THC, chronic hepatitis C and PTSD/bipolar disease; treated for right septic hip with MSSA in September 2021, surgery at that time by Dr. Gusman and subsequently transitioned to [facility], abnormal LFTs with empiric change from Zosyn to cefazolin with improvement although unclear if related to penicillin class versus hep C or combination.  Noncompliant with follow-up.  Apparently plans to be seen at [facility] regarding possible right hip surgery in the future but specifics of that are unclear.  She reports being in shelter recently, lost her spot in methadone clinic and reverted to injection drug abuse approximately 1 week prior to admission.  After injection had developed worsening right hip and left knee pain, worsening low back pain and ultimately presented to [facility] on March 11.     Aside from injection drug abuse, she denies any other specific exposures. No raw or undercooked food.  No unpasteurized milk or milk products.  No animal insect or arthropod exposure.  No tick bites.  No outdoor camping or hunting exposure.  No travel exposure.  No  ill exposure.  No history of TB or TB exposure.   Denies a history of MRSA/VRE and no history of C. difficile or ESBL/KPC  Organisms.      HCG negative      3/21/22   Per nursing, right  Hip and left knee  pain ongoing, nonradiating, worse with movement ;  pain 2-4/10 but intensity fluctuates and not progressive; recent urinary frequency , now better and no new urinary/vaginal symptoms       lumbar back pain, varies in intensity and not progressive, sharp at times, worse with movement, somewhat better with pain meds but not completely relieved and 3-4 out of 10 in severity at present.  She denies any new focal weakness or numbness.  No bowel or bladder incontinence.        Denies headache photophobia or neck stiffness.  No shortness of breath cough or hemoptysis.  No nausea vomiting diarrhea or abdominal pain.  No dysuria hematuria or pyuria.          ROS:     3/21/22  Per nursing; No f/c/s. No n/v/d. No rash. No new ADR to Abx.       Constitutional--subjective fevers and chills.  Appetite diminished with fatigue  Heent-- No new vision, hearing or throat complaints.  No epistaxis or oral sores.  Denies odynophagia or dysphagia.  No flashers, floaters or eye pain. No odynophagia or dysphagia. No headache, photophobia or neck stiffness.  CV-- No chest pain, palpitation or syncope  Resp-- No SOB/cough/Hemoptysis  GI- No nausea, vomiting, or diarrhea.  No hematochezia, melena, or hematemesis. Denies jaundice or chronic liver disease.  -- at present No dysuria, hematuria, or flank pain.  Denies hesitancy, urgency or flank pain.  Lymph- no swollen lymph nodes in neck/axilla or groin.   Heme- No active bruising or bleeding; no Hx of DVT or PE.  MS-- no swelling or pain in the bones or joints of arms/legs aside from above.     Neuro-- No acute focal weakness or numbness in the arms or legs.  No seizures.     Full 12 point review of systems reviewed and negative otherwise for acute complaints, except for above      PE:  "nursing/chaperone present  /74   Pulse 67   Temp 98 °F (36.7 °C) (Oral)   Resp 16   Ht 160 cm (63\")   Wt 83.8 kg (184 lb 12.8 oz)   LMP 02/17/2022   SpO2 96%   Breastfeeding No   BMI 32.74 kg/m²       GENERAL: sleepy; NAD  HEENT: Normocephalic, atraumatic.  No conjunctival injection. No icterus. Oropharynx clear without evidence of thrush or exudate. No evidence of peridontal disease.     HEART: RRR; No murmur, rubs, gallops.   LUNGS: Clear to auscultation bilaterally without wheezing, rales, rhonchi. Normal respiratory effort. Nonlabored. No dullness.  ABDOMEN: Soft, nontender, nondistended. Positive bowel sounds. No rebound or guarding. NO mass or HSM.  EXT:  No cyanosis, clubbing . No cord.   MSK: FROM without joint effusions noted arms/legs.    SKIN: Warm and dry without cutaneous eruptions on Inspection/palpation.    NEURO: sleepy     Pain with ROM at the right hip.  No obvious warmth or redness there.  Prior incision noted with no dehiscence or drainage.  No discrete mass bulge or fluctuance.  No crepitus or bulla     Spine with no redness bulge fluctuance or point tenderness; diffusely tender in lumbar spine.  No thoracic or cervical spine tenderness.     Left knee less edematous  , no warmth ,  no erythema; no discrete fluctuance but exam limited with pain and patient not cooperative with a detailed exam     Track marks from drug use noted in upper extremities     IV without obvious redness or drainage    Laboratory Data    Results from last 7 days   Lab Units 03/20/22  0415   WBC 10*3/mm3 5.20   HEMOGLOBIN g/dL 12.2   HEMATOCRIT % 37.6   PLATELETS 10*3/mm3 285     Results from last 7 days   Lab Units 03/20/22  0415   SODIUM mmol/L 136   POTASSIUM mmol/L 4.3   CHLORIDE mmol/L 101   CO2 mmol/L 25.0   BUN mg/dL 22*   CREATININE mg/dL 0.62   GLUCOSE mg/dL 89   CALCIUM mg/dL 9.3                   Estimated Creatinine Clearance: 123.8 mL/min (by C-G formula based on SCr of 0.62 " mg/dL).      Microbiology:      Radiology:  Imaging Results (Last 72 Hours)     ** No results found for the last 72 hours. **            Impression:         --Acute subjective constitutional symptoms with low-grade temp 99, active injection drug abuse , polysubstance with acute right hip/lumbar back pain out of proportion to baseline and acute left knee pain/swelling.  She is at risk for bloodstream infection and hematogenous spread with risk for metastatic foci of involvement including risk for septic joints/osteomyelitis, endovascular focus etc.   however,  blood cultures negative so far and CT/MRI's/joint aspirates NOT c/w infection so far;  she denies cough and respiratory exam nonfocal.  No acute inflammatory process on abdominal CT. Mild urinary changes and mixed urine culture with transient symptoms, now improved with empiric abx and urine culture may have been contaminated; no fever and tapered abx    **right hip and left knee aspirate NOT c/w septic joints by cell counts;  Cultures negative so far    --pyuria/bacteruria; empiric treatment for UTI although urine difficult to interpret with epi's/contamination; repeat urine with pyuria/nitrite pos (abx modified), also possible contamination with epi'sl empiric abx given constitutional symptoms at admit with vague urinary symptoms , now better     --Acute/chronic right hip pain with abrupt worsening over last week, recent injection drug abuse and risk for new versus recurrent infection.      **aspirate NOT c/w septic joints by cell counts;  Cultures negative so far     --Acute/chronic lumbar back pain with worsening over 1 week and recent injection drug abuse with risk for metastatic seeding and spinal/paraspinal infection risk.  MRI lumbar spine no infectious change per radiology     --Acute left knee pain/swelling, recent injection drug abuse and risk for septic joint.     **aspirate NOT c/w septic joints by cell counts;  Cultures negative so far     --Right  septic hip arthritis in August/September 2021 with surgery by Dr. Gusman     --Chronic hepatitis C.  I do not treat viral hepatitis as a part of my practice.  If you desire further input regarding this during this hospitalization you should give consideration to gastroenterology consultation.     --Polysubstance abuse as previously noted.  Strategy for minimizing risk of withdrawal and additional supportive measures at discretion of medicine team     --Possible Zosyn adverse drug reaction with abnormal LFTs in 2021.  Not clear-cut but trying to avoid.  Subsequently tolerated cephalosporin class antibiotics with improvements in liver tests     --Abnormal CPK prior to daptomycin.  Monitor.     --PTSD/bipolar disease     --Medical noncompliance           PLAN:       --omnicef/doxy  While here but anticipate stop at discahrge     --Check/review labs cultures and scans     --Partial history per nursing staff     --Discussed with microbiology      --Highly complex set of issues with high risk for further serious morbidity and other serious sequela     --HCG negative    --I will sign off at discharge;  Call us back if needed; she plans to see ortho at [facility] regarding joints         Broderick Wilhelm MD  3/21/2022       Yes

## 2024-03-23 ENCOUNTER — APPOINTMENT (OUTPATIENT)
Dept: GENERAL RADIOLOGY | Facility: HOSPITAL | Age: 44
End: 2024-03-23
Payer: COMMERCIAL

## 2024-03-23 ENCOUNTER — HOSPITAL ENCOUNTER (EMERGENCY)
Facility: HOSPITAL | Age: 44
Discharge: HOME | End: 2024-03-24
Attending: STUDENT IN AN ORGANIZED HEALTH CARE EDUCATION/TRAINING PROGRAM
Payer: COMMERCIAL

## 2024-03-23 VITALS
BODY MASS INDEX: 26.52 KG/M2 | WEIGHT: 175 LBS | SYSTOLIC BLOOD PRESSURE: 135 MMHG | HEIGHT: 68 IN | TEMPERATURE: 98.6 F | DIASTOLIC BLOOD PRESSURE: 79 MMHG | HEART RATE: 68 BPM | OXYGEN SATURATION: 100 % | RESPIRATION RATE: 20 BRPM

## 2024-03-23 DIAGNOSIS — M25.552 BILATERAL HIP PAIN: Primary | ICD-10-CM

## 2024-03-23 DIAGNOSIS — E86.0 DEHYDRATION: ICD-10-CM

## 2024-03-23 DIAGNOSIS — M79.10 MYALGIA: ICD-10-CM

## 2024-03-23 DIAGNOSIS — M25.551 BILATERAL HIP PAIN: Primary | ICD-10-CM

## 2024-03-23 DIAGNOSIS — F19.10 POLYSUBSTANCE ABUSE: ICD-10-CM

## 2024-03-23 LAB
ALBUMIN SERPL-MCNC: 4.1 G/DL (ref 3.5–5.2)
ALBUMIN/GLOB SERPL: 1.2 G/DL
ALP SERPL-CCNC: 114 U/L (ref 39–117)
ALT SERPL W P-5'-P-CCNC: 7 U/L (ref 1–33)
AMPHET+METHAMPHET UR QL: NEGATIVE
AMPHETAMINES UR QL: NEGATIVE
ANION GAP SERPL CALCULATED.3IONS-SCNC: 15 MMOL/L (ref 5–15)
APAP SERPL-MCNC: <5 MCG/ML (ref 0–30)
AST SERPL-CCNC: 11 U/L (ref 1–32)
ATMOSPHERIC PRESS: ABNORMAL MM[HG]
B-HCG UR QL: NEGATIVE
BACTERIA UR QL AUTO: ABNORMAL /HPF
BARBITURATES UR QL SCN: NEGATIVE
BASE EXCESS BLDV CALC-SCNC: 3.1 MMOL/L (ref -2–2)
BASOPHILS # BLD AUTO: 0.01 10*3/MM3 (ref 0–0.2)
BASOPHILS NFR BLD AUTO: 0.2 % (ref 0–1.5)
BDY SITE: ABNORMAL
BENZODIAZ UR QL SCN: POSITIVE
BILIRUB SERPL-MCNC: 0.4 MG/DL (ref 0–1.2)
BILIRUB UR QL STRIP: NEGATIVE
BODY TEMPERATURE: 37
BUN SERPL-MCNC: 6 MG/DL (ref 6–20)
BUN/CREAT SERPL: 11.8 (ref 7–25)
BUPRENORPHINE SERPL-MCNC: NEGATIVE NG/ML
CALCIUM SPEC-SCNC: 9.2 MG/DL (ref 8.6–10.5)
CANNABINOIDS SERPL QL: POSITIVE
CHLORIDE SERPL-SCNC: 103 MMOL/L (ref 98–107)
CK SERPL-CCNC: 51 U/L (ref 20–180)
CLARITY UR: ABNORMAL
CO2 BLDA-SCNC: 28.2 MMOL/L (ref 22–33)
CO2 SERPL-SCNC: 22 MMOL/L (ref 22–29)
COCAINE UR QL: POSITIVE
COHGB MFR BLD: 1.6 %
COLOR UR: ABNORMAL
CREAT SERPL-MCNC: 0.51 MG/DL (ref 0.57–1)
CRP SERPL-MCNC: 0.99 MG/DL (ref 0–0.5)
D-LACTATE SERPL-SCNC: 1.4 MMOL/L (ref 0.5–2)
D-LACTATE SERPL-SCNC: 2.7 MMOL/L (ref 0.5–2)
DEPRECATED RDW RBC AUTO: 39.7 FL (ref 37–54)
EGFRCR SERPLBLD CKD-EPI 2021: 119.7 ML/MIN/1.73
EOSINOPHIL # BLD AUTO: 0.06 10*3/MM3 (ref 0–0.4)
EOSINOPHIL NFR BLD AUTO: 1.2 % (ref 0.3–6.2)
EPAP: 0
ERYTHROCYTE [DISTWIDTH] IN BLOOD BY AUTOMATED COUNT: 13.1 % (ref 12.3–15.4)
ERYTHROCYTE [SEDIMENTATION RATE] IN BLOOD: 33 MM/HR (ref 0–20)
ETHANOL BLD-MCNC: 22 MG/DL (ref 0–10)
EXPIRATION DATE: NORMAL
FENTANYL UR-MCNC: POSITIVE NG/ML
FLUAV RNA RESP QL NAA+PROBE: NOT DETECTED
FLUBV RNA RESP QL NAA+PROBE: NOT DETECTED
GLOBULIN UR ELPH-MCNC: 3.4 GM/DL
GLUCOSE SERPL-MCNC: 88 MG/DL (ref 65–99)
GLUCOSE UR STRIP-MCNC: NEGATIVE MG/DL
HCO3 BLDV-SCNC: 27 MMOL/L (ref 22–28)
HCT VFR BLD AUTO: 40.2 % (ref 34–46.6)
HGB BLD-MCNC: 12.7 G/DL (ref 12–15.9)
HGB BLDA-MCNC: 12.3 G/DL (ref 14–18)
HGB UR QL STRIP.AUTO: NEGATIVE
HYALINE CASTS UR QL AUTO: ABNORMAL /LPF
IMM GRANULOCYTES # BLD AUTO: 0.01 10*3/MM3 (ref 0–0.05)
IMM GRANULOCYTES NFR BLD AUTO: 0.2 % (ref 0–0.5)
INHALED O2 CONCENTRATION: 21 %
INTERNAL NEGATIVE CONTROL: NEGATIVE
INTERNAL POSITIVE CONTROL: POSITIVE
IPAP: 0
KETONES UR QL STRIP: ABNORMAL
LEUKOCYTE ESTERASE UR QL STRIP.AUTO: NEGATIVE
LIPASE SERPL-CCNC: 15 U/L (ref 13–60)
LYMPHOCYTES # BLD AUTO: 1.31 10*3/MM3 (ref 0.7–3.1)
LYMPHOCYTES NFR BLD AUTO: 25.1 % (ref 19.6–45.3)
Lab: NORMAL
MAGNESIUM SERPL-MCNC: 1.9 MG/DL (ref 1.6–2.6)
MCH RBC QN AUTO: 26.9 PG (ref 26.6–33)
MCHC RBC AUTO-ENTMCNC: 31.6 G/DL (ref 31.5–35.7)
MCV RBC AUTO: 85.2 FL (ref 79–97)
METHADONE UR QL SCN: NEGATIVE
METHGB BLD QL: 0 %
MODALITY: ABNORMAL
MONOCYTES # BLD AUTO: 0.25 10*3/MM3 (ref 0.1–0.9)
MONOCYTES NFR BLD AUTO: 4.8 % (ref 5–12)
NEUTROPHILS NFR BLD AUTO: 3.57 10*3/MM3 (ref 1.7–7)
NEUTROPHILS NFR BLD AUTO: 68.5 % (ref 42.7–76)
NITRITE UR QL STRIP: NEGATIVE
NRBC BLD AUTO-RTO: 0 /100 WBC (ref 0–0.2)
NT-PROBNP SERPL-MCNC: 283.8 PG/ML (ref 0–450)
OPIATES UR QL: NEGATIVE
OXYCODONE UR QL SCN: NEGATIVE
OXYHGB MFR BLDV: 91.8 %
PAW @ PEAK INSP FLOW SETTING VENT: 0 CMH2O
PCO2 BLDV: 38 MM HG (ref 41–51)
PCP UR QL SCN: NEGATIVE
PH BLDV: 7.46 PH UNITS (ref 7.31–7.41)
PH UR STRIP.AUTO: 6 [PH] (ref 5–8)
PHOSPHATE SERPL-MCNC: 3 MG/DL (ref 2.5–4.5)
PLATELET # BLD AUTO: 272 10*3/MM3 (ref 140–450)
PMV BLD AUTO: 10 FL (ref 6–12)
PO2 BLDV: 64.1 MM HG (ref 27–53)
POTASSIUM SERPL-SCNC: 3.2 MMOL/L (ref 3.5–5.2)
PROCALCITONIN SERPL-MCNC: 0.03 NG/ML (ref 0–0.25)
PROT SERPL-MCNC: 7.5 G/DL (ref 6–8.5)
PROT UR QL STRIP: ABNORMAL
RBC # BLD AUTO: 4.72 10*6/MM3 (ref 3.77–5.28)
RBC # UR STRIP: ABNORMAL /HPF
REF LAB TEST METHOD: ABNORMAL
RSV RNA RESP QL NAA+PROBE: NOT DETECTED
SALICYLATES SERPL-MCNC: <0.3 MG/DL
SARS-COV-2 RNA RESP QL NAA+PROBE: NOT DETECTED
SODIUM SERPL-SCNC: 140 MMOL/L (ref 136–145)
SP GR UR STRIP: 1.03 (ref 1–1.03)
SQUAMOUS #/AREA URNS HPF: ABNORMAL /HPF
TOTAL RATE: 0 BREATHS/MINUTE
TRICYCLICS UR QL SCN: NEGATIVE
TROPONIN T SERPL HS-MCNC: 9 NG/L
TSH SERPL DL<=0.05 MIU/L-ACNC: 0.47 UIU/ML (ref 0.27–4.2)
UROBILINOGEN UR QL STRIP: ABNORMAL
WBC # UR STRIP: ABNORMAL /HPF
WBC NRBC COR # BLD AUTO: 5.21 10*3/MM3 (ref 3.4–10.8)

## 2024-03-23 PROCEDURE — 80143 DRUG ASSAY ACETAMINOPHEN: CPT | Performed by: STUDENT IN AN ORGANIZED HEALTH CARE EDUCATION/TRAINING PROGRAM

## 2024-03-23 PROCEDURE — 25810000003 SODIUM CHLORIDE 0.9 % SOLUTION: Performed by: STUDENT IN AN ORGANIZED HEALTH CARE EDUCATION/TRAINING PROGRAM

## 2024-03-23 PROCEDURE — 80050 GENERAL HEALTH PANEL: CPT | Performed by: STUDENT IN AN ORGANIZED HEALTH CARE EDUCATION/TRAINING PROGRAM

## 2024-03-23 PROCEDURE — 83880 ASSAY OF NATRIURETIC PEPTIDE: CPT | Performed by: STUDENT IN AN ORGANIZED HEALTH CARE EDUCATION/TRAINING PROGRAM

## 2024-03-23 PROCEDURE — 86140 C-REACTIVE PROTEIN: CPT | Performed by: STUDENT IN AN ORGANIZED HEALTH CARE EDUCATION/TRAINING PROGRAM

## 2024-03-23 PROCEDURE — 96361 HYDRATE IV INFUSION ADD-ON: CPT

## 2024-03-23 PROCEDURE — 83735 ASSAY OF MAGNESIUM: CPT | Performed by: STUDENT IN AN ORGANIZED HEALTH CARE EDUCATION/TRAINING PROGRAM

## 2024-03-23 PROCEDURE — 80307 DRUG TEST PRSMV CHEM ANLYZR: CPT | Performed by: STUDENT IN AN ORGANIZED HEALTH CARE EDUCATION/TRAINING PROGRAM

## 2024-03-23 PROCEDURE — 36415 COLL VENOUS BLD VENIPUNCTURE: CPT

## 2024-03-23 PROCEDURE — 84484 ASSAY OF TROPONIN QUANT: CPT | Performed by: STUDENT IN AN ORGANIZED HEALTH CARE EDUCATION/TRAINING PROGRAM

## 2024-03-23 PROCEDURE — 84100 ASSAY OF PHOSPHORUS: CPT | Performed by: STUDENT IN AN ORGANIZED HEALTH CARE EDUCATION/TRAINING PROGRAM

## 2024-03-23 PROCEDURE — 81025 URINE PREGNANCY TEST: CPT | Performed by: STUDENT IN AN ORGANIZED HEALTH CARE EDUCATION/TRAINING PROGRAM

## 2024-03-23 PROCEDURE — 80179 DRUG ASSAY SALICYLATE: CPT | Performed by: STUDENT IN AN ORGANIZED HEALTH CARE EDUCATION/TRAINING PROGRAM

## 2024-03-23 PROCEDURE — 25010000002 LORAZEPAM PER 2 MG: Performed by: STUDENT IN AN ORGANIZED HEALTH CARE EDUCATION/TRAINING PROGRAM

## 2024-03-23 PROCEDURE — 81001 URINALYSIS AUTO W/SCOPE: CPT | Performed by: STUDENT IN AN ORGANIZED HEALTH CARE EDUCATION/TRAINING PROGRAM

## 2024-03-23 PROCEDURE — 85652 RBC SED RATE AUTOMATED: CPT | Performed by: STUDENT IN AN ORGANIZED HEALTH CARE EDUCATION/TRAINING PROGRAM

## 2024-03-23 PROCEDURE — 71045 X-RAY EXAM CHEST 1 VIEW: CPT

## 2024-03-23 PROCEDURE — 96375 TX/PRO/DX INJ NEW DRUG ADDON: CPT

## 2024-03-23 PROCEDURE — 83605 ASSAY OF LACTIC ACID: CPT | Performed by: STUDENT IN AN ORGANIZED HEALTH CARE EDUCATION/TRAINING PROGRAM

## 2024-03-23 PROCEDURE — 83690 ASSAY OF LIPASE: CPT | Performed by: STUDENT IN AN ORGANIZED HEALTH CARE EDUCATION/TRAINING PROGRAM

## 2024-03-23 PROCEDURE — 99283 EMERGENCY DEPT VISIT LOW MDM: CPT

## 2024-03-23 PROCEDURE — 82550 ASSAY OF CK (CPK): CPT | Performed by: STUDENT IN AN ORGANIZED HEALTH CARE EDUCATION/TRAINING PROGRAM

## 2024-03-23 PROCEDURE — 82805 BLOOD GASES W/O2 SATURATION: CPT

## 2024-03-23 PROCEDURE — 96374 THER/PROPH/DIAG INJ IV PUSH: CPT

## 2024-03-23 PROCEDURE — 84145 PROCALCITONIN (PCT): CPT | Performed by: STUDENT IN AN ORGANIZED HEALTH CARE EDUCATION/TRAINING PROGRAM

## 2024-03-23 PROCEDURE — 82077 ASSAY SPEC XCP UR&BREATH IA: CPT | Performed by: STUDENT IN AN ORGANIZED HEALTH CARE EDUCATION/TRAINING PROGRAM

## 2024-03-23 PROCEDURE — 72170 X-RAY EXAM OF PELVIS: CPT

## 2024-03-23 PROCEDURE — 87040 BLOOD CULTURE FOR BACTERIA: CPT | Performed by: STUDENT IN AN ORGANIZED HEALTH CARE EDUCATION/TRAINING PROGRAM

## 2024-03-23 PROCEDURE — 87637 SARSCOV2&INF A&B&RSV AMP PRB: CPT | Performed by: STUDENT IN AN ORGANIZED HEALTH CARE EDUCATION/TRAINING PROGRAM

## 2024-03-23 RX ORDER — GABAPENTIN 300 MG/1
300 CAPSULE ORAL ONCE
Status: COMPLETED | OUTPATIENT
Start: 2024-03-23 | End: 2024-03-23

## 2024-03-23 RX ORDER — LORAZEPAM 2 MG/ML
1 INJECTION INTRAMUSCULAR EVERY 4 HOURS PRN
Status: DISCONTINUED | OUTPATIENT
Start: 2024-03-23 | End: 2024-03-24 | Stop reason: HOSPADM

## 2024-03-23 RX ORDER — ACETAMINOPHEN 500 MG
1000 TABLET ORAL ONCE
Status: COMPLETED | OUTPATIENT
Start: 2024-03-23 | End: 2024-03-23

## 2024-03-23 RX ORDER — KETAMINE HCL IN NACL, ISO-OSM 100MG/10ML
5 SYRINGE (ML) INJECTION ONCE
Status: COMPLETED | OUTPATIENT
Start: 2024-03-23 | End: 2024-03-23

## 2024-03-23 RX ORDER — POTASSIUM CHLORIDE 750 MG/1
20 CAPSULE, EXTENDED RELEASE ORAL ONCE
Status: COMPLETED | OUTPATIENT
Start: 2024-03-23 | End: 2024-03-23

## 2024-03-23 RX ADMIN — SODIUM CHLORIDE 1000 ML: 9 INJECTION, SOLUTION INTRAVENOUS at 21:59

## 2024-03-23 RX ADMIN — ACETAMINOPHEN 1000 MG: 500 TABLET ORAL at 18:28

## 2024-03-23 RX ADMIN — SODIUM CHLORIDE 1000 ML: 9 INJECTION, SOLUTION INTRAVENOUS at 18:25

## 2024-03-23 RX ADMIN — Medication 5 MG: at 21:17

## 2024-03-23 RX ADMIN — GABAPENTIN 300 MG: 300 CAPSULE ORAL at 18:28

## 2024-03-23 RX ADMIN — LORAZEPAM 1 MG: 2 INJECTION INTRAMUSCULAR; INTRAVENOUS at 18:27

## 2024-03-23 RX ADMIN — POTASSIUM CHLORIDE 20 MEQ: 750 CAPSULE, EXTENDED RELEASE ORAL at 21:17

## 2024-03-23 NOTE — ED PROVIDER NOTES
" EMERGENCY DEPARTMENT ENCOUNTER    Pt Name: Frieda Flores  MRN: [id number]  Pt :   1980  Room Number:  DESIRAE/DESIRAE  Date of encounter:  3/23/2024  PCP: Karan Douglas DO  ED Provider: Christiano Mata MD    Historian: EMS, patient      HPI:  Chief Complaint: Diffuse myalgias        Context: Frieda Flores is a 43-year-old female who presents the emergency department with complaint of diffuse myalgias.  Says she is particularly having pain in her bilateral hips.  She has history of right-sided septic arthritis and recent arthroplasty in January with orthopedic surgery at [facility] she says she has not followed up with them since her surgery.  She is having pain in the right hip but now new left hip pain as well.  She also complains of diffuse myalgias.  She says she was taking methadone up until 4 days ago but she does not believe that this is withdrawal.  She has been drinking today.  Denies any infectious symptoms only \"pain everywhere\".  No other complaints at this time.    PAST MEDICAL HISTORY  Past Medical History:   Diagnosis Date    Abdominal wall cellulitis - After  2019    Treated with IV antibiotics    ADHD     Alcoholism     Bipolar disorder     Chlamydia     Chlamydia 2021    Hepatitis C carrier     Negative viral load    Migraine     Osteoarthritis     PTSD (post-traumatic stress disorder)     Septic right hip (CMS/HCC) 2021    Substance abuse     Therapeutic opioid-induced constipation (OIC)     Urogenital trichomoniasis          PAST SURGICAL HISTORY  Past Surgical History:   Procedure Laterality Date    BREAST LUMPECTOMY Right      SECTION Bilateral 3/9/2019    Procedure:  SECTION PRIMARY;  Surgeon: Shelley Hughes MD;  Location:  [facility];  Service: Obstetrics/Gynecology     SECTION N/A 2021    Procedure: Repeat C/S;  Surgeon: Fernando Pedersen MD;  Location: [facility] OR;  Service: " "Obstetrics/Gynecology;  Laterality: N/A;    INCISION AND DRAINAGE HIP Right 7/31/2021    Procedure: HIP INCISION AND DRAINAGE RIGHT;  Surgeon: Martin Gusman MD;  Location: [facility];  Service: Orthopedics;  Laterality: Right;         FAMILY HISTORY  Family History   Problem Relation Age of Onset    Heart disease Father     Diabetes Mother     Hypertension Mother     Heart disease Mother          SOCIAL HISTORY  Social History     Socioeconomic History    Marital status: Legally    Tobacco Use    Smoking status: Some Days     Types: Cigarettes    Smokeless tobacco: Never    Tobacco comments:     vapes mainly; still has a cigarette every now and then   Vaping Use    Vaping status: Every Day    Substances: Nicotine, Flavoring   Substance and Sexual Activity    Alcohol use: No     Comment: hasnt had anything since 8/18/18 per pt    Drug use: Not Currently     Types: IV, \"Crack\" cocaine, Benzodiazepines, Heroin, Marijuana     Comment: 7/31/21 - still actively using; 4 months sober    Sexual activity: Yes     Partners: Male         ALLERGIES  Aleve [naproxen sodium]        REVIEW OF SYSTEMS  Review of Systems       All systems reviewed and negative except for those discussed in HPI.       PHYSICAL EXAM    I have reviewed the triage vital signs and nursing notes.    ED Triage Vitals [03/23/24 1800]   Temp Pulse Resp BP SpO2   -- -- -- 124/87 --      Temp src Heart Rate Source Patient Position BP Location FiO2 (%)   -- -- -- -- --       Physical Exam  GENERAL:   Unkempt, diffusely tender  HENT: Nares patent.  Dry mucous membrane  EYES: No scleral icterus.  CV: Regular rhythm, regular rate.  RESPIRATORY: Normal effort.  No audible wheezes, rales or rhonchi.  ABDOMEN: Soft, nontender  MUSCULOSKELETAL: No deformities.  Hyperesthetic with diffuse tenderness over the body, staples in place and surgical incision appears clean no surrounding erythema or warmth  NEURO: Alert, moves all extremities, follows " commands.  SKIN: Warm, dry, no rash visualized.      LAB RESULTS  Recent Results (from the past 24 hour(s))   Comprehensive Metabolic Panel    Collection Time: 03/23/24  6:22 PM    Specimen: Blood   Result Value Ref Range    Glucose 88 65 - 99 mg/dL    BUN 6 6 - 20 mg/dL    Creatinine 0.51 (L) 0.57 - 1.00 mg/dL    Sodium 140 136 - 145 mmol/L    Potassium 3.2 (L) 3.5 - 5.2 mmol/L    Chloride 103 98 - 107 mmol/L    CO2 22.0 22.0 - 29.0 mmol/L    Calcium 9.2 8.6 - 10.5 mg/dL    Total Protein 7.5 6.0 - 8.5 g/dL    Albumin 4.1 3.5 - 5.2 g/dL    ALT (SGPT) 7 1 - 33 U/L    AST (SGOT) 11 1 - 32 U/L    Alkaline Phosphatase 114 39 - 117 U/L    Total Bilirubin 0.4 0.0 - 1.2 mg/dL    Globulin 3.4 gm/dL    A/G Ratio 1.2 g/dL    BUN/Creatinine Ratio 11.8 7.0 - 25.0    Anion Gap 15.0 5.0 - 15.0 mmol/L    eGFR 119.7 >60.0 mL/min/1.73   Lipase    Collection Time: 03/23/24  6:22 PM    Specimen: Blood   Result Value Ref Range    Lipase 15 13 - 60 U/L   Single High Sensitivity Troponin T    Collection Time: 03/23/24  6:22 PM    Specimen: Blood   Result Value Ref Range    HS Troponin T 9 <14 ng/L   BNP    Collection Time: 03/23/24  6:22 PM    Specimen: Blood   Result Value Ref Range    proBNP 283.8 0.0 - 450.0 pg/mL   Lactic Acid, Plasma    Collection Time: 03/23/24  6:22 PM    Specimen: Blood   Result Value Ref Range    Lactate 2.7 (C) 0.5 - 2.0 mmol/L   Procalcitonin    Collection Time: 03/23/24  6:22 PM    Specimen: Blood   Result Value Ref Range    Procalcitonin 0.03 0.00 - 0.25 ng/mL   C-reactive Protein    Collection Time: 03/23/24  6:22 PM    Specimen: Blood   Result Value Ref Range    C-Reactive Protein 0.99 (H) 0.00 - 0.50 mg/dL   Sedimentation Rate    Collection Time: 03/23/24  6:22 PM    Specimen: Blood   Result Value Ref Range    Sed Rate 33 (H) 0 - 20 mm/hr   Salicylate Level    Collection Time: 03/23/24  6:22 PM    Specimen: Blood   Result Value Ref Range    Salicylate <0.3 <=30.0 mg/dL   Ethanol    Collection Time:  03/23/24  6:22 PM    Specimen: Blood   Result Value Ref Range    Ethanol 22 (H) 0 - 10 mg/dL   Acetaminophen Level    Collection Time: 03/23/24  6:22 PM    Specimen: Blood   Result Value Ref Range    Acetaminophen <5.0 0.0 - 30.0 mcg/mL   CK    Collection Time: 03/23/24  6:22 PM    Specimen: Blood   Result Value Ref Range    Creatine Kinase 51 20 - 180 U/L   Magnesium    Collection Time: 03/23/24  6:22 PM    Specimen: Blood   Result Value Ref Range    Magnesium 1.9 1.6 - 2.6 mg/dL   Phosphorus    Collection Time: 03/23/24  6:22 PM    Specimen: Blood   Result Value Ref Range    Phosphorus 3.0 2.5 - 4.5 mg/dL   TSH    Collection Time: 03/23/24  6:22 PM    Specimen: Blood   Result Value Ref Range    TSH 0.469 0.270 - 4.200 uIU/mL   CBC Auto Differential    Collection Time: 03/23/24  6:22 PM    Specimen: Blood   Result Value Ref Range    WBC 5.21 3.40 - 10.80 10*3/mm3    RBC 4.72 3.77 - 5.28 10*6/mm3    Hemoglobin 12.7 12.0 - 15.9 g/dL    Hematocrit 40.2 34.0 - 46.6 %    MCV 85.2 79.0 - 97.0 fL    MCH 26.9 26.6 - 33.0 pg    MCHC 31.6 31.5 - 35.7 g/dL    RDW 13.1 12.3 - 15.4 %    RDW-SD 39.7 37.0 - 54.0 fl    MPV 10.0 6.0 - 12.0 fL    Platelets 272 140 - 450 10*3/mm3    Neutrophil % 68.5 42.7 - 76.0 %    Lymphocyte % 25.1 19.6 - 45.3 %    Monocyte % 4.8 (L) 5.0 - 12.0 %    Eosinophil % 1.2 0.3 - 6.2 %    Basophil % 0.2 0.0 - 1.5 %    Immature Grans % 0.2 0.0 - 0.5 %    Neutrophils, Absolute 3.57 1.70 - 7.00 10*3/mm3    Lymphocytes, Absolute 1.31 0.70 - 3.10 10*3/mm3    Monocytes, Absolute 0.25 0.10 - 0.90 10*3/mm3    Eosinophils, Absolute 0.06 0.00 - 0.40 10*3/mm3    Basophils, Absolute 0.01 0.00 - 0.20 10*3/mm3    Immature Grans, Absolute 0.01 0.00 - 0.05 10*3/mm3    nRBC 0.0 0.0 - 0.2 /100 WBC   COVID-19, FLU A/B, RSV PCR 1 HR TAT - Swab, Nasopharynx    Collection Time: 03/23/24  6:38 PM    Specimen: Nasopharynx; Swab   Result Value Ref Range    COVID19 Not Detected Not Detected - Ref. Range    Influenza A PCR Not  Detected Not Detected    Influenza B PCR Not Detected Not Detected    RSV, PCR Not Detected Not Detected   Urinalysis With Microscopic If Indicated (No Culture) - Urine, Clean Catch    Collection Time: 03/23/24  7:45 PM    Specimen: Urine, Clean Catch   Result Value Ref Range    Color, UA Dark Yellow (A) Yellow, Straw    Appearance, UA Cloudy (A) Clear    pH, UA 6.0 5.0 - 8.0    Specific Gravity, UA 1.034 (H) 1.001 - 1.030    Glucose, UA Negative Negative    Ketones, UA 15 mg/dL (1+) (A) Negative    Bilirubin, UA Negative Negative    Blood, UA Negative Negative    Protein, UA Trace (A) Negative    Leuk Esterase, UA Negative Negative    Nitrite, UA Negative Negative    Urobilinogen, UA 1.0 E.U./dL 0.2 - 1.0 E.U./dL   Urine Drug Screen - Urine, Clean Catch    Collection Time: 03/23/24  7:45 PM    Specimen: Urine, Clean Catch   Result Value Ref Range    THC, Screen, Urine Positive (A) Negative    Phencyclidine (PCP), Urine Negative Negative    Cocaine Screen, Urine Positive (A) Negative    Methamphetamine, Ur Negative Negative    Opiate Screen Negative Negative    Amphetamine Screen, Urine Negative Negative    Benzodiazepine Screen, Urine Positive (A) Negative    Tricyclic Antidepressants Screen Negative Negative    Methadone Screen, Urine Negative Negative    Barbiturates Screen, Urine Negative Negative    Oxycodone Screen, Urine Negative Negative    Buprenorphine, Screen, Urine Negative Negative   Fentanyl, Urine - Urine, Clean Catch    Collection Time: 03/23/24  7:45 PM    Specimen: Urine, Clean Catch   Result Value Ref Range    Fentanyl, Urine Positive (A) Negative   Urinalysis, Microscopic Only - Urine, Clean Catch    Collection Time: 03/23/24  7:45 PM    Specimen: Urine, Clean Catch   Result Value Ref Range    RBC, UA 0-2 None Seen, 0-2 /HPF    WBC, UA 3-5 (A) None Seen, 0-2 /HPF    Bacteria, UA 1+ (A) None Seen, Trace /HPF    Squamous Epithelial Cells, UA 3-6 (A) None Seen, 0-2 /HPF    Hyaline Casts, UA 0-6 0 -  6 /LPF    Methodology Automated Microscopy    POC Urine Pregnancy    Collection Time: 03/23/24  7:48 PM    Specimen: Urine   Result Value Ref Range    HCG, Urine, QL Negative Negative    Lot Number [id number]     Internal Positive Control Positive Positive, Passed    Internal Negative Control Negative Negative, Passed    Expiration Date 2025/01/29    Blood Gas, Venous With Co-Ox    Collection Time: 03/23/24  8:38 PM    Specimen: Venous Blood   Result Value Ref Range    Site Nurse/Dr Draw     pH, Venous 7.459 (H) 7.310 - 7.410 pH Units    pCO2, Venous 38.0 (L) 41.0 - 51.0 mm Hg    pO2, Venous 64.1 (H) 27.0 - 53.0 mm Hg    HCO3, Venous 27.0 22.0 - 28.0 mmol/L    Base Excess, Venous 3.1 (H) -2.0 - 2.0 mmol/L    Hemoglobin, Blood Gas 12.3 (L) 14 - 18 g/dL    Oxyhemoglobin Venous 91.8 %    Methemoglobin Venous 0.0 %    Carboxyhemoglobin Venous 1.6 %    CO2 Content 28.2 22 - 33 mmol/L    Temperature 37.0     Barometric Pressure for Blood Gas      Modality Room Air     FIO2 21 %    Rate 0 Breaths/minute    PIP 0 cmH2O    IPAP 0     EPAP 0    STAT Lactic Acid, Reflex    Collection Time: 03/23/24  9:30 PM    Specimen: Arm, Right; Blood   Result Value Ref Range    Lactate 1.4 0.5 - 2.0 mmol/L       If labs were ordered, I independently reviewed the results and considered them in treating the patient.        RADIOLOGY  XR Pelvis 1 or 2 View    Result Date: 3/23/2024  XR PELVIS 1 OR 2 VW Date of Exam: 3/23/2024 6:21 PM EDT Indication: severe bilateral hip pain, history of right hip replacement/infection january Comparison: CT abdomen and pelvis 3/11/2022 Findings: There is severe left hip osteoarthritis similar to prior exam. There are postsurgical changes at the right hip likely related to prior right hip prosthesis placement status post recent hardware removal. The femur is slightly superiorly subluxed relative to the acetabulum. Iliopectineal and ilioischial lines are intact. Remaining bony pelvis appears intact.      Impression: 1. Abnormal right hip likely related to recent removal of right hip prosthesis, correlate with surgical history. 2. Severe left hip osteoarthritis similar to prior studies. Electronically Signed: Raleigh Lopez MD  3/23/2024 6:52 PM EDT  Workstation ID: [id number]    XR Chest 1 View    Result Date: 3/23/2024  XR CHEST 1 VW Date of Exam: 3/23/2024 6:21 PM EDT Indication: Septic workup, denies cough, history of IV drug use Comparison: None available. Findings: The cardiomediastinal silhouette is within normal limits. Lungs are clear. No focal consolidation, pneumothorax, or significant pleural effusion. Osseous structures grossly intact.     Impression: No acute process. Electronically Signed: Raleigh Lopez MD  3/23/2024 6:49 PM EDT  Workstation ID: [id number]     I ordered and independently reviewed the above noted radiographic studies.      I viewed images of x-ray of the chest which showed no acute pathology per my independent interpretation.  X-ray of the pelvis which shows postoperative changes of the right hip and impressive arthritis of the left hip but no acute fractures dislocations.    See radiologist's dictation for official interpretation.        PROCEDURES    Procedures    No orders to display       MEDICATIONS GIVEN IN ER    Medications   LORazepam (ATIVAN) injection 1 mg (1 mg Intravenous Given 3/23/24 1827)   sodium chloride 0.9 % bolus 1,000 mL (0 mL Intravenous Stopped 3/23/24 2159)   acetaminophen (TYLENOL) tablet 1,000 mg (1,000 mg Oral Given 3/23/24 1828)   gabapentin (NEURONTIN) capsule 300 mg (300 mg Oral Given 3/23/24 1828)   sodium chloride 0.9 % bolus 1,000 mL (0 mL Intravenous Stopped 3/23/24 2304)   potassium chloride (MICRO-K/KLOR-CON) CR capsule (20 mEq Oral Given 3/23/24 2117)   ketamine hcl syringe solution prefilled syringe 5 mg (5 mg Intravenous Given 3/23/24 2117)         MEDICAL DECISION MAKING, PROGRESS, and CONSULTS    All labs, if obtained, have been independently  reviewed by me.  All radiology studies, if obtained, have been reviewed by me and the radiologist dictating the report.  All EKG's, if obtained, have been independently viewed and interpreted by me/my attending physician.      Discussion below represents my analysis of pertinent findings related to patient's condition, differential diagnosis, treatment plan and final disposition.                         Differential diagnosis:    Sepsis, bacteremia, septic arthritis, substance withdrawal, rhabdomyolysis, pneumonia, urinary tract infection, anemia, electrolyte abnormality      Additional sources:    - Discussed/ obtained information from independent historians: EMS    - External (non-ED) record review:  Chart review of hospitalization at [facility] in late January for right total hip replacement with orthopedic surgery shows history of PMH of R hip septic arthritis c/b advanced DJD s/p R hip arthroplasty (8/2022), HTN, HCV, GERD, anxiety, bipolar disorder, opioid use disorder (prev on methadone), alcohol use disorder c/b withdrawal, stimulant use disorder, nicotine use disorder (vapes)    - Chronic or social conditions impacting care: History of septic arthritis with total replacement and lost to follow-up, hypertension, hepatitis C, GERD, anxiety, bipolar disorder, opioid use disorder, alcohol use disorder, stimulant use disorder, nicotine use disorder    - Shared decision making: Agreeable to discharge.      Orders placed during this visit:  Orders Placed This Encounter   Procedures    COVID PRE-OP / PRE-PROCEDURE SCREENING ORDER (NO ISOLATION) - Swab, Nasopharynx    Blood Culture - Blood,    Blood Culture - Blood,    COVID-19, FLU A/B, RSV PCR 1 HR TAT - Swab, Nasopharynx    XR Pelvis 1 or 2 View    XR Chest 1 View    Comprehensive Metabolic Panel    Lipase    Urinalysis With Microscopic If Indicated (No Culture) - Urine, Catheter    Single High Sensitivity Troponin T    BNP    Blood Gas, Venous -With  "Co-Ox Panel: Yes    Lactic Acid, Plasma    Procalcitonin    C-reactive Protein    Sedimentation Rate    Salicylate Level    Urine Drug Screen - Urine, Clean Catch    Ethanol    Acetaminophen Level    CK    Magnesium    Phosphorus    TSH    CBC Auto Differential    STAT Lactic Acid, Reflex    Fentanyl, Urine - Urine, Clean Catch    Urinalysis, Microscopic Only - Urine, Clean Catch    Blood Gas, Venous With Co-Ox    POC Urine Pregnancy    CBC & Differential         Additional orders considered but not ordered:      ED Course:    Consultants:      ED Course as of 03/24/24 0205   Sat Mar 23, 2024   5204 Chart review of hospitalization at [facility] in late January for right total hip replacement with orthopedic surgery shows history of PMH of R hip septic arthritis c/b advanced DJD s/p R hip arthroplasty (8/2022), HTN, HCV, GERD, anxiety, bipolar disorder, opioid use disorder (prev on methadone), alcohol use disorder c/b withdrawal, stimulant use disorder, nicotine use disorder (vapes) [CC]   1802 In summary this 43-year-old female who presents the emergency department with complaint of diffuse myalgias.  Says she is particularly having pain in her bilateral hips.  She has history of right-sided septic arthritis and recent arthroplasty in January with orthopedic surgery at [facility] she says she has not followed up with them since her surgery.  She is having pain in the right hip but now new left hip pain as well.  She also complains of diffuse myalgias.  She says she was taking methadone up until 4 days ago but she does not believe that this is withdrawal.  She has been drinking today.  Denies any infectious symptoms only \"pain everywhere\".  No other complaints at this time. [CC]   1804 She arrived ill-appearing, unkempt, having difficulty staying still in the bed.  Surgical incision appears clean and well-healing no appreciated deformities or swelling or overlying erythema or warmth of the " bilateral hips, concern however for bacteremia septic arthritis sepsis, withdrawal.  Treating with IV fluids lorazepam gabapentin and acetaminophen she has an NSAID allergy. [CC]   Sun Mar 24, 2024   0202 CBC shows no leukocytosis or anemia.  Very mild elevations in sed rate and CRP.  Metabolic panel shows hypokalemia which I am repleting IV.  Ethanol is only 22.  She does have an elevated lactate but with her dry mucous membranes am not sure this is infectious after IV fluids lactate normalized without any antibiotics.  No elevation in procalcitonin.  COVID and flu swab negative.  Urinalysis has 3-5 whites but contaminated with squamous cells.  UDS is positive for THC, cocaine, benzodiazepines.  Upon reevaluation she is resting comfortably after I was able to wake her we discussed the reassuring workup.  Ultimately I think she needs to follow-up with her orthopedic surgeon as she was already supposed to have done.  She is agreeable to contacting them first thing Monday morning.  Right now her infectious labs look good not have a reason to start her on antibiotics.  Counseled that I feel like her polysubstance use is likely contributing to her symptoms.  Discharged in stable condition with strict return precautions. [CC]      ED Course User Index  [CC] Christiano Mata MD              Shared Decision Making:  After my consideration of clinical presentation and any laboratory/radiology studies obtained, I discussed the findings with the patient/patient representative who is in agreement with the treatment plan and the final disposition.   Risks and benefits of discharge and/or observation/admission were discussed.       AS OF 02:05 EDT VITALS:    BP - 135/79  HR - 68  TEMP - 98.6 °F (37 °C) (Oral)  O2 SATS - 100%      [facility] reviewed by Christiano Mata MD           DIAGNOSIS  Final diagnoses:   Bilateral hip pain   Myalgia   Polysubstance abuse   Dehydration         DISPOSITION  DISCHARGE    Patient  discharged in stable condition.    Reviewed implications of results, diagnosis, meds, responsibility to follow up, warning signs and symptoms of possible worsening, potential complications and reasons to return to ER.    Patient/Family voiced understanding of above instructions.    Discussed plan for discharge, as there is no emergent indication for admission.  Pt/family is agreeable and understands need for follow up and possible repeat testing.  Pt/family is aware that discharge does not mean that nothing is wrong but that it indicates no emergency is currently present that requires admission and they must continue care with follow-up as given below or with a physician of their choice.     FOLLOW-UP  Anatoliy Stanton MD  [address] [facility] [address]  [place] [id number] [phone]    Call   Call first thing Monday morning you need to follow-up with your surgeon.    Karan Douglas, DO  [address] [facility] [address] [facility]  [address]  [place] [id number] [phone]    Call            Medication List      No changes were made to your prescriptions during this visit.             Please note that portions of this document were completed with voice recognition software.        Christiano Mata MD  03/24/24 7105

## 2024-03-24 NOTE — DISCHARGE INSTRUCTIONS
It is critical that you follow-up with your operating surgeon at , you should have already made this appointment.  I think the drugs in your system are likely contributing to your symptoms as well.  Maintain good oral hydration.  Follow-up with your PCP.

## 2024-03-28 LAB
BACTERIA SPEC AEROBE CULT: NORMAL
BACTERIA SPEC AEROBE CULT: NORMAL

## 2024-06-05 NOTE — ANESTHESIA PROCEDURE NOTES
Labor Epidural      Patient reassessed immediately prior to procedure    Patient location during procedure: OB  Performed By  Anesthesiologist: Rika Cuellar DO  Preanesthetic Checklist  Completed: patient identified, surgical consent, pre-op evaluation, timeout performed, IV checked, risks and benefits discussed and monitors and equipment checked  Prep:  Pt Position:sitting  Sterile Tech:cap, gloves, mask and sterile barrier  Prep:DuraPrep  Monitoring:blood pressure monitoring  Epidural Block Procedure:  Approach:midline  Guidance:palpation technique  Location:L3-L4  Needle Type:Tuohy  Needle Gauge:17 G  Loss of Resistance Medium: air  Loss of Resistance: 6cm  Cath Depth at skin:13 cm  Paresthesia: none  Aspiration:negative  Test Dose:negative  Number of Attempts: 1  Post Assessment:  Dressing:occlusive dressing applied and secured with tape  Pt Tolerance:patient tolerated the procedure well with no apparent complications  Complications:no             yes

## (undated) DEVICE — DRSNG GZ CURAD XEROFORM NONADHR OVERWRAP 5X9IN

## (undated) DEVICE — UNDERGLV SURG BIOGEL INDICAT PI SZ8 BLU

## (undated) DEVICE — BOWL UTIL STRL 32OZ

## (undated) DEVICE — PK C/SECT 10

## (undated) DEVICE — SOL IRR NACL 0.9PCT BT 1000ML

## (undated) DEVICE — MAT PREVALON MOBL TRANSFR AIR W/PAD 39X80IN

## (undated) DEVICE — SUT PDS 1 CTX 36IN VIO PDP371T

## (undated) DEVICE — GLV SURG BIOGEL LTX PF 6

## (undated) DEVICE — STERILE PVP: Brand: MEDLINE INDUSTRIES, INC.

## (undated) DEVICE — ELECTRD BLD EZ CLN STD 6.5IN

## (undated) DEVICE — BLAKE SILICONE DRAIN, 15 FR ROUND, HUBLESS WITH 3/16" TROCAR: Brand: BLAKE

## (undated) DEVICE — SUT PDS 2/0 CT2 27IN VIL PDP333H

## (undated) DEVICE — PK HIP TOTL UNIV 10

## (undated) DEVICE — SUT ETHLN 3/0 FSLX 30IN 1673H

## (undated) DEVICE — TRY SPINE BLCK WHITACRE 25G 3X5IN

## (undated) DEVICE — HEWSON SUTURE RETRIEVER: Brand: HEWSON SUTURE RETRIEVER

## (undated) DEVICE — DRAPE,TOP,102X53,STERILE: Brand: MEDLINE

## (undated) DEVICE — DRAPE,U/ SHT,SPLIT,PLAS,STERIL: Brand: MEDLINE

## (undated) DEVICE — CULT ANAERB

## (undated) DEVICE — SUT VIC 4/0 KS 27IN VCP662H

## (undated) DEVICE — SST TWIST DRILL, STANDARD, 1.6MM DIA. X 127MM: Brand: MICROAIRE®

## (undated) DEVICE — CULT AERO SGL CA/50

## (undated) DEVICE — ELECTRD BLD EZ CLN STD 2.5IN

## (undated) DEVICE — INTENDED FOR TISSUE SEPARATION, AND OTHER PROCEDURES THAT REQUIRE A SHARP SURGICAL BLADE TO PUNCTURE OR CUT.: Brand: BARD-PARKER ® STAINLESS STEEL BLADES

## (undated) DEVICE — SUT GUT CHRM 1 CTX 36IN 905H

## (undated) DEVICE — PENCL ROCKRSWCH MEGADYNE W/HOLSTR 10FT SS

## (undated) DEVICE — SOL IRR H2O BTL 1000ML STRL

## (undated) DEVICE — HDRST POSTIN FM CRDL TRACH SLOT 9X8X4IN

## (undated) DEVICE — SPNG GZ WOVN 4X4IN 12PLY 10/BX STRL

## (undated) DEVICE — GLV SURG SENSICARE PI MIC PF SZ9 LF STRL

## (undated) DEVICE — Device

## (undated) DEVICE — PATIENT RETURN ELECTRODE, SINGLE-USE, CONTACT QUALITY MONITORING, ADULT, WITH 9FT CORD, FOR PATIENTS WEIGING OVER 33LBS. (15KG): Brand: MEGADYNE

## (undated) DEVICE — DRSNG TELFA PAD NONADH STR 1S 3X8IN

## (undated) DEVICE — SPNG LAP PREWSH SFTPK 18X18IN STRL PK/5

## (undated) DEVICE — SUT PDS 1 TP1 48IN Z880G BX/12

## (undated) DEVICE — BLANKT WARM UPPR/BDY ARM/OUT 57X196CM

## (undated) DEVICE — DRAPE,REIN 53X77,STERILE: Brand: MEDLINE

## (undated) DEVICE — SUT PLAIN  3/0 CT1 27IN 842H

## (undated) DEVICE — PILLW ABD MD

## (undated) DEVICE — GLV SURG SENSICARE PI ORTHO SZ8 LF STRL

## (undated) DEVICE — 3M™ STERI-STRIP™ REINFORCED ADHESIVE SKIN CLOSURES, R1547, 1/2 IN X 4 IN (12 MM X 100 MM), 6 STRIPS/ENVELOPE: Brand: 3M™ STERI-STRIP™

## (undated) DEVICE — GLV SURG BIOGEL LTX PF 6 1/2